# Patient Record
Sex: MALE | Race: BLACK OR AFRICAN AMERICAN | NOT HISPANIC OR LATINO | Employment: OTHER | URBAN - METROPOLITAN AREA
[De-identification: names, ages, dates, MRNs, and addresses within clinical notes are randomized per-mention and may not be internally consistent; named-entity substitution may affect disease eponyms.]

---

## 2019-06-21 ENCOUNTER — HOSPITAL ENCOUNTER (EMERGENCY)
Facility: HOSPITAL | Age: 54
Discharge: HOME | End: 2019-06-21
Attending: EMERGENCY MEDICINE
Payer: MEDICARE

## 2019-06-21 VITALS
TEMPERATURE: 99 F | HEART RATE: 67 BPM | RESPIRATION RATE: 16 BRPM | HEIGHT: 75 IN | OXYGEN SATURATION: 100 % | BODY MASS INDEX: 39.17 KG/M2 | SYSTOLIC BLOOD PRESSURE: 187 MMHG | WEIGHT: 315 LBS | DIASTOLIC BLOOD PRESSURE: 91 MMHG

## 2019-06-21 DIAGNOSIS — T82.524A DISPLACEMENT OF PERIPHERALLY INSERTED CENTRAL VENOUS CATHETER (PICC): Primary | ICD-10-CM

## 2019-06-21 DIAGNOSIS — Z45.2 PICC (PERIPHERALLY INSERTED CENTRAL CATHETER) IN PLACE: ICD-10-CM

## 2019-06-21 DIAGNOSIS — M25.579 ANKLE PAIN: ICD-10-CM

## 2019-06-21 PROCEDURE — 99283 EMERGENCY DEPT VISIT LOW MDM: CPT | Mod: 25

## 2019-06-21 PROCEDURE — 25000003 PHARM REV CODE 250: Performed by: PHYSICIAN ASSISTANT

## 2019-06-21 PROCEDURE — 99285 PR EMERGENCY DEPT VISIT,LEVEL V: ICD-10-PCS | Mod: ,,, | Performed by: EMERGENCY MEDICINE

## 2019-06-21 PROCEDURE — 63600175 PHARM REV CODE 636 W HCPCS: Performed by: PHYSICIAN ASSISTANT

## 2019-06-21 PROCEDURE — 99285 EMERGENCY DEPT VISIT HI MDM: CPT | Mod: ,,, | Performed by: EMERGENCY MEDICINE

## 2019-06-21 RX ORDER — VANCOMYCIN 1.75 GRAM/500 ML IN 0.9 % SODIUM CHLORIDE INTRAVENOUS
1750
Status: COMPLETED | OUTPATIENT
Start: 2019-06-21 | End: 2019-06-21

## 2019-06-21 RX ORDER — ACETAMINOPHEN 325 MG/1
650 TABLET ORAL
Status: COMPLETED | OUTPATIENT
Start: 2019-06-21 | End: 2019-06-21

## 2019-06-21 RX ADMIN — ACETAMINOPHEN 650 MG: 325 TABLET ORAL at 05:06

## 2019-06-21 RX ADMIN — VANCOMYCIN HYDROCHLORIDE 1750 MG: 100 INJECTION, POWDER, LYOPHILIZED, FOR SOLUTION INTRAVENOUS at 07:06

## 2019-06-21 NOTE — ED PROVIDER NOTES
Encounter Date: 6/21/2019       History     Chief Complaint   Patient presents with    Vascular Access Problem     from [facility], sent to ED for PICC line replacement.     55 y/o AAM with history of HTN, hyperlipidemia, DM, currently being treated for R foot osteomyelitis at [facility] presents to the ED via EMS from [facility] due to concern for dislodged PICC line. He has a PICC line in the LUE - he states they were changing the dressing and think they might have pulled out the PICC. He was sent to the ED for imaging to confirm placement of PICC line. PICC was used today without any complication. He reports mild headache and L ankle pain. He denies any trauma to the ankle. He denies f/c, chest pain, SOB, abdominal pain, nausea.     The history is provided by the patient.     Review of patient's allergies indicates:   Allergen Reactions    Adhesive Itching     Past Medical History:   Diagnosis Date    Depression     Diabetes mellitus     Hypertension     Obesity     Osteomyelitis      History reviewed. No pertinent surgical history.  History reviewed. No pertinent family history.  Social History     Tobacco Use    Smoking status: Never Smoker   Substance Use Topics    Alcohol use: Not on file    Drug use: Yes     Frequency: 1.0 times per week     Types: Marijuana     Comment: last use 2 days ago     Review of Systems   Constitutional: Negative for chills and fever.   HENT: Negative for congestion, rhinorrhea and sore throat.    Eyes: Negative for photophobia and visual disturbance.   Respiratory: Negative for shortness of breath.    Cardiovascular: Negative for chest pain.   Gastrointestinal: Negative for abdominal pain, constipation, diarrhea, nausea and vomiting.   Genitourinary: Negative for dysuria and hematuria.   Musculoskeletal: Positive for arthralgias.        PICC line possibly pulled back   Skin: Negative for rash.   Neurological: Negative for light-headedness, numbness and headaches.    Psychiatric/Behavioral: Negative for confusion.       Physical Exam     Initial Vitals [06/21/19 1642]   BP Pulse Resp Temp SpO2   (!) 176/87 60 18 98.5 °F (36.9 °C) 95 %      MAP       --         Physical Exam    Nursing note and vitals reviewed.  Constitutional: He appears well-developed and well-nourished. He is not diaphoretic. No distress.   HENT:   Head: Normocephalic and atraumatic.   Neck: Normal range of motion. Neck supple.   Cardiovascular: Normal rate, regular rhythm and normal heart sounds. Exam reveals no gallop and no friction rub.    No murmur heard.  Pulmonary/Chest: Breath sounds normal. He has no wheezes. He has no rhonchi. He has no rales.   Abdominal: Soft. Bowel sounds are normal. There is no tenderness. There is no rebound and no guarding.   Musculoskeletal: He exhibits no edema or tenderness.        Left ankle: He exhibits normal range of motion. No tenderness.   R foot in boot. PICC line noted to the LUE. No tenderness.    Neurological: He is alert and oriented to person, place, and time.   Skin: Skin is warm and dry. No rash noted. No erythema.   Psychiatric: He has a normal mood and affect.         ED Course   Procedures  Labs Reviewed - No data to display       Imaging Results          X-Ray Ankle Complete Left (Final result)  Result time 06/21/19 18:23:16    Final result by Nina Junior MD (06/21/19 18:23:16)                 Impression:      No fracture or malalignment.      Electronically signed by: Nina Junior  Date:    06/21/2019  Time:    18:23             Narrative:    EXAMINATION:  XR ANKLE COMPLETE 3 VIEW LEFT    CLINICAL HISTORY:  Pain in unspecified ankle and joints of unspecified foot    TECHNIQUE:  AP, lateral and oblique views of the left ankle were performed.    COMPARISON:  None    FINDINGS:  Frontal, oblique and lateral views presented.  The mineralization and joint spaces are normal.  No fracture or erosion or effusion.  There is mild spurring at the  talonavicular joint and the naviculocuneiform joints.  The Achilles tendon appears normal.  No widening of the mortise. No focal soft tissue swelling.                               X-Ray Chest 1 View (Final result)  Result time 06/21/19 18:24:00    Final result by Nina Junior MD (06/21/19 18:24:00)                 Impression:      No failure or pneumonia.      Electronically signed by: Nina Junior  Date:    06/21/2019  Time:    18:24             Narrative:    EXAMINATION:  XR CHEST 1 VIEW    CLINICAL HISTORY:  Encounter for adjustment and management of vascular access device    TECHNIQUE:  Single frontal view of the chest was performed.    COMPARISON:  None    FINDINGS:  Single AP portable view at 17:41.    Left PICC catheter terminates with its tip projecting inferior to the medial left clavicle, precise location unknown on single projection.    Semi lordotic view with modest inspiration.  The heart is probably normal in size allowing for technique.  No pneumothorax or pleural effusion or interstitial edema or nodule or cavitary lesion.  No abdominal free air or fracture.                                 Medical Decision Making:   History:   Old Medical Records: I decided to obtain old medical records.  Independently Interpreted Test(s):   I have ordered and independently interpreted X-rays - see summary below.       <> Summary of X-Ray Reading(s): PICC line tip at the L clavicle  Clinical Tests:   Radiological Study: Ordered and Reviewed       APC / Resident Notes:   55 y/o AAM with history of HTN, hyperlipidemia, DM, currently being treated for R foot osteomyelitis at [facility] presents to the ED via EMS from [facility] due to concern for dislodged PICC line. VSS. PICC line noted to the LUE - no cellulitis, appears dislodged. R foot in boot. L ankle with no tenderness, swelling, erythema. Will obtain CXR to confirm placement of PICC and L ankle xray.     Xray L ankle with no fracture.  CXR shows PICC tip  at the clavicle.     PICC line pulled by Cara Preciado RN.     Discussed with the patient's nurse at [facility]. He needs IV access to return there. Per nurse, he is on vancomycin 1750mg q12 (next dose due at 6pm), rocephin 2g daily (next dose due at 2pm tomorrow). Will place peripheral IV, give dose of IV vanocmycin, send back to [facility]. He will return tomorrow to have PICC line placed.    Spoke with PICC team - they will be here tomorrow starting at 7:30, all orders need to be placed before noon. They were notified that he will be coming tomorrow morning. I do not feel that he needs any further labs or imaging at this time. Stable for discharge.    He was discharged without any new prescriptions.  He will return to the ED tomorrow to have PICC line placed.  All of the patient's questions were answered.  I reviewed the patient's chart and imaging and discussed the case with my supervising physician.                    Clinical Impression:       ICD-10-CM ICD-9-CM   1. Displacement of peripherally inserted central venous catheter (PICC) T82.598A 996.1   2. PICC (peripherally inserted central catheter) in place Z45.2 V58.81   3. Ankle pain M25.579 719.47         Disposition:   Disposition: Discharged  Condition: Stable                        Rossy Guido PA-C  06/21/19 4256

## 2019-06-21 NOTE — DISCHARGE INSTRUCTIONS
Peripheral IV placed in the ED - given 6pm dose of vancomycin. Please send back to the [facility] ED tomorrow morning at 7am to have new PICC line placed.

## 2019-06-21 NOTE — ED TRIAGE NOTES
Vascular Access Problem (from [facility], sent to ED for PICC line replacement.) Pt receives antibiotics daily for infection to right great toe.

## 2019-06-21 NOTE — ED NOTES
Patient identifiers verified and correct for Abel Gibbs  LOC: The patient is awake, alert and aware of environment with an appropriate affect, the patient is oriented x 3 and speaking appropriately.   APPEARANCE: Patient appears comfortable and in no acute distress, patient is clean and well groomed.  SKIN: The skin is warm and dry, color consistent with ethnicity, patient has normal skin turgor and moist mucus membranes, skin intact, no breakdown or bruising noted. Pt has PICC line to left upper arm that needs to be evaluated.  MUSCULOSKELETAL: Patient moving all extremities spontaneously, no swelling noted.  RESPIRATORY: Airway is open and patent, respirations are spontaneous, patient has a normal effort and rate, no accessory muscle use noted, pt placed on continuous pulse ox with O2 sats noted at 97% on room air.  CARDIAC: Pt placed on cardiac monitor. Patient has a normal rate and regular rhythm, no edema noted, capillary refill < 3 seconds.   GASTRO: Soft and non tender to palpation, no distention noted, normoactive bowel sounds present in all four quadrants. Pt states bowel movements have been regular.  : Pt denies any pain or frequency with urination.  NEURO: Pt opens eyes spontaneously, behavior appropriate to situation, follows commands, facial expression symmetrical, bilateral hand grasp equal and even, purposeful motor response noted, normal sensation in all extremities when touched with a finger.

## 2019-06-22 ENCOUNTER — HOSPITAL ENCOUNTER (EMERGENCY)
Facility: HOSPITAL | Age: 54
Discharge: HOME | End: 2019-06-22
Attending: EMERGENCY MEDICINE
Payer: MEDICARE

## 2019-06-22 VITALS
BODY MASS INDEX: 39.17 KG/M2 | OXYGEN SATURATION: 100 % | RESPIRATION RATE: 20 BRPM | HEIGHT: 75 IN | TEMPERATURE: 98 F | HEART RATE: 14 BPM | SYSTOLIC BLOOD PRESSURE: 169 MMHG | DIASTOLIC BLOOD PRESSURE: 86 MMHG | WEIGHT: 315 LBS

## 2019-06-22 DIAGNOSIS — Z95.828 S/P PICC CENTRAL LINE PLACEMENT: Primary | ICD-10-CM

## 2019-06-22 DIAGNOSIS — M25.572 CHRONIC PAIN OF LEFT ANKLE: ICD-10-CM

## 2019-06-22 DIAGNOSIS — G89.29 CHRONIC PAIN OF LEFT ANKLE: ICD-10-CM

## 2019-06-22 LAB
ANION GAP SERPL CALC-SCNC: 8 MMOL/L (ref 8–16)
BASOPHILS # BLD AUTO: 0.07 K/UL (ref 0–0.2)
BASOPHILS NFR BLD: 1.3 % (ref 0–1.9)
BUN SERPL-MCNC: 16 MG/DL (ref 6–20)
CALCIUM SERPL-MCNC: 8.8 MG/DL (ref 8.7–10.5)
CHLORIDE SERPL-SCNC: 104 MMOL/L (ref 95–110)
CO2 SERPL-SCNC: 26 MMOL/L (ref 23–29)
CREAT SERPL-MCNC: 1 MG/DL (ref 0.5–1.4)
DIFFERENTIAL METHOD: ABNORMAL
EOSINOPHIL # BLD AUTO: 0.5 K/UL (ref 0–0.5)
EOSINOPHIL NFR BLD: 8.2 % (ref 0–8)
ERYTHROCYTE [DISTWIDTH] IN BLOOD BY AUTOMATED COUNT: 14.4 % (ref 11.5–14.5)
EST. GFR  (AFRICAN AMERICAN): >60 ML/MIN/1.73 M^2
EST. GFR  (NON AFRICAN AMERICAN): >60 ML/MIN/1.73 M^2
GLUCOSE SERPL-MCNC: 156 MG/DL (ref 70–110)
HCT VFR BLD AUTO: 32.8 % (ref 40–54)
HGB BLD-MCNC: 10.5 G/DL (ref 14–18)
IMM GRANULOCYTES # BLD AUTO: 0.01 K/UL (ref 0–0.04)
IMM GRANULOCYTES NFR BLD AUTO: 0.2 % (ref 0–0.5)
LYMPHOCYTES # BLD AUTO: 2.3 K/UL (ref 1–4.8)
LYMPHOCYTES NFR BLD: 41.4 % (ref 18–48)
MCH RBC QN AUTO: 29 PG (ref 27–31)
MCHC RBC AUTO-ENTMCNC: 32 G/DL (ref 32–36)
MCV RBC AUTO: 91 FL (ref 82–98)
MONOCYTES # BLD AUTO: 0.5 K/UL (ref 0.3–1)
MONOCYTES NFR BLD: 9.5 % (ref 4–15)
NEUTROPHILS # BLD AUTO: 2.2 K/UL (ref 1.8–7.7)
NEUTROPHILS NFR BLD: 39.4 % (ref 38–73)
NRBC BLD-RTO: 0 /100 WBC
PLATELET # BLD AUTO: 223 K/UL (ref 150–350)
PMV BLD AUTO: 9.8 FL (ref 9.2–12.9)
POTASSIUM SERPL-SCNC: 4 MMOL/L (ref 3.5–5.1)
RBC # BLD AUTO: 3.62 M/UL (ref 4.6–6.2)
SODIUM SERPL-SCNC: 138 MMOL/L (ref 136–145)
WBC # BLD AUTO: 5.6 K/UL (ref 3.9–12.7)

## 2019-06-22 PROCEDURE — 99284 EMERGENCY DEPT VISIT MOD MDM: CPT | Mod: ,,, | Performed by: PHYSICIAN ASSISTANT

## 2019-06-22 PROCEDURE — 85025 COMPLETE CBC W/AUTO DIFF WBC: CPT

## 2019-06-22 PROCEDURE — 99284 EMERGENCY DEPT VISIT MOD MDM: CPT | Mod: 25

## 2019-06-22 PROCEDURE — 25000003 PHARM REV CODE 250: Performed by: PHYSICIAN ASSISTANT

## 2019-06-22 PROCEDURE — 80048 BASIC METABOLIC PNL TOTAL CA: CPT

## 2019-06-22 PROCEDURE — 99284 PR EMERGENCY DEPT VISIT,LEVEL IV: ICD-10-PCS | Mod: ,,, | Performed by: PHYSICIAN ASSISTANT

## 2019-06-22 PROCEDURE — C1751 CATH, INF, PER/CENT/MIDLINE: HCPCS

## 2019-06-22 PROCEDURE — 36573 INSJ PICC RS&I 5 YR+: CPT

## 2019-06-22 PROCEDURE — 76937 US GUIDE VASCULAR ACCESS: CPT

## 2019-06-22 RX ORDER — LIDOCAINE 50 MG/G
1 PATCH TOPICAL
Status: DISCONTINUED | OUTPATIENT
Start: 2019-06-22 | End: 2019-06-22 | Stop reason: HOSPADM

## 2019-06-22 RX ORDER — SODIUM CHLORIDE 0.9 % (FLUSH) 0.9 %
10 SYRINGE (ML) INJECTION
Status: DISCONTINUED | OUTPATIENT
Start: 2019-06-22 | End: 2019-06-22 | Stop reason: HOSPADM

## 2019-06-22 RX ORDER — DICLOFENAC SODIUM 10 MG/G
2 GEL TOPICAL 4 TIMES DAILY
Qty: 100 G | Refills: 0 | Status: ON HOLD | OUTPATIENT
Start: 2019-06-22 | End: 2023-02-10 | Stop reason: HOSPADM

## 2019-06-22 RX ORDER — SODIUM CHLORIDE 0.9 % (FLUSH) 0.9 %
10 SYRINGE (ML) INJECTION EVERY 6 HOURS
Status: DISCONTINUED | OUTPATIENT
Start: 2019-06-22 | End: 2019-06-22 | Stop reason: HOSPADM

## 2019-06-22 RX ORDER — LIDOCAINE 50 MG/G
1 PATCH TOPICAL DAILY
Qty: 15 PATCH | Refills: 0 | Status: ON HOLD | OUTPATIENT
Start: 2019-06-22 | End: 2023-02-10 | Stop reason: HOSPADM

## 2019-06-22 RX ORDER — ACETAMINOPHEN 500 MG
1000 TABLET ORAL
Status: COMPLETED | OUTPATIENT
Start: 2019-06-22 | End: 2019-06-22

## 2019-06-22 RX ADMIN — LIDOCAINE 1 PATCH: 50 PATCH TOPICAL at 09:06

## 2019-06-22 RX ADMIN — ACETAMINOPHEN 1000 MG: 500 TABLET ORAL at 09:06

## 2019-06-22 NOTE — CONSULTS
Double lumen PICC placed to (R) BRACHIALvein.  45 cm in length, 0 cm exposed, and 36 cm arm circumference.  Lot # [id number]

## 2019-06-22 NOTE — DISCHARGE INSTRUCTIONS
Take antibiotics through PICC as instructed.  Return to the ED immediately if you develop fever, uncontrollable vomiting or severe pain around PICC site.    Our goal in the emergency department is to always give you outstanding care and exceptional service. You may receive a survey by mail or e-mail in the next week regarding your experience in our ED. We would greatly appreciate your completing and returning the survey. Your feedback provides us with a way to recognize our staff who give very good care and it helps us learn how to improve when your experience was below our aspiration of excellence.

## 2019-06-22 NOTE — ED NOTES
Pt identifiers checked and accurate with Abel Gibbs    Pt reports to ED for PICC placement. Pt seen in ED yesterday, told to return today for new PICC placement, left brachial PICC removed yesterday. Pt denies pain, swelling, fever, chills, N/V/D.    .LOC: The patient is awake, alert and aware of environment with an appropriate affect, the patient is oriented x 3 and speaking appropriately.  APPEARANCE: Patient resting comfortably and in no acute distress, patient is clean and well groomed  SKIN: The skin is warm and dry, color consistent with ethnicity, patient has normal skin turgor and moist mucus membranes, skin intact. Pt presents with dressing to left upper arm, 22 gauge IV to right hand from last night, flushing without difficulty.   MUSCULOSKELETAL: Patient moving all extremities well, no obvious swelling or deformities noted. Pt ambulates unassisted with steady gait.   RESPIRATORY: Airway is open and patent; respirations are spontaneous, patient has a normal effort and rate, no accessory muscle use noted.   NEUROLOGIC: Eyes open spontaneously, behavior appropriate to situation, follows commands, purposeful motor response noted

## 2019-06-22 NOTE — PROCEDURES
"Abel Gibbs is a 54 y.o. male patient.    Temp: 97.5 °F (36.4 °C) (06/22/19 0802)  Pulse: 63 (06/22/19 0802)  Resp: 18 (06/22/19 0802)  BP: (!) 152/75 (06/22/19 0802)  SpO2: 96 % (06/22/19 0802)  Weight: (!) 152 kg (335 lb) (06/22/19 0802)  Height: 6' 3" (190.5 cm) (06/22/19 0802)    PICC  Date/Time: 6/22/2019 12:29 PM  Performed by: Carina Torres RN  Consent Done: Yes  Time out: Immediately prior to procedure a time out was called to verify the correct patient, procedure, equipment, support staff and site/side marked as required  Indications: med administration and vascular access  Anesthesia: local infiltration  Local anesthetic: lidocaine 1% without epinephrine  Anesthetic Total (mL): 2  Preparation: skin prepped with ChloraPrep  Skin prep agent dried: skin prep agent completely dried prior to procedure  Sterile barriers: all five maximum sterile barriers used - cap, mask, sterile gown, sterile gloves, and large sterile sheet  Hand hygiene: hand hygiene performed prior to central venous catheter insertion  Location details: right brachial  Catheter type: double lumen  Catheter size: 5 Fr  Catheter Length: 45cm    Ultrasound guidance: yes  Vessel Caliber: medium and patent, compressibility normal  Vascular Doppler: not done  Needle advanced into vessel with real time Ultrasound guidance.  Guidewire confirmed in vessel.  Image recorded and saved.  Sterile sheath used.  no esophageal manometryNumber of attempts: 1  Post-procedure: blood return through all ports, chlorhexidine patch and sterile dressing applied  Specimens: No  Implants: No  Assessment: placement verified by x-ray  Complications: none          Linda Cordoba  6/22/2019  "

## 2019-06-22 NOTE — ED PROVIDER NOTES
Encounter Date: 6/22/2019       History     Chief Complaint   Patient presents with    Vascular Access Problem     seen here last night told to come to er  this morning and get pic line inserted     Mr Gibbs is 54yoM who presents for PICC line placement; pertinent PMHx PICC line for IV antibiotics 2/2 osteomyelitis of right toe, DM 2, HTN, obesity.  Patient was seen in  see ED yesterday for PICC line dislodgement; PICC line was found to be in left subclavian vein.  PICC was pulled, peripheral IV was placed and he was told to come back today for PICC line placement.  This is confirmed by provider with PICC team.  Patient has no complaints, was able to receive IV antibiotic infusion yesterday.  He has not yet received his infusion today.  Denies fever/chills, nausea/vomiting.  The patients available PMH, PSH, Social History, medications, allergies, and triage vital signs were reviewed just prior to their medical evaluation.          Review of patient's allergies indicates:   Allergen Reactions    Adhesive Itching     Past Medical History:   Diagnosis Date    Depression     Diabetes mellitus     Hypertension     Obesity     Osteomyelitis      History reviewed. No pertinent surgical history.  History reviewed. No pertinent family history.  Social History     Tobacco Use    Smoking status: Never Smoker   Substance Use Topics    Alcohol use: Not on file    Drug use: Yes     Frequency: 1.0 times per week     Types: Marijuana     Comment: last use 2 days ago     Review of Systems   Constitutional: Negative for chills and fever.   Respiratory: Negative for shortness of breath.    Cardiovascular: Negative for chest pain.   Gastrointestinal: Negative for nausea and vomiting.   Skin: Negative for color change and rash.   Neurological: Negative for weakness.       Physical Exam     Initial Vitals [06/22/19 0802]   BP Pulse Resp Temp SpO2   (!) 152/75 63 18 97.5 °F (36.4 °C) 96 %      MAP       --         Physical  Exam    Vitals reviewed.  Constitutional: He appears well-developed and well-nourished. He is not diaphoretic. No distress.   Well-appearing male in NAD, VSS, afebrile, 96% on RA.     HENT:   Head: Normocephalic and atraumatic.   Right Ear: External ear normal.   Left Ear: External ear normal.   Nose: Nose normal.   Eyes: Conjunctivae and EOM are normal. Pupils are equal, round, and reactive to light.   Cardiovascular: Normal rate, regular rhythm and intact distal pulses.   Pulmonary/Chest: Breath sounds normal.   Musculoskeletal:   Right walking boot in place  Left ankle is unremarkable, no pain with AROM, minimal pain with ambulation, no bony tenderness, neurovascular intact (chronic left ankle pain)   Neurological: He is alert and oriented to person, place, and time. He has normal strength. No cranial nerve deficit or sensory deficit.   Skin: Skin is warm and dry. Capillary refill takes less than 2 seconds. No rash noted. No erythema. No pallor.   Peripheral IV in right hand, no surrounding erythema induration or fluctuance   Psychiatric: He has a normal mood and affect. His behavior is normal. Judgment and thought content normal.         ED Course   Procedures  Labs Reviewed   BASIC METABOLIC PANEL - Abnormal; Notable for the following components:       Result Value    Glucose 156 (*)     All other components within normal limits   CBC W/ AUTO DIFFERENTIAL - Abnormal; Notable for the following components:    RBC 3.62 (*)     Hemoglobin 10.5 (*)     Hematocrit 32.8 (*)     Eosinophil% 8.2 (*)     All other components within normal limits          Imaging Results          X-Ray Chest 1 View for PICC_Central line (Final result)  Result time 06/22/19 13:17:46    Final result by Mike Johnston MD (06/22/19 13:17:46)                 Impression:      Interval right-sided PICC line with tip overlying the mid SVC.  Otherwise, no change or radiographic acute process seen.      Electronically signed by: Mike Johnston  MD  Date:    06/22/2019  Time:    13:17             Narrative:    EXAMINATION:  XR CHEST 1 VIEW    CLINICAL HISTORY:  Evaluate PICC line placement;    TECHNIQUE:  Frontal chest radiograph.    COMPARISON:  Chest radiograph 1 day prior    FINDINGS:  Interval placement of right-sided PICC line with tip overlying the mid SVC.  No large pneumothorax or new focal opacity.  Cardiomediastinal silhouette is midline and within normal limits.  Pulmonary vasculature and hilar regions are within normal limits.  No acute osseous process seen.  PA and lateral views can be obtained.                                 Medical Decision Making:   History:   Old Medical Records: I decided to obtain old medical records.  Old Records Summarized: records from clinic visits and records from previous admission(s).  Initial Assessment:   Patient returns for PICC line placement, as confirmed by PICC team yesterday.  No changes to PIP, VSS, afebrile.  Also mentions chronic left ankle pain that improves with Tylenol.  Differential Diagnosis:   DDx arthritic pain, improper sole support. Physical exam and history taking lower clinical suspicion for DVT of RUE, bacteremia.  Clinical Tests:   Lab Tests: Ordered and Reviewed  Radiological Study: Ordered and Reviewed  ED Management:  PICC team consulted and will place on line at the bedside.  Requesting CBC and BMP.  Update:  PICC successfully placed and confirmed by x-ray.  Given Voltaren gel and lidocaine patches as this improved ankle pain. Recommend regular follow-up PCP. Patient agreed to plan of care and voiced understanding. Discharged in stable condition with strict ED return precautions.    Aviva De Santiago PA-C  06/22/2019    I discussed the following case, diagnosis and plan of care with attending physician.                        Clinical Impression:       ICD-10-CM ICD-9-CM   1. S/P PICC central line placement Z95.828 V45.89   2. Chronic pain of left ankle M25.572 719.47    G89.29 338.29          Disposition:   Disposition: Discharged  Condition: Stable                        Aviva De Santiago PA-C  06/22/19 0159

## 2019-06-27 ENCOUNTER — HOSPITAL ENCOUNTER (EMERGENCY)
Facility: HOSPITAL | Age: 54
Discharge: HOME | End: 2019-06-27
Attending: EMERGENCY MEDICINE
Payer: MEDICARE

## 2019-06-27 VITALS
TEMPERATURE: 98 F | DIASTOLIC BLOOD PRESSURE: 88 MMHG | RESPIRATION RATE: 16 BRPM | SYSTOLIC BLOOD PRESSURE: 138 MMHG | HEART RATE: 68 BPM | OXYGEN SATURATION: 98 % | BODY MASS INDEX: 39.17 KG/M2 | WEIGHT: 315 LBS | HEIGHT: 75 IN

## 2019-06-27 DIAGNOSIS — Z95.828 S/P PICC CENTRAL LINE PLACEMENT: Primary | ICD-10-CM

## 2019-06-27 LAB
ANION GAP SERPL CALC-SCNC: 8 MMOL/L (ref 8–16)
BASOPHILS # BLD AUTO: 0.06 K/UL (ref 0–0.2)
BASOPHILS NFR BLD: 1.2 % (ref 0–1.9)
BUN SERPL-MCNC: 21 MG/DL (ref 6–20)
CALCIUM SERPL-MCNC: 9.7 MG/DL (ref 8.7–10.5)
CHLORIDE SERPL-SCNC: 103 MMOL/L (ref 95–110)
CO2 SERPL-SCNC: 29 MMOL/L (ref 23–29)
CREAT SERPL-MCNC: 1 MG/DL (ref 0.5–1.4)
DIFFERENTIAL METHOD: ABNORMAL
EOSINOPHIL # BLD AUTO: 0.4 K/UL (ref 0–0.5)
EOSINOPHIL NFR BLD: 7.4 % (ref 0–8)
ERYTHROCYTE [DISTWIDTH] IN BLOOD BY AUTOMATED COUNT: 14.6 % (ref 11.5–14.5)
EST. GFR  (AFRICAN AMERICAN): >60 ML/MIN/1.73 M^2
EST. GFR  (NON AFRICAN AMERICAN): >60 ML/MIN/1.73 M^2
GLUCOSE SERPL-MCNC: 93 MG/DL (ref 70–110)
HCT VFR BLD AUTO: 33.2 % (ref 40–54)
HGB BLD-MCNC: 10.7 G/DL (ref 14–18)
IMM GRANULOCYTES # BLD AUTO: 0.02 K/UL (ref 0–0.04)
IMM GRANULOCYTES NFR BLD AUTO: 0.4 % (ref 0–0.5)
LYMPHOCYTES # BLD AUTO: 2 K/UL (ref 1–4.8)
LYMPHOCYTES NFR BLD: 40 % (ref 18–48)
MCH RBC QN AUTO: 28.8 PG (ref 27–31)
MCHC RBC AUTO-ENTMCNC: 32.2 G/DL (ref 32–36)
MCV RBC AUTO: 89 FL (ref 82–98)
MONOCYTES # BLD AUTO: 0.5 K/UL (ref 0.3–1)
MONOCYTES NFR BLD: 11.1 % (ref 4–15)
NEUTROPHILS # BLD AUTO: 1.9 K/UL (ref 1.8–7.7)
NEUTROPHILS NFR BLD: 39.9 % (ref 38–73)
NRBC BLD-RTO: 0 /100 WBC
PLATELET # BLD AUTO: 206 K/UL (ref 150–350)
PMV BLD AUTO: 10.6 FL (ref 9.2–12.9)
POTASSIUM SERPL-SCNC: 4.3 MMOL/L (ref 3.5–5.1)
RBC # BLD AUTO: 3.72 M/UL (ref 4.6–6.2)
SODIUM SERPL-SCNC: 140 MMOL/L (ref 136–145)
WBC # BLD AUTO: 4.87 K/UL (ref 3.9–12.7)

## 2019-06-27 PROCEDURE — 99284 EMERGENCY DEPT VISIT MOD MDM: CPT

## 2019-06-27 PROCEDURE — 99284 PR EMERGENCY DEPT VISIT,LEVEL IV: ICD-10-PCS | Mod: ,,, | Performed by: EMERGENCY MEDICINE

## 2019-06-27 PROCEDURE — 85025 COMPLETE CBC W/AUTO DIFF WBC: CPT

## 2019-06-27 PROCEDURE — 99284 EMERGENCY DEPT VISIT MOD MDM: CPT | Mod: ,,, | Performed by: EMERGENCY MEDICINE

## 2019-06-27 PROCEDURE — 80048 BASIC METABOLIC PNL TOTAL CA: CPT

## 2019-06-27 NOTE — ED NOTES
Abel Gibbs, a 54 y.o. male presents to the ED via EMS with CC patient states his picc line is burning, and reports the PICC line has backed out.  Noted the PICC line is noted on the 4 cm nena exposed from skin.        Patient identifiers verified verbally with patient and correct for Abel Gibbs.    LOC/ APPEARANCE: The patient is AAOx4. Pt is speaking appropriately, no slurred speech.  SKIN: Skin is warm dry and intact, and color is consistent with ethnicity. Capillary refill <3 seconds. No breakdown or brusing visible. Mucus membranes moist, acyanotic.Noted PICC line on right upper arm.    RESPIRATORY: Airway is open and patent. Respirations-spontaneous, unlabored, regular rate, equal bilaterally on inspiration and expiration.  CARDIAC: Patient has regular heart rate.  No peripheral edema noted, and patient has no c/o chest pain. Peripheral pulses present equal and strong throughout.  ABDOMEN: Soft and non-tender to palpation with no distention noted. Normoactive bowel sounds x4 quadrants.   NEUROLOGIC: Eyes open spontaneously and facial expression symmetrical. Pt behavior appropriate to situation, and pt follows commands.  MUSCULOSKELETAL: Spontaneous movement noted to all extremities. Hand  equal and leg strength strong +5 bilaterally.   : No complaints of frequency, burning, urgency or blood in the urine. No complaints of incontinence.

## 2019-06-27 NOTE — ED PROVIDER NOTES
Encounter Date: 6/27/2019    SCRIBE #1 NOTE: I, Kenna Stuart, am scribing for, and in the presence of,  Dr. Quick. I have scribed the entire note.       History     Chief Complaint   Patient presents with    Vascular Access Problem     PICC line problem, receives antibiotics for infection in bones     Time patient was seen by the provider: 1:58 PM      The patient is a 54 y.o. male with co-morbidities including: DM, HTN, and Osteomyelitis who presents to the ED with a complaint of vascular access problem with his PICC line at his right arm. States that he is currently living in a nursing home so he can receive the antibiotics for his Osteomyelitis, but he missed his last 2 doses. He explains that at his PICC line has been displaced again during dressing change, nursing home staff states that it is out more, so symptoms to the ED. States that he is having issues with the tape that they used to secure the device, causing irritation to the skin.  He complains of pain at the insertion site of the PICC line.  No redness. No fevers.  No swelling  He also is complaining of severe pain in his left ankle for the last several weeks.  No trauma. He tried taking Percocet with no relief.  He has been recently evaluated for this ankle pain, negative x-rays.  He was recently seen for a left PICC line displacement, at which time PICC was replaced.       The history is provided by the patient and medical records.     Review of patient's allergies indicates:   Allergen Reactions    Adhesive Itching     Past Medical History:   Diagnosis Date    Depression     Diabetes mellitus     Hypertension     Obesity     Osteomyelitis      No past surgical history on file.  No family history on file.  Social History     Tobacco Use    Smoking status: Never Smoker   Substance Use Topics    Alcohol use: Not on file    Drug use: Yes     Frequency: 1.0 times per week     Types: Marijuana     Comment: last use 2 days ago     Review of  Systems     Constitutional:  No Fever, No Chills,   Eyes: No Vision Changes  ENT/Mouth: No sore throat, No rhinorrhea  Cardiovascular:  No Chest Pain, No Palpitations  Respiratory:  No Cough, No SOB  Gastrointestinal:  No Nausea, No Vomiting, No Diarrhea, No abdo pain.  Genitourinary:  No  pain, No dysuria   Musculoskeletal:  + L ankle pain, No Arthralgias, No Back Pain, No Neck Pain, No recent trauma.  Skin: + R arm PICC line pain, No skin Lesions  Neuro:  No Weakness, No Numbness, No Paresthesias, No Dizziness, No Headache        Physical Exam     Initial Vitals [06/27/19 1218]   BP Pulse Resp Temp SpO2   132/82 66 16 98.5 °F (36.9 °C) 98 %      MAP       --         Physical Exam    Nursing note and vitals reviewed.    Physical Exam:  GENERAL APPEARANCE: Well developed, well nourished, in no acute distress.  HENT: Normocephalic, atraumatic    EYES: Sclerae anicteric   NECK: Supple  LUNGS: Breathing comfortably. Speaking in full sentences   NEUROLOGIC: Alert, interacting normally. No facial droop.   MSK: Right arm has a PICC line in place with mild tenderness at the insertion site, no erythema, no significant swelling, no discharge, no foul smell.   Left foot is without focal tenderness and no erythema no significant swelling. Ft is appropriately warm to touch.  Moving all four extremities.  Skin: Warm and dry. No visible rash on exposed areas of skin.    Psych: Mood and affect normal.       ED Course   Procedures  Labs Reviewed   CBC W/ AUTO DIFFERENTIAL - Abnormal; Notable for the following components:       Result Value    RBC 3.72 (*)     Hemoglobin 10.7 (*)     Hematocrit 33.2 (*)     RDW 14.6 (*)     All other components within normal limits   BASIC METABOLIC PANEL - Abnormal; Notable for the following components:    BUN, Bld 21 (*)     All other components within normal limits          Imaging Results          X-Ray Chest AP Portable (Final result)  Result time 06/27/19 15:10:59    Final result by Yuri TOMPKINS  MD Alexa (06/27/19 15:10:59)                 Impression:      1. No acute cardiopulmonary process, grossly stable chronic findings noting hypoventilatory exam.  2. Interval retraction of PICC catheter versus replacement, correlation advised.      Electronically signed by: Yuri Liu MD  Date:    06/27/2019  Time:    15:10             Narrative:    EXAMINATION:  XR CHEST AP PORTABLE    CLINICAL HISTORY:  Picc line check;    TECHNIQUE:  Single frontal view of the chest was performed.    COMPARISON:  06/22/2019    FINDINGS:  Right PICC catheter tip projects over the proximal SVC, retracted somewhat since the previous exam versus replaced.  Correlation recommended.  The cardiomediastinal silhouette is not enlarged.  There is no pleural effusion.  The trachea is midline.  The lungs are symmetrically expanded bilaterally with mildly coarse central hilar interstitial attenuation.  No large focal consolidation seen.  There is no pneumothorax.  The osseous structures are remarkable for degenerative change..                                 Medical Decision Making:   History:   Old Medical Records: I decided to obtain old medical records.  Old Records Summarized: records from clinic visits and records from previous admission(s).  Initial Assessment:   Returning for possible displaced PICC line. PICC line appears to be mostly in at this time. There is some tenderness at the insertion site, but otherwise, no signs of infection. Will check basic labs to look for signs of inflammation. Will xray to see placement of PICC line. Nurse will redress PICC line and see if it flushes and draws back and will discuss with PICC line team. Although, if PICC line is flushing appropriately and is in a satisfactory location, patient may not need replacement.   Clinical Tests:   Lab Tests: Ordered and Reviewed  Radiological Study: Ordered and Reviewed  ED Management:  4:33 PM Discussed with PICC line team given that the patient's  catheter ends in the SVC still and the cath is flushing and drawing back, it is still usable. Dressing changed. Labs are unremarkable and, this combined with physical exam and history, not consistent with infection. Will discharge back to nursing home and recommend continuing infusion as prescribed and return precautions for signs of infection or any new displacement of the line.     MDM Complexity Points:   Problem Points:  1.New problem, with no additional ED work-up planned (maximum of 1) - PICC line displacement/problem    Data Points:  Review or order clinical lab tests, Review or order radiology test, Decision to obtain old records (in the EHR), Review and summarization of old records and Discuss test with performing physician/consulting physician - discussed with PICC line team                    Scribe Attestation:   Scribe #1: I performed the above scribed service and the documentation accurately describes the services I performed. I attest to the accuracy of the note.               Clinical Impression:       ICD-10-CM ICD-9-CM   1. S/P PICC central line placement Z95.828 V45.89         Disposition:   Disposition: Discharged  Condition: Stable                        Edmond Quick MD  06/27/19 2021       Edmond Quick MD  06/27/19 2021

## 2019-06-27 NOTE — DISCHARGE INSTRUCTIONS
Your PICC line is still functional.  It is still in the SVC.  There does not appear to be any signs of infection.  You can continue take your infusions using this PICC line.  There is no need to change at this time.     Please have your nurse to be extremely careful during dressing changes to not further pull out the PICC line.  Your PICC line was redressed in the emergency department.    If you develop any fevers, redness around the area, swelling, the PICC line is not flushing properly, the PICC line is pulled out any further, you need to return to the emergency department for a new PICC line.    Please continue your infusions as prescribed, can be resumed today.

## 2019-07-03 ENCOUNTER — HOSPITAL ENCOUNTER (EMERGENCY)
Facility: HOSPITAL | Age: 54
Discharge: HOME | End: 2019-07-03
Attending: EMERGENCY MEDICINE
Payer: MEDICARE

## 2019-07-03 VITALS
HEART RATE: 64 BPM | DIASTOLIC BLOOD PRESSURE: 75 MMHG | BODY MASS INDEX: 39.17 KG/M2 | SYSTOLIC BLOOD PRESSURE: 152 MMHG | OXYGEN SATURATION: 99 % | WEIGHT: 315 LBS | TEMPERATURE: 98 F | RESPIRATION RATE: 16 BRPM | HEIGHT: 75 IN

## 2019-07-03 DIAGNOSIS — T82.524A DISPLACEMENT OF PERIPHERALLY INSERTED CENTRAL VENOUS CATHETER (PICC): Primary | ICD-10-CM

## 2019-07-03 PROCEDURE — 36578 REPLACE TUNNELED CV CATH: CPT | Mod: 59

## 2019-07-03 PROCEDURE — 99284 EMERGENCY DEPT VISIT MOD MDM: CPT | Mod: 25

## 2019-07-03 PROCEDURE — C1751 CATH, INF, PER/CENT/MIDLINE: HCPCS

## 2019-07-03 PROCEDURE — 36569 INSJ PICC 5 YR+ W/O IMAGING: CPT

## 2019-07-03 PROCEDURE — 99283 PR EMERGENCY DEPT VISIT,LEVEL III: ICD-10-PCS | Mod: ,,, | Performed by: PHYSICIAN ASSISTANT

## 2019-07-03 PROCEDURE — 63600175 PHARM REV CODE 636 W HCPCS: Performed by: PHYSICIAN ASSISTANT

## 2019-07-03 PROCEDURE — 96365 THER/PROPH/DIAG IV INF INIT: CPT | Mod: 59

## 2019-07-03 PROCEDURE — 25000003 PHARM REV CODE 250: Performed by: PHYSICIAN ASSISTANT

## 2019-07-03 PROCEDURE — 96375 TX/PRO/DX INJ NEW DRUG ADDON: CPT | Mod: 59

## 2019-07-03 PROCEDURE — 76937 US GUIDE VASCULAR ACCESS: CPT

## 2019-07-03 PROCEDURE — 99283 EMERGENCY DEPT VISIT LOW MDM: CPT | Mod: ,,, | Performed by: PHYSICIAN ASSISTANT

## 2019-07-03 RX ORDER — VANCOMYCIN 1.75 GRAM/500 ML IN 0.9 % SODIUM CHLORIDE INTRAVENOUS
1750
Status: COMPLETED | OUTPATIENT
Start: 2019-07-03 | End: 2019-07-03

## 2019-07-03 RX ORDER — SODIUM CHLORIDE 0.9 % (FLUSH) 0.9 %
10 SYRINGE (ML) INJECTION
Status: DISCONTINUED | OUTPATIENT
Start: 2019-07-03 | End: 2019-07-03 | Stop reason: HOSPADM

## 2019-07-03 RX ORDER — SODIUM CHLORIDE 0.9 % (FLUSH) 0.9 %
10 SYRINGE (ML) INJECTION EVERY 6 HOURS
Status: DISCONTINUED | OUTPATIENT
Start: 2019-07-03 | End: 2019-07-03 | Stop reason: HOSPADM

## 2019-07-03 RX ADMIN — VANCOMYCIN HYDROCHLORIDE 1750 MG: 100 INJECTION, POWDER, LYOPHILIZED, FOR SOLUTION INTRAVENOUS at 05:07

## 2019-07-03 RX ADMIN — CEFTRIAXONE SODIUM 2 G: 2 INJECTION, SOLUTION INTRAVENOUS at 04:07

## 2019-07-03 NOTE — ED PROVIDER NOTES
Encounter Date: 7/3/2019    SCRIBE #1 NOTE: I, Son Marisa, am scribing for, and in the presence of,  Dr. Banuelos . I have scribed the following portions of the note - the APC attestation.       History     Chief Complaint   Patient presents with    Vascular Access Problem     Pt presents with a dislodged PICC line. Pt has a bone infection and is recieving antibiotic therapy.      54-year-old male with history DM, HTN, and Osteomyelitis who presents to the ED with a complaint of vascular access problem.  Patient is currently on vancomycin and Rocephin for ostial myelitis of the right toe.  Patient states that he believes that his PICC became partially dislodged during a dressing change last night and became further dislodged overnight.  Patient currently at [facility] for therapy.  Confirmed with nursing that patient is currently prescribed vancomycin 1750 mg Q 18h and Rocephin at 2pm daily. Pt last received vanco at 11pm.         Review of patient's allergies indicates:   Allergen Reactions    Adhesive Itching     Past Medical History:   Diagnosis Date    Depression     Diabetes mellitus     Hypertension     Obesity     Osteomyelitis      History reviewed. No pertinent surgical history.  History reviewed. No pertinent family history.  Social History     Tobacco Use    Smoking status: Never Smoker   Substance Use Topics    Alcohol use: Not on file    Drug use: Yes     Frequency: 1.0 times per week     Types: Marijuana     Comment: last use 2 days ago     Review of Systems   Constitutional: Negative for fever.   HENT: Negative for sore throat.    Respiratory: Negative for shortness of breath.    Cardiovascular: Negative for chest pain.        PICC dislodged   Gastrointestinal: Negative for nausea.   Genitourinary: Negative for dysuria.   Musculoskeletal: Negative for back pain.   Skin: Negative for rash.   Neurological: Negative for weakness.   Hematological: Does not bruise/bleed easily.        Physical Exam     Initial Vitals [07/03/19 1317]   BP Pulse Resp Temp SpO2   (!) 147/76 61 18 97.6 °F (36.4 °C) 98 %      MAP       --         Physical Exam    Nursing note and vitals reviewed.  Constitutional: He appears well-developed and well-nourished. He is Obese .  Non-toxic appearance. He does not appear ill. No distress.   HENT:   Head: Normocephalic and atraumatic.   Neck: Normal range of motion. Neck supple.   Cardiovascular: Normal rate and regular rhythm. Exam reveals no gallop, no distant heart sounds and no friction rub.    No murmur heard.  Pulmonary/Chest: Effort normal and breath sounds normal. No accessory muscle usage. No tachypnea. No respiratory distress. He has no decreased breath sounds. He has no wheezes. He has no rhonchi. He has no rales.   Abdominal: He exhibits no distension.   Musculoskeletal:   PICC in R upper arm mostly dislodged. Catheter still inserted into skin.    Neurological: He is alert.   Skin: No rash noted.         ED Course   Procedures  Labs Reviewed - No data to display       Imaging Results          X-Ray Chest 1 View for PICC_Central line (Final result)  Result time 07/03/19 17:06:37    Final result by Jose Lackey Jr., MD (07/03/19 17:06:37)                 Impression:      Satisfactory PICC line placement.      Electronically signed by: Jose Lackey MD  Date:    07/03/2019  Time:    17:06             Narrative:    EXAMINATION:  XR CHEST 1 VIEW    CLINICAL HISTORY:  Evaluate PICC line placement;    TECHNIQUE:  Single frontal view of the chest was performed.    COMPARISON:  June 27, 2019.    FINDINGS:  Right-sided PICC is been removed.  Left-sided PICC catheter overlies the SVC.  Heart size pulmonary vessels are normal.  The lungs are well aerated and clear.  No confluent consolidation or pneumothorax.                                 Medical Decision Making:   History:   Old Medical Records: I decided to obtain old medical records.  Initial Assessment:    Patient presents with dislodged PICC.  Has 11 additional days of IV antibiotic treatment.  ED Management:  PICC team consult it.  Successful replacement of PICC.  Patient given both IV antibiotics in the ED.  Will discharge back to nursing home.            Scribe Attestation:   Scribe #1: I performed the above scribed service and the documentation accurately describes the services I performed. I attest to the accuracy of the note.    Attending Attestation:     Physician Attestation Statement for NP/PA:   I discussed this assessment and plan of this patient with the NP/PA, but I did not personally examine the patient. The face to face encounter was performed by the NP/PA.                     Clinical Impression:       ICD-10-CM ICD-9-CM   1. Displacement of peripherally inserted central venous catheter (PICC) T82.598A 996.1         Disposition:   Disposition: Discharged  Condition: Stable                        Cindy Clay PA-C  07/03/19 2873

## 2019-07-03 NOTE — PROCEDURES
"Abel Gibbs is a 54 y.o. male patient.    Temp: 97.6 °F (36.4 °C) (07/03/19 1317)  Pulse: 61 (07/03/19 1317)  Resp: 18 (07/03/19 1317)  BP: (!) 147/76 (07/03/19 1317)  SpO2: 98 % (07/03/19 1317)  Weight: (!) 143.3 kg (316 lb) (07/03/19 1317)  Height: 6' 3" (190.5 cm) (07/03/19 1317)    PICC  Date/Time: 7/3/2019 4:39 PM  Performed by: Jennifer Govea RN  Assisting provider: Carina Torres RN  Consent Done: Yes  Time out: Immediately prior to procedure a time out was called to verify the correct patient, procedure, equipment, support staff and site/side marked as required  Indications: med administration and vascular access  Anesthesia: local infiltration  Local anesthetic: lidocaine 1% without epinephrine  Anesthetic Total (mL): 2  Preparation: skin prepped with ChloraPrep  Skin prep agent dried: skin prep agent completely dried prior to procedure  Sterile barriers: all five maximum sterile barriers used - cap, mask, sterile gown, sterile gloves, and large sterile sheet  Hand hygiene: hand hygiene performed prior to central venous catheter insertion  Location details: left brachial  Catheter type: double lumen  Catheter size: 5 Fr  Catheter Length: 49 (48 inserted; 1 out)cm    Ultrasound guidance: yes  Vessel Caliber: medium and patent, compressibility normal  Vascular Doppler: not done  Needle advanced into vessel with real time Ultrasound guidance.  Guidewire confirmed in vessel.  Image recorded and saved.  Sterile sheath used.  Number of attempts: 1  Post-procedure: blood return through all ports, chlorhexidine patch and sterile dressing applied  Technical procedures used: 3CG  Specimens: No  Implants: No  Assessment: placement verified by x-ray  Complications: none          Carina Torres  7/3/2019  "

## 2019-07-03 NOTE — PROVIDER PROGRESS NOTES - EMERGENCY DEPT.
Encounter Date: 7/3/2019    ED Physician Progress Notes        Physician Note:   7/3/2019 6:41 PM  I received sign out of this patient from Cindy Clay PA-C.     54-year-old male with PMHx of DM, HTN, and osteomyelitis (receiving vanc + Rocephin via PICC) who presents to the ED with a complaint of vascular access problem. Pt's PICC dislodged during a dressing change last night.    Pt's PICC line replaced in ED. CXR confirmed correct positioning. Final dispo pending patient completing full dose of IV antibiotics in ED and reassessment.     Pt completed antibiotics. He currently has no complaints. Pt is stable for discharge to his nursing facility with instructions to continue IV antibiotics as prescribed. Strict ER return precautions given. Pt expressed understanding and is agreeable with plan.     I have discussed the treatment and management of this patient with my supervising physician, and we agree on the plan of care.      Anitra Maldonado PA-C

## 2019-07-03 NOTE — CONSULTS
Placed double lumen PICC to left brachial vein using u/s guidance.  49 cm in length (48 cm inserted), 40 cm arm circumference and 1 cm exposed.   Lot # [id number].

## 2019-07-03 NOTE — DISCHARGE INSTRUCTIONS
Patient RECEIVED BOTH ANTIBIOTICS (vancomycin and ceftriaxone) in the emergency department.   Successful replacement of PICC line.

## 2019-07-03 NOTE — ED TRIAGE NOTES
"Patient presents w dislodged PICC line. PICC line placed approx 1 month ago. Reports bone infection being treated w IV Vanc per Dr. Vang. Patient states PICC line DRSG has been "raggety"  for approx 1 week. Line was caught on something this this am. Walking boot on RT foot, Rt great toe w wound per patient. Denies fever/ chills. States he is depressed over this situation. PMH significant for controlled diabetes and HTN.  "

## 2019-07-04 NOTE — ED NOTES
The patient is awake, alert and acting age appropriately.    No apparent distress noted. Airway is open and patent.  Respirations with normal effort and rate noted. Explanation of care provided to  patient. No needs at this time. Will continue to monitor.

## 2019-07-08 ENCOUNTER — HOSPITAL ENCOUNTER (EMERGENCY)
Facility: HOSPITAL | Age: 54
Discharge: HOME | End: 2019-07-08
Attending: EMERGENCY MEDICINE
Payer: MEDICARE

## 2019-07-08 VITALS
TEMPERATURE: 98 F | WEIGHT: 315 LBS | DIASTOLIC BLOOD PRESSURE: 68 MMHG | SYSTOLIC BLOOD PRESSURE: 136 MMHG | HEIGHT: 75 IN | BODY MASS INDEX: 39.17 KG/M2 | HEART RATE: 60 BPM | RESPIRATION RATE: 18 BRPM | OXYGEN SATURATION: 97 %

## 2019-07-08 DIAGNOSIS — Z78.9 PROBLEM WITH VASCULAR ACCESS: Primary | ICD-10-CM

## 2019-07-08 PROCEDURE — 36410 VNPNXR 3YR/> PHY/QHP DX/THER: CPT

## 2019-07-08 PROCEDURE — 99282 PR EMERGENCY DEPT VISIT,LEVEL II: ICD-10-PCS | Mod: ,,, | Performed by: NURSE PRACTITIONER

## 2019-07-08 PROCEDURE — C1751 CATH, INF, PER/CENT/MIDLINE: HCPCS

## 2019-07-08 PROCEDURE — 76937 US GUIDE VASCULAR ACCESS: CPT

## 2019-07-08 PROCEDURE — 99283 EMERGENCY DEPT VISIT LOW MDM: CPT

## 2019-07-08 PROCEDURE — 99282 EMERGENCY DEPT VISIT SF MDM: CPT | Mod: ,,, | Performed by: NURSE PRACTITIONER

## 2019-07-08 NOTE — ED PROVIDER NOTES
Encounter Date: 7/8/2019       History     Chief Complaint   Patient presents with    Vascular Access Problem     needing pic line replaced    Ankle Pain     seen for this, tylenol stops pain     HPI     This is a 54-year-old male who is currently living in a [facility] facility for treatment of osteomyelitis of the right foot with IV Rocephin and vancomycin.  Currently has a PICC line and has been seen here in the emergency department several times for dislodgement.  He presents again today after he states he scratched the site of the PICC line last night, accidentally pulling it out.  He denies redness or swelling.  He denies fever or chills. Denies any other problems or concerns at this time.                      Review of patient's allergies indicates:   Allergen Reactions    Adhesive Itching     Past Medical History:   Diagnosis Date    Depression     Diabetes mellitus     Hypertension     Obesity     Osteomyelitis      No past surgical history on file.  No family history on file.  Social History     Tobacco Use    Smoking status: Never Smoker   Substance Use Topics    Alcohol use: Not on file    Drug use: Yes     Frequency: 1.0 times per week     Types: Marijuana     Comment: last use 2 days ago     Review of Systems   Constitutional: Negative for chills and fever.   HENT: Negative for congestion and sinus pressure.    Eyes: Negative for visual disturbance.   Respiratory: Negative for cough, chest tightness and shortness of breath.    Cardiovascular: Negative for chest pain.   Gastrointestinal: Negative for abdominal pain, diarrhea, nausea and vomiting.   Genitourinary: Negative for flank pain. Negative for dysuria.  Musculoskeletal:  Negative for back pain.  Skin:  Positive for osteomyelitis  Neurological: Negative for dizziness. Negative for weakness and numbness.   Psychiatric/Behavioral: Negative for confusion.         Physical Exam     Initial Vitals [07/08/19 1233]   BP Pulse Resp Temp SpO2   (!)  146/80 70 18 98.1 °F (36.7 °C) 97 %      MAP       --         Physical Exam   Nursing note and vitals reviewed.  Constitutional: Patient appears well-developed and well-nourished. Not diaphoretic. No distress.   HENT:   Head: Normocephalic and atraumatic.   Eyes: Conjunctivae are normal. No scleral icterus.   Neck: Normal range of motion. Neck supple.   Cardiovascular: Normal rate, regular rhythm and normal heart sounds.   Pulmonary/Chest: Breath sounds normal. No respiratory distress.  Abdominal: Soft. There is no tenderness.   Musculoskeletal:  Walking boot noted to right foot.    Neurological: Alert and oriented to person, place, and time. Normal strength. No sensory deficit.   Skin:  Dislodged PICC line noted to the left upper extremity.  There is no swelling, erythema, warmth or drainage.  Psychiatric: Normal mood and affect. Thought content normal.       ED Course   Procedures  Labs Reviewed - No data to display       Imaging Results    None          Medical Decision Making:   History:   Old Medical Records: I decided to obtain old medical records.  Differential Diagnosis:   This is a 54-year-old male who is currently being treated with IV antibiotics via PICC line for osteomyelitis of the right foot.  Patient reports that he lives in [place] and his physician is in [facility].  He is currently living in a [facility] facility for treatment.  He is being treated by a non [facility] physician and does not have any of his medical records with him at this time.  After talking to the PICC line team, they have requested that we obtain records indicating when treatment will and as he has been seen multiple times for PICC line replacement without definitive information about his treatment plan.  I did obtain this information via fax, see record included in this chart.  Patient's treatment is scheduled to end on 07/14/2019.  Per Jennifer on the PICC team, because the patient has less than 1 week of IV antibiotic treatment  remaining, he can had a midline placed rather than a PICC line.  This was done per the midline team and patient was discharged back the [facility] facility.  He should continue medications as prescribed and follow up with his physician tomorrow for recheck.                      Clinical Impression:       ICD-10-CM ICD-9-CM   1. Problem with vascular access Z78.9 V49.9         Disposition:   Disposition: Discharged  Condition: Stable                        Kate Moseley NP  07/08/19 2010

## 2019-07-08 NOTE — ED NOTES
Abel Gibbs, a 54 y.o. male presents to the ED via transportation from [facility] with CC PICC line is dislodged.      Patient identifiers verified verbally with patient and correct for Abel Gibbs.

## 2019-07-08 NOTE — CONSULTS
Single lumen 18g x 08cm midline placed to  (R) CEPHALIC vein.  Max dwell date 08.06.19, Lot# [id number]  Needle advances into vein under realtime Ultrasound guidance, image recorded and saved.

## 2019-07-10 ENCOUNTER — HOSPITAL ENCOUNTER (EMERGENCY)
Facility: HOSPITAL | Age: 54
Discharge: HOME | End: 2019-07-10
Attending: EMERGENCY MEDICINE
Payer: MEDICARE

## 2019-07-10 VITALS
WEIGHT: 315 LBS | TEMPERATURE: 98 F | BODY MASS INDEX: 39.17 KG/M2 | HEART RATE: 58 BPM | OXYGEN SATURATION: 95 % | DIASTOLIC BLOOD PRESSURE: 67 MMHG | RESPIRATION RATE: 20 BRPM | HEIGHT: 75 IN | SYSTOLIC BLOOD PRESSURE: 117 MMHG

## 2019-07-10 DIAGNOSIS — Z87.39 HISTORY OF OSTEOMYELITIS: ICD-10-CM

## 2019-07-10 DIAGNOSIS — T82.898A: Primary | ICD-10-CM

## 2019-07-10 LAB — VANCOMYCIN SERPL-MCNC: 7.2 UG/ML

## 2019-07-10 PROCEDURE — 96365 THER/PROPH/DIAG IV INF INIT: CPT

## 2019-07-10 PROCEDURE — 96368 THER/DIAG CONCURRENT INF: CPT

## 2019-07-10 PROCEDURE — 63600175 PHARM REV CODE 636 W HCPCS: Performed by: PHYSICIAN ASSISTANT

## 2019-07-10 PROCEDURE — 96366 THER/PROPH/DIAG IV INF ADDON: CPT

## 2019-07-10 PROCEDURE — 80202 ASSAY OF VANCOMYCIN: CPT

## 2019-07-10 PROCEDURE — 99284 PR EMERGENCY DEPT VISIT,LEVEL IV: ICD-10-PCS | Mod: ,,, | Performed by: EMERGENCY MEDICINE

## 2019-07-10 PROCEDURE — 99284 EMERGENCY DEPT VISIT MOD MDM: CPT | Mod: ,,, | Performed by: EMERGENCY MEDICINE

## 2019-07-10 PROCEDURE — 99284 EMERGENCY DEPT VISIT MOD MDM: CPT | Mod: 25

## 2019-07-10 RX ORDER — VANCOMYCIN 1.75 GRAM/500 ML IN 0.9 % SODIUM CHLORIDE INTRAVENOUS
1750
Status: DISCONTINUED | OUTPATIENT
Start: 2019-07-10 | End: 2019-07-10 | Stop reason: SDUPTHER

## 2019-07-10 RX ADMIN — CEFTRIAXONE 2 G: 2 INJECTION, SOLUTION INTRAVENOUS at 06:07

## 2019-07-10 NOTE — ED NOTES
LOC: The patient is awake, alert and aware of environment with an appropriate affect, the patient is oriented x 3 and speaking appropriately.  APPEARANCE: Patient resting comfortably and in no acute distress, patient is clean and well groomed, patient's clothing is properly fastened.  SKIN: The skin is warm and dry, color consistent with ethnicity, patient has normal skin turgor and moist mucus membranes, skin intact, no breakdown or bruising noted.  Pt with midline to right upper arm dislodged, catheter intact.  MUSCULOSKELETAL: Patient moving all extremities spontaneously, no obvious swelling or deformities noted.  Pt with orthopedic boot to right foot.    RESPIRATORY: Airway is open and patent, respirations are spontaneous, patient has a normal effort and rate, no accessory muscle use noted.

## 2019-07-10 NOTE — ED NOTES
Pt reports that he when the nurse at [facility] attempted to infuse antibiotic to right upper arm midline, line found to be completely out.  Unable to infuse antibiotics.  Pt here for line replacement.

## 2019-07-10 NOTE — ED PROVIDER NOTES
Encounter Date: 7/10/2019       History     Chief Complaint   Patient presents with    Vascular Access Problem     'came out again     54-year-old male with PMHx of DM, HTN, and osteomyelitis (Receiving Vanc + Rocephin via midline IV) who presents to the ED with a complaint of vascular access problem. Per pt, his midline was pulled out at his nursing facility this morning, prior to receiving his morning dose of Vancomycin. He receives vancomycin 1750mg q12 (at 6 AM and 6 PM) and Rocephin 2g daily (at 2 PM). He states that his PICC line has been dislodged multiple times in the past, requiring replacement in the ED. He denies any current complaints and feels as his baseline. Denies fevers, chills, chest pain, shortness of breath, back pain, abdominal pain, nausea, vomiting, diarrhea, blood in stool, melena, dysuria, urinary frequency, numbness, weakness, or any other medical complaints.    The history is provided by the patient.     Review of patient's allergies indicates:   Allergen Reactions    Adhesive Itching     Past Medical History:   Diagnosis Date    Depression     Diabetes mellitus     Hypertension     Obesity     Osteomyelitis      No past surgical history on file.  No family history on file.  Social History     Tobacco Use    Smoking status: Never Smoker   Substance Use Topics    Alcohol use: Not on file    Drug use: Yes     Frequency: 1.0 times per week     Types: Marijuana     Comment: last use 2 days ago     Review of Systems   Constitutional: Negative for chills, fatigue and fever.   HENT: Negative for congestion and sore throat.    Eyes: Negative for visual disturbance.   Respiratory: Negative for shortness of breath.    Cardiovascular: Negative for chest pain.   Gastrointestinal: Negative for abdominal pain, blood in stool, constipation, diarrhea, nausea and vomiting.   Genitourinary: Negative for dysuria and frequency.   Musculoskeletal: Negative for back pain.   Skin: Negative for color  change.   Neurological: Negative for weakness, numbness and headaches.   Hematological: Does not bruise/bleed easily.   Psychiatric/Behavioral: Negative for confusion.       Physical Exam     Initial Vitals [07/10/19 1617]   BP Pulse Resp Temp SpO2   (!) 149/79 69 18 97.7 °F (36.5 °C) 99 %      MAP       --         Physical Exam    Nursing note and vitals reviewed.  Constitutional: He appears well-developed and well-nourished.   HENT:   Head: Normocephalic and atraumatic.   Eyes: Conjunctivae and EOM are normal.   Neck: Normal range of motion. Neck supple.   Cardiovascular: Normal rate, regular rhythm and normal heart sounds.   Pulmonary/Chest: Breath sounds normal. He has no wheezes. He has no rhonchi. He has no rales.   Abdominal: Soft. There is no tenderness. There is no rebound and no guarding.   Musculoskeletal: Normal range of motion. He exhibits no edema or tenderness.   No midline C, T, or L spinal tenderness to palpation. Right boot in place.    Neurological: He is alert and oriented to person, place, and time. He has normal strength.   Skin: Skin is warm.   Psychiatric: He has a normal mood and affect.         ED Course   Procedures  Labs Reviewed   VANCOMYCIN, RANDOM          Imaging Results    None          Medical Decision Making:   History:   Old Medical Records: I decided to obtain old medical records.  Old Records Summarized: records from clinic visits.       <> Summary of Records: Patient with multiple ED visits for similar complaints. Most recently on 07/08/2019, midline IV replaced by PICC team.   Initial Assessment:   54-year-old male with PMHx of DM, HTN, and osteomyelitis (Receiving Vanc + Rocephin via midline IV) who presents to the ED with a complaint of vascular access problem. Pt's midline was pulled out by nursing home staff this AM. He receives vancomycin 1750mg q12 (at 6 AM and 6 PM) and Rocephin 2g daily (at 2 PM). Denies any current complaints. Vital signs are stable. RRR. Lungs CTA  bilaterally. Abdomen soft and nontender. Neurovascularly intact.   Differential Diagnosis:   DDx includes but is not limited vascular access problem,/midline, osteomyelitis, need for IV antibiotics.  ED Management:  As it is after 5 PM, PICC team unavailable to replace midline IV at this time. Will place a peripheral IV, which the patient may be discharged back to his nursing facility with. As patient missed 2 doses of 1750 mg IV vancomycin, will give higher dose in the ED.  Will administer 2500 mg IV vancomycin and 2 mg IV Rocephin in the ED.     Pt received full course of antibiotics. He continues to have no complaints. He is stable for discharge with instructions to return to the ER tomorrow to have PICC line placed. Strict ER return precautions given. All questions answered. Pt expressed understanding and is agreeable with plan.     I have discussed the treatment and management of this patient with my supervising physician, and we agree on the plan of care.      Anitra Maldonado PA-C                        Clinical Impression:       ICD-10-CM ICD-9-CM   1. Accidental loss of central line, initial encounter T82.898A [id number]   2. History of osteomyelitis Z87.39 V13.59         Disposition:   Disposition: Discharged  Condition: Stable                        Anitra Maldonado PA-C  07/10/19 2143

## 2019-07-11 ENCOUNTER — HOSPITAL ENCOUNTER (EMERGENCY)
Facility: HOSPITAL | Age: 54
Discharge: HOME | End: 2019-07-11
Attending: EMERGENCY MEDICINE
Payer: MEDICARE

## 2019-07-11 VITALS
HEART RATE: 69 BPM | TEMPERATURE: 98 F | WEIGHT: 315 LBS | HEIGHT: 75 IN | SYSTOLIC BLOOD PRESSURE: 138 MMHG | DIASTOLIC BLOOD PRESSURE: 71 MMHG | RESPIRATION RATE: 16 BRPM | OXYGEN SATURATION: 99 % | BODY MASS INDEX: 39.17 KG/M2

## 2019-07-11 DIAGNOSIS — Z87.39 HISTORY OF OSTEOMYELITIS: ICD-10-CM

## 2019-07-11 DIAGNOSIS — M25.572 ACUTE LEFT ANKLE PAIN: ICD-10-CM

## 2019-07-11 DIAGNOSIS — Z78.9 PROBLEM WITH VASCULAR ACCESS: Primary | ICD-10-CM

## 2019-07-11 LAB
ANION GAP SERPL CALC-SCNC: 12 MMOL/L (ref 8–16)
BUN SERPL-MCNC: 23 MG/DL (ref 6–20)
CALCIUM SERPL-MCNC: 9.7 MG/DL (ref 8.7–10.5)
CHLORIDE SERPL-SCNC: 102 MMOL/L (ref 95–110)
CO2 SERPL-SCNC: 24 MMOL/L (ref 23–29)
CREAT SERPL-MCNC: 1 MG/DL (ref 0.5–1.4)
CREAT SERPL-MCNC: 1 MG/DL (ref 0.5–1.4)
EST. GFR  (AFRICAN AMERICAN): >60 ML/MIN/1.73 M^2
EST. GFR  (AFRICAN AMERICAN): >60 ML/MIN/1.73 M^2
EST. GFR  (NON AFRICAN AMERICAN): >60 ML/MIN/1.73 M^2
EST. GFR  (NON AFRICAN AMERICAN): >60 ML/MIN/1.73 M^2
GLUCOSE SERPL-MCNC: 114 MG/DL (ref 70–110)
POTASSIUM SERPL-SCNC: 4.6 MMOL/L (ref 3.5–5.1)
SODIUM SERPL-SCNC: 138 MMOL/L (ref 136–145)
VANCOMYCIN TROUGH SERPL-MCNC: 11 UG/ML (ref 10–22)

## 2019-07-11 PROCEDURE — 99284 EMERGENCY DEPT VISIT MOD MDM: CPT | Mod: 25

## 2019-07-11 PROCEDURE — 25000003 PHARM REV CODE 250: Performed by: PHYSICIAN ASSISTANT

## 2019-07-11 PROCEDURE — 76937 US GUIDE VASCULAR ACCESS: CPT | Mod: 59

## 2019-07-11 PROCEDURE — 36410 VNPNXR 3YR/> PHY/QHP DX/THER: CPT

## 2019-07-11 PROCEDURE — 82565 ASSAY OF CREATININE: CPT

## 2019-07-11 PROCEDURE — 80048 BASIC METABOLIC PNL TOTAL CA: CPT

## 2019-07-11 PROCEDURE — 36569 INSJ PICC 5 YR+ W/O IMAGING: CPT

## 2019-07-11 PROCEDURE — 99284 PR EMERGENCY DEPT VISIT,LEVEL IV: ICD-10-PCS | Mod: ,,, | Performed by: PHYSICIAN ASSISTANT

## 2019-07-11 PROCEDURE — C1751 CATH, INF, PER/CENT/MIDLINE: HCPCS

## 2019-07-11 PROCEDURE — 80202 ASSAY OF VANCOMYCIN: CPT

## 2019-07-11 PROCEDURE — 99284 EMERGENCY DEPT VISIT MOD MDM: CPT | Mod: ,,, | Performed by: PHYSICIAN ASSISTANT

## 2019-07-11 RX ORDER — OXYCODONE AND ACETAMINOPHEN 5; 325 MG/1; MG/1
1 TABLET ORAL
Status: COMPLETED | OUTPATIENT
Start: 2019-07-11 | End: 2019-07-11

## 2019-07-11 RX ORDER — VANCOMYCIN 1.75 GRAM/500 ML IN 0.9 % SODIUM CHLORIDE INTRAVENOUS
1750
Status: DISCONTINUED | OUTPATIENT
Start: 2019-07-11 | End: 2019-07-11

## 2019-07-11 RX ADMIN — OXYCODONE HYDROCHLORIDE AND ACETAMINOPHEN 1 TABLET: 5; 325 TABLET ORAL at 12:07

## 2019-07-11 NOTE — DISCHARGE INSTRUCTIONS
You received your full dose of antibiotics in the ER tonight. You should return to the Emergency Department tomorrow morning for your antibiotics and PICC line placement. Please return to the ER if you develop fevers, chills, or any other concerning symptoms.

## 2019-07-11 NOTE — ED TRIAGE NOTES
"Patient seen in ED yesterday after PICC line pulled out, started PIV and gave Vancomycin and Rocephin in ED yesterday. Was told to return today for PICC line placement, IV meds to be completed Monday. Percocet 10 mg and Gabapentin 800mg this am. States "its time for some more"   "

## 2019-07-11 NOTE — ED NOTES
Patient identifiers verified and correct for Mr Gibbs  C/C: Needs midline for IV antibiotics SEE NN  APPEARANCE: awake and alert in NAD.  SKIN: warm, dry and intact. No breakdown or bruising.  MUSCULOSKELETAL: Patient moving all extremities spontaneously, no obvious swelling or deformities noted. Ambulates with assist, arrives with boot  And dressing to RLE  RESPIRATORY: Denies shortness of breath.Respirations unlabored.   CARDIAC: Denies CP, 2+ distal pulses; no peripheral edema  ABDOMEN: S/ND/NT, Denies nausea  : voids spontaneously, denies difficulty  Neurologic: AAO x 4; follows commands equal strength in all extremities; denies numbness/tingling. Denies dizziness Positive gen weakness

## 2019-07-11 NOTE — CONSULTS
Single lumen 18G x 8CM midline placed right cephalic vein. Max dwell date 8/9/19, Lot# [id number].  Needle advanced into the vessel under real time ultrasound guidance.  Image recorded and saved.

## 2019-07-13 NOTE — ED PROVIDER NOTES
Encounter Date: 7/11/2019       History     Chief Complaint   Patient presents with    Vascular Access Problem     Pt needs PICC line placed for antibiotics.     55 y/o male with history of DM, HTN, obesity, osteomyelitis presents to the ER to have his midline catheter replaced.  The patient reports that his midline catheter became dislodged in his sleep yesterday and was pulled out at his nursing facility yesterday morning.  He came to the ER yesterday evening , but PICC team was not available after 5 pm. He was advised to return today for midline placement.  Patient is currently being treated for osteomyelitis of the right foot and is scheduled to complete antibiotic therapy on 7/14.  He receives vancomycin 1750mg q12 (at 6 AM and 6 PM) and Rocephin 2g daily (at 2 PM). He last received 2500 mg Vancomycin and 2 mg Rocephin IV at 6 pm yesterday in the ER due to missed doses. Patient has additional complaint of ongoing left ankle pain.  He denies injury and says that he had an xray last month and was told his pain was due to putting pressure on this ankle while he had a boot on the right ankle.  He is taking percocet q 6 hours for pain and last dose was at 7 am this morning.  He denies redness or swelling of the ankle, fever, or additional complaints.          Review of patient's allergies indicates:   Allergen Reactions    Adhesive Itching     Past Medical History:   Diagnosis Date    Depression     Diabetes mellitus     Hypertension     Obesity     Osteomyelitis      History reviewed. No pertinent surgical history.  History reviewed. No pertinent family history.  Social History     Tobacco Use    Smoking status: Never Smoker    Smokeless tobacco: Never Used   Substance Use Topics    Alcohol use: Yes     Comment: occas    Drug use: Yes     Frequency: 1.0 times per week     Types: Marijuana     Comment: last use 2 days ago     Review of Systems   Constitutional: Negative for chills and fever.    Respiratory: Negative for shortness of breath.    Cardiovascular: Negative for chest pain.   Gastrointestinal: Negative for nausea and vomiting.   Musculoskeletal: Positive for arthralgias. Negative for back pain and joint swelling.   Skin: Negative for rash.   Neurological: Negative for weakness.   Hematological: Does not bruise/bleed easily.       Physical Exam     Initial Vitals [07/11/19 0934]   BP Pulse Resp Temp SpO2   (!) 156/90 81 20 97.8 °F (36.6 °C) 98 %      MAP       --         Physical Exam    Nursing note and vitals reviewed.  Constitutional: He appears well-developed and well-nourished.   HENT:   Head: Atraumatic.   Eyes: Conjunctivae and EOM are normal. Pupils are equal, round, and reactive to light.   Neck: Normal range of motion. Neck supple.   Cardiovascular: Normal rate and regular rhythm.   Pulmonary/Chest: Breath sounds normal. No respiratory distress. He has no wheezes. He has no rhonchi. He has no rales.   Musculoskeletal:        Left ankle: He exhibits normal range of motion, no swelling and normal pulse. No tenderness.   Dressing and boot in place to right ankle/ foot.   Neurological: He is alert and oriented to person, place, and time.   Skin: No rash noted.   Psychiatric: He has a normal mood and affect.         ED Course   Procedures  Labs Reviewed   BASIC METABOLIC PANEL - Abnormal; Notable for the following components:       Result Value    Glucose 114 (*)     BUN, Bld 23 (*)     All other components within normal limits    Narrative:     add on BMP order [id number] per Dr. Alvaro Sloan  07/11/2019  16:27    BASIC METABOLIC PANEL   VANCOMYCIN, TROUGH    Narrative:     Collection Instructions:->before 4th dose   CREATININE, SERUM          Imaging Results    None                APC / Resident Notes:   Patient presents to the ER from [facility] to have midline catheter replaced for antibiotic administration.  Last dose of antibiotics was yesterday at 6 pm while in the ED.    PICC team is consulted at 11:10 am and will place patient on list for midline catheter placement asap.  I have ordered peripheral IV and dose of IV Vancomycin in the ED.  Pharmacy has contacted nurse at 1330 and requested Vanc trough and creatinine prior to patient receiving additional vanc due to large dose of 2500 mg IV given yesterday.  Midline cath is placed by PICC team at 1534 with blood work pending.  Patient is requesting discharge and would like to receive antibiotics at nursing facility.  I have discussed patients presentation with the patients nurse at [facility].  She understands that patient will need dose of Vancomycin and Rocephin when he returns to facility. They will obtain vanc trough tomorrow.   Creatinine and Vanc trough today are WNL.  Patient is stable for discharge back to nursing facility.      I reviewed xray of left ankle from 6/21/19, which showed no acute fracture.  Patient has no joint swelling, redness or significant tenderness. I do not see an indication for additional imaging at this time.  Will refer to orthopedics, he will continue pain medication as prescribed.    He is given ER return precautions.    I discussed the care of this pt with my supervising MD.                     Clinical Impression:       ICD-10-CM ICD-9-CM   1. Problem with vascular access Z78.9 V49.9   2. History of osteomyelitis Z87.39 V13.59   3. Acute left ankle pain M25.572 719.47                                JEFF Hood  07/13/19 0043

## 2020-01-07 NOTE — ED NOTES
PICC team states best case 2 hours until able to place line. Will start PIV for IV antibiotics.    Partial Purse String (Simple) Text: Given the location of the defect and the characteristics of the surrounding skin a simple purse string closure was deemed most appropriate.  Undermining was performed circumfirentially around the surgical defect.  A purse string suture was then placed and tightened. Wound tension only allowed a partial closure of the circular defect.

## 2020-09-03 ENCOUNTER — LAB VISIT (OUTPATIENT)
Dept: LAB | Facility: HOSPITAL | Age: 55
End: 2020-09-03
Attending: INTERNAL MEDICINE
Payer: MEDICARE

## 2020-09-03 DIAGNOSIS — E11.40 DIABETIC NEUROPATHY WITH NEUROLOGIC COMPLICATION: Primary | ICD-10-CM

## 2020-09-03 DIAGNOSIS — E11.49 DIABETIC NEUROPATHY WITH NEUROLOGIC COMPLICATION: Primary | ICD-10-CM

## 2020-09-03 DIAGNOSIS — M86.171 ACUTE OSTEOMYELITIS OF RIGHT ANKLE OR FOOT: ICD-10-CM

## 2020-09-03 LAB
ALBUMIN SERPL BCP-MCNC: 3.5 G/DL (ref 3.5–5.2)
ALP SERPL-CCNC: 41 U/L (ref 55–135)
ALT SERPL W/O P-5'-P-CCNC: 28 U/L (ref 10–44)
ANION GAP SERPL CALC-SCNC: 9 MMOL/L (ref 8–16)
AST SERPL-CCNC: 25 U/L (ref 10–40)
BASOPHILS # BLD AUTO: 0.06 K/UL (ref 0–0.2)
BASOPHILS NFR BLD: 0.9 % (ref 0–1.9)
BILIRUB SERPL-MCNC: 0.6 MG/DL (ref 0.1–1)
BUN SERPL-MCNC: 18 MG/DL (ref 6–20)
CALCIUM SERPL-MCNC: 8.6 MG/DL (ref 8.7–10.5)
CHLORIDE SERPL-SCNC: 105 MMOL/L (ref 95–110)
CO2 SERPL-SCNC: 26 MMOL/L (ref 23–29)
CREAT SERPL-MCNC: 1 MG/DL (ref 0.5–1.4)
CRP SERPL-MCNC: 1.23 MG/DL
DIFFERENTIAL METHOD: ABNORMAL
EOSINOPHIL # BLD AUTO: 0.4 K/UL (ref 0–0.5)
EOSINOPHIL NFR BLD: 6.8 % (ref 0–8)
ERYTHROCYTE [DISTWIDTH] IN BLOOD BY AUTOMATED COUNT: 14.6 % (ref 11.5–14.5)
ERYTHROCYTE [SEDIMENTATION RATE] IN BLOOD BY WESTERGREN METHOD: 46 MM/HR (ref 0–10)
EST. GFR  (AFRICAN AMERICAN): >60 ML/MIN/1.73 M^2
EST. GFR  (NON AFRICAN AMERICAN): >60 ML/MIN/1.73 M^2
GLUCOSE SERPL-MCNC: 104 MG/DL (ref 70–110)
HCT VFR BLD AUTO: 28.4 % (ref 40–54)
HGB BLD-MCNC: 8.8 G/DL (ref 14–18)
IMM GRANULOCYTES # BLD AUTO: 0.02 K/UL (ref 0–0.04)
IMM GRANULOCYTES NFR BLD AUTO: 0.3 % (ref 0–0.5)
LYMPHOCYTES # BLD AUTO: 2 K/UL (ref 1–4.8)
LYMPHOCYTES NFR BLD: 30.6 % (ref 18–48)
MCH RBC QN AUTO: 27.7 PG (ref 27–31)
MCHC RBC AUTO-ENTMCNC: 31 G/DL (ref 32–36)
MCV RBC AUTO: 89 FL (ref 82–98)
MONOCYTES # BLD AUTO: 0.5 K/UL (ref 0.3–1)
MONOCYTES NFR BLD: 8.5 % (ref 4–15)
NEUTROPHILS # BLD AUTO: 3.4 K/UL (ref 1.8–7.7)
NEUTROPHILS NFR BLD: 52.9 % (ref 38–73)
NRBC BLD-RTO: 0 /100 WBC
PLATELET # BLD AUTO: 279 K/UL (ref 150–350)
PMV BLD AUTO: 10.8 FL (ref 9.2–12.9)
POTASSIUM SERPL-SCNC: 3.9 MMOL/L (ref 3.5–5.1)
PROT SERPL-MCNC: 7.2 G/DL (ref 6–8.4)
RBC # BLD AUTO: 3.18 M/UL (ref 4.6–6.2)
SODIUM SERPL-SCNC: 140 MMOL/L (ref 136–145)
VANCOMYCIN TROUGH SERPL-MCNC: 13.5 UG/ML (ref 10–22)
WBC # BLD AUTO: 6.37 K/UL (ref 3.9–12.7)

## 2020-09-03 PROCEDURE — 85651 RBC SED RATE NONAUTOMATED: CPT

## 2020-09-03 PROCEDURE — 80053 COMPREHEN METABOLIC PANEL: CPT

## 2020-09-03 PROCEDURE — 80202 ASSAY OF VANCOMYCIN: CPT

## 2020-09-03 PROCEDURE — 86140 C-REACTIVE PROTEIN: CPT

## 2020-09-03 PROCEDURE — 85025 COMPLETE CBC W/AUTO DIFF WBC: CPT

## 2020-09-14 ENCOUNTER — LAB VISIT (OUTPATIENT)
Dept: LAB | Facility: HOSPITAL | Age: 55
End: 2020-09-14
Attending: INTERNAL MEDICINE
Payer: MEDICARE

## 2020-09-14 DIAGNOSIS — M86.171 ACUTE OSTEOMYELITIS OF RIGHT ANKLE OR FOOT: Primary | ICD-10-CM

## 2020-09-14 LAB
ALBUMIN SERPL BCP-MCNC: 3.7 G/DL (ref 3.5–5.2)
ALP SERPL-CCNC: 40 U/L (ref 55–135)
ALT SERPL W/O P-5'-P-CCNC: 24 U/L (ref 10–44)
ANION GAP SERPL CALC-SCNC: 8 MMOL/L (ref 8–16)
AST SERPL-CCNC: 23 U/L (ref 10–40)
BASOPHILS # BLD AUTO: 0.07 K/UL (ref 0–0.2)
BASOPHILS NFR BLD: 1.4 % (ref 0–1.9)
BILIRUB SERPL-MCNC: 0.8 MG/DL (ref 0.1–1)
BUN SERPL-MCNC: 23 MG/DL (ref 6–20)
CALCIUM SERPL-MCNC: 8.6 MG/DL (ref 8.7–10.5)
CHLORIDE SERPL-SCNC: 104 MMOL/L (ref 95–110)
CO2 SERPL-SCNC: 26 MMOL/L (ref 23–29)
CREAT SERPL-MCNC: 1.4 MG/DL (ref 0.5–1.4)
CRP SERPL-MCNC: 0.83 MG/DL
DIFFERENTIAL METHOD: ABNORMAL
EOSINOPHIL # BLD AUTO: 0.4 K/UL (ref 0–0.5)
EOSINOPHIL NFR BLD: 8.1 % (ref 0–8)
ERYTHROCYTE [DISTWIDTH] IN BLOOD BY AUTOMATED COUNT: 15 % (ref 11.5–14.5)
ERYTHROCYTE [SEDIMENTATION RATE] IN BLOOD BY WESTERGREN METHOD: 10 MM/HR (ref 0–10)
EST. GFR  (AFRICAN AMERICAN): >60 ML/MIN/1.73 M^2
EST. GFR  (NON AFRICAN AMERICAN): 55.4 ML/MIN/1.73 M^2
GLUCOSE SERPL-MCNC: 230 MG/DL (ref 70–110)
HCT VFR BLD AUTO: 29.2 % (ref 40–54)
HGB BLD-MCNC: 9.1 G/DL (ref 14–18)
IMM GRANULOCYTES # BLD AUTO: 0.01 K/UL (ref 0–0.04)
IMM GRANULOCYTES NFR BLD AUTO: 0.2 % (ref 0–0.5)
LYMPHOCYTES # BLD AUTO: 1.9 K/UL (ref 1–4.8)
LYMPHOCYTES NFR BLD: 36 % (ref 18–48)
MCH RBC QN AUTO: 28.1 PG (ref 27–31)
MCHC RBC AUTO-ENTMCNC: 31.2 G/DL (ref 32–36)
MCV RBC AUTO: 90 FL (ref 82–98)
MONOCYTES # BLD AUTO: 0.6 K/UL (ref 0.3–1)
MONOCYTES NFR BLD: 10.8 % (ref 4–15)
NEUTROPHILS # BLD AUTO: 2.3 K/UL (ref 1.8–7.7)
NEUTROPHILS NFR BLD: 43.5 % (ref 38–73)
NRBC BLD-RTO: 0 /100 WBC
PLATELET # BLD AUTO: 256 K/UL (ref 150–350)
PMV BLD AUTO: 11 FL (ref 9.2–12.9)
POTASSIUM SERPL-SCNC: 4.3 MMOL/L (ref 3.5–5.1)
PROT SERPL-MCNC: 7.1 G/DL (ref 6–8.4)
RBC # BLD AUTO: 3.24 M/UL (ref 4.6–6.2)
SODIUM SERPL-SCNC: 138 MMOL/L (ref 136–145)
VANCOMYCIN TROUGH SERPL-MCNC: 17.8 UG/ML (ref 10–22)
WBC # BLD AUTO: 5.17 K/UL (ref 3.9–12.7)

## 2020-09-14 PROCEDURE — 80053 COMPREHEN METABOLIC PANEL: CPT

## 2020-09-14 PROCEDURE — 86140 C-REACTIVE PROTEIN: CPT

## 2020-09-14 PROCEDURE — 80202 ASSAY OF VANCOMYCIN: CPT

## 2020-09-14 PROCEDURE — 85651 RBC SED RATE NONAUTOMATED: CPT

## 2020-09-14 PROCEDURE — 85025 COMPLETE CBC W/AUTO DIFF WBC: CPT

## 2020-09-16 ENCOUNTER — HOSPITAL ENCOUNTER (EMERGENCY)
Facility: HOSPITAL | Age: 55
Discharge: HOME | End: 2020-09-17
Attending: EMERGENCY MEDICINE
Payer: MEDICARE

## 2020-09-16 VITALS
HEART RATE: 68 BPM | HEIGHT: 75 IN | DIASTOLIC BLOOD PRESSURE: 79 MMHG | WEIGHT: 315 LBS | BODY MASS INDEX: 39.17 KG/M2 | OXYGEN SATURATION: 96 % | RESPIRATION RATE: 16 BRPM | TEMPERATURE: 99 F | SYSTOLIC BLOOD PRESSURE: 161 MMHG

## 2020-09-16 DIAGNOSIS — T82.898A OCCLUSION OF PERIPHERALLY INSERTED CENTRAL CATHETER (PICC) LINE, INITIAL ENCOUNTER: Primary | ICD-10-CM

## 2020-09-16 PROCEDURE — 99281 EMR DPT VST MAYX REQ PHY/QHP: CPT

## 2020-09-17 NOTE — ED NOTES
Pt here with PICC line that is leaking and fluid will not go thru it. Pt has PICC line for antibiotic infusions for infection in right foot.

## 2020-09-17 NOTE — ED PROVIDER NOTES
Encounter Date: 9/16/2020       History     Chief Complaint   Patient presents with    PICC line issue     right upper arm PICC leaking and possibly out of place     HPI   55-year-old man with history of osteomyelitis of the right foot receiving vancomycin through a right arm PICC line presents emergency department for or dislodgement of his PICC line that occurred today when scratching his arm.  Review of patient's allergies indicates:   Allergen Reactions    Adhesive Itching     Past Medical History:   Diagnosis Date    Depression     Diabetes mellitus     Hypertension     Obesity     Osteomyelitis      No past surgical history on file.  No family history on file.  Social History     Tobacco Use    Smoking status: Never Smoker    Smokeless tobacco: Never Used   Substance Use Topics    Alcohol use: Yes     Comment: occas    Drug use: Yes     Frequency: 1.0 times per week     Types: Marijuana     Comment: last use 2 days ago     Review of Systems   Constitutional: Negative for fever.   HENT: Negative for sore throat.    Respiratory: Negative for shortness of breath.    Cardiovascular: Negative for chest pain.   Gastrointestinal: Negative for nausea.   Genitourinary: Negative for dysuria.   Musculoskeletal: Negative for back pain.   Skin: Negative for rash.   Neurological: Negative for weakness.   Hematological: Does not bruise/bleed easily.       Physical Exam     Initial Vitals [09/16/20 2231]   BP Pulse Resp Temp SpO2   (!) 161/79 68 16 99.2 °F (37.3 °C) 96 %      MAP       --         Physical Exam  Physical Exam   Nursing note and vitals reviewed.   Constitutional: Oriented to person, place, and time. Appears well-developed and well-nourished.  Obese.  HENT:   Head: Normocephalic and atraumatic.   Eyes: EOM are normal.   Neck: Normal range of motion. Neck supple.   Cardiovascular: Normal rate.  Dislodged PICC line in right upper extremity  Pulmonary/Chest: Effort normal. Clear BS b/l   Abdominal: Soft,  Non tender, no distension.   Musculoskeletal: Normal range of motion.  Right foot with bandage in place.  Neurological:Alert and oriented to person, place, and time.   Psychiatric: Normal mood and affect. Behavior is normal.         ED Course   Procedures   PICC line was completely removed without any bleeding.  Bandage with Coban placed.  Labs Reviewed - No data to display       Imaging Results    None          Medical Decision Making:   History:   Old Medical Records: I decided to obtain old medical records.  Initial Assessment:   55-year-old male with osteomyelitis of the foot presents because he dislodged his PICC line accidentally in the right upper extremity when he was scratching his arm.  He received vancomycin to this PICC line and states he has an appointment for wound care and [place] in the morning.  No PICC team available at this time of night to place PICC line in the ED. Patient does not want to be admitted states he will follow with wound care in the morning and they will call PICC team to place a PICC line.  Patient states they have done this for him in the past.  Return precautions discussed to return if he is unable to have PICC line placed.                             Clinical Impression:     ICD-10-CM ICD-9-CM   1. Occlusion of peripherally inserted central catheter (PICC) line, initial encounter  T82.898A 996.74                          ED Disposition Condition    Discharge Stable        ED Prescriptions     None        Follow-up Information     Follow up With Specialties Details Why Contact Info    Wound care in [place] and have piccc line placed        [facility] Emergency Medicine  If symptoms worsen, As needed [address] [place] [id number] [phone]                                       Rober Quick MD  09/16/20 7557

## 2020-09-18 ENCOUNTER — LAB VISIT (OUTPATIENT)
Dept: LAB | Facility: HOSPITAL | Age: 55
End: 2020-09-18
Attending: INTERNAL MEDICINE
Payer: MEDICARE

## 2020-09-18 DIAGNOSIS — M86.9 DIABETIC FOOT ULCER WITH OSTEOMYELITIS: ICD-10-CM

## 2020-09-18 DIAGNOSIS — L97.509 DIABETIC FOOT ULCER WITH OSTEOMYELITIS: ICD-10-CM

## 2020-09-18 DIAGNOSIS — E11.621 DIABETIC FOOT ULCER WITH OSTEOMYELITIS: ICD-10-CM

## 2020-09-18 DIAGNOSIS — E11.69 DIABETIC FOOT ULCER WITH OSTEOMYELITIS: ICD-10-CM

## 2020-09-18 DIAGNOSIS — M86.10 ACUTE BONE INFECTION: Primary | ICD-10-CM

## 2020-09-18 DIAGNOSIS — E66.09 EXOGENOUS OBESITY: ICD-10-CM

## 2020-09-18 LAB — VANCOMYCIN TROUGH SERPL-MCNC: 10.9 UG/ML (ref 10–22)

## 2020-09-18 PROCEDURE — 80202 ASSAY OF VANCOMYCIN: CPT

## 2020-09-18 PROCEDURE — 36415 COLL VENOUS BLD VENIPUNCTURE: CPT

## 2020-12-30 PROBLEM — N18.9 ACUTE KIDNEY INJURY SUPERIMPOSED ON CHRONIC KIDNEY DISEASE: Status: ACTIVE | Noted: 2020-12-30

## 2020-12-30 PROBLEM — E11.621 DIABETIC ULCER OF TOE OF RIGHT FOOT ASSOCIATED WITH TYPE 2 DIABETES MELLITUS, WITH NECROSIS OF BONE: Status: ACTIVE | Noted: 2020-12-30

## 2020-12-30 PROBLEM — I10 ESSENTIAL HYPERTENSION: Status: ACTIVE | Noted: 2020-12-30

## 2020-12-30 PROBLEM — N17.9 ACUTE KIDNEY INJURY SUPERIMPOSED ON CHRONIC KIDNEY DISEASE: Status: ACTIVE | Noted: 2020-12-30

## 2020-12-30 PROBLEM — L97.514 DIABETIC ULCER OF TOE OF RIGHT FOOT ASSOCIATED WITH TYPE 2 DIABETES MELLITUS, WITH NECROSIS OF BONE: Status: ACTIVE | Noted: 2020-12-30

## 2020-12-30 PROBLEM — M86.071 ACUTE HEMATOGENOUS OSTEOMYELITIS OF RIGHT FOOT: Status: ACTIVE | Noted: 2020-12-30

## 2020-12-30 PROBLEM — E10.51: Status: ACTIVE | Noted: 2020-12-30

## 2021-04-26 ENCOUNTER — HOSPITAL ENCOUNTER (EMERGENCY)
Facility: HOSPITAL | Age: 56
Discharge: HOME | End: 2021-04-26
Attending: EMERGENCY MEDICINE
Payer: MEDICARE

## 2021-04-26 VITALS
TEMPERATURE: 98 F | HEART RATE: 56 BPM | SYSTOLIC BLOOD PRESSURE: 175 MMHG | OXYGEN SATURATION: 100 % | RESPIRATION RATE: 18 BRPM | WEIGHT: 315 LBS | DIASTOLIC BLOOD PRESSURE: 79 MMHG | BODY MASS INDEX: 39.17 KG/M2 | HEIGHT: 75 IN

## 2021-04-26 DIAGNOSIS — R10.9 LEFT FLANK PAIN: Primary | ICD-10-CM

## 2021-04-26 LAB
BACTERIA #/AREA URNS HPF: NORMAL /HPF
BILIRUB UR QL STRIP: NEGATIVE
CLARITY UR: CLEAR
COLOR UR: YELLOW
GLUCOSE UR QL STRIP: ABNORMAL
HGB UR QL STRIP: ABNORMAL
HYALINE CASTS #/AREA URNS LPF: 0 /LPF
KETONES UR QL STRIP: NEGATIVE
LEUKOCYTE ESTERASE UR QL STRIP: NEGATIVE
MICROSCOPIC COMMENT: NORMAL
NITRITE UR QL STRIP: NEGATIVE
PH UR STRIP: 6 [PH] (ref 5–8)
PROT UR QL STRIP: ABNORMAL
RBC #/AREA URNS HPF: 1 /HPF (ref 0–4)
SP GR UR STRIP: >=1.03 (ref 1–1.03)
URN SPEC COLLECT METH UR: ABNORMAL
UROBILINOGEN UR STRIP-ACNC: NEGATIVE EU/DL
WBC #/AREA URNS HPF: 0 /HPF (ref 0–5)

## 2021-04-26 PROCEDURE — 96361 HYDRATE IV INFUSION ADD-ON: CPT

## 2021-04-26 PROCEDURE — 25000003 PHARM REV CODE 250: Performed by: EMERGENCY MEDICINE

## 2021-04-26 PROCEDURE — 99284 EMERGENCY DEPT VISIT MOD MDM: CPT | Mod: 25

## 2021-04-26 PROCEDURE — 96374 THER/PROPH/DIAG INJ IV PUSH: CPT

## 2021-04-26 PROCEDURE — 63600175 PHARM REV CODE 636 W HCPCS: Performed by: EMERGENCY MEDICINE

## 2021-04-26 PROCEDURE — 81000 URINALYSIS NONAUTO W/SCOPE: CPT | Performed by: EMERGENCY MEDICINE

## 2021-04-26 RX ORDER — SODIUM CHLORIDE 9 MG/ML
INJECTION, SOLUTION INTRAVENOUS
Status: COMPLETED | OUTPATIENT
Start: 2021-04-26 | End: 2021-04-26

## 2021-04-26 RX ORDER — KETOROLAC TROMETHAMINE 30 MG/ML
10 INJECTION, SOLUTION INTRAMUSCULAR; INTRAVENOUS
Status: COMPLETED | OUTPATIENT
Start: 2021-04-26 | End: 2021-04-26

## 2021-04-26 RX ADMIN — KETOROLAC TROMETHAMINE 10 MG: 30 INJECTION, SOLUTION INTRAMUSCULAR; INTRAVENOUS at 10:04

## 2021-04-26 RX ADMIN — SODIUM CHLORIDE: 0.9 INJECTION, SOLUTION INTRAVENOUS at 10:04

## 2022-03-09 ENCOUNTER — HOSPITAL ENCOUNTER (OUTPATIENT)
Facility: HOSPITAL | Age: 57
LOS: 2 days | Discharge: HOME | End: 2022-03-11
Attending: EMERGENCY MEDICINE | Admitting: HOSPITALIST
Payer: MEDICARE

## 2022-03-09 DIAGNOSIS — J98.4 PNEUMONITIS: ICD-10-CM

## 2022-03-09 DIAGNOSIS — R07.9 CHEST PAIN: ICD-10-CM

## 2022-03-09 DIAGNOSIS — R06.02 SOB (SHORTNESS OF BREATH): ICD-10-CM

## 2022-03-09 DIAGNOSIS — J96.01 ACUTE HYPOXEMIC RESPIRATORY FAILURE: ICD-10-CM

## 2022-03-09 DIAGNOSIS — J12.9 VIRAL PNEUMONIA: ICD-10-CM

## 2022-03-09 DIAGNOSIS — R09.02 HYPOXIA: ICD-10-CM

## 2022-03-09 DIAGNOSIS — I10 HYPERTENSION, UNSPECIFIED TYPE: ICD-10-CM

## 2022-03-09 DIAGNOSIS — J10.1 INFLUENZA A: Primary | ICD-10-CM

## 2022-03-09 PROBLEM — E11.9 TYPE 2 DIABETES MELLITUS: Status: ACTIVE | Noted: 2022-03-09

## 2022-03-09 LAB
ALBUMIN SERPL BCP-MCNC: 3.3 G/DL (ref 3.5–5.2)
ALP SERPL-CCNC: 47 U/L (ref 55–135)
ALT SERPL W/O P-5'-P-CCNC: 55 U/L (ref 10–44)
ANION GAP SERPL CALC-SCNC: 8 MMOL/L (ref 8–16)
AST SERPL-CCNC: 41 U/L (ref 10–40)
BACTERIA #/AREA URNS HPF: ABNORMAL /HPF
BASOPHILS # BLD AUTO: 0.06 K/UL (ref 0–0.2)
BASOPHILS NFR BLD: 1 % (ref 0–1.9)
BILIRUB SERPL-MCNC: 1.1 MG/DL (ref 0.1–1)
BILIRUB UR QL STRIP: NEGATIVE
BNP SERPL-MCNC: 160 PG/ML (ref 0–99)
BUN SERPL-MCNC: 20 MG/DL (ref 6–20)
CALCIUM SERPL-MCNC: 8.5 MG/DL (ref 8.7–10.5)
CHLORIDE SERPL-SCNC: 103 MMOL/L (ref 95–110)
CLARITY UR: CLEAR
CO2 SERPL-SCNC: 26 MMOL/L (ref 23–29)
COLOR UR: YELLOW
CREAT SERPL-MCNC: 1.2 MG/DL (ref 0.5–1.4)
DIFFERENTIAL METHOD: ABNORMAL
EOSINOPHIL # BLD AUTO: 0.2 K/UL (ref 0–0.5)
EOSINOPHIL NFR BLD: 3.4 % (ref 0–8)
ERYTHROCYTE [DISTWIDTH] IN BLOOD BY AUTOMATED COUNT: 14.9 % (ref 11.5–14.5)
EST. GFR  (AFRICAN AMERICAN): >60 ML/MIN/1.73 M^2
EST. GFR  (NON AFRICAN AMERICAN): >60 ML/MIN/1.73 M^2
GLUCOSE SERPL-MCNC: 198 MG/DL (ref 70–110)
GLUCOSE UR QL STRIP: ABNORMAL
HCT VFR BLD AUTO: 34.8 % (ref 40–54)
HGB BLD-MCNC: 10.9 G/DL (ref 14–18)
HGB UR QL STRIP: ABNORMAL
HYALINE CASTS #/AREA URNS LPF: 0 /LPF
IMM GRANULOCYTES # BLD AUTO: 0.03 K/UL (ref 0–0.04)
IMM GRANULOCYTES NFR BLD AUTO: 0.5 % (ref 0–0.5)
INFLUENZA A, MOLECULAR: POSITIVE
INFLUENZA B, MOLECULAR: NEGATIVE
KETONES UR QL STRIP: NEGATIVE
LACTATE SERPL-SCNC: 1.1 MMOL/L (ref 0.5–2.2)
LEUKOCYTE ESTERASE UR QL STRIP: NEGATIVE
LYMPHOCYTES # BLD AUTO: 1.1 K/UL (ref 1–4.8)
LYMPHOCYTES NFR BLD: 18.8 % (ref 18–48)
MCH RBC QN AUTO: 28.7 PG (ref 27–31)
MCHC RBC AUTO-ENTMCNC: 31.3 G/DL (ref 32–36)
MCV RBC AUTO: 92 FL (ref 82–98)
MICROSCOPIC COMMENT: ABNORMAL
MONOCYTES # BLD AUTO: 0.6 K/UL (ref 0.3–1)
MONOCYTES NFR BLD: 10.3 % (ref 4–15)
NEUTROPHILS # BLD AUTO: 3.9 K/UL (ref 1.8–7.7)
NEUTROPHILS NFR BLD: 66 % (ref 38–73)
NITRITE UR QL STRIP: NEGATIVE
NRBC BLD-RTO: 0 /100 WBC
PH UR STRIP: 6 [PH] (ref 5–8)
PLATELET # BLD AUTO: 195 K/UL (ref 150–450)
PMV BLD AUTO: 11.5 FL (ref 9.2–12.9)
POCT GLUCOSE: 165 MG/DL (ref 70–110)
POTASSIUM SERPL-SCNC: 4 MMOL/L (ref 3.5–5.1)
PROCALCITONIN SERPL IA-MCNC: 0.07 NG/ML
PROT SERPL-MCNC: 6.7 G/DL (ref 6–8.4)
PROT UR QL STRIP: ABNORMAL
RBC # BLD AUTO: 3.8 M/UL (ref 4.6–6.2)
RBC #/AREA URNS HPF: 3 /HPF (ref 0–4)
SARS-COV-2 RDRP RESP QL NAA+PROBE: NEGATIVE
SODIUM SERPL-SCNC: 137 MMOL/L (ref 136–145)
SP GR UR STRIP: 1.02 (ref 1–1.03)
SPECIMEN SOURCE: ABNORMAL
SQUAMOUS #/AREA URNS HPF: 0 /HPF
TROPONIN I SERPL DL<=0.01 NG/ML-MCNC: 0.02 NG/ML (ref 0–0.03)
URN SPEC COLLECT METH UR: ABNORMAL
UROBILINOGEN UR STRIP-ACNC: NEGATIVE EU/DL
WBC # BLD AUTO: 5.91 K/UL (ref 3.9–12.7)
WBC #/AREA URNS HPF: 0 /HPF (ref 0–5)

## 2022-03-09 PROCEDURE — U0002 COVID-19 LAB TEST NON-CDC: HCPCS | Performed by: EMERGENCY MEDICINE

## 2022-03-09 PROCEDURE — 25000003 PHARM REV CODE 250: Performed by: NURSE PRACTITIONER

## 2022-03-09 PROCEDURE — 81000 URINALYSIS NONAUTO W/SCOPE: CPT | Performed by: EMERGENCY MEDICINE

## 2022-03-09 PROCEDURE — 36415 COLL VENOUS BLD VENIPUNCTURE: CPT | Performed by: EMERGENCY MEDICINE

## 2022-03-09 PROCEDURE — 12000002 HC ACUTE/MED SURGE SEMI-PRIVATE ROOM

## 2022-03-09 PROCEDURE — 96375 TX/PRO/DX INJ NEW DRUG ADDON: CPT

## 2022-03-09 PROCEDURE — 63600175 PHARM REV CODE 636 W HCPCS: Performed by: NURSE PRACTITIONER

## 2022-03-09 PROCEDURE — 94761 N-INVAS EAR/PLS OXIMETRY MLT: CPT

## 2022-03-09 PROCEDURE — 25000003 PHARM REV CODE 250: Performed by: EMERGENCY MEDICINE

## 2022-03-09 PROCEDURE — 85025 COMPLETE CBC W/AUTO DIFF WBC: CPT | Performed by: EMERGENCY MEDICINE

## 2022-03-09 PROCEDURE — 96374 THER/PROPH/DIAG INJ IV PUSH: CPT

## 2022-03-09 PROCEDURE — 25000242 PHARM REV CODE 250 ALT 637 W/ HCPCS: Performed by: NURSE PRACTITIONER

## 2022-03-09 PROCEDURE — 63600175 PHARM REV CODE 636 W HCPCS: Performed by: EMERGENCY MEDICINE

## 2022-03-09 PROCEDURE — 80053 COMPREHEN METABOLIC PANEL: CPT | Performed by: EMERGENCY MEDICINE

## 2022-03-09 PROCEDURE — 84145 PROCALCITONIN (PCT): CPT | Performed by: NURSE PRACTITIONER

## 2022-03-09 PROCEDURE — 87502 INFLUENZA DNA AMP PROBE: CPT | Performed by: EMERGENCY MEDICINE

## 2022-03-09 PROCEDURE — 87040 BLOOD CULTURE FOR BACTERIA: CPT | Performed by: EMERGENCY MEDICINE

## 2022-03-09 PROCEDURE — 83605 ASSAY OF LACTIC ACID: CPT | Performed by: EMERGENCY MEDICINE

## 2022-03-09 PROCEDURE — 83880 ASSAY OF NATRIURETIC PEPTIDE: CPT | Performed by: EMERGENCY MEDICINE

## 2022-03-09 PROCEDURE — 36415 COLL VENOUS BLD VENIPUNCTURE: CPT | Performed by: NURSE PRACTITIONER

## 2022-03-09 PROCEDURE — 94640 AIRWAY INHALATION TREATMENT: CPT

## 2022-03-09 PROCEDURE — 84484 ASSAY OF TROPONIN QUANT: CPT | Performed by: EMERGENCY MEDICINE

## 2022-03-09 PROCEDURE — 99285 EMERGENCY DEPT VISIT HI MDM: CPT | Mod: 25

## 2022-03-09 PROCEDURE — 93005 ELECTROCARDIOGRAM TRACING: CPT

## 2022-03-09 RX ORDER — IBUPROFEN 400 MG/1
400 TABLET ORAL EVERY 6 HOURS PRN
Status: DISCONTINUED | OUTPATIENT
Start: 2022-03-09 | End: 2022-03-11 | Stop reason: HOSPADM

## 2022-03-09 RX ORDER — TALC
6 POWDER (GRAM) TOPICAL NIGHTLY PRN
Status: DISCONTINUED | OUTPATIENT
Start: 2022-03-09 | End: 2022-03-11 | Stop reason: HOSPADM

## 2022-03-09 RX ORDER — FUROSEMIDE 20 MG/1
20 TABLET ORAL DAILY
Qty: 30 TABLET | Refills: 0 | Status: ON HOLD | OUTPATIENT
Start: 2022-03-09 | End: 2023-02-10 | Stop reason: HOSPADM

## 2022-03-09 RX ORDER — OSELTAMIVIR PHOSPHATE 75 MG/1
75 CAPSULE ORAL 2 TIMES DAILY
Status: DISCONTINUED | OUTPATIENT
Start: 2022-03-09 | End: 2022-03-11 | Stop reason: HOSPADM

## 2022-03-09 RX ORDER — SODIUM,POTASSIUM PHOSPHATES 280-250MG
2 POWDER IN PACKET (EA) ORAL
Status: DISCONTINUED | OUTPATIENT
Start: 2022-03-09 | End: 2022-03-11 | Stop reason: HOSPADM

## 2022-03-09 RX ORDER — IBUPROFEN 600 MG/1
600 TABLET ORAL
Status: COMPLETED | OUTPATIENT
Start: 2022-03-09 | End: 2022-03-09

## 2022-03-09 RX ORDER — GUAIFENESIN 100 MG/5ML
200 SOLUTION ORAL EVERY 4 HOURS PRN
Status: DISCONTINUED | OUTPATIENT
Start: 2022-03-09 | End: 2022-03-10

## 2022-03-09 RX ORDER — IBUPROFEN 200 MG
16 TABLET ORAL
Status: DISCONTINUED | OUTPATIENT
Start: 2022-03-09 | End: 2022-03-11 | Stop reason: HOSPADM

## 2022-03-09 RX ORDER — ACETAMINOPHEN 325 MG/1
650 TABLET ORAL EVERY 4 HOURS PRN
Status: DISCONTINUED | OUTPATIENT
Start: 2022-03-09 | End: 2022-03-11 | Stop reason: HOSPADM

## 2022-03-09 RX ORDER — LISINOPRIL 10 MG/1
20 TABLET ORAL
Status: COMPLETED | OUTPATIENT
Start: 2022-03-09 | End: 2022-03-09

## 2022-03-09 RX ORDER — MAG HYDROX/ALUMINUM HYD/SIMETH 200-200-20
30 SUSPENSION, ORAL (FINAL DOSE FORM) ORAL 4 TIMES DAILY PRN
Status: DISCONTINUED | OUTPATIENT
Start: 2022-03-09 | End: 2022-03-11 | Stop reason: HOSPADM

## 2022-03-09 RX ORDER — GLUCAGON 1 MG
1 KIT INJECTION
Status: DISCONTINUED | OUTPATIENT
Start: 2022-03-09 | End: 2022-03-11 | Stop reason: HOSPADM

## 2022-03-09 RX ORDER — FUROSEMIDE 10 MG/ML
40 INJECTION INTRAMUSCULAR; INTRAVENOUS
Status: COMPLETED | OUTPATIENT
Start: 2022-03-09 | End: 2022-03-09

## 2022-03-09 RX ORDER — HYDRALAZINE HYDROCHLORIDE 20 MG/ML
10 INJECTION INTRAMUSCULAR; INTRAVENOUS
Status: COMPLETED | OUTPATIENT
Start: 2022-03-09 | End: 2022-03-09

## 2022-03-09 RX ORDER — ENOXAPARIN SODIUM 100 MG/ML
40 INJECTION SUBCUTANEOUS EVERY 24 HOURS
Status: DISCONTINUED | OUTPATIENT
Start: 2022-03-09 | End: 2022-03-11 | Stop reason: HOSPADM

## 2022-03-09 RX ORDER — LANOLIN ALCOHOL/MO/W.PET/CERES
800 CREAM (GRAM) TOPICAL
Status: DISCONTINUED | OUTPATIENT
Start: 2022-03-09 | End: 2022-03-11 | Stop reason: HOSPADM

## 2022-03-09 RX ORDER — FUROSEMIDE 20 MG/1
20 TABLET ORAL DAILY
Status: DISCONTINUED | OUTPATIENT
Start: 2022-03-10 | End: 2022-03-11 | Stop reason: HOSPADM

## 2022-03-09 RX ORDER — IPRATROPIUM BROMIDE AND ALBUTEROL SULFATE 2.5; .5 MG/3ML; MG/3ML
3 SOLUTION RESPIRATORY (INHALATION)
Status: DISCONTINUED | OUTPATIENT
Start: 2022-03-09 | End: 2022-03-11 | Stop reason: HOSPADM

## 2022-03-09 RX ORDER — GABAPENTIN 600 MG/1
600 TABLET ORAL 3 TIMES DAILY
Status: ON HOLD | COMMUNITY
Start: 2022-02-07 | End: 2023-02-10 | Stop reason: HOSPADM

## 2022-03-09 RX ORDER — ALBUTEROL SULFATE 90 UG/1
1-2 AEROSOL, METERED RESPIRATORY (INHALATION) EVERY 6 HOURS PRN
Qty: 18 G | Refills: 0 | Status: SHIPPED | OUTPATIENT
Start: 2022-03-09 | End: 2022-03-11 | Stop reason: SDUPTHER

## 2022-03-09 RX ORDER — SODIUM CHLORIDE 0.9 % (FLUSH) 0.9 %
10 SYRINGE (ML) INJECTION EVERY 12 HOURS PRN
Status: DISCONTINUED | OUTPATIENT
Start: 2022-03-09 | End: 2022-03-11 | Stop reason: HOSPADM

## 2022-03-09 RX ORDER — INSULIN ASPART 100 [IU]/ML
0-5 INJECTION, SOLUTION INTRAVENOUS; SUBCUTANEOUS
Status: DISCONTINUED | OUTPATIENT
Start: 2022-03-09 | End: 2022-03-11 | Stop reason: HOSPADM

## 2022-03-09 RX ORDER — NALOXONE HCL 0.4 MG/ML
0.02 VIAL (ML) INJECTION
Status: DISCONTINUED | OUTPATIENT
Start: 2022-03-09 | End: 2022-03-11 | Stop reason: HOSPADM

## 2022-03-09 RX ORDER — ATORVASTATIN CALCIUM 40 MG/1
80 TABLET, FILM COATED ORAL DAILY
Status: DISCONTINUED | OUTPATIENT
Start: 2022-03-10 | End: 2022-03-11 | Stop reason: HOSPADM

## 2022-03-09 RX ORDER — ONDANSETRON 4 MG/1
4 TABLET, ORALLY DISINTEGRATING ORAL EVERY 8 HOURS PRN
Qty: 12 TABLET | Refills: 0 | Status: SHIPPED | OUTPATIENT
Start: 2022-03-09 | End: 2022-03-11 | Stop reason: SDUPTHER

## 2022-03-09 RX ORDER — GABAPENTIN 300 MG/1
600 CAPSULE ORAL 3 TIMES DAILY
Status: DISCONTINUED | OUTPATIENT
Start: 2022-03-09 | End: 2022-03-11 | Stop reason: HOSPADM

## 2022-03-09 RX ORDER — BENZONATATE 200 MG/1
200 CAPSULE ORAL 3 TIMES DAILY PRN
Qty: 30 CAPSULE | Refills: 0 | Status: SHIPPED | OUTPATIENT
Start: 2022-03-09 | End: 2022-03-11 | Stop reason: SDUPTHER

## 2022-03-09 RX ORDER — HYDROCODONE BITARTRATE AND ACETAMINOPHEN 10; 325 MG/1; MG/1
1 TABLET ORAL EVERY 6 HOURS PRN
Status: DISCONTINUED | OUTPATIENT
Start: 2022-03-09 | End: 2022-03-10

## 2022-03-09 RX ORDER — ACETAMINOPHEN 500 MG
1000 TABLET ORAL
Status: COMPLETED | OUTPATIENT
Start: 2022-03-09 | End: 2022-03-09

## 2022-03-09 RX ORDER — ATORVASTATIN CALCIUM 80 MG/1
80 TABLET, FILM COATED ORAL DAILY
COMMUNITY
Start: 2021-09-12 | End: 2023-02-15 | Stop reason: SDUPTHER

## 2022-03-09 RX ORDER — IBUPROFEN 200 MG
24 TABLET ORAL
Status: DISCONTINUED | OUTPATIENT
Start: 2022-03-09 | End: 2022-03-11 | Stop reason: HOSPADM

## 2022-03-09 RX ORDER — OSELTAMIVIR PHOSPHATE 75 MG/1
75 CAPSULE ORAL 2 TIMES DAILY
Qty: 10 CAPSULE | Refills: 0 | Status: SHIPPED | OUTPATIENT
Start: 2022-03-09 | End: 2022-03-11 | Stop reason: SDUPTHER

## 2022-03-09 RX ORDER — HYDRALAZINE HYDROCHLORIDE 25 MG/1
25 TABLET, FILM COATED ORAL EVERY 8 HOURS PRN
Status: DISCONTINUED | OUTPATIENT
Start: 2022-03-09 | End: 2022-03-11 | Stop reason: HOSPADM

## 2022-03-09 RX ORDER — LISINOPRIL 10 MG/1
20 TABLET ORAL DAILY
Status: DISCONTINUED | OUTPATIENT
Start: 2022-03-09 | End: 2022-03-10

## 2022-03-09 RX ORDER — HYDROCODONE BITARTRATE AND ACETAMINOPHEN 10; 325 MG/1; MG/1
1 TABLET ORAL EVERY 6 HOURS PRN
Status: ON HOLD | COMMUNITY
Start: 2022-02-20 | End: 2023-02-10 | Stop reason: HOSPADM

## 2022-03-09 RX ORDER — ONDANSETRON 2 MG/ML
4 INJECTION INTRAMUSCULAR; INTRAVENOUS EVERY 6 HOURS PRN
Status: DISCONTINUED | OUTPATIENT
Start: 2022-03-09 | End: 2022-03-11 | Stop reason: HOSPADM

## 2022-03-09 RX ORDER — HYDROCODONE POLISTIREX AND CHLORPHENIRAMINE POLISTIREX 10; 8 MG/5ML; MG/5ML
5 SUSPENSION, EXTENDED RELEASE ORAL
Status: COMPLETED | OUTPATIENT
Start: 2022-03-09 | End: 2022-03-09

## 2022-03-09 RX ADMIN — IBUPROFEN 600 MG: 600 TABLET ORAL at 03:03

## 2022-03-09 RX ADMIN — ACETAMINOPHEN 1000 MG: 500 TABLET ORAL at 11:03

## 2022-03-09 RX ADMIN — HYDRALAZINE HYDROCHLORIDE 10 MG: 20 INJECTION INTRAMUSCULAR; INTRAVENOUS at 11:03

## 2022-03-09 RX ADMIN — LISINOPRIL 20 MG: 10 TABLET ORAL at 04:03

## 2022-03-09 RX ADMIN — OSELTAMIVIR PHOSPHATE 75 MG: 75 CAPSULE ORAL at 09:03

## 2022-03-09 RX ADMIN — FUROSEMIDE 40 MG: 10 INJECTION, SOLUTION INTRAMUSCULAR; INTRAVENOUS at 11:03

## 2022-03-09 RX ADMIN — GUAIFENESIN 200 MG: 200 SOLUTION ORAL at 09:03

## 2022-03-09 RX ADMIN — HYDRALAZINE HYDROCHLORIDE 10 MG: 20 INJECTION, SOLUTION INTRAMUSCULAR; INTRAVENOUS at 04:03

## 2022-03-09 RX ADMIN — ENOXAPARIN SODIUM 40 MG: 100 INJECTION SUBCUTANEOUS at 09:03

## 2022-03-09 RX ADMIN — HYDROCODONE POLISTIREX AND CHLORPHENIRAMINE POLISTIREX 5 ML: 10; 8 SUSPENSION, EXTENDED RELEASE ORAL at 03:03

## 2022-03-09 RX ADMIN — GABAPENTIN 600 MG: 300 CAPSULE ORAL at 09:03

## 2022-03-09 RX ADMIN — HYDROCODONE BITARTRATE AND ACETAMINOPHEN 1 TABLET: 10; 325 TABLET ORAL at 09:03

## 2022-03-09 RX ADMIN — IPRATROPIUM BROMIDE AND ALBUTEROL SULFATE 3 ML: 2.5; .5 SOLUTION RESPIRATORY (INHALATION) at 08:03

## 2022-03-09 NOTE — ED NOTES
On phon with Cris DIAZ, NP notified of bp systolic 170's - 200's; she is aware of vs and has PRN meds ordered; she was informed of pt's persistent cough and movements during cycling; no new verbal orders.Alvaro Espinoza

## 2022-03-09 NOTE — HPI
"Abel Gibbs is a 56 yr old male with PMHx significant for hypertension, diabetes, osteomyelitis and nicotine abuse presenting today for cough, shortness of breath, fever or malaise.  Patient reports symptoms started approximately 3 days ago.  He states his shortness of breath and cough worsened and he therefore presented to the ED.  Workup in the ED reveals bilateral pneumonitis and patient tested positive for influenza A. His ambulatory O2 sat dropped into the 80s and therefore admission was requested.  Patient states his symptoms are moderate severity and worsen with exertion.  He denies any chest pain, nausea, vomiting or diarrhea.  Patient was experiencing accelerated hypertension the ED and he was recently treated with IV hydralazine and p.o. lisinopril.  Patient states he did not take his home medications today.  He reports noncompliance with his Lasix at home stating that he takes enough medications and "does not want to take another pill that makes him pee". Patient educated extensively on medication compliance.  Patient will be admitted to hospital medicine services for further workup and management.    "

## 2022-03-09 NOTE — ASSESSMENT & PLAN NOTE
Patient's FSGs are controlled on current medication regimen.  Last A1c reviewed- No results found for: LABA1C, HGBA1C  Most recent fingerstick glucose reviewed- No results for input(s): POCTGLUCOSE in the last 24 hours.  Current correctional scale  Low  Maintain anti-hyperglycemic dose as follows-   Antihyperglycemics (From admission, onward)            Sliding scale insulin AC/HS        Hold Oral hypoglycemics while patient is in the hospital.

## 2022-03-09 NOTE — H&P
"[facility] Medicine  History & Physical    Patient Name: Abel Gibbs  MRN: [id number]  Patient Class: IP- Inpatient  Admission Date: 3/9/2022  Attending Physician: Dr. Vanesa Justice MD  Primary Care Provider: Primary Doctor No         Patient information was obtained from patient, past medical records and ER records.     Subjective:     Principal Problem:Acute hypoxemic respiratory failure    Chief Complaint:   Chief Complaint   Patient presents with    Cough    Shortness of Breath     States cough (mostly dry) since last PM that has interfered with sleeping last PM (Denies COPD Hx/tx'd with albuterol via neb per EMS en route. T=100.6 (pt was unaware of fever PTA)        HPI: Abel Gibbs is a 56 yr old male with PMHx significant for hypertension, diabetes, osteomyelitis and nicotine abuse presenting today for cough, shortness of breath, fever or malaise.  Patient reports symptoms started approximately 3 days ago.  He states his shortness of breath and cough worsened and he therefore presented to the ED.  Workup in the ED reveals bilateral pneumonitis and patient tested positive for influenza A. His ambulatory O2 sat dropped into the 80s and therefore admission was requested.  Patient states his symptoms are moderate severity and worsen with exertion.  He denies any chest pain, nausea, vomiting or diarrhea.  Patient was experiencing accelerated hypertension the ED and he was recently treated with IV hydralazine and p.o. lisinopril.  Patient states he did not take his home medications today.  He reports noncompliance with his Lasix at home stating that he takes enough medications and "does not want to take another pill that makes him pee". Patient educated extensively on medication compliance.  Patient will be admitted to hospital medicine services for further workup and management.        Past Medical History:   Diagnosis Date    Depression     Diabetes mellitus     Hypertension  "    Obesity     Osteomyelitis        No past surgical history on file.    Review of patient's allergies indicates:   Allergen Reactions    Adhesive Itching       No current facility-administered medications on file prior to encounter.     Current Outpatient Medications on File Prior to Encounter   Medication Sig    gabapentin (NEURONTIN) 600 MG tablet Take 600 mg by mouth 3 (three) times daily.    HYDROcodone-acetaminophen (NORCO)  mg per tablet Take 1 tablet by mouth every 6 (six) hours as needed.    arginine-glutamine-calcium HMB 7-7-1.5 gram PwPk Take by mouth 2 (two) times daily.    ascorbic acid, vitamin C, (VITAMIN C) 500 MG tablet Take 500 mg by mouth 2 (two) times daily.    atorvastatin (LIPITOR) 80 MG tablet Take 80 mg by mouth once daily.    dextrose (GLUCOSE GEL) 40 % gel Take 15 g by mouth as needed.    diclofenac sodium (VOLTAREN) 1 % Gel Apply 2 g topically 4 (four) times daily.    glucagon, human recombinant, (GLUCAGEN HYPOKIT) 1 mg SolR Inject 1 mg into the muscle as needed.    insulin glargine (LANTUS) 100 unit/mL injection Inject 50 Units into the skin once daily.    insulin lispro (HUMALOG) 100 unit/mL injection Inject 0-10 Units into the skin 3 (three) times daily before meals.    isoniazid (NYDRAZID) 300 MG Tab Take 300 mg by mouth once daily.    lactobacillus acidophilus & bulgar (LACTINEX) 100 million cell packet Take 1 tablet by mouth 3 (three) times daily with meals.    lidocaine (LIDODERM) 5 % Place 1 patch onto the skin once daily. Remove & Discard patch within 12 hours or as directed by MD    lisinopril (PRINIVIL,ZESTRIL) 20 MG tablet Take 20 mg by mouth once daily.    menthol/zinc oxide (REMEDY CALAZIME PROTECT PASTE TOP) Apply topically 2 (two) times daily.    metFORMIN (GLUCOPHAGE) 1000 MG tablet Take 1,000 mg by mouth 2 (two) times daily with meals.    multivitamin capsule Take 1 capsule by mouth once daily.    polyethylene glycol (MIRALAX) 17 gram PwPk Take  by mouth 2 (two) times daily.    pyridoxine, vitamin B6, (VITAMIN B-6) 50 MG Tab Take 50 mg by mouth once daily.    [DISCONTINUED] atorvastatin (LIPITOR) 40 MG tablet Take 40 mg by mouth every evening.    [DISCONTINUED] furosemide (LASIX) 20 MG tablet Take 20 mg by mouth once daily.    [DISCONTINUED] gabapentin (NEURONTIN) 800 MG tablet Take 800 mg by mouth 3 (three) times daily.    [DISCONTINUED] oxyCODONE-acetaminophen (PERCOCET)  mg per tablet Take 1 tablet by mouth every 4 (four) hours as needed for Pain.     Family History    None       Tobacco Use    Smoking status: Never Smoker    Smokeless tobacco: Never Used   Substance and Sexual Activity    Alcohol use: Yes     Comment: occas    Drug use: Yes     Frequency: 1.0 times per week     Types: Marijuana     Comment: last use 2 days ago    Sexual activity: Not on file     Review of Systems   Constitutional:  Positive for chills, fatigue and fever.   HENT:  Positive for congestion, rhinorrhea, sinus pressure and sneezing. Negative for sore throat and trouble swallowing.    Respiratory:  Positive for cough and shortness of breath. Negative for chest tightness and wheezing.    Cardiovascular:  Negative for chest pain, palpitations and leg swelling.   Gastrointestinal:  Negative for abdominal pain, diarrhea, nausea and vomiting.   Genitourinary:  Negative for difficulty urinating, dysuria and urgency.   Musculoskeletal:  Positive for myalgias. Negative for arthralgias, back pain and gait problem.   Skin:  Negative for rash.   Neurological:  Negative for dizziness, facial asymmetry and headaches.   Hematological:  Negative for adenopathy.   Psychiatric/Behavioral:  Negative for agitation, behavioral problems and confusion.    All other systems reviewed and are negative.  Objective:     Vital Signs (Most Recent):  Temp: 100 °F (37.8 °C) (03/09/22 1257)  Pulse: 78 (03/09/22 1427)  Resp: 19 (03/09/22 1519)  BP: (!) 193/86 (03/09/22 1404)  SpO2: 95 %  (03/09/22 1427)   Vital Signs (24h Range):  Temp:  [100 °F (37.8 °C)-100.6 °F (38.1 °C)] 100 °F (37.8 °C)  Pulse:  [77-98] 78  Resp:  [19-22] 19  SpO2:  [84 %-98 %] 95 %  BP: (160-203)/() 193/86     Weight: (!) 143.8 kg (317 lb)  Body mass index is 39.62 kg/m².    Physical Exam  Vitals and nursing note reviewed.   Constitutional:       General: He is not in acute distress.     Appearance: Normal appearance. He is well-developed. He is not ill-appearing or diaphoretic.   HENT:      Head: Normocephalic and atraumatic.      Right Ear: External ear normal.      Left Ear: External ear normal.      Nose: Congestion and rhinorrhea present.      Mouth/Throat:      Mouth: Mucous membranes are moist.      Pharynx: Oropharynx is clear. No oropharyngeal exudate or posterior oropharyngeal erythema.   Eyes:      General: No scleral icterus.     Conjunctiva/sclera: Conjunctivae normal.      Pupils: Pupils are equal, round, and reactive to light.   Neck:      Vascular: No JVD.   Cardiovascular:      Rate and Rhythm: Normal rate and regular rhythm.      Pulses: Normal pulses.      Heart sounds: Normal heart sounds. No murmur heard.  Pulmonary:      Effort: Pulmonary effort is normal. No respiratory distress.      Breath sounds: Normal breath sounds. No stridor. No wheezing, rhonchi or rales.   Abdominal:      General: Bowel sounds are normal. There is no distension.      Palpations: Abdomen is soft.      Tenderness: There is no abdominal tenderness.   Musculoskeletal:         General: No swelling, tenderness or deformity. Normal range of motion.      Cervical back: Normal range of motion and neck supple.   Skin:     General: Skin is warm and dry.      Capillary Refill: Capillary refill takes 2 to 3 seconds.      Coloration: Skin is not jaundiced or pale.      Findings: No erythema.   Neurological:      General: No focal deficit present.      Mental Status: He is alert and oriented to person, place, and time.      Cranial  Nerves: No cranial nerve deficit.      Sensory: No sensory deficit.      Motor: No weakness.      Coordination: Coordination normal.   Psychiatric:         Mood and Affect: Mood normal.         Behavior: Behavior normal.         Thought Content: Thought content normal.         CRANIAL NERVES     CN III, IV, VI   Pupils are equal, round, and reactive to light.     Significant Labs: All pertinent labs within the past 24 hours have been reviewed.  CBC:   Recent Labs   Lab 03/09/22  1125   WBC 5.91   HGB 10.9*   HCT 34.8*        CMP:   Recent Labs   Lab 03/09/22  1125      K 4.0      CO2 26   *   BUN 20   CREATININE 1.2   CALCIUM 8.5*   PROT 6.7   ALBUMIN 3.3*   BILITOT 1.1*   ALKPHOS 47*   AST 41*   ALT 55*   ANIONGAP 8   EGFRNONAA >60       Significant Imaging: I have reviewed all pertinent imaging results/findings within the past 24 hours.    Assessment/Plan:     * Acute hypoxemic respiratory failure  Patient with Hypoxic Respiratory failure which is Acute.  he is not on home oxygen. Supplemental oxygen was provided and noted-  .   Signs/symptoms of respiratory failure include- tachypnea and increased work of breathing. Contributing diagnoses includes - bilat pneumonitis, viral influenza Labs and images were reviewed. Patient Has not had a recent ABG. Will treat underlying causes and adjust management of respiratory failure as follows-     Cont supplemental O2  Neb tx  tamiflu  Telemetry monitoring      Type 2 diabetes mellitus  Patient's FSGs are controlled on current medication regimen.  Last A1c reviewed- No results found for: LABA1C, HGBA1C  Most recent fingerstick glucose reviewed- No results for input(s): POCTGLUCOSE in the last 24 hours.  Current correctional scale  Low  Maintain anti-hyperglycemic dose as follows-   Antihyperglycemics (From admission, onward)            Sliding scale insulin AC/HS        Hold Oral hypoglycemics while patient is in the  hospital.        Pneumonitis  cxr reviewed  Stable on room air but O2 sats drop with ambulation  Neb tx and supplemental O2      Influenza A  tamiflu bid  Supportive care      Hypertension  Chronic, uncontrolled.  Latest blood pressure and vitals reviewed-   Temp:  [100 °F (37.8 °C)-100.6 °F (38.1 °C)]   Pulse:  [77-98]   Resp:  [19-22]   BP: (160-203)/()   SpO2:  [84 %-98 %] .   Home meds for hypertension were reviewed and noted below.   Hypertension Medications             furosemide (LASIX) 20 MG tablet Take 1 tablet (20 mg total) by mouth once daily.    lisinopril (PRINIVIL,ZESTRIL) 20 MG tablet Take 20 mg by mouth once daily.          While in the hospital, will manage blood pressure as follows; Continue home antihypertensive regimen    Will utilize p.r.n. blood pressure medication only if patient's blood pressure greater than  180/110 and he develops symptoms such as worsening chest pain or shortness of breath.          VTE Risk Mitigation (From admission, onward)    Sunshine Juarez NP  Department of Hospital Medicine   [facility] - Emergency Dept

## 2022-03-09 NOTE — PLAN OF CARE
[facility] Emergency Dept  Initial Discharge Assessment       Primary Care Provider: Primary Doctor No    Admission Diagnosis: Acute hypoxemic respiratory failure [J96.01]    Admission Date: 3/9/2022  Expected Discharge Date:     Cm completed the assessment with pt at bedside. Pt lives alone. Demographic, PCP and Insurance was verified by patient. Pt is a diabetic, denies dialysis and coumadin.  Pt denies hh and other dme.  Pt denies POA/LW. Disposition:  Pt will discharge to home.  Sister will provide transportatio non discharge.  No needs at this time.      Discharge Barriers Identified: None    Payor: [employer] MANAGED MEDICARE / Plan: [employer] MEDICARE HMO / Product Type: Capitation /     Extended Emergency Contact Information  Primary Emergency Contact: Nga Owens   [facility]  Home Phone: [phone]  Mobile Phone: [phone]  Relation: Other  Secondary Emergency Contact: Vick robb  Mobile Phone: [phone]  Relation: Friend   needed? No    Discharge Plan A: Home with family  Discharge Plan B: Home with family    No Pharmacies Listed    Initial Assessment (most recent)     Adult Discharge Assessment - 03/09/22 1525        Discharge Assessment    Assessment Type Discharge Planning Assessment     Confirmed/corrected address, phone number and insurance Yes     Confirmed Demographics Correct on Facesheet     Source of Information patient     Lives With alone     Facility Arrived From: home     Do you expect to return to your current living situation? Yes     Do you have help at home or someone to help you manage your care at home? Yes     Who are your caregiver(s) and their phone number(s)? Nga Owens - [phone]/ friend Vick - [phone]     Prior to hospitilization cognitive status: Alert/Oriented     Current cognitive status: Alert/Oriented     Walking or Climbing Stairs Difficulty none     Dressing/Bathing Difficulty none     Equipment Currently Used at Home glucometer      Readmission within 30 days? No     Patient currently being followed by outpatient case management? Yes     If yes, name of outpatient case management following: insurance company assigned oupatient case management     Do you currently have service(s) that help you manage your care at home? No     Do you take prescription medications? Yes     Do you have prescription coverage? Yes     Do you have any problems affording any of your prescribed medications? No     Is the patient taking medications as prescribed? yes     Who is going to help you get home at discharge? sister     How do you get to doctors appointments? car, drives self     Are you on dialysis? No     Do you take coumadin? No     Discharge Plan A Home with family     Discharge Plan B Home with family     DME Needed Upon Discharge  none     Discharge Plan discussed with: Patient     Discharge Barriers Identified None

## 2022-03-09 NOTE — ASSESSMENT & PLAN NOTE
Chronic, uncontrolled.  Latest blood pressure and vitals reviewed-   Temp:  [100 °F (37.8 °C)-100.6 °F (38.1 °C)]   Pulse:  [77-98]   Resp:  [19-22]   BP: (160-203)/()   SpO2:  [84 %-98 %] .   Home meds for hypertension were reviewed and noted below.   Hypertension Medications             furosemide (LASIX) 20 MG tablet Take 1 tablet (20 mg total) by mouth once daily.    lisinopril (PRINIVIL,ZESTRIL) 20 MG tablet Take 20 mg by mouth once daily.          While in the hospital, will manage blood pressure as follows; Continue home antihypertensive regimen    Will utilize p.r.n. blood pressure medication only if patient's blood pressure greater than  180/110 and he develops symptoms such as worsening chest pain or shortness of breath.

## 2022-03-09 NOTE — PHARMACY MED REC
"Admission Medication History     The home medication history was taken by Ingrid Mckeon CPhT.    Medication history obtained from Patient  And [facility]     You may go to "Admission" then "Reconcile Home Medications" tabs to review and/or act upon these items.      The home medication list has been updated by the Pharmacy department.    Please read ALL comments highlighted in yellow.    Please address this information as you see fit.     Feel free to contact us if you have any questions or require assistance.    The medications listed below were marked currently not taking from the home medication list.  Please reorder if appropriate:  [facility] reports no longer filling for the following medication(s):   Arginine Powder Packet   Vitamin C   Atorvastatin 80mg   Dextrose 40% Gel   Voltaren Gel   Furosemide 20mg   Lantus    Humalog   Isoniazid 300mg   Lactinex   Lidocaine Patch   Lisinopril 20mg   Remedy    Multivitamin    Percocet 10/325mg   Miralax   Vitamin B6    Patient is compliment with filling following medication(s):   Norco 10/325   Gabapentin 600mg    Potential issues to be addressed PRIOR TO DISCHARGE   Patient lacks understanding of therapy      Ingrid Mckeon CPhT.  [phone]      .          "

## 2022-03-09 NOTE — ED PROVIDER NOTES
"Encounter Date: 3/9/2022    SCRIBE #1 NOTE: I, Verenice Cruz, am scribing for, and in the presence of, Doug Fonseca MD.       History     Chief Complaint   Patient presents with    Cough    Shortness of Breath     States cough (mostly dry) since last PM that has interfered with sleeping last PM (Denies COPD Hx/tx'd with albuterol via neb per EMS en route. T=100.6 (pt was unaware of fever PTA)     Time seen by provider: 10:41 AM on 03/09/2022    Abel Gibbs is a 56 y.o. male who presents to the ED via EMS with an onset of SOB and cough. Patient has been sick the last couple days and c/o worsening SOB, cough, generalized weakness, and fatigue. He reports sleeping in a recliner at night secondary to difficulty breathing when lying flat. He also c/o HA, chest tightness, and bilateral leg swelling today. Fever is noted upon ED arrival. Per EMS personnel, patient was placed on nonrebreather with O2 saturation of 98%. EMS personnel deny any wheezing or crackles on auscultation. They mention patient's last COVID-19 test was 3 weeks ago with negative result. CBG is 268. The patient denies CP or any other symptoms at this time. He denies any Hx of similar symptoms or CHF. He has not been compliant with furosemide stating, "I already take a lot of pills and don't want to take any more pills just to make me urinate." He works currently in residential construction. He smokes blunts, marijuana daily, noting use of Black[employer]Mild cigars. He denies inhalation of smoke but just keeps the smoke in his mouth and blows it out. PMHx of DM, HTN, and osteomyelitis. No pertinent PSHx.    The history is provided by the patient and the EMS personnel.     Review of patient's allergies indicates:   Allergen Reactions    Adhesive Itching     Past Medical History:   Diagnosis Date    Depression     Diabetes mellitus     Hypertension     Obesity     Osteomyelitis      No past surgical history on file.  No family history on file.  Social " History     Tobacco Use    Smoking status: Never Smoker    Smokeless tobacco: Never Used   Substance Use Topics    Alcohol use: Yes     Comment: occas    Drug use: Yes     Frequency: 1.0 times per week     Types: Marijuana     Comment: last use 2 days ago     Review of Systems   Constitutional: Positive for fatigue and fever.   HENT: Negative for sore throat.    Respiratory: Positive for cough, chest tightness and shortness of breath.    Cardiovascular: Positive for leg swelling. Negative for chest pain.   Gastrointestinal: Negative for nausea.   Genitourinary: Negative for dysuria.   Musculoskeletal: Negative for back pain.   Skin: Negative for rash.   Neurological: Positive for weakness (generalized) and headaches.   Hematological: Does not bruise/bleed easily.       Physical Exam     Initial Vitals [03/09/22 1031]   BP Pulse Resp Temp SpO2   (!) 170/100 86 20 (!) 100.6 °F (38.1 °C) 98 %      MAP       --         Physical Exam    Nursing note and vitals reviewed.  Constitutional: He appears well-developed and well-nourished. No distress. Face mask in place.   HENT:   Head: Normocephalic and atraumatic.   Eyes: Conjunctivae and EOM are normal. Pupils are equal, round, and reactive to light.   Neck: Neck supple. No JVD present.   Cardiovascular: Normal rate, regular rhythm and normal heart sounds. Exam reveals no gallop, no S3 and no friction rub.    No murmur heard.  Pulmonary/Chest: No respiratory distress. He has no wheezes. He has no rhonchi. He has rales.   Subtle rales to left lung base.   Abdominal: Abdomen is soft. Bowel sounds are normal. He exhibits no distension. There is no abdominal tenderness.   Musculoskeletal:         General: No tenderness or edema. Normal range of motion.      Cervical back: Neck supple.     Neurological: He is alert and oriented to person, place, and time.   Skin: Skin is warm and dry.   Psychiatric: He has a normal mood and affect.         ED Course   Procedures  Labs  Reviewed   INFLUENZA A & B BY MOLECULAR - Abnormal; Notable for the following components:       Result Value    Influenza A, Molecular Positive (*)     All other components within normal limits   CBC W/ AUTO DIFFERENTIAL - Abnormal; Notable for the following components:    RBC 3.80 (*)     Hemoglobin 10.9 (*)     Hematocrit 34.8 (*)     MCHC 31.3 (*)     RDW 14.9 (*)     All other components within normal limits   COMPREHENSIVE METABOLIC PANEL - Abnormal; Notable for the following components:    Glucose 198 (*)     Calcium 8.5 (*)     Albumin 3.3 (*)     Total Bilirubin 1.1 (*)     Alkaline Phosphatase 47 (*)     AST 41 (*)     ALT 55 (*)     All other components within normal limits   URINALYSIS, REFLEX TO URINE CULTURE - Abnormal; Notable for the following components:    Protein, UA 2+ (*)     Glucose, UA 2+ (*)     Occult Blood UA 1+ (*)     All other components within normal limits    Narrative:     Specimen Source->Urine   B-TYPE NATRIURETIC PEPTIDE - Abnormal; Notable for the following components:     (*)     All other components within normal limits   URINALYSIS MICROSCOPIC - Abnormal; Notable for the following components:    Bacteria Few (*)     All other components within normal limits    Narrative:     Specimen Source->Urine   CULTURE, BLOOD   CULTURE, BLOOD   SARS-COV-2 RNA AMPLIFICATION, QUAL   LACTIC ACID, PLASMA   TROPONIN I          Imaging Results          X-Ray Chest AP Portable (Final result)  Result time 03/09/22 12:41:12    Final result by Rich Tyson Jr., MD (03/09/22 12:41:12)                 Impression:      Bilateral lower lobe patchy intrapulmonary infiltrates consistent with pneumonitis.      Electronically signed by: Rich Tyson MD  Date:    03/09/2022  Time:    12:41             Narrative:    EXAMINATION:  XR CHEST AP PORTABLE    CLINICAL HISTORY:  Sepsis;    TECHNIQUE:  Single frontal view of the chest was performed.    COMPARISON:  Chest x-ray of July 3,  2019    FINDINGS:  The mediastinal and cardiac size and contours are normal.  Patchy bilateral lower lobe infiltrates are identified.  These are not present on the prior chest.  The upper lung fields are clear.  There is no pneumothorax or pleural effusion.                                 Medications   acetaminophen tablet 1,000 mg (1,000 mg Oral Given 3/9/22 1135)   furosemide injection 40 mg (40 mg Intravenous Given 3/9/22 1135)   hydrALAZINE injection 10 mg (10 mg Intravenous Given 3/9/22 1135)   hydrocodone-chlorpheniramine 10-8 mg/5 mL suspension 5 mL (5 mLs Oral Given 3/9/22 1519)   ibuprofen tablet 600 mg (600 mg Oral Given 3/9/22 1519)   lisinopriL tablet 20 mg (20 mg Oral Given 3/9/22 1609)   hydrALAZINE injection 10 mg (10 mg Intravenous Given 3/9/22 1609)     Medical Decision Making:   History:   Old Medical Records: I decided to obtain old medical records.  Independently Interpreted Test(s):   I have ordered and independently interpreted EKG Reading(s) - see prior notes  Clinical Tests:   Lab Tests: Reviewed and Ordered  Radiological Study: Ordered and Reviewed  Medical Tests: Reviewed and Ordered          Scribe Attestation:   Scribe #1: I performed the above scribed service and the documentation accurately describes the services I performed. I attest to the accuracy of the note.        ED Course as of 03/09/22 1835   Wed Mar 09, 2022   1257 Patient appears to have pneumonitis likely from influenza.  I will start him on Tamiflu.  He also significantly hypertensive and he needs to take his Lasix and blood pressure medicines. [JS]   1310 Patient has influenza.  This is causing his cough.  BNP is slightly also elevated.  I will start him on Tamiflu and he needs to take his medications for blood pressure and Lasix.  I have given very specific return precautions.  Repeat exam shows oxygen level 95% on room air. [JS]   1437 Patient became hypoxic to 84% with exertion.  Given the pneumonitis on his x-ray  positive flu hypoxia he will be admitted for supplemental oxygen and treatment of his accelerated hypertension [JS]   1517 EKG independently interpreted by me as rate 91 normal sinus rhythm incomplete right bundle-branch block T-wave inversion lead 1 aVL no ST segment elevation or depression. [JS]      ED Course User Index  [JS] Doug Fonseca MD           I, Dr. Doug Fonseca personally performed the services described in this documentation. All medical record entries made by the scribe were at my direction and in my presence.  I have reviewed the chart and agree that the record reflects my personal performance and is accurate and complete. Doug Fonseca MD.  1:15 PM 03/09/2022    DISCLAIMER: This note was prepared with Dragon NaturallySpeaking voice recognition transcription software. Garbled syntax, mangled pronouns, and other bizarre constructions may be attributed to that software system   Clinical Impression:   Final diagnoses:  [R06.02] SOB (shortness of breath)  [J10.1] Influenza A (Primary)  [I10] Hypertension, unspecified type  [J12.9] Viral pneumonia  [R09.02] Hypoxia  [J96.01] Acute hypoxemic respiratory failure          ED Disposition Condition    Admit               Doug Fonseca MD  03/09/22 1316       Doug Fonseca MD  03/09/22 1438       Doug Fonseca MD  03/09/22 2167

## 2022-03-09 NOTE — SUBJECTIVE & OBJECTIVE
Past Medical History:   Diagnosis Date    Depression     Diabetes mellitus     Hypertension     Obesity     Osteomyelitis        No past surgical history on file.    Review of patient's allergies indicates:   Allergen Reactions    Adhesive Itching       No current facility-administered medications on file prior to encounter.     Current Outpatient Medications on File Prior to Encounter   Medication Sig    gabapentin (NEURONTIN) 600 MG tablet Take 600 mg by mouth 3 (three) times daily.    HYDROcodone-acetaminophen (NORCO)  mg per tablet Take 1 tablet by mouth every 6 (six) hours as needed.    arginine-glutamine-calcium HMB 7-7-1.5 gram PwPk Take by mouth 2 (two) times daily.    ascorbic acid, vitamin C, (VITAMIN C) 500 MG tablet Take 500 mg by mouth 2 (two) times daily.    atorvastatin (LIPITOR) 80 MG tablet Take 80 mg by mouth once daily.    dextrose (GLUCOSE GEL) 40 % gel Take 15 g by mouth as needed.    diclofenac sodium (VOLTAREN) 1 % Gel Apply 2 g topically 4 (four) times daily.    glucagon, human recombinant, (GLUCAGEN HYPOKIT) 1 mg SolR Inject 1 mg into the muscle as needed.    insulin glargine (LANTUS) 100 unit/mL injection Inject 50 Units into the skin once daily.    insulin lispro (HUMALOG) 100 unit/mL injection Inject 0-10 Units into the skin 3 (three) times daily before meals.    isoniazid (NYDRAZID) 300 MG Tab Take 300 mg by mouth once daily.    lactobacillus acidophilus & bulgar (LACTINEX) 100 million cell packet Take 1 tablet by mouth 3 (three) times daily with meals.    lidocaine (LIDODERM) 5 % Place 1 patch onto the skin once daily. Remove & Discard patch within 12 hours or as directed by MD    lisinopril (PRINIVIL,ZESTRIL) 20 MG tablet Take 20 mg by mouth once daily.    menthol/zinc oxide (REMEDY CALAZIME PROTECT PASTE TOP) Apply topically 2 (two) times daily.    metFORMIN (GLUCOPHAGE) 1000 MG tablet Take 1,000 mg by mouth 2 (two) times daily with meals.    multivitamin capsule Take 1 capsule  by mouth once daily.    polyethylene glycol (MIRALAX) 17 gram PwPk Take by mouth 2 (two) times daily.    pyridoxine, vitamin B6, (VITAMIN B-6) 50 MG Tab Take 50 mg by mouth once daily.    [DISCONTINUED] atorvastatin (LIPITOR) 40 MG tablet Take 40 mg by mouth every evening.    [DISCONTINUED] furosemide (LASIX) 20 MG tablet Take 20 mg by mouth once daily.    [DISCONTINUED] gabapentin (NEURONTIN) 800 MG tablet Take 800 mg by mouth 3 (three) times daily.    [DISCONTINUED] oxyCODONE-acetaminophen (PERCOCET)  mg per tablet Take 1 tablet by mouth every 4 (four) hours as needed for Pain.     Family History    None       Tobacco Use    Smoking status: Never Smoker    Smokeless tobacco: Never Used   Substance and Sexual Activity    Alcohol use: Yes     Comment: occas    Drug use: Yes     Frequency: 1.0 times per week     Types: Marijuana     Comment: last use 2 days ago    Sexual activity: Not on file     Review of Systems   Constitutional:  Positive for chills, fatigue and fever.   HENT:  Positive for congestion, rhinorrhea, sinus pressure and sneezing. Negative for sore throat and trouble swallowing.    Respiratory:  Positive for cough and shortness of breath. Negative for chest tightness and wheezing.    Cardiovascular:  Negative for chest pain, palpitations and leg swelling.   Gastrointestinal:  Negative for abdominal pain, diarrhea, nausea and vomiting.   Genitourinary:  Negative for difficulty urinating, dysuria and urgency.   Musculoskeletal:  Positive for myalgias. Negative for arthralgias, back pain and gait problem.   Skin:  Negative for rash.   Neurological:  Negative for dizziness, facial asymmetry and headaches.   Hematological:  Negative for adenopathy.   Psychiatric/Behavioral:  Negative for agitation, behavioral problems and confusion.    All other systems reviewed and are negative.  Objective:     Vital Signs (Most Recent):  Temp: 100 °F (37.8 °C) (03/09/22 1257)  Pulse: 78 (03/09/22 1427)  Resp: 19  (03/09/22 1519)  BP: (!) 193/86 (03/09/22 1404)  SpO2: 95 % (03/09/22 1427)   Vital Signs (24h Range):  Temp:  [100 °F (37.8 °C)-100.6 °F (38.1 °C)] 100 °F (37.8 °C)  Pulse:  [77-98] 78  Resp:  [19-22] 19  SpO2:  [84 %-98 %] 95 %  BP: (160-203)/() 193/86     Weight: (!) 143.8 kg (317 lb)  Body mass index is 39.62 kg/m².    Physical Exam  Vitals and nursing note reviewed.   Constitutional:       General: He is not in acute distress.     Appearance: Normal appearance. He is well-developed. He is not ill-appearing or diaphoretic.   HENT:      Head: Normocephalic and atraumatic.      Right Ear: External ear normal.      Left Ear: External ear normal.      Nose: Congestion and rhinorrhea present.      Mouth/Throat:      Mouth: Mucous membranes are moist.      Pharynx: Oropharynx is clear. No oropharyngeal exudate or posterior oropharyngeal erythema.   Eyes:      General: No scleral icterus.     Conjunctiva/sclera: Conjunctivae normal.      Pupils: Pupils are equal, round, and reactive to light.   Neck:      Vascular: No JVD.   Cardiovascular:      Rate and Rhythm: Normal rate and regular rhythm.      Pulses: Normal pulses.      Heart sounds: Normal heart sounds. No murmur heard.  Pulmonary:      Effort: Pulmonary effort is normal. No respiratory distress.      Breath sounds: Normal breath sounds. No stridor. No wheezing, rhonchi or rales.   Abdominal:      General: Bowel sounds are normal. There is no distension.      Palpations: Abdomen is soft.      Tenderness: There is no abdominal tenderness.   Musculoskeletal:         General: No swelling, tenderness or deformity. Normal range of motion.      Cervical back: Normal range of motion and neck supple.   Skin:     General: Skin is warm and dry.      Capillary Refill: Capillary refill takes 2 to 3 seconds.      Coloration: Skin is not jaundiced or pale.      Findings: No erythema.   Neurological:      General: No focal deficit present.      Mental Status: He is alert  and oriented to person, place, and time.      Cranial Nerves: No cranial nerve deficit.      Sensory: No sensory deficit.      Motor: No weakness.      Coordination: Coordination normal.   Psychiatric:         Mood and Affect: Mood normal.         Behavior: Behavior normal.         Thought Content: Thought content normal.         CRANIAL NERVES     CN III, IV, VI   Pupils are equal, round, and reactive to light.     Significant Labs: All pertinent labs within the past 24 hours have been reviewed.  CBC:   Recent Labs   Lab 03/09/22  1125   WBC 5.91   HGB 10.9*   HCT 34.8*        CMP:   Recent Labs   Lab 03/09/22  1125      K 4.0      CO2 26   *   BUN 20   CREATININE 1.2   CALCIUM 8.5*   PROT 6.7   ALBUMIN 3.3*   BILITOT 1.1*   ALKPHOS 47*   AST 41*   ALT 55*   ANIONGAP 8   EGFRNONAA >60       Significant Imaging: I have reviewed all pertinent imaging results/findings within the past 24 hours.

## 2022-03-09 NOTE — ASSESSMENT & PLAN NOTE
Patient with Hypoxic Respiratory failure which is Acute.  he is not on home oxygen. Supplemental oxygen was provided and noted-  .   Signs/symptoms of respiratory failure include- tachypnea and increased work of breathing. Contributing diagnoses includes - bilat pneumonitis, viral influenza Labs and images were reviewed. Patient Has not had a recent ABG. Will treat underlying causes and adjust management of respiratory failure as follows-     Cont supplemental O2  Neb tx  tamiflu  Telemetry monitoring

## 2022-03-09 NOTE — DISCHARGE INSTRUCTIONS
Make sure to take all your medications as prescribed including your diuretic (furosemide) and your blood pressure medicines.  You have the flu and this should last for several days.  Return to the ER for difficulty breathing and or worsening symptoms

## 2022-03-09 NOTE — ED NOTES
pateint's ambulating SPO2 noted as low as  84 % on room air; patient continues to reports mild SOB with persistent cough; patient SPO2 recovers well MD notified.Alvaro Espinoza

## 2022-03-10 LAB
ALBUMIN SERPL BCP-MCNC: 2.9 G/DL (ref 3.5–5.2)
ALP SERPL-CCNC: 40 U/L (ref 55–135)
ALT SERPL W/O P-5'-P-CCNC: 43 U/L (ref 10–44)
ANION GAP SERPL CALC-SCNC: 9 MMOL/L (ref 8–16)
AST SERPL-CCNC: 38 U/L (ref 10–40)
BILIRUB SERPL-MCNC: 1 MG/DL (ref 0.1–1)
BUN SERPL-MCNC: 19 MG/DL (ref 6–20)
CALCIUM SERPL-MCNC: 8.3 MG/DL (ref 8.7–10.5)
CHLORIDE SERPL-SCNC: 103 MMOL/L (ref 95–110)
CO2 SERPL-SCNC: 26 MMOL/L (ref 23–29)
CREAT SERPL-MCNC: 1.1 MG/DL (ref 0.5–1.4)
ERYTHROCYTE [DISTWIDTH] IN BLOOD BY AUTOMATED COUNT: 14.7 % (ref 11.5–14.5)
EST. GFR  (AFRICAN AMERICAN): >60 ML/MIN/1.73 M^2
EST. GFR  (NON AFRICAN AMERICAN): >60 ML/MIN/1.73 M^2
GLUCOSE SERPL-MCNC: 158 MG/DL (ref 70–110)
HCT VFR BLD AUTO: 32.7 % (ref 40–54)
HGB BLD-MCNC: 10.6 G/DL (ref 14–18)
MAGNESIUM SERPL-MCNC: 1.6 MG/DL (ref 1.6–2.6)
MCH RBC QN AUTO: 29.2 PG (ref 27–31)
MCHC RBC AUTO-ENTMCNC: 32.4 G/DL (ref 32–36)
MCV RBC AUTO: 90 FL (ref 82–98)
PLATELET # BLD AUTO: 174 K/UL (ref 150–450)
PMV BLD AUTO: 11.8 FL (ref 9.2–12.9)
POCT GLUCOSE: 156 MG/DL (ref 70–110)
POCT GLUCOSE: 171 MG/DL (ref 70–110)
POCT GLUCOSE: 175 MG/DL (ref 70–110)
POCT GLUCOSE: 238 MG/DL (ref 70–110)
POTASSIUM SERPL-SCNC: 4 MMOL/L (ref 3.5–5.1)
PROT SERPL-MCNC: 6.2 G/DL (ref 6–8.4)
RBC # BLD AUTO: 3.63 M/UL (ref 4.6–6.2)
SODIUM SERPL-SCNC: 138 MMOL/L (ref 136–145)
WBC # BLD AUTO: 4.55 K/UL (ref 3.9–12.7)

## 2022-03-10 PROCEDURE — 94618 PULMONARY STRESS TESTING: CPT

## 2022-03-10 PROCEDURE — 12000002 HC ACUTE/MED SURGE SEMI-PRIVATE ROOM

## 2022-03-10 PROCEDURE — 94640 AIRWAY INHALATION TREATMENT: CPT

## 2022-03-10 PROCEDURE — 63600175 PHARM REV CODE 636 W HCPCS

## 2022-03-10 PROCEDURE — 94761 N-INVAS EAR/PLS OXIMETRY MLT: CPT

## 2022-03-10 PROCEDURE — 25000003 PHARM REV CODE 250: Performed by: NURSE PRACTITIONER

## 2022-03-10 PROCEDURE — 63600175 PHARM REV CODE 636 W HCPCS: Performed by: NURSE PRACTITIONER

## 2022-03-10 PROCEDURE — 99900035 HC TECH TIME PER 15 MIN (STAT)

## 2022-03-10 PROCEDURE — 85027 COMPLETE CBC AUTOMATED: CPT | Performed by: NURSE PRACTITIONER

## 2022-03-10 PROCEDURE — 80053 COMPREHEN METABOLIC PANEL: CPT | Performed by: NURSE PRACTITIONER

## 2022-03-10 PROCEDURE — 36415 COLL VENOUS BLD VENIPUNCTURE: CPT | Performed by: NURSE PRACTITIONER

## 2022-03-10 PROCEDURE — 83735 ASSAY OF MAGNESIUM: CPT | Performed by: NURSE PRACTITIONER

## 2022-03-10 PROCEDURE — 25000242 PHARM REV CODE 250 ALT 637 W/ HCPCS: Performed by: NURSE PRACTITIONER

## 2022-03-10 PROCEDURE — 27000207 HC ISOLATION

## 2022-03-10 RX ORDER — HYDRALAZINE HYDROCHLORIDE 20 MG/ML
10 INJECTION INTRAMUSCULAR; INTRAVENOUS ONCE
Status: COMPLETED | OUTPATIENT
Start: 2022-03-10 | End: 2022-03-10

## 2022-03-10 RX ORDER — FLUTICASONE PROPIONATE 50 MCG
2 SPRAY, SUSPENSION (ML) NASAL DAILY
Status: DISCONTINUED | OUTPATIENT
Start: 2022-03-10 | End: 2022-03-11 | Stop reason: HOSPADM

## 2022-03-10 RX ORDER — LISINOPRIL 10 MG/1
20 TABLET ORAL 2 TIMES DAILY
Status: DISCONTINUED | OUTPATIENT
Start: 2022-03-10 | End: 2022-03-11

## 2022-03-10 RX ORDER — OXYMETAZOLINE HCL 0.05 %
2 SPRAY, NON-AEROSOL (ML) NASAL 2 TIMES DAILY
Status: DISCONTINUED | OUTPATIENT
Start: 2022-03-10 | End: 2022-03-11 | Stop reason: HOSPADM

## 2022-03-10 RX ORDER — DIPHENHYDRAMINE HYDROCHLORIDE 50 MG/ML
25 INJECTION INTRAMUSCULAR; INTRAVENOUS ONCE
Status: COMPLETED | OUTPATIENT
Start: 2022-03-10 | End: 2022-03-10

## 2022-03-10 RX ORDER — AMLODIPINE BESYLATE 5 MG/1
5 TABLET ORAL DAILY
Status: DISCONTINUED | OUTPATIENT
Start: 2022-03-10 | End: 2022-03-11 | Stop reason: HOSPADM

## 2022-03-10 RX ORDER — HYDROCODONE BITARTRATE AND ACETAMINOPHEN 10; 325 MG/1; MG/1
1 TABLET ORAL EVERY 6 HOURS PRN
Status: DISCONTINUED | OUTPATIENT
Start: 2022-03-10 | End: 2022-03-11 | Stop reason: HOSPADM

## 2022-03-10 RX ORDER — LABETALOL HYDROCHLORIDE 5 MG/ML
10 INJECTION, SOLUTION INTRAVENOUS ONCE
Status: DISCONTINUED | OUTPATIENT
Start: 2022-03-10 | End: 2022-03-10

## 2022-03-10 RX ORDER — GUAIFENESIN 600 MG/1
600 TABLET, EXTENDED RELEASE ORAL 2 TIMES DAILY
Status: DISCONTINUED | OUTPATIENT
Start: 2022-03-10 | End: 2022-03-11 | Stop reason: HOSPADM

## 2022-03-10 RX ADMIN — Medication 800 MG: at 09:03

## 2022-03-10 RX ADMIN — AMLODIPINE BESYLATE 5 MG: 5 TABLET ORAL at 05:03

## 2022-03-10 RX ADMIN — ACETAMINOPHEN 650 MG: 325 TABLET ORAL at 05:03

## 2022-03-10 RX ADMIN — OSELTAMIVIR PHOSPHATE 75 MG: 75 CAPSULE ORAL at 08:03

## 2022-03-10 RX ADMIN — HYDRALAZINE HYDROCHLORIDE 10 MG: 20 INJECTION INTRAMUSCULAR; INTRAVENOUS at 05:03

## 2022-03-10 RX ADMIN — GABAPENTIN 600 MG: 300 CAPSULE ORAL at 03:03

## 2022-03-10 RX ADMIN — HYDROCODONE BITARTRATE AND ACETAMINOPHEN 1 TABLET: 10; 325 TABLET ORAL at 05:03

## 2022-03-10 RX ADMIN — GABAPENTIN 600 MG: 300 CAPSULE ORAL at 09:03

## 2022-03-10 RX ADMIN — ATORVASTATIN CALCIUM 80 MG: 40 TABLET, FILM COATED ORAL at 09:03

## 2022-03-10 RX ADMIN — THERA TABS 1 TABLET: TAB at 09:03

## 2022-03-10 RX ADMIN — FLUTICASONE PROPIONATE 100 MCG: 50 SPRAY, METERED NASAL at 12:03

## 2022-03-10 RX ADMIN — LISINOPRIL 20 MG: 10 TABLET ORAL at 09:03

## 2022-03-10 RX ADMIN — HYDROCODONE BITARTRATE AND ACETAMINOPHEN 1 TABLET: 10; 325 TABLET ORAL at 07:03

## 2022-03-10 RX ADMIN — Medication 800 MG: at 12:03

## 2022-03-10 RX ADMIN — FUROSEMIDE 20 MG: 20 TABLET ORAL at 09:03

## 2022-03-10 RX ADMIN — INSULIN ASPART 1 UNITS: 100 INJECTION, SOLUTION INTRAVENOUS; SUBCUTANEOUS at 09:03

## 2022-03-10 RX ADMIN — LISINOPRIL 20 MG: 10 TABLET ORAL at 08:03

## 2022-03-10 RX ADMIN — IPRATROPIUM BROMIDE AND ALBUTEROL SULFATE 3 ML: 2.5; .5 SOLUTION RESPIRATORY (INHALATION) at 07:03

## 2022-03-10 RX ADMIN — HYDRALAZINE HYDROCHLORIDE 25 MG: 25 TABLET, FILM COATED ORAL at 04:03

## 2022-03-10 RX ADMIN — GUAIFENESIN 600 MG: 600 TABLET, EXTENDED RELEASE ORAL at 12:03

## 2022-03-10 RX ADMIN — DIPHENHYDRAMINE HYDROCHLORIDE 25 MG: 50 INJECTION INTRAMUSCULAR; INTRAVENOUS at 11:03

## 2022-03-10 RX ADMIN — GABAPENTIN 600 MG: 300 CAPSULE ORAL at 08:03

## 2022-03-10 RX ADMIN — OXYMETAZOLINE HCL 2 SPRAY: 0.05 SPRAY NASAL at 12:03

## 2022-03-10 RX ADMIN — OSELTAMIVIR PHOSPHATE 75 MG: 75 CAPSULE ORAL at 09:03

## 2022-03-10 RX ADMIN — OXYMETAZOLINE HCL 2 SPRAY: 0.05 SPRAY NASAL at 08:03

## 2022-03-10 RX ADMIN — IPRATROPIUM BROMIDE AND ALBUTEROL SULFATE 3 ML: 2.5; .5 SOLUTION RESPIRATORY (INHALATION) at 03:03

## 2022-03-10 RX ADMIN — GUAIFENESIN 600 MG: 600 TABLET, EXTENDED RELEASE ORAL at 08:03

## 2022-03-10 RX ADMIN — HYDRALAZINE HYDROCHLORIDE 25 MG: 25 TABLET, FILM COATED ORAL at 12:03

## 2022-03-10 RX ADMIN — ENOXAPARIN SODIUM 40 MG: 100 INJECTION SUBCUTANEOUS at 05:03

## 2022-03-10 NOTE — HOSPITAL COURSE
Pt admitted for influenza A, bilat pneumonitis and hypoxemia.  Patient was initiated on tamiflu and supplemental oxygen.  He was noncompliant with oxygen use during hospital stay and needed redirection. His oxygen sat remained stable at rest but worsened on exertion. Home O2 eval performed and patient does not qualify. Pt's bp was uncontrolled on admission and home meds were resumed, pt noncompliant with home meds. His bp improved and lisinopril was adjusted from 20mg daily to bid. Patient is on RA with oxygen sat 96%. He is medically stable for DC.

## 2022-03-10 NOTE — ASSESSMENT & PLAN NOTE
Patient with Hypoxic Respiratory failure which is Acute.  he is not on home oxygen. Supplemental oxygen was provided and noted-  .   Signs/symptoms of respiratory failure include- tachypnea and increased work of breathing. Contributing diagnoses includes - bilat pneumonitis, viral influenza Labs and images were reviewed. Patient Has not had a recent ABG. Will treat underlying causes and adjust management of respiratory failure as follows-     Cont supplemental O2  Neb tx  tamiflu  Telemetry monitoring  Home O2 eval

## 2022-03-10 NOTE — PLAN OF CARE
Problem: Adult Inpatient Plan of Care  Goal: Plan of Care Review  3/9/2022 2330 by Azul Krause RN  Outcome: Ongoing, Progressing  3/9/2022 2328 by Azul Krause RN  Outcome: Ongoing, Progressing  Goal: Patient-Specific Goal (Individualized)  3/9/2022 2330 by Azul Krause RN  Outcome: Ongoing, Progressing  3/9/2022 2328 by Azul Krause RN  Outcome: Ongoing, Progressing  Goal: Absence of Hospital-Acquired Illness or Injury  3/9/2022 2330 by Azul Krause RN  Outcome: Ongoing, Progressing  3/9/2022 2328 by Azul Krause RN  Outcome: Ongoing, Progressing  Goal: Optimal Comfort and Wellbeing  3/9/2022 2330 by Azul Krause RN  Outcome: Ongoing, Progressing  3/9/2022 2328 by Azul Krause RN  Outcome: Ongoing, Progressing  Goal: Readiness for Transition of Care  3/9/2022 2330 by Azul Krause RN  Outcome: Ongoing, Progressing  3/9/2022 2328 by Azul Krause RN  Outcome: Ongoing, Progressing     Problem: Diabetes Comorbidity  Goal: Blood Glucose Level Within Targeted Range  3/9/2022 2330 by Azul Krause RN  Outcome: Ongoing, Progressing  3/9/2022 2328 by Azul Krause RN  Outcome: Ongoing, Progressing     Problem: Fluid and Electrolyte Imbalance (Acute Kidney Injury/Impairment)  Goal: Fluid and Electrolyte Balance  3/9/2022 2330 by Azul Krause RN  Outcome: Ongoing, Progressing  3/9/2022 2328 by Azul Krause RN  Outcome: Ongoing, Progressing     Problem: Oral Intake Inadequate (Acute Kidney Injury/Impairment)  Goal: Optimal Nutrition Intake  3/9/2022 2330 by Azul Krause RN  Outcome: Ongoing, Progressing  3/9/2022 2328 by Azul Krause RN  Outcome: Ongoing, Progressing     Problem: Renal Function Impairment (Acute Kidney Injury/Impairment)  Goal: Effective Renal Function  3/9/2022 2330 by Azul Krause RN  Outcome: Ongoing, Progressing  3/9/2022 2328 by Azul Krause RN  Outcome: Ongoing, Progressing     Problem: Fluid Imbalance  (Pneumonia)  Goal: Fluid Balance  3/9/2022 2330 by Azul Krause RN  Outcome: Ongoing, Progressing  3/9/2022 2328 by Azul Krause RN  Outcome: Ongoing, Progressing     Problem: Infection (Pneumonia)  Goal: Resolution of Infection Signs and Symptoms  3/9/2022 2330 by Azul Krause RN  Outcome: Ongoing, Progressing  3/9/2022 2328 by Azul Krause RN  Outcome: Ongoing, Progressing     Problem: Respiratory Compromise (Pneumonia)  Goal: Effective Oxygenation and Ventilation  3/9/2022 2330 by Azul Krause RN  Outcome: Ongoing, Progressing  3/9/2022 2328 by Azul Krause RN  Outcome: Ongoing, Progressing     Problem: Impaired Wound Healing  Goal: Optimal Wound Healing  3/9/2022 2330 by Azul Krause RN  Outcome: Ongoing, Progressing  3/9/2022 2328 by Azul Krause RN  Outcome: Ongoing, Progressing     Problem: Bariatric Environmental Safety  Goal: Safety Maintained with Care  3/9/2022 2330 by Azul Krause RN  Outcome: Ongoing, Progressing  3/9/2022 2328 by Azul Krause RN  Outcome: Ongoing, Progressing

## 2022-03-10 NOTE — SUBJECTIVE & OBJECTIVE
Interval History:  Notes reviewed, no acute events overnight other than patient noncompliant with oxygen.  Patient resting comfortably in bed.  He states he still experiencing generalized malaise and fatigue with body aches.  He is complaining of severe nasal congestion.  Will order Flonase and Afrin and continue supportive care.  Patient encouraged on oxygen use.  Patient verbalized understanding.  Will continue to monitor closely and plan for possible discharge tomorrow if patient remains stable.    Review of Systems   Constitutional:  Positive for appetite change, chills, fatigue and fever.   HENT:  Positive for congestion, rhinorrhea and sinus pressure. Negative for sore throat and trouble swallowing.    Respiratory:  Positive for cough. Negative for chest tightness and shortness of breath.    Cardiovascular:  Negative for chest pain, palpitations and leg swelling.   Gastrointestinal:  Negative for abdominal pain, diarrhea, nausea and vomiting.   Genitourinary:  Negative for difficulty urinating and dysuria.   Musculoskeletal:  Negative for arthralgias, back pain and gait problem.   Skin:  Negative for color change, pallor, rash and wound.   Psychiatric/Behavioral:  Negative for agitation, behavioral problems and confusion.    All other systems reviewed and are negative.  Objective:     Vital Signs (Most Recent):  Temp: (!) 100.7 °F (38.2 °C) (03/10/22 0806)  Pulse: 80 (03/10/22 0806)  Resp: 19 (03/10/22 0806)  BP: (!) 173/79 (03/10/22 0806)  SpO2: 96 % (03/10/22 0806)   Vital Signs (24h Range):  Temp:  [97.9 °F (36.6 °C)-100.7 °F (38.2 °C)] 100.7 °F (38.2 °C)  Pulse:  [] 80  Resp:  [15-22] 19  SpO2:  [84 %-97 %] 96 %  BP: (160-217)/(77-98) 173/79     Weight: (!) 146.7 kg (323 lb 6.6 oz)  Body mass index is 40.42 kg/m².    Intake/Output Summary (Last 24 hours) at 3/10/2022 1119  Last data filed at 3/10/2022 0638  Gross per 24 hour   Intake 240 ml   Output 3350 ml   Net -3110 ml      Physical Exam  Vitals  and nursing note reviewed.   Constitutional:       General: He is not in acute distress.     Appearance: Normal appearance. He is well-developed. He is not ill-appearing or diaphoretic.   HENT:      Head: Normocephalic and atraumatic.      Right Ear: External ear normal.      Left Ear: External ear normal.      Nose: Congestion and rhinorrhea present.      Mouth/Throat:      Mouth: Mucous membranes are moist.      Pharynx: Oropharynx is clear. No oropharyngeal exudate or posterior oropharyngeal erythema.   Eyes:      General: No scleral icterus.     Conjunctiva/sclera: Conjunctivae normal.      Pupils: Pupils are equal, round, and reactive to light.   Neck:      Vascular: No JVD.   Cardiovascular:      Rate and Rhythm: Normal rate and regular rhythm.      Pulses: Normal pulses.      Heart sounds: Normal heart sounds. No murmur heard.  Pulmonary:      Effort: Pulmonary effort is normal. No respiratory distress.      Breath sounds: Normal breath sounds. No stridor. No wheezing, rhonchi or rales.      Comments: Dry cough  Abdominal:      General: Bowel sounds are normal. There is no distension.      Palpations: Abdomen is soft.      Tenderness: There is no abdominal tenderness.   Musculoskeletal:         General: No swelling or tenderness. Normal range of motion.      Cervical back: Normal range of motion and neck supple.   Skin:     General: Skin is warm and dry.      Capillary Refill: Capillary refill takes 2 to 3 seconds.      Coloration: Skin is not jaundiced or pale.      Findings: No erythema.   Neurological:      General: No focal deficit present.      Mental Status: He is alert and oriented to person, place, and time.      Cranial Nerves: No cranial nerve deficit.      Sensory: No sensory deficit.   Psychiatric:         Mood and Affect: Mood normal.         Behavior: Behavior normal.         Thought Content: Thought content normal.       Significant Labs: All pertinent labs within the past 24 hours have been  reviewed.  CBC:   Recent Labs   Lab 03/09/22  1125 03/10/22  0433   WBC 5.91 4.55   HGB 10.9* 10.6*   HCT 34.8* 32.7*    174     CMP:   Recent Labs   Lab 03/09/22  1125 03/10/22  0434    138   K 4.0 4.0    103   CO2 26 26   * 158*   BUN 20 19   CREATININE 1.2 1.1   CALCIUM 8.5* 8.3*   PROT 6.7 6.2   ALBUMIN 3.3* 2.9*   BILITOT 1.1* 1.0   ALKPHOS 47* 40*   AST 41* 38   ALT 55* 43   ANIONGAP 8 9   EGFRNONAA >60 >60       Significant Imaging: I have reviewed all pertinent imaging results/findings within the past 24 hours.

## 2022-03-10 NOTE — ASSESSMENT & PLAN NOTE
Chronic, uncontrolled.  Latest blood pressure and vitals reviewed-   Temp:  [97.9 °F (36.6 °C)-100.7 °F (38.2 °C)]   Pulse:  []   Resp:  [15-22]   BP: (160-217)/(77-98)   SpO2:  [84 %-97 %] .   Home meds for hypertension were reviewed and noted below.   Hypertension Medications             furosemide (LASIX) 20 MG tablet Take 1 tablet (20 mg total) by mouth once daily.    lisinopril (PRINIVIL,ZESTRIL) 20 MG tablet Take 20 mg by mouth once daily.          While in the hospital, will manage blood pressure as follows; Continue home antihypertensive regimen and increase lisinopril to bid    Will utilize p.r.n. blood pressure medication only if patient's blood pressure greater than  180/110 and he develops symptoms such as worsening chest pain or shortness of breath.

## 2022-03-10 NOTE — PLAN OF CARE
Cm in morning rounding with Cris NASSAR NP. Pt not medically cleared for discharge today. Home o2 eval ordered. Possible d/c tomorrow. Cm will follow-up.

## 2022-03-10 NOTE — CARE UPDATE
03/10/22 0719   Patient Assessment/Suction   Level of Consciousness (AVPU) alert   Respiratory Effort Normal   Expansion/Accessory Muscles/Retractions expansion symmetric   All Lung Fields Breath Sounds Anterior:   LLL Breath Sounds diminished   RLL Breath Sounds diminished   Rhythm/Pattern, Respiratory pattern regular   Cough Frequency no cough   PRE-TX-O2   O2 Device (Oxygen Therapy) room air   SpO2 (!) 92 %   Pulse Oximetry Type Intermittent   $ Pulse Oximetry - Multiple Charge Pulse Oximetry - Multiple   Pulse 78   Resp 15   Positioning HOB elevated 30 degrees   Aerosol Therapy   $ Aerosol Therapy Charges Aerosol Treatment   Respiratory Treatment Status (SVN) given   Treatment Route (SVN) mask   Patient Position (SVN) HOB elevated   Signs of Intolerance (SVN) none   Breath Sounds Post-Respiratory Treatment   Throughout All Fields Post-Treatment All Fields   Throughout All Fields Post-Treatment no change   POx, nebs TID

## 2022-03-10 NOTE — ED NOTES
Report given to assigned RN Sarah; questions and concerns addressed.Alvaro Espinoza  Monitor room notified of tele box placement.Alvaro Espinoza

## 2022-03-10 NOTE — NURSING
Patient arrived to room and assisted into bed. O2 at 2 lpm via NC. Coughing noted but no other distress. Telemetry 8612 in place. Continious pulse ox placed. Ice and water given. Oriented to room and call light. Safety measures in place.

## 2022-03-10 NOTE — PROGRESS NOTES
"[facility] Medicine  Progress Note    Patient Name: Abel Gibbs  MRN: [id number]  Patient Class: IP- Inpatient   Admission Date: 3/9/2022  Length of Stay: 1 days  Attending Physician: Vanesa Justice MD  Primary Care Provider: Primary Doctor No        Subjective:     Principal Problem:Acute hypoxemic respiratory failure        HPI:  Abel Gibbs is a 56 yr old male with PMHx significant for hypertension, diabetes, osteomyelitis and nicotine abuse presenting today for cough, shortness of breath, fever or malaise.  Patient reports symptoms started approximately 3 days ago.  He states his shortness of breath and cough worsened and he therefore presented to the ED.  Workup in the ED reveals bilateral pneumonitis and patient tested positive for influenza A. His ambulatory O2 sat dropped into the 80s and therefore admission was requested.  Patient states his symptoms are moderate severity and worsen with exertion.  He denies any chest pain, nausea, vomiting or diarrhea.  Patient was experiencing accelerated hypertension the ED and he was recently treated with IV hydralazine and p.o. lisinopril.  Patient states he did not take his home medications today.  He reports noncompliance with his Lasix at home stating that he takes enough medications and "does not want to take another pill that makes him pee". Patient educated extensively on medication compliance.  Patient will be admitted to hospital medicine services for further workup and management.        Overview/Hospital Course:  No notes on file    Interval History:  Notes reviewed, no acute events overnight other than patient noncompliant with oxygen.  Patient resting comfortably in bed.  He states he still experiencing generalized malaise and fatigue with body aches.  He is complaining of severe nasal congestion.  Will order Flonase and Afrin and continue supportive care.  Patient encouraged on oxygen use.  Patient verbalized " understanding.  Will continue to monitor closely and plan for possible discharge tomorrow if patient remains stable.    Review of Systems   Constitutional:  Positive for appetite change, chills, fatigue and fever.   HENT:  Positive for congestion, rhinorrhea and sinus pressure. Negative for sore throat and trouble swallowing.    Respiratory:  Positive for cough. Negative for chest tightness and shortness of breath.    Cardiovascular:  Negative for chest pain, palpitations and leg swelling.   Gastrointestinal:  Negative for abdominal pain, diarrhea, nausea and vomiting.   Genitourinary:  Negative for difficulty urinating and dysuria.   Musculoskeletal:  Negative for arthralgias, back pain and gait problem.   Skin:  Negative for color change, pallor, rash and wound.   Psychiatric/Behavioral:  Negative for agitation, behavioral problems and confusion.    All other systems reviewed and are negative.  Objective:     Vital Signs (Most Recent):  Temp: (!) 100.7 °F (38.2 °C) (03/10/22 0806)  Pulse: 80 (03/10/22 0806)  Resp: 19 (03/10/22 0806)  BP: (!) 173/79 (03/10/22 0806)  SpO2: 96 % (03/10/22 0806)   Vital Signs (24h Range):  Temp:  [97.9 °F (36.6 °C)-100.7 °F (38.2 °C)] 100.7 °F (38.2 °C)  Pulse:  [] 80  Resp:  [15-22] 19  SpO2:  [84 %-97 %] 96 %  BP: (160-217)/(77-98) 173/79     Weight: (!) 146.7 kg (323 lb 6.6 oz)  Body mass index is 40.42 kg/m².    Intake/Output Summary (Last 24 hours) at 3/10/2022 1119  Last data filed at 3/10/2022 0638  Gross per 24 hour   Intake 240 ml   Output 3350 ml   Net -3110 ml      Physical Exam  Vitals and nursing note reviewed.   Constitutional:       General: He is not in acute distress.     Appearance: Normal appearance. He is well-developed. He is not ill-appearing or diaphoretic.   HENT:      Head: Normocephalic and atraumatic.      Right Ear: External ear normal.      Left Ear: External ear normal.      Nose: Congestion and rhinorrhea present.      Mouth/Throat:      Mouth:  Mucous membranes are moist.      Pharynx: Oropharynx is clear. No oropharyngeal exudate or posterior oropharyngeal erythema.   Eyes:      General: No scleral icterus.     Conjunctiva/sclera: Conjunctivae normal.      Pupils: Pupils are equal, round, and reactive to light.   Neck:      Vascular: No JVD.   Cardiovascular:      Rate and Rhythm: Normal rate and regular rhythm.      Pulses: Normal pulses.      Heart sounds: Normal heart sounds. No murmur heard.  Pulmonary:      Effort: Pulmonary effort is normal. No respiratory distress.      Breath sounds: Normal breath sounds. No stridor. No wheezing, rhonchi or rales.      Comments: Dry cough  Abdominal:      General: Bowel sounds are normal. There is no distension.      Palpations: Abdomen is soft.      Tenderness: There is no abdominal tenderness.   Musculoskeletal:         General: No swelling or tenderness. Normal range of motion.      Cervical back: Normal range of motion and neck supple.   Skin:     General: Skin is warm and dry.      Capillary Refill: Capillary refill takes 2 to 3 seconds.      Coloration: Skin is not jaundiced or pale.      Findings: No erythema.   Neurological:      General: No focal deficit present.      Mental Status: He is alert and oriented to person, place, and time.      Cranial Nerves: No cranial nerve deficit.      Sensory: No sensory deficit.   Psychiatric:         Mood and Affect: Mood normal.         Behavior: Behavior normal.         Thought Content: Thought content normal.       Significant Labs: All pertinent labs within the past 24 hours have been reviewed.  CBC:   Recent Labs   Lab 03/09/22  1125 03/10/22  0433   WBC 5.91 4.55   HGB 10.9* 10.6*   HCT 34.8* 32.7*    174     CMP:   Recent Labs   Lab 03/09/22  1125 03/10/22  0434    138   K 4.0 4.0    103   CO2 26 26   * 158*   BUN 20 19   CREATININE 1.2 1.1   CALCIUM 8.5* 8.3*   PROT 6.7 6.2   ALBUMIN 3.3* 2.9*   BILITOT 1.1* 1.0   ALKPHOS 47* 40*   AST  41* 38   ALT 55* 43   ANIONGAP 8 9   EGFRNONAA >60 >60       Significant Imaging: I have reviewed all pertinent imaging results/findings within the past 24 hours.      Assessment/Plan:      * Acute hypoxemic respiratory failure  Patient with Hypoxic Respiratory failure which is Acute.  he is not on home oxygen. Supplemental oxygen was provided and noted-  .   Signs/symptoms of respiratory failure include- tachypnea and increased work of breathing. Contributing diagnoses includes - bilat pneumonitis, viral influenza Labs and images were reviewed. Patient Has not had a recent ABG. Will treat underlying causes and adjust management of respiratory failure as follows-     Cont supplemental O2  Neb tx  tamiflu  Telemetry monitoring  Home O2 eval      Type 2 diabetes mellitus  Patient's FSGs are controlled on current medication regimen.  Last A1c reviewed- No results found for: LABA1C, HGBA1C  Most recent fingerstick glucose reviewed- No results for input(s): POCTGLUCOSE in the last 24 hours.  Current correctional scale  Low  Maintain anti-hyperglycemic dose as follows-   Antihyperglycemics (From admission, onward)            Sliding scale insulin AC/HS        Hold Oral hypoglycemics while patient is in the hospital.        Pneumonitis  cxr reviewed  Stable on room air but O2 sats drop with ambulation  Neb tx and supplemental O2      Influenza A  tamiflu bid  Supportive care      Hypertension  Chronic, uncontrolled.  Latest blood pressure and vitals reviewed-   Temp:  [97.9 °F (36.6 °C)-100.7 °F (38.2 °C)]   Pulse:  []   Resp:  [15-22]   BP: (160-217)/(77-98)   SpO2:  [84 %-97 %] .   Home meds for hypertension were reviewed and noted below.   Hypertension Medications             furosemide (LASIX) 20 MG tablet Take 1 tablet (20 mg total) by mouth once daily.    lisinopril (PRINIVIL,ZESTRIL) 20 MG tablet Take 20 mg by mouth once daily.          While in the hospital, will manage blood pressure as follows; Continue  home antihypertensive regimen and increase lisinopril to bid    Will utilize p.r.n. blood pressure medication only if patient's blood pressure greater than  180/110 and he develops symptoms such as worsening chest pain or shortness of breath.          VTE Risk Mitigation (From admission, onward)         Ordered     enoxaparin injection 40 mg  Daily         03/09/22 1847     IP VTE HIGH RISK PATIENT  Once         03/09/22 1847     Place sequential compression device  Until discontinued         03/09/22 1847                Discharge Planning   JONATHAN:      Code Status: Full Code   Is the patient medically ready for discharge?:     Reason for patient still in hospital (select all that apply): Patient trending condition and Treatment  Discharge Plan A: Home with family                  Cris Juarez NP  Department of Hospital Medicine   [facility]

## 2022-03-10 NOTE — CARE UPDATE
03/10/22 1023   Home Oxygen Qualification   $ Home O2 Qualification Pulmonary Stress Test/6 min walk;Tech time 15 minutes   Room Air SpO2 At Rest 94 %   Room Air SpO2 During Ambulation 92 %   Heart Rate on O2 93 bpm   SpO2 Post Ambulation (!) 89 %   Post Ambulation Heart Rate 88 bpm   Home O2 Eval Comments Pt did not have any SOB during exertion.

## 2022-03-11 VITALS
BODY MASS INDEX: 39.17 KG/M2 | RESPIRATION RATE: 18 BRPM | HEART RATE: 66 BPM | OXYGEN SATURATION: 94 % | SYSTOLIC BLOOD PRESSURE: 129 MMHG | WEIGHT: 315 LBS | HEIGHT: 75 IN | DIASTOLIC BLOOD PRESSURE: 82 MMHG | TEMPERATURE: 97 F

## 2022-03-11 LAB
ALBUMIN SERPL BCP-MCNC: 2.7 G/DL (ref 3.5–5.2)
ALP SERPL-CCNC: 39 U/L (ref 55–135)
ALT SERPL W/O P-5'-P-CCNC: 35 U/L (ref 10–44)
ANION GAP SERPL CALC-SCNC: 9 MMOL/L (ref 8–16)
AST SERPL-CCNC: 33 U/L (ref 10–40)
BILIRUB SERPL-MCNC: 0.6 MG/DL (ref 0.1–1)
BUN SERPL-MCNC: 17 MG/DL (ref 6–20)
CALCIUM SERPL-MCNC: 7.9 MG/DL (ref 8.7–10.5)
CHLORIDE SERPL-SCNC: 101 MMOL/L (ref 95–110)
CO2 SERPL-SCNC: 27 MMOL/L (ref 23–29)
CREAT SERPL-MCNC: 1.1 MG/DL (ref 0.5–1.4)
ERYTHROCYTE [DISTWIDTH] IN BLOOD BY AUTOMATED COUNT: 14.6 % (ref 11.5–14.5)
EST. GFR  (AFRICAN AMERICAN): >60 ML/MIN/1.73 M^2
EST. GFR  (NON AFRICAN AMERICAN): >60 ML/MIN/1.73 M^2
GLUCOSE SERPL-MCNC: 190 MG/DL (ref 70–110)
HCT VFR BLD AUTO: 32 % (ref 40–54)
HGB BLD-MCNC: 10.2 G/DL (ref 14–18)
MAGNESIUM SERPL-MCNC: 1.8 MG/DL (ref 1.6–2.6)
MCH RBC QN AUTO: 28.7 PG (ref 27–31)
MCHC RBC AUTO-ENTMCNC: 31.9 G/DL (ref 32–36)
MCV RBC AUTO: 90 FL (ref 82–98)
PLATELET # BLD AUTO: 153 K/UL (ref 150–450)
PMV BLD AUTO: 12.2 FL (ref 9.2–12.9)
POCT GLUCOSE: 186 MG/DL (ref 70–110)
POCT GLUCOSE: 208 MG/DL (ref 70–110)
POTASSIUM SERPL-SCNC: 3.6 MMOL/L (ref 3.5–5.1)
PROT SERPL-MCNC: 5.8 G/DL (ref 6–8.4)
RBC # BLD AUTO: 3.56 M/UL (ref 4.6–6.2)
SODIUM SERPL-SCNC: 137 MMOL/L (ref 136–145)
WBC # BLD AUTO: 2.89 K/UL (ref 3.9–12.7)

## 2022-03-11 PROCEDURE — 85027 COMPLETE CBC AUTOMATED: CPT | Performed by: NURSE PRACTITIONER

## 2022-03-11 PROCEDURE — 80053 COMPREHEN METABOLIC PANEL: CPT | Performed by: NURSE PRACTITIONER

## 2022-03-11 PROCEDURE — 36415 COLL VENOUS BLD VENIPUNCTURE: CPT | Performed by: NURSE PRACTITIONER

## 2022-03-11 PROCEDURE — G0378 HOSPITAL OBSERVATION PER HR: HCPCS

## 2022-03-11 PROCEDURE — 25000242 PHARM REV CODE 250 ALT 637 W/ HCPCS: Performed by: NURSE PRACTITIONER

## 2022-03-11 PROCEDURE — 25000003 PHARM REV CODE 250: Performed by: NURSE PRACTITIONER

## 2022-03-11 PROCEDURE — 94761 N-INVAS EAR/PLS OXIMETRY MLT: CPT

## 2022-03-11 PROCEDURE — 94640 AIRWAY INHALATION TREATMENT: CPT

## 2022-03-11 PROCEDURE — 83735 ASSAY OF MAGNESIUM: CPT | Performed by: NURSE PRACTITIONER

## 2022-03-11 PROCEDURE — 99900035 HC TECH TIME PER 15 MIN (STAT)

## 2022-03-11 RX ORDER — OSELTAMIVIR PHOSPHATE 75 MG/1
75 CAPSULE ORAL 2 TIMES DAILY
Qty: 10 CAPSULE | Refills: 0 | Status: SHIPPED | OUTPATIENT
Start: 2022-03-11 | End: 2022-03-16

## 2022-03-11 RX ORDER — AMLODIPINE BESYLATE 5 MG/1
5 TABLET ORAL DAILY
Qty: 30 TABLET | Refills: 0 | Status: ON HOLD | OUTPATIENT
Start: 2022-03-11 | End: 2023-02-10 | Stop reason: HOSPADM

## 2022-03-11 RX ORDER — ALBUTEROL SULFATE 90 UG/1
1-2 AEROSOL, METERED RESPIRATORY (INHALATION) EVERY 6 HOURS PRN
Qty: 18 G | Refills: 0 | Status: SHIPPED | OUTPATIENT
Start: 2022-03-11 | End: 2023-02-15 | Stop reason: SDUPTHER

## 2022-03-11 RX ORDER — LISINOPRIL 20 MG/1
40 TABLET ORAL DAILY
Qty: 60 TABLET | Refills: 0 | Status: SHIPPED | OUTPATIENT
Start: 2022-03-11 | End: 2022-03-11 | Stop reason: SDUPTHER

## 2022-03-11 RX ORDER — ONDANSETRON 4 MG/1
4 TABLET, ORALLY DISINTEGRATING ORAL EVERY 8 HOURS PRN
Qty: 12 TABLET | Refills: 0 | Status: ON HOLD | OUTPATIENT
Start: 2022-03-11 | End: 2023-02-10 | Stop reason: HOSPADM

## 2022-03-11 RX ORDER — LISINOPRIL 40 MG/1
40 TABLET ORAL 2 TIMES DAILY
Status: DISCONTINUED | OUTPATIENT
Start: 2022-03-11 | End: 2022-03-11

## 2022-03-11 RX ORDER — AMLODIPINE BESYLATE 5 MG/1
5 TABLET ORAL DAILY
Qty: 30 TABLET | Refills: 0 | Status: SHIPPED | OUTPATIENT
Start: 2022-03-11 | End: 2022-03-11 | Stop reason: SDUPTHER

## 2022-03-11 RX ORDER — LISINOPRIL 40 MG/1
40 TABLET ORAL DAILY
Status: DISCONTINUED | OUTPATIENT
Start: 2022-03-11 | End: 2022-03-11 | Stop reason: HOSPADM

## 2022-03-11 RX ORDER — BENZONATATE 200 MG/1
200 CAPSULE ORAL 3 TIMES DAILY PRN
Qty: 30 CAPSULE | Refills: 0 | Status: SHIPPED | OUTPATIENT
Start: 2022-03-11 | End: 2022-03-21

## 2022-03-11 RX ORDER — LISINOPRIL 20 MG/1
40 TABLET ORAL DAILY
Qty: 60 TABLET | Refills: 0 | Status: ON HOLD | OUTPATIENT
Start: 2022-03-11 | End: 2023-02-10 | Stop reason: HOSPADM

## 2022-03-11 RX ADMIN — ATORVASTATIN CALCIUM 80 MG: 40 TABLET, FILM COATED ORAL at 08:03

## 2022-03-11 RX ADMIN — IPRATROPIUM BROMIDE AND ALBUTEROL SULFATE 3 ML: 2.5; .5 SOLUTION RESPIRATORY (INHALATION) at 07:03

## 2022-03-11 RX ADMIN — THERA TABS 1 TABLET: TAB at 08:03

## 2022-03-11 RX ADMIN — LISINOPRIL 40 MG: 40 TABLET ORAL at 08:03

## 2022-03-11 RX ADMIN — INSULIN ASPART 2 UNITS: 100 INJECTION, SOLUTION INTRAVENOUS; SUBCUTANEOUS at 08:03

## 2022-03-11 RX ADMIN — FLUTICASONE PROPIONATE 100 MCG: 50 SPRAY, METERED NASAL at 08:03

## 2022-03-11 RX ADMIN — OXYMETAZOLINE HCL 2 SPRAY: 0.05 SPRAY NASAL at 08:03

## 2022-03-11 RX ADMIN — FUROSEMIDE 20 MG: 20 TABLET ORAL at 08:03

## 2022-03-11 RX ADMIN — AMLODIPINE BESYLATE 5 MG: 5 TABLET ORAL at 08:03

## 2022-03-11 RX ADMIN — GABAPENTIN 600 MG: 300 CAPSULE ORAL at 08:03

## 2022-03-11 RX ADMIN — HYDROCODONE BITARTRATE AND ACETAMINOPHEN 1 TABLET: 10; 325 TABLET ORAL at 06:03

## 2022-03-11 RX ADMIN — OSELTAMIVIR PHOSPHATE 75 MG: 75 CAPSULE ORAL at 08:03

## 2022-03-11 RX ADMIN — GUAIFENESIN 600 MG: 600 TABLET, EXTENDED RELEASE ORAL at 08:03

## 2022-03-11 NOTE — NURSING
Patient  has a very small scratch on his right knee. There is a moderate amount of blood noted.Environment free of clutter and sharp objects. Dressing applied. Patient does not know how it happened.Patient also c/o right ankle pain. Pain medications administred per order. Jennie Perez NP made aware.

## 2022-03-11 NOTE — PLAN OF CARE
Problem: Adult Inpatient Plan of Care  Goal: Plan of Care Review  Outcome: Ongoing, Progressing     Problem: Adult Inpatient Plan of Care  Goal: Optimal Comfort and Wellbeing  Outcome: Ongoing, Progressing     Problem: Diabetes Comorbidity  Goal: Blood Glucose Level Within Targeted Range  Outcome: Ongoing, Progressing     Problem: Fluid and Electrolyte Imbalance (Acute Kidney Injury/Impairment)  Goal: Fluid and Electrolyte Balance  Outcome: Ongoing, Progressing     Problem: Infection (Pneumonia)  Goal: Resolution of Infection Signs and Symptoms  Outcome: Ongoing, Progressing       Plan of care reviewed with patient. Verbalized understanding of plan. PIV intact and saline lock. BP elevated 200s systolic, Cris Juarez, NP made aware. Received new orders for BP medication. Temp 100.5 today. Given prn tylenol. Reports right ankle pain and back pain. Given prn pain medication as directed. Decreased appetite. Encourage to drink fluids. Telemetry monitor 8612 SR. Desaturation when take oxygen off. Sats currently 95%.

## 2022-03-11 NOTE — CARE UPDATE
03/11/22 0702   Patient Assessment/Suction   Level of Consciousness (AVPU) alert   Respiratory Effort Normal   Expansion/Accessory Muscles/Retractions expansion symmetric   All Lung Fields Breath Sounds Anterior:   LLL Breath Sounds diminished   RLL Breath Sounds diminished   Rhythm/Pattern, Respiratory pattern regular   Cough Frequency no cough   PRE-TX-O2   O2 Device (Oxygen Therapy) room air   Oxygen Concentration (%) 21   SpO2 95 %   Pulse Oximetry Type Intermittent   $ Pulse Oximetry - Multiple Charge Pulse Oximetry - Multiple   Pulse 67   Resp 15   Positioning HOB elevated 30 degrees   Aerosol Therapy   $ Aerosol Therapy Charges Aerosol Treatment   Respiratory Treatment Status (SVN) given   Treatment Route (SVN) mask   Patient Position (SVN) HOB elevated   Signs of Intolerance (SVN) none   Breath Sounds Post-Respiratory Treatment   Throughout All Fields Post-Treatment All Fields   Throughout All Fields Post-Treatment no change   POx, nebs TID

## 2022-03-11 NOTE — PLAN OF CARE
Pt is on isolation and SW called pt from nurse station and notified pt of MOSQUEDA.  Pt acknowledged understanding.  Bing RN CN witnessed.     03/11/22 1107   MOSQUEDA Message   Medicare Outpatient and Observation Notification regarding financial responsibility Explained to patient/caregiver   Date MOSQUEDA was signed 03/11/22   Time MOSQUEDA was signed 1107

## 2022-03-11 NOTE — NURSING
Discharge instructions given to patient.  Verbalized understanding of orders. IV/tele discontinued.  Currently waiting on transportation home.

## 2022-03-11 NOTE — PLAN OF CARE
Pt cleared for discharge home from Case Management.    Pt has order for oxygen, but doesn't qualify.  Pt and ALDEN Lynch aware.     03/11/22 1128   Final Note   Assessment Type Final Discharge Note   Anticipated Discharge Disposition Home   What phone number can be called within the next 1-3 days to see how you are doing after discharge?   ([phone])   Hospital Resources/Appts/Education Provided Appointments scheduled and added to AVS   Post-Acute Status   Post-Acute Authorization HME   HME Status   (Patient does qualify)

## 2022-03-11 NOTE — NURSING
Patient has a hive type rash noted on left arm around area of IV site.Patient c/o itching. Mini AGUILAR made aware. New order for IV benadryl  Noted. Will continue to monitor.

## 2022-03-11 NOTE — PLAN OF CARE
Problem: Adult Inpatient Plan of Care  Goal: Plan of Care Review  Outcome: Ongoing, Progressing  Goal: Patient-Specific Goal (Individualized)  Outcome: Ongoing, Progressing  Goal: Absence of Hospital-Acquired Illness or Injury  Outcome: Ongoing, Progressing  Goal: Optimal Comfort and Wellbeing  Outcome: Ongoing, Progressing  Goal: Readiness for Transition of Care  Outcome: Ongoing, Progressing     Problem: Diabetes Comorbidity  Goal: Blood Glucose Level Within Targeted Range  Outcome: Ongoing, Progressing     Problem: Fluid and Electrolyte Imbalance (Acute Kidney Injury/Impairment)  Goal: Fluid and Electrolyte Balance  Outcome: Ongoing, Progressing     Problem: Oral Intake Inadequate (Acute Kidney Injury/Impairment)  Goal: Optimal Nutrition Intake  Outcome: Ongoing, Progressing     Problem: Renal Function Impairment (Acute Kidney Injury/Impairment)  Goal: Effective Renal Function  Outcome: Ongoing, Progressing     Problem: Fluid Imbalance (Pneumonia)  Goal: Fluid Balance  Outcome: Ongoing, Progressing     Problem: Infection (Pneumonia)  Goal: Resolution of Infection Signs and Symptoms  Outcome: Ongoing, Progressing     Problem: Respiratory Compromise (Pneumonia)  Goal: Effective Oxygenation and Ventilation  Outcome: Ongoing, Progressing     Problem: Impaired Wound Healing  Goal: Optimal Wound Healing  Outcome: Ongoing, Progressing     Problem: Bariatric Environmental Safety  Goal: Safety Maintained with Care  Outcome: Ongoing, Progressing     Problem: Infection  Goal: Absence of Infection Signs and Symptoms  Outcome: Ongoing, Progressing

## 2022-03-13 NOTE — DISCHARGE SUMMARY
"[facility] Medicine  Discharge Summary      Patient Name: Able Gibbs  MRN: [id number]  Patient Class: OP- Observation  Admission Date: 3/9/2022  Hospital Length of Stay: 2 days  Discharge Date and Time: 3/11/2022  2:44 PM  Attending Physician: No att. providers found   Discharging Provider: Cris Rausch NP  Primary Care Provider: Primary Doctor Olinda      HPI:   Abel Gibbs is a 56 yr old male with PMHx significant for hypertension, diabetes, osteomyelitis and nicotine abuse presenting today for cough, shortness of breath, fever or malaise.  Patient reports symptoms started approximately 3 days ago.  He states his shortness of breath and cough worsened and he therefore presented to the ED.  Workup in the ED reveals bilateral pneumonitis and patient tested positive for influenza A. His ambulatory O2 sat dropped into the 80s and therefore admission was requested.  Patient states his symptoms are moderate severity and worsen with exertion.  He denies any chest pain, nausea, vomiting or diarrhea.  Patient was experiencing accelerated hypertension the ED and he was recently treated with IV hydralazine and p.o. lisinopril.  Patient states he did not take his home medications today.  He reports noncompliance with his Lasix at home stating that he takes enough medications and "does not want to take another pill that makes him pee". Patient educated extensively on medication compliance.  Patient will be admitted to hospital medicine services for further workup and management.        * No surgery found *      Hospital Course:   Pt admitted for influenza A, bilat pneumonitis and hypoxemia.  Patient was initiated on tamiflu and supplemental oxygen.  He was noncompliant with oxygen use during hospital stay and needed redirection. His oxygen sat remained stable at rest but worsened on exertion. Home O2 eval performed and patient does not qualify. Pt's bp was uncontrolled on admission and " home meds were resumed, pt noncompliant with home meds. His bp improved and lisinopril was adjusted from 20mg daily to bid. Patient is on RA with oxygen sat 96%. He is medically stable for DC.            Goals of Care Treatment Preferences:  Code Status: Full Code      Consults:     No new Assessment & Plan notes have been filed under this hospital service since the last note was generated.  Service: Hospital Medicine    Final Active Diagnoses:    Diagnosis Date Noted POA    PRINCIPAL PROBLEM:  Acute hypoxemic respiratory failure [J96.01] 03/09/2022 Yes    Influenza A [J10.1] 03/09/2022 Yes    Pneumonitis [J18.9] 03/09/2022 Yes    Type 2 diabetes mellitus [E11.9] 03/09/2022 Yes    Hypertension [I10] 12/30/2020 Yes      Problems Resolved During this Admission:       Discharged Condition: stable    Disposition: Home or Self Care    Follow Up:   Follow-up Information     [facility] Emergency Dept .    Specialty: Emergency Medicine  Why: If symptoms worsen  Contact information:  [address] [place] [id number] [phone]           Please follow up.    Contact information:  Follow-up with your regular doctor as needed if your symptoms persist.  Return to the ER for worsening symptoms.           SALVADOR Mace Follow up on 3/23/2022.    Specialty: Family Medicine  Why: hosp f/u at 1pm.  Cm added pt to waiting list for an earlier appointment in 1 week.  Contact information:  [address] [place] [address]  [address] [place] [id number] [phone]             Primary Doctor No Follow up in 1 week(s).                     Patient Instructions:      OXYGEN FOR HOME USE     Order Specific Question Answer Comments   Liter Flow 2    Duration Continuous    Qualifying Test Performed at: Activity    Oxygen saturation at rest 94    Oxygen saturation with activity 89    Oxygen saturation with activity on oxygen 94    Portable mode: continuous    Route nasal cannula    Device: home concentrator with  "portable tanks    Length of need (in months): 12 mos    Patient condition with qualifying saturation Other - List qualifying diagnosis and code    Select a diagnosis & list the code in the comments Acute hypoxemic respiratory failure [[id number]]    Height: 6' 3" (1.905 m)    Weight: 146.7 kg (323 lb 6.6 oz)    Alternative treatment measures have been tried or considered and deemed clinically ineffective. Yes      Diet diabetic     Notify your health care provider if you experience any of the following:  difficulty breathing or increased cough     Notify your health care provider if you experience any of the following:  severe persistent headache     Activity as tolerated       Significant Diagnostic Studies: Labs: BMP: No results for input(s): GLU, NA, K, CL, CO2, BUN, CREATININE, CALCIUM, MG in the last 48 hours. and CMP No results for input(s): NA, K, CL, CO2, GLU, BUN, CREATININE, CALCIUM, PROT, ALBUMIN, BILITOT, ALKPHOS, AST, ALT, ANIONGAP, ESTGFRAFRICA, EGFRNONAA in the last 48 hours.    Pending Diagnostic Studies:     None         Medications:  Reconciled Home Medications:      Medication List      START taking these medications    albuterol 90 mcg/actuation inhaler  Commonly known as: PROVENTIL/VENTOLIN HFA  Inhale 1-2 puffs into the lungs every 6 (six) hours as needed for Wheezing. Rescue     amLODIPine 5 MG tablet  Commonly known as: NORVASC  Take 1 tablet (5 mg total) by mouth once daily.     benzonatate 200 MG capsule  Commonly known as: TESSALON  Take 1 capsule (200 mg total) by mouth 3 (three) times daily as needed.     ondansetron 4 MG Tbdl  Commonly known as: ZOFRAN-ODT  Take 1 tablet (4 mg total) by mouth every 8 (eight) hours as needed.     oseltamivir 75 MG capsule  Commonly known as: TAMIFLU  Take 1 capsule (75 mg total) by mouth 2 (two) times daily. for 5 days        CHANGE how you take these medications    lisinopriL 20 MG tablet  Commonly known as: PRINIVIL,ZESTRIL  Take 2 tablets (40 mg total) " by mouth once daily.  What changed: how much to take        CONTINUE taking these medications    arginine-glutamine-calcium HMB 7-7-1.5 gram Pwpk  Take by mouth 2 (two) times daily.     atorvastatin 80 MG tablet  Commonly known as: LIPITOR  Take 80 mg by mouth once daily.     diclofenac sodium 1 % Gel  Commonly known as: VOLTAREN  Apply 2 g topically 4 (four) times daily.     furosemide 20 MG tablet  Commonly known as: LASIX  Take 1 tablet (20 mg total) by mouth once daily.     gabapentin 600 MG tablet  Commonly known as: NEURONTIN  Take 600 mg by mouth 3 (three) times daily.     GLUCAGON (HUMAN RECOMBINANT) 1 mg Solr  Generic drug: glucagon  Inject 1 mg into the muscle as needed.     Glucose GeL 40 % gel  Generic drug: dextrose  Take 15 g by mouth as needed.     HYDROcodone-acetaminophen  mg per tablet  Commonly known as: NORCO  Take 1 tablet by mouth every 6 (six) hours as needed.     insulin glargine 100 unit/mL injection  Commonly known as: Lantus  Inject 50 Units into the skin once daily.     insulin lispro 100 unit/mL injection  Inject 0-10 Units into the skin 3 (three) times daily before meals.     isoniazid 300 MG Tab  Commonly known as: NYDRAZID  Take 300 mg by mouth once daily.     lactobacillus acidophilus & bulgar 100 million cell packet  Commonly known as: LACTINEX  Take 1 tablet by mouth 3 (three) times daily with meals.     LIDOcaine 5 %  Commonly known as: LIDODERM  Place 1 patch onto the skin once daily. Remove & Discard patch within 12 hours or as directed by MD     metFORMIN 1000 MG tablet  Commonly known as: GLUCOPHAGE  Take 1,000 mg by mouth 2 (two) times daily with meals.     MIRALAX 17 gram Pwpk  Generic drug: polyethylene glycol  Take by mouth 2 (two) times daily.     multivitamin capsule  Take 1 capsule by mouth once daily.     REMEDY CALAZIME PROTECT PASTE TOP  Apply topically 2 (two) times daily.     VITAMIN B-6 50 MG Tab  Generic drug: pyridoxine (vitamin B6)  Take 50 mg by mouth  once daily.     VITAMIN C 500 MG tablet  Generic drug: ascorbic acid (vitamin C)  Take 500 mg by mouth 2 (two) times daily.            Indwelling Lines/Drains at time of discharge:   Lines/Drains/Airways     None                 Time spent on the discharge of patient: 40 minutes         Cris Rausch NP  Department of Hospital Medicine  [facility]

## 2022-03-14 LAB
BACTERIA BLD CULT: NORMAL
BACTERIA BLD CULT: NORMAL

## 2022-08-10 ENCOUNTER — DOCUMENT SCAN (OUTPATIENT)
Dept: HOME HEALTH SERVICES | Facility: HOSPITAL | Age: 57
End: 2022-08-10
Payer: MEDICARE

## 2022-08-23 ENCOUNTER — DOCUMENT SCAN (OUTPATIENT)
Dept: HOME HEALTH SERVICES | Facility: HOSPITAL | Age: 57
End: 2022-08-23
Payer: MEDICARE

## 2023-02-07 ENCOUNTER — HOSPITAL ENCOUNTER (INPATIENT)
Facility: HOSPITAL | Age: 58
LOS: 3 days | Discharge: LEFT BEFORE DISCHARGE | DRG: 981 | End: 2023-02-10
Attending: EMERGENCY MEDICINE | Admitting: STUDENT IN AN ORGANIZED HEALTH CARE EDUCATION/TRAINING PROGRAM
Payer: MEDICARE

## 2023-02-07 DIAGNOSIS — I10 ACCELERATED HYPERTENSION: ICD-10-CM

## 2023-02-07 DIAGNOSIS — R07.9 CHEST PAIN: ICD-10-CM

## 2023-02-07 DIAGNOSIS — I73.9 PAD (PERIPHERAL ARTERY DISEASE): ICD-10-CM

## 2023-02-07 DIAGNOSIS — I10 PRIMARY HYPERTENSION: ICD-10-CM

## 2023-02-07 DIAGNOSIS — L97.523 ULCER OF LEFT FOOT WITH NECROSIS OF MUSCLE: ICD-10-CM

## 2023-02-07 DIAGNOSIS — E11.621 DIABETIC ULCER OF LEFT MIDFOOT ASSOCIATED WITH TYPE 2 DIABETES MELLITUS, UNSPECIFIED ULCER STAGE: ICD-10-CM

## 2023-02-07 DIAGNOSIS — L97.429 DIABETIC ULCER OF LEFT MIDFOOT ASSOCIATED WITH TYPE 2 DIABETES MELLITUS, UNSPECIFIED ULCER STAGE: ICD-10-CM

## 2023-02-07 DIAGNOSIS — J06.9 VIRAL UPPER RESPIRATORY INFECTION: ICD-10-CM

## 2023-02-07 DIAGNOSIS — E11.621 DIABETIC ULCER OF TOE OF RIGHT FOOT ASSOCIATED WITH TYPE 2 DIABETES MELLITUS, WITH NECROSIS OF BONE: ICD-10-CM

## 2023-02-07 DIAGNOSIS — I50.9 ACUTE EXACERBATION OF CHF (CONGESTIVE HEART FAILURE): ICD-10-CM

## 2023-02-07 DIAGNOSIS — I50.9 ACUTE HEART FAILURE, UNSPECIFIED HEART FAILURE TYPE: Primary | ICD-10-CM

## 2023-02-07 DIAGNOSIS — L97.514 DIABETIC ULCER OF TOE OF RIGHT FOOT ASSOCIATED WITH TYPE 2 DIABETES MELLITUS, WITH NECROSIS OF BONE: ICD-10-CM

## 2023-02-07 LAB
ALBUMIN SERPL BCP-MCNC: 2.9 G/DL (ref 3.5–5.2)
ALP SERPL-CCNC: 50 U/L (ref 55–135)
ALT SERPL W/O P-5'-P-CCNC: 22 U/L (ref 10–44)
ANION GAP SERPL CALC-SCNC: 7 MMOL/L (ref 8–16)
AST SERPL-CCNC: 22 U/L (ref 10–40)
BACTERIA #/AREA URNS HPF: NORMAL /HPF
BASOPHILS # BLD AUTO: 0.07 K/UL (ref 0–0.2)
BASOPHILS NFR BLD: 1.3 % (ref 0–1.9)
BILIRUB SERPL-MCNC: 0.6 MG/DL (ref 0.1–1)
BILIRUB UR QL STRIP: NEGATIVE
BNP SERPL-MCNC: 152 PG/ML (ref 0–99)
BUN SERPL-MCNC: 23 MG/DL (ref 6–20)
CALCIUM SERPL-MCNC: 8.4 MG/DL (ref 8.7–10.5)
CHLORIDE SERPL-SCNC: 106 MMOL/L (ref 95–110)
CLARITY UR: CLEAR
CO2 SERPL-SCNC: 26 MMOL/L (ref 23–29)
COLOR UR: ABNORMAL
CREAT SERPL-MCNC: 1.6 MG/DL (ref 0.5–1.4)
DIFFERENTIAL METHOD: ABNORMAL
EOSINOPHIL # BLD AUTO: 0.2 K/UL (ref 0–0.5)
EOSINOPHIL NFR BLD: 4.2 % (ref 0–8)
ERYTHROCYTE [DISTWIDTH] IN BLOOD BY AUTOMATED COUNT: 14.4 % (ref 11.5–14.5)
EST. GFR  (NO RACE VARIABLE): 49 ML/MIN/1.73 M^2
GLUCOSE SERPL-MCNC: 182 MG/DL (ref 70–110)
GLUCOSE UR QL STRIP: NEGATIVE
HCT VFR BLD AUTO: 32.2 % (ref 40–54)
HCV AB SERPL QL IA: NORMAL
HGB BLD-MCNC: 10.3 G/DL (ref 14–18)
HGB UR QL STRIP: ABNORMAL
HIV 1+2 AB+HIV1 P24 AG SERPL QL IA: NORMAL
HYALINE CASTS #/AREA URNS LPF: 0 /LPF
IMM GRANULOCYTES # BLD AUTO: 0.01 K/UL (ref 0–0.04)
IMM GRANULOCYTES NFR BLD AUTO: 0.2 % (ref 0–0.5)
INFLUENZA A, MOLECULAR: NEGATIVE
INFLUENZA B, MOLECULAR: NEGATIVE
KETONES UR QL STRIP: NEGATIVE
LEUKOCYTE ESTERASE UR QL STRIP: NEGATIVE
LYMPHOCYTES # BLD AUTO: 1.5 K/UL (ref 1–4.8)
LYMPHOCYTES NFR BLD: 28 % (ref 18–48)
MAGNESIUM SERPL-MCNC: 1.8 MG/DL (ref 1.6–2.6)
MCH RBC QN AUTO: 29.1 PG (ref 27–31)
MCHC RBC AUTO-ENTMCNC: 32 G/DL (ref 32–36)
MCV RBC AUTO: 91 FL (ref 82–98)
MICROSCOPIC COMMENT: NORMAL
MONOCYTES # BLD AUTO: 0.4 K/UL (ref 0.3–1)
MONOCYTES NFR BLD: 7.7 % (ref 4–15)
NEUTROPHILS # BLD AUTO: 3.1 K/UL (ref 1.8–7.7)
NEUTROPHILS NFR BLD: 58.6 % (ref 38–73)
NITRITE UR QL STRIP: NEGATIVE
NRBC BLD-RTO: 0 /100 WBC
PH UR STRIP: 7 [PH] (ref 5–8)
PLATELET # BLD AUTO: 239 K/UL (ref 150–450)
PMV BLD AUTO: 11.1 FL (ref 9.2–12.9)
POTASSIUM SERPL-SCNC: 4.4 MMOL/L (ref 3.5–5.1)
PROT SERPL-MCNC: 6.3 G/DL (ref 6–8.4)
PROT UR QL STRIP: ABNORMAL
RBC # BLD AUTO: 3.54 M/UL (ref 4.6–6.2)
RBC #/AREA URNS HPF: 2 /HPF (ref 0–4)
SARS-COV-2 RDRP RESP QL NAA+PROBE: NEGATIVE
SODIUM SERPL-SCNC: 139 MMOL/L (ref 136–145)
SP GR UR STRIP: 1.01 (ref 1–1.03)
SPECIMEN SOURCE: NORMAL
T4 FREE SERPL-MCNC: 1.01 NG/DL (ref 0.71–1.51)
TROPONIN I SERPL DL<=0.01 NG/ML-MCNC: 0.03 NG/ML (ref 0–0.03)
TSH SERPL DL<=0.005 MIU/L-ACNC: 1.49 UIU/ML (ref 0.4–4)
URN SPEC COLLECT METH UR: ABNORMAL
UROBILINOGEN UR STRIP-ACNC: NEGATIVE EU/DL
WBC # BLD AUTO: 5.22 K/UL (ref 3.9–12.7)
WBC #/AREA URNS HPF: 0 /HPF (ref 0–5)

## 2023-02-07 PROCEDURE — 63600175 PHARM REV CODE 636 W HCPCS: Mod: HCNC | Performed by: EMERGENCY MEDICINE

## 2023-02-07 PROCEDURE — 99285 EMERGENCY DEPT VISIT HI MDM: CPT | Mod: 25,HCNC

## 2023-02-07 PROCEDURE — 86803 HEPATITIS C AB TEST: CPT | Mod: HCNC | Performed by: EMERGENCY MEDICINE

## 2023-02-07 PROCEDURE — 94761 N-INVAS EAR/PLS OXIMETRY MLT: CPT | Mod: HCNC

## 2023-02-07 PROCEDURE — 80053 COMPREHEN METABOLIC PANEL: CPT | Mod: HCNC | Performed by: EMERGENCY MEDICINE

## 2023-02-07 PROCEDURE — 36415 COLL VENOUS BLD VENIPUNCTURE: CPT | Mod: HCNC | Performed by: EMERGENCY MEDICINE

## 2023-02-07 PROCEDURE — 94640 AIRWAY INHALATION TREATMENT: CPT | Mod: HCNC

## 2023-02-07 PROCEDURE — 83036 HEMOGLOBIN GLYCOSYLATED A1C: CPT | Mod: HCNC | Performed by: NURSE PRACTITIONER

## 2023-02-07 PROCEDURE — 25000003 PHARM REV CODE 250: Mod: HCNC | Performed by: NURSE PRACTITIONER

## 2023-02-07 PROCEDURE — 63600175 PHARM REV CODE 636 W HCPCS: Mod: HCNC | Performed by: NURSE PRACTITIONER

## 2023-02-07 PROCEDURE — 87389 HIV-1 AG W/HIV-1&-2 AB AG IA: CPT | Mod: HCNC | Performed by: EMERGENCY MEDICINE

## 2023-02-07 PROCEDURE — 84439 ASSAY OF FREE THYROXINE: CPT | Mod: HCNC | Performed by: NURSE PRACTITIONER

## 2023-02-07 PROCEDURE — 85025 COMPLETE CBC W/AUTO DIFF WBC: CPT | Mod: HCNC | Performed by: EMERGENCY MEDICINE

## 2023-02-07 PROCEDURE — 87502 INFLUENZA DNA AMP PROBE: CPT | Mod: HCNC | Performed by: EMERGENCY MEDICINE

## 2023-02-07 PROCEDURE — 93005 ELECTROCARDIOGRAM TRACING: CPT | Mod: HCNC

## 2023-02-07 PROCEDURE — 81000 URINALYSIS NONAUTO W/SCOPE: CPT | Mod: HCNC | Performed by: NURSE PRACTITIONER

## 2023-02-07 PROCEDURE — 84443 ASSAY THYROID STIM HORMONE: CPT | Mod: HCNC | Performed by: NURSE PRACTITIONER

## 2023-02-07 PROCEDURE — U0002 COVID-19 LAB TEST NON-CDC: HCPCS | Mod: HCNC | Performed by: EMERGENCY MEDICINE

## 2023-02-07 PROCEDURE — 83880 ASSAY OF NATRIURETIC PEPTIDE: CPT | Mod: HCNC | Performed by: EMERGENCY MEDICINE

## 2023-02-07 PROCEDURE — 36415 COLL VENOUS BLD VENIPUNCTURE: CPT | Mod: HCNC | Performed by: NURSE PRACTITIONER

## 2023-02-07 PROCEDURE — 84484 ASSAY OF TROPONIN QUANT: CPT | Mod: HCNC | Performed by: EMERGENCY MEDICINE

## 2023-02-07 PROCEDURE — 94760 N-INVAS EAR/PLS OXIMETRY 1: CPT | Mod: HCNC

## 2023-02-07 PROCEDURE — 25000242 PHARM REV CODE 250 ALT 637 W/ HCPCS: Mod: HCNC | Performed by: EMERGENCY MEDICINE

## 2023-02-07 PROCEDURE — 93010 ELECTROCARDIOGRAM REPORT: CPT | Mod: HCNC,,, | Performed by: INTERNAL MEDICINE

## 2023-02-07 PROCEDURE — 83735 ASSAY OF MAGNESIUM: CPT | Mod: HCNC | Performed by: NURSE PRACTITIONER

## 2023-02-07 PROCEDURE — 12000002 HC ACUTE/MED SURGE SEMI-PRIVATE ROOM: Mod: HCNC

## 2023-02-07 PROCEDURE — 93010 EKG 12-LEAD: ICD-10-PCS | Mod: HCNC,,, | Performed by: INTERNAL MEDICINE

## 2023-02-07 RX ORDER — FUROSEMIDE 10 MG/ML
40 INJECTION INTRAMUSCULAR; INTRAVENOUS
Status: COMPLETED | OUTPATIENT
Start: 2023-02-07 | End: 2023-02-07

## 2023-02-07 RX ORDER — IPRATROPIUM BROMIDE AND ALBUTEROL SULFATE 2.5; .5 MG/3ML; MG/3ML
3 SOLUTION RESPIRATORY (INHALATION)
Status: COMPLETED | OUTPATIENT
Start: 2023-02-07 | End: 2023-02-07

## 2023-02-07 RX ORDER — HYDROCODONE BITARTRATE AND ACETAMINOPHEN 10; 325 MG/1; MG/1
1 TABLET ORAL ONCE
Status: COMPLETED | OUTPATIENT
Start: 2023-02-08 | End: 2023-02-07

## 2023-02-07 RX ORDER — GABAPENTIN 300 MG/1
600 CAPSULE ORAL ONCE
Status: COMPLETED | OUTPATIENT
Start: 2023-02-07 | End: 2023-02-07

## 2023-02-07 RX ORDER — HYDRALAZINE HYDROCHLORIDE 20 MG/ML
10 INJECTION INTRAMUSCULAR; INTRAVENOUS
Status: COMPLETED | OUTPATIENT
Start: 2023-02-07 | End: 2023-02-07

## 2023-02-07 RX ORDER — SODIUM CHLORIDE 0.9 % (FLUSH) 0.9 %
10 SYRINGE (ML) INJECTION
Status: DISCONTINUED | OUTPATIENT
Start: 2023-02-07 | End: 2023-02-10 | Stop reason: HOSPADM

## 2023-02-07 RX ORDER — HYDRALAZINE HYDROCHLORIDE 20 MG/ML
10 INJECTION INTRAMUSCULAR; INTRAVENOUS ONCE
Status: COMPLETED | OUTPATIENT
Start: 2023-02-07 | End: 2023-02-07

## 2023-02-07 RX ORDER — FUROSEMIDE 10 MG/ML
40 INJECTION INTRAMUSCULAR; INTRAVENOUS DAILY
Status: DISCONTINUED | OUTPATIENT
Start: 2023-02-08 | End: 2023-02-09

## 2023-02-07 RX ORDER — HEPARIN SODIUM 5000 [USP'U]/ML
5000 INJECTION, SOLUTION INTRAVENOUS; SUBCUTANEOUS EVERY 8 HOURS
Status: DISCONTINUED | OUTPATIENT
Start: 2023-02-07 | End: 2023-02-10 | Stop reason: HOSPADM

## 2023-02-07 RX ORDER — ONDANSETRON 2 MG/ML
4 INJECTION INTRAMUSCULAR; INTRAVENOUS EVERY 6 HOURS PRN
Status: DISCONTINUED | OUTPATIENT
Start: 2023-02-07 | End: 2023-02-10 | Stop reason: HOSPADM

## 2023-02-07 RX ORDER — AMLODIPINE BESYLATE 5 MG/1
5 TABLET ORAL DAILY
Status: DISCONTINUED | OUTPATIENT
Start: 2023-02-07 | End: 2023-02-10 | Stop reason: HOSPADM

## 2023-02-07 RX ADMIN — HYDRALAZINE HYDROCHLORIDE 10 MG: 20 INJECTION INTRAMUSCULAR; INTRAVENOUS at 08:02

## 2023-02-07 RX ADMIN — IPRATROPIUM BROMIDE AND ALBUTEROL SULFATE 3 ML: 2.5; .5 SOLUTION RESPIRATORY (INHALATION) at 11:02

## 2023-02-07 RX ADMIN — AMLODIPINE BESYLATE 5 MG: 5 TABLET ORAL at 03:02

## 2023-02-07 RX ADMIN — GABAPENTIN 600 MG: 300 CAPSULE ORAL at 09:02

## 2023-02-07 RX ADMIN — HEPARIN SODIUM 5000 UNITS: 5000 INJECTION INTRAVENOUS; SUBCUTANEOUS at 11:02

## 2023-02-07 RX ADMIN — HYDROCODONE BITARTRATE AND ACETAMINOPHEN 1 TABLET: 10; 325 TABLET ORAL at 11:02

## 2023-02-07 RX ADMIN — FUROSEMIDE 40 MG: 10 INJECTION, SOLUTION INTRAMUSCULAR; INTRAVENOUS at 02:02

## 2023-02-07 RX ADMIN — HYDRALAZINE HYDROCHLORIDE 10 MG: 20 INJECTION INTRAMUSCULAR; INTRAVENOUS at 02:02

## 2023-02-07 NOTE — H&P
"[facility] Medicine  History & Physical    Patient Name: Abel Gibbs  MRN: [id number]  Patient Class: IP- Inpatient  Admission Date: 2/7/2023  Attending Physician: Darius Qiu MD   Primary Care Provider: Primary Doctor No         Patient information was obtained from patient, past medical records and ER records.     Subjective:     Principal Problem:Acute exacerbation of CHF (congestive heart failure)    Chief Complaint:   Chief Complaint   Patient presents with    Chest Pain     Cough, sob x 1 week        HPI: Abel Gibbs is a 57 yr old male with PMHx significant for HTN, DM, right foot osteomyeltis, depression and obesity presenting today for orthopnea, cough, sob and lower ext swelling. Pt reports symptoms started approx 1 week ago and have progressively worsened. He describes his symptoms as mod severity, worsens on exertion or lying flat, improves with rest and elevating head. Pt reports noncompliance with all meds and medical care for the past 6 months.  Patient states he missed his routine appointments and therefore has been out of his medications.  He states "I haven't been taking the medications for the past several months so I didn't think I needed them anymore".  Patient was educated extensively on importance of medication and follow-up compliance.  Patient reports dry nonproductive cough.  He denies fevers, chills, nausea, vomiting or chest pain.  He reports 10 lb weight gain over the past week.  Patient will be admitted to hospital medicine services for further workup and management.        Past Medical History:   Diagnosis Date    Depression     Diabetes mellitus     Hypertension     Obesity     Osteomyelitis        No past surgical history on file.    Review of patient's allergies indicates:   Allergen Reactions    Adhesive Itching       No current facility-administered medications on file prior to encounter.     Current Outpatient Medications on File Prior " to Encounter   Medication Sig    albuterol (PROVENTIL/VENTOLIN HFA) 90 mcg/actuation inhaler Inhale 1-2 puffs into the lungs every 6 (six) hours as needed for Wheezing. Rescue    amLODIPine (NORVASC) 5 MG tablet Take 1 tablet (5 mg total) by mouth once daily.    arginine-glutamine-calcium HMB 7-7-1.5 gram PwPk Take by mouth 2 (two) times daily.    ascorbic acid, vitamin C, (VITAMIN C) 500 MG tablet Take 500 mg by mouth 2 (two) times daily.    atorvastatin (LIPITOR) 80 MG tablet Take 80 mg by mouth once daily.    dextrose (GLUCOSE GEL) 40 % gel Take 15 g by mouth as needed.    diclofenac sodium (VOLTAREN) 1 % Gel Apply 2 g topically 4 (four) times daily.    furosemide (LASIX) 20 MG tablet Take 1 tablet (20 mg total) by mouth once daily.    gabapentin (NEURONTIN) 600 MG tablet Take 600 mg by mouth 3 (three) times daily.    glucagon, human recombinant, (GLUCAGEN HYPOKIT) 1 mg SolR Inject 1 mg into the muscle as needed.    HYDROcodone-acetaminophen (NORCO)  mg per tablet Take 1 tablet by mouth every 6 (six) hours as needed.    insulin glargine (LANTUS) 100 unit/mL injection Inject 50 Units into the skin once daily.    insulin lispro (HUMALOG) 100 unit/mL injection Inject 0-10 Units into the skin 3 (three) times daily before meals.    isoniazid (NYDRAZID) 300 MG Tab Take 300 mg by mouth once daily.    lactobacillus acidophilus & bulgar (LACTINEX) 100 million cell packet Take 1 tablet by mouth 3 (three) times daily with meals.    lidocaine (LIDODERM) 5 % Place 1 patch onto the skin once daily. Remove & Discard patch within 12 hours or as directed by MD    lisinopriL (PRINIVIL,ZESTRIL) 20 MG tablet Take 2 tablets (40 mg total) by mouth once daily.    menthol/zinc oxide (REMEDY CALAZIME PROTECT PASTE TOP) Apply topically 2 (two) times daily.    metFORMIN (GLUCOPHAGE) 1000 MG tablet Take 1,000 mg by mouth 2 (two) times daily with meals.    multivitamin capsule Take 1 capsule by mouth once daily.     ondansetron (ZOFRAN-ODT) 4 MG TbDL Take 1 tablet (4 mg total) by mouth every 8 (eight) hours as needed.    polyethylene glycol (MIRALAX) 17 gram PwPk Take by mouth 2 (two) times daily.    pyridoxine, vitamin B6, (VITAMIN B-6) 50 MG Tab Take 50 mg by mouth once daily.     Family History    None       Tobacco Use    Smoking status: Never    Smokeless tobacco: Never   Substance and Sexual Activity    Alcohol use: Yes     Comment: occas    Drug use: Yes     Frequency: 1.0 times per week     Types: Marijuana     Comment: last use 2 days ago    Sexual activity: Not on file     Review of Systems   Constitutional:  Positive for activity change. Negative for chills, fatigue and fever.   HENT:  Negative for congestion, sore throat and trouble swallowing.    Eyes:  Negative for photophobia and visual disturbance.   Respiratory:  Positive for cough and shortness of breath. Negative for chest tightness.    Cardiovascular:  Positive for leg swelling. Negative for chest pain and palpitations.   Gastrointestinal:  Negative for abdominal pain, diarrhea, nausea and vomiting.   Genitourinary:  Negative for difficulty urinating, dysuria, hematuria and urgency.   Musculoskeletal:  Negative for arthralgias, back pain and gait problem.   Skin:  Negative for color change, pallor, rash and wound.   Hematological:  Negative for adenopathy.   Psychiatric/Behavioral:  Negative for agitation, behavioral problems and confusion.    All other systems reviewed and are negative.  Objective:     Vital Signs (Most Recent):  Temp: 98.8 °F (37.1 °C) (02/07/23 1018)  Pulse: 71 (02/07/23 1333)  Resp: 18 (02/07/23 1140)  BP: (!) 210/98 (02/07/23 1333)  SpO2: (!) 94 % (02/07/23 1333)   Vital Signs (24h Range):  Temp:  [98.8 °F (37.1 °C)] 98.8 °F (37.1 °C)  Pulse:  [62-71] 71  Resp:  [12-18] 18  SpO2:  [94 %-99 %] 94 %  BP: (189-210)/() 210/98     Weight: (!) 152.4 kg (336 lb)  Body mass index is 42 kg/m².    Physical Exam  Vitals and nursing  note reviewed.   Constitutional:       General: He is not in acute distress.     Appearance: Normal appearance. He is well-developed. He is obese. He is not ill-appearing or diaphoretic.   HENT:      Head: Normocephalic and atraumatic.      Right Ear: External ear normal.      Left Ear: External ear normal.      Nose: Nose normal. No congestion or rhinorrhea.      Mouth/Throat:      Mouth: Mucous membranes are moist.      Pharynx: Oropharynx is clear. No oropharyngeal exudate or posterior oropharyngeal erythema.   Eyes:      General: No scleral icterus.     Conjunctiva/sclera: Conjunctivae normal.      Pupils: Pupils are equal, round, and reactive to light.   Neck:      Vascular: No JVD.   Cardiovascular:      Rate and Rhythm: Normal rate and regular rhythm.      Pulses: Normal pulses.      Heart sounds: Normal heart sounds. No murmur heard.  Pulmonary:      Effort: Pulmonary effort is normal. No respiratory distress.      Breath sounds: No stridor. No wheezing, rhonchi or rales.      Comments: Faint crackles to bilat bases  Abdominal:      General: Bowel sounds are normal. There is no distension.      Palpations: Abdomen is soft.      Tenderness: There is no abdominal tenderness.   Musculoskeletal:         General: No swelling or tenderness. Normal range of motion.      Cervical back: Normal range of motion and neck supple.      Right lower leg: Edema present.      Left lower leg: Edema present.      Comments: +3 edema to bilat LE   Skin:     General: Skin is warm and dry.      Capillary Refill: Capillary refill takes 2 to 3 seconds.      Coloration: Skin is not jaundiced or pale.      Findings: No erythema.   Neurological:      General: No focal deficit present.      Mental Status: He is alert and oriented to person, place, and time.      Cranial Nerves: No cranial nerve deficit.      Sensory: No sensory deficit.      Motor: No weakness.      Coordination: Coordination normal.   Psychiatric:         Mood and  Affect: Mood normal.         Behavior: Behavior normal.         Thought Content: Thought content normal.         CRANIAL NERVES     CN III, IV, VI   Pupils are equal, round, and reactive to light.     Significant Labs: All pertinent labs within the past 24 hours have been reviewed.  CBC:   Recent Labs   Lab 02/07/23  1043   WBC 5.22   HGB 10.3*   HCT 32.2*        CMP:   Recent Labs   Lab 02/07/23  1043      K 4.4      CO2 26   *   BUN 23*   CREATININE 1.6*   CALCIUM 8.4*   PROT 6.3   ALBUMIN 2.9*   BILITOT 0.6   ALKPHOS 50*   AST 22   ALT 22   ANIONGAP 7*     Troponin:   Recent Labs   Lab 02/07/23  1043   TROPONINI 0.026       Significant Imaging: I have reviewed all pertinent imaging results/findings within the past 24 hours.    Assessment/Plan:     * Acute exacerbation of CHF (congestive heart failure)  Patient is identified as having new onset CHF heart failure that is Acute. CHF is currently uncontrolled due to Continued edema of extremities and Weight gain of 10 pounds, >3 pillow orthopnea, Rales/crackles on pulmonary exam and Pulmonary edema/pleural effusion on CXR. Latest ECHO performed and demonstrates- No results found for this or any previous visit.  . Continue Furosemide and monitor clinical status closely. Monitor on telemetry. Patient is on CHF pathway.  Monitor strict Is&Os and daily weights.  Place on fluid restriction of 1.5 L. Continue to stress to patient importance of self efficacy and  on diet for CHF. Last BNP reviewed- and noted below   Recent Labs   Lab 02/07/23  1043   *   .  Echo pending  Consult cardiology  Cont IV lasix daily      Type 2 diabetes mellitus  Patient's FSGs are uncontrolled due to hyperglycemia . Pt has been out of meds for 6 months due to noncompliance with follow up appts.  Last A1c reviewed- No results found for: LABA1C, HGBA1C  Most recent fingerstick glucose reviewed- No results for input(s): POCTGLUCOSE in the last 24  hours.  Current correctional scale  Low  Maintain anti-hyperglycemic dose as follows-   Antihyperglycemics (From admission, onward)    None        Hold Oral hypoglycemics while patient is in the hospital.    Hypertension  Chronic, uncontrolled.  Latest blood pressure and vitals reviewed-   Temp:  [98.8 °F (37.1 °C)]   Pulse:  [62-71]   Resp:  [12-18]   BP: (189-210)/()   SpO2:  [94 %-99 %] .   Home meds for hypertension were reviewed and noted below.   Hypertension Medications             amLODIPine (NORVASC) 5 MG tablet Take 1 tablet (5 mg total) by mouth once daily.    furosemide (LASIX) 20 MG tablet Take 1 tablet (20 mg total) by mouth once daily.    lisinopriL (PRINIVIL,ZESTRIL) 20 MG tablet Take 2 tablets (40 mg total) by mouth once daily.          While in the hospital, will manage blood pressure as follows; Adjust home antihypertensive regimen as follows- pt has been noncompliant with home meds and out of meds for 6 months. will start back norvasc. cont IV lasix and add additional bp meds as warranted    Will utilize p.r.n. blood pressure medication only if patient's blood pressure greater than  180/110 and he develops symptoms such as worsening chest pain or shortness of breath.        MINI (acute kidney injury)  Patient with acute kidney injury likely d/t Pre-renal azotemia  caused by acute CHF exac and accelerated HTN. MINI is currently worsening. Labs reviewed- Renal function/electrolytes with Estimated Creatinine Clearance: 78.5 mL/min (A) (based on SCr of 1.6 mg/dL (H)). according to latest data. Monitor urine output and serial BMP and adjust therapy as needed. Avoid nephrotoxins and renally dose meds for GFR listed above.     Consult nephrology  Renal US        VTE Risk Mitigation (From admission, onward)         Ordered     heparin (porcine) injection 5,000 Units  Every 8 hours         02/07/23 1438     IP VTE HIGH RISK PATIENT  Once         02/07/23 1438     Place sequential compression device   Until discontinued         02/07/23 1438                   Cris Juarez NP  Department of Hospital Medicine   [facility] - Emergency Dept

## 2023-02-07 NOTE — ASSESSMENT & PLAN NOTE
Patient is identified as having new onset CHF heart failure that is Acute. CHF is currently uncontrolled due to Continued edema of extremities and Weight gain of 10 pounds, >3 pillow orthopnea, Rales/crackles on pulmonary exam and Pulmonary edema/pleural effusion on CXR. Latest ECHO performed and demonstrates- No results found for this or any previous visit.  . Continue Furosemide and monitor clinical status closely. Monitor on telemetry. Patient is on CHF pathway.  Monitor strict Is&Os and daily weights.  Place on fluid restriction of 1.5 L. Continue to stress to patient importance of self efficacy and  on diet for CHF. Last BNP reviewed- and noted below   Recent Labs   Lab 02/07/23  1043   *   .  Echo pending  Consult cardiology  Cont IV lasix daily

## 2023-02-07 NOTE — ASSESSMENT & PLAN NOTE
Patient's FSGs are uncontrolled due to hyperglycemia . Pt has been out of meds for 6 months due to noncompliance with follow up appts.  Last A1c reviewed- No results found for: LABA1C, HGBA1C  Most recent fingerstick glucose reviewed- No results for input(s): POCTGLUCOSE in the last 24 hours.  Current correctional scale  Low  Maintain anti-hyperglycemic dose as follows-   Antihyperglycemics (From admission, onward)    None        Hold Oral hypoglycemics while patient is in the hospital.

## 2023-02-07 NOTE — ASSESSMENT & PLAN NOTE
Chronic, uncontrolled.  Latest blood pressure and vitals reviewed-   Temp:  [98.8 °F (37.1 °C)]   Pulse:  [62-71]   Resp:  [12-18]   BP: (189-210)/()   SpO2:  [94 %-99 %] .   Home meds for hypertension were reviewed and noted below.   Hypertension Medications             amLODIPine (NORVASC) 5 MG tablet Take 1 tablet (5 mg total) by mouth once daily.    furosemide (LASIX) 20 MG tablet Take 1 tablet (20 mg total) by mouth once daily.    lisinopriL (PRINIVIL,ZESTRIL) 20 MG tablet Take 2 tablets (40 mg total) by mouth once daily.          While in the hospital, will manage blood pressure as follows; Adjust home antihypertensive regimen as follows- pt has been noncompliant with home meds and out of meds for 6 months. will start back norvasc. cont IV lasix and add additional bp meds as warranted    Will utilize p.r.n. blood pressure medication only if patient's blood pressure greater than  180/110 and he develops symptoms such as worsening chest pain or shortness of breath.

## 2023-02-07 NOTE — CONSULTS
Nephrology X-cover.    Request for consult received by our office, chart reviewed, no emergent renal needs, agree with current management.    Dr. Cantor will see patient formally in AM.     If clinically significant change requires urgent decisions, please call me tonight to discuss and treat.    Maico Guerra MD  2/7/2023  5:53 PM

## 2023-02-07 NOTE — ASSESSMENT & PLAN NOTE
Patient with acute kidney injury likely d/t Pre-renal azotemia  caused by acute CHF exac and accelerated HTN. MINI is currently worsening. Labs reviewed- Renal function/electrolytes with Estimated Creatinine Clearance: 78.5 mL/min (A) (based on SCr of 1.6 mg/dL (H)). according to latest data. Monitor urine output and serial BMP and adjust therapy as needed. Avoid nephrotoxins and renally dose meds for GFR listed above.     Consult nephrology  Renal US

## 2023-02-07 NOTE — HPI
"Abel Gibbs is a 57 yr old male with PMHx significant for HTN, DM, right foot osteomyeltis, depression and obesity presenting today for orthopnea, cough, sob and lower ext swelling. Pt reports symptoms started approx 1 week ago and have progressively worsened. He describes his symptoms as mod severity, worsens on exertion or lying flat, improves with rest and elevating head. Pt reports noncompliance with all meds and medical care for the past 6 months.  Patient states he missed his routine appointments and therefore has been out of his medications.  He states "I haven't been taking the medications for the past several months so I didn't think I needed them anymore".  Patient was educated extensively on importance of medication and follow-up compliance.  Patient reports dry nonproductive cough.  He denies fevers, chills, nausea, vomiting or chest pain.  He reports 10 lb weight gain over the past week.  Patient will be admitted to hospital medicine services for further workup and management.    "

## 2023-02-07 NOTE — SUBJECTIVE & OBJECTIVE
Past Medical History:   Diagnosis Date    Depression     Diabetes mellitus     Hypertension     Obesity     Osteomyelitis        No past surgical history on file.    Review of patient's allergies indicates:   Allergen Reactions    Adhesive Itching       No current facility-administered medications on file prior to encounter.     Current Outpatient Medications on File Prior to Encounter   Medication Sig    albuterol (PROVENTIL/VENTOLIN HFA) 90 mcg/actuation inhaler Inhale 1-2 puffs into the lungs every 6 (six) hours as needed for Wheezing. Rescue    amLODIPine (NORVASC) 5 MG tablet Take 1 tablet (5 mg total) by mouth once daily.    arginine-glutamine-calcium HMB 7-7-1.5 gram PwPk Take by mouth 2 (two) times daily.    ascorbic acid, vitamin C, (VITAMIN C) 500 MG tablet Take 500 mg by mouth 2 (two) times daily.    atorvastatin (LIPITOR) 80 MG tablet Take 80 mg by mouth once daily.    dextrose (GLUCOSE GEL) 40 % gel Take 15 g by mouth as needed.    diclofenac sodium (VOLTAREN) 1 % Gel Apply 2 g topically 4 (four) times daily.    furosemide (LASIX) 20 MG tablet Take 1 tablet (20 mg total) by mouth once daily.    gabapentin (NEURONTIN) 600 MG tablet Take 600 mg by mouth 3 (three) times daily.    glucagon, human recombinant, (GLUCAGEN HYPOKIT) 1 mg SolR Inject 1 mg into the muscle as needed.    HYDROcodone-acetaminophen (NORCO)  mg per tablet Take 1 tablet by mouth every 6 (six) hours as needed.    insulin glargine (LANTUS) 100 unit/mL injection Inject 50 Units into the skin once daily.    insulin lispro (HUMALOG) 100 unit/mL injection Inject 0-10 Units into the skin 3 (three) times daily before meals.    isoniazid (NYDRAZID) 300 MG Tab Take 300 mg by mouth once daily.    lactobacillus acidophilus & bulgar (LACTINEX) 100 million cell packet Take 1 tablet by mouth 3 (three) times daily with meals.    lidocaine (LIDODERM) 5 % Place 1 patch onto the skin once daily. Remove & Discard patch within 12 hours or as directed  by MD    lisinopriL (PRINIVIL,ZESTRIL) 20 MG tablet Take 2 tablets (40 mg total) by mouth once daily.    menthol/zinc oxide (REMEDY CALAZIME PROTECT PASTE TOP) Apply topically 2 (two) times daily.    metFORMIN (GLUCOPHAGE) 1000 MG tablet Take 1,000 mg by mouth 2 (two) times daily with meals.    multivitamin capsule Take 1 capsule by mouth once daily.    ondansetron (ZOFRAN-ODT) 4 MG TbDL Take 1 tablet (4 mg total) by mouth every 8 (eight) hours as needed.    polyethylene glycol (MIRALAX) 17 gram PwPk Take by mouth 2 (two) times daily.    pyridoxine, vitamin B6, (VITAMIN B-6) 50 MG Tab Take 50 mg by mouth once daily.     Family History    None       Tobacco Use    Smoking status: Never    Smokeless tobacco: Never   Substance and Sexual Activity    Alcohol use: Yes     Comment: occas    Drug use: Yes     Frequency: 1.0 times per week     Types: Marijuana     Comment: last use 2 days ago    Sexual activity: Not on file     Review of Systems   Constitutional:  Positive for activity change. Negative for chills, fatigue and fever.   HENT:  Negative for congestion, sore throat and trouble swallowing.    Eyes:  Negative for photophobia and visual disturbance.   Respiratory:  Positive for cough and shortness of breath. Negative for chest tightness.    Cardiovascular:  Positive for leg swelling. Negative for chest pain and palpitations.   Gastrointestinal:  Negative for abdominal pain, diarrhea, nausea and vomiting.   Genitourinary:  Negative for difficulty urinating, dysuria, hematuria and urgency.   Musculoskeletal:  Negative for arthralgias, back pain and gait problem.   Skin:  Negative for color change, pallor, rash and wound.   Hematological:  Negative for adenopathy.   Psychiatric/Behavioral:  Negative for agitation, behavioral problems and confusion.    All other systems reviewed and are negative.  Objective:     Vital Signs (Most Recent):  Temp: 98.8 °F (37.1 °C) (02/07/23 1018)  Pulse: 71 (02/07/23 1333)  Resp: 18  (02/07/23 1140)  BP: (!) 210/98 (02/07/23 1333)  SpO2: (!) 94 % (02/07/23 1333)   Vital Signs (24h Range):  Temp:  [98.8 °F (37.1 °C)] 98.8 °F (37.1 °C)  Pulse:  [62-71] 71  Resp:  [12-18] 18  SpO2:  [94 %-99 %] 94 %  BP: (189-210)/() 210/98     Weight: (!) 152.4 kg (336 lb)  Body mass index is 42 kg/m².    Physical Exam  Vitals and nursing note reviewed.   Constitutional:       General: He is not in acute distress.     Appearance: Normal appearance. He is well-developed. He is obese. He is not ill-appearing or diaphoretic.   HENT:      Head: Normocephalic and atraumatic.      Right Ear: External ear normal.      Left Ear: External ear normal.      Nose: Nose normal. No congestion or rhinorrhea.      Mouth/Throat:      Mouth: Mucous membranes are moist.      Pharynx: Oropharynx is clear. No oropharyngeal exudate or posterior oropharyngeal erythema.   Eyes:      General: No scleral icterus.     Conjunctiva/sclera: Conjunctivae normal.      Pupils: Pupils are equal, round, and reactive to light.   Neck:      Vascular: No JVD.   Cardiovascular:      Rate and Rhythm: Normal rate and regular rhythm.      Pulses: Normal pulses.      Heart sounds: Normal heart sounds. No murmur heard.  Pulmonary:      Effort: Pulmonary effort is normal. No respiratory distress.      Breath sounds: No stridor. No wheezing, rhonchi or rales.      Comments: Faint crackles to bilat bases  Abdominal:      General: Bowel sounds are normal. There is no distension.      Palpations: Abdomen is soft.      Tenderness: There is no abdominal tenderness.   Musculoskeletal:         General: No swelling or tenderness. Normal range of motion.      Cervical back: Normal range of motion and neck supple.      Right lower leg: Edema present.      Left lower leg: Edema present.      Comments: +3 edema to bilat LE   Skin:     General: Skin is warm and dry.      Capillary Refill: Capillary refill takes 2 to 3 seconds.      Coloration: Skin is not jaundiced  or pale.      Findings: No erythema.   Neurological:      General: No focal deficit present.      Mental Status: He is alert and oriented to person, place, and time.      Cranial Nerves: No cranial nerve deficit.      Sensory: No sensory deficit.      Motor: No weakness.      Coordination: Coordination normal.   Psychiatric:         Mood and Affect: Mood normal.         Behavior: Behavior normal.         Thought Content: Thought content normal.         CRANIAL NERVES     CN III, IV, VI   Pupils are equal, round, and reactive to light.     Significant Labs: All pertinent labs within the past 24 hours have been reviewed.  CBC:   Recent Labs   Lab 02/07/23  1043   WBC 5.22   HGB 10.3*   HCT 32.2*        CMP:   Recent Labs   Lab 02/07/23  1043      K 4.4      CO2 26   *   BUN 23*   CREATININE 1.6*   CALCIUM 8.4*   PROT 6.3   ALBUMIN 2.9*   BILITOT 0.6   ALKPHOS 50*   AST 22   ALT 22   ANIONGAP 7*     Troponin:   Recent Labs   Lab 02/07/23  1043   TROPONINI 0.026       Significant Imaging: I have reviewed all pertinent imaging results/findings within the past 24 hours.

## 2023-02-08 PROBLEM — Z91.199 NON COMPLIANCE WITH MEDICAL TREATMENT: Status: ACTIVE | Noted: 2023-02-08

## 2023-02-08 PROBLEM — E66.01 SEVERE OBESITY (BMI >= 40): Status: ACTIVE | Noted: 2023-02-08

## 2023-02-08 LAB
ALBUMIN SERPL BCP-MCNC: 2.7 G/DL (ref 3.5–5.2)
ALP SERPL-CCNC: 43 U/L (ref 55–135)
ALT SERPL W/O P-5'-P-CCNC: 18 U/L (ref 10–44)
ANION GAP SERPL CALC-SCNC: 4 MMOL/L (ref 8–16)
AST SERPL-CCNC: 19 U/L (ref 10–40)
BASOPHILS # BLD AUTO: 0.06 K/UL (ref 0–0.2)
BASOPHILS NFR BLD: 0.8 % (ref 0–1.9)
BILIRUB SERPL-MCNC: 0.5 MG/DL (ref 0.1–1)
BSA FOR ECHO PROCEDURE: 2.84 M2
BUN SERPL-MCNC: 25 MG/DL (ref 6–20)
CALCIUM SERPL-MCNC: 8.4 MG/DL (ref 8.7–10.5)
CHLORIDE SERPL-SCNC: 104 MMOL/L (ref 95–110)
CO2 SERPL-SCNC: 30 MMOL/L (ref 23–29)
CREAT SERPL-MCNC: 1.7 MG/DL (ref 0.5–1.4)
CREAT UR-MCNC: 82.3 MG/DL (ref 23–375)
DIFFERENTIAL METHOD: ABNORMAL
EJECTION FRACTION: 65 %
EOSINOPHIL # BLD AUTO: 0.2 K/UL (ref 0–0.5)
EOSINOPHIL NFR BLD: 2.9 % (ref 0–8)
ERYTHROCYTE [DISTWIDTH] IN BLOOD BY AUTOMATED COUNT: 14.3 % (ref 11.5–14.5)
EST. GFR  (NO RACE VARIABLE): 46 ML/MIN/1.73 M^2
ESTIMATED AVG GLUCOSE: 203 MG/DL (ref 68–131)
GLUCOSE SERPL-MCNC: 230 MG/DL (ref 70–110)
HBA1C MFR BLD: 8.7 % (ref 4–5.6)
HCT VFR BLD AUTO: 30.7 % (ref 40–54)
HGB BLD-MCNC: 9.8 G/DL (ref 14–18)
IMM GRANULOCYTES # BLD AUTO: 0.02 K/UL (ref 0–0.04)
IMM GRANULOCYTES NFR BLD AUTO: 0.3 % (ref 0–0.5)
LYMPHOCYTES # BLD AUTO: 2 K/UL (ref 1–4.8)
LYMPHOCYTES NFR BLD: 25 % (ref 18–48)
MCH RBC QN AUTO: 28.4 PG (ref 27–31)
MCHC RBC AUTO-ENTMCNC: 31.9 G/DL (ref 32–36)
MCV RBC AUTO: 89 FL (ref 82–98)
MONOCYTES # BLD AUTO: 0.7 K/UL (ref 0.3–1)
MONOCYTES NFR BLD: 8.5 % (ref 4–15)
NEUTROPHILS # BLD AUTO: 5 K/UL (ref 1.8–7.7)
NEUTROPHILS NFR BLD: 62.5 % (ref 38–73)
NRBC BLD-RTO: 0 /100 WBC
PLATELET # BLD AUTO: 218 K/UL (ref 150–450)
PMV BLD AUTO: 11.2 FL (ref 9.2–12.9)
POCT GLUCOSE: 178 MG/DL (ref 70–110)
POCT GLUCOSE: 225 MG/DL (ref 70–110)
POCT GLUCOSE: 258 MG/DL (ref 70–110)
POTASSIUM SERPL-SCNC: 3.9 MMOL/L (ref 3.5–5.1)
PROT SERPL-MCNC: 6.1 G/DL (ref 6–8.4)
PROT UR-MCNC: 274 MG/DL (ref 0–15)
RA PRESSURE: 3 MMHG
RBC # BLD AUTO: 3.45 M/UL (ref 4.6–6.2)
SODIUM SERPL-SCNC: 138 MMOL/L (ref 136–145)
WBC # BLD AUTO: 8 K/UL (ref 3.9–12.7)

## 2023-02-08 PROCEDURE — 84156 ASSAY OF PROTEIN URINE: CPT | Mod: HCNC | Performed by: INTERNAL MEDICINE

## 2023-02-08 PROCEDURE — 84166 PATHOLOGIST INTERPRETATION UPE: ICD-10-PCS | Mod: 26,HCNC,, | Performed by: PATHOLOGY

## 2023-02-08 PROCEDURE — 63600175 PHARM REV CODE 636 W HCPCS: Mod: HCNC | Performed by: NURSE PRACTITIONER

## 2023-02-08 PROCEDURE — 25000003 PHARM REV CODE 250: Mod: HCNC | Performed by: NURSE PRACTITIONER

## 2023-02-08 PROCEDURE — 36415 COLL VENOUS BLD VENIPUNCTURE: CPT | Mod: HCNC | Performed by: NURSE PRACTITIONER

## 2023-02-08 PROCEDURE — 84166 PROTEIN E-PHORESIS/URINE/CSF: CPT | Mod: HCNC | Performed by: INTERNAL MEDICINE

## 2023-02-08 PROCEDURE — 94761 N-INVAS EAR/PLS OXIMETRY MLT: CPT | Mod: HCNC

## 2023-02-08 PROCEDURE — 99223 1ST HOSP IP/OBS HIGH 75: CPT | Mod: HCNC,,, | Performed by: GENERAL PRACTICE

## 2023-02-08 PROCEDURE — 99223 PR INITIAL HOSPITAL CARE,LEVL III: ICD-10-PCS | Mod: HCNC,,, | Performed by: GENERAL PRACTICE

## 2023-02-08 PROCEDURE — 84166 PROTEIN E-PHORESIS/URINE/CSF: CPT | Mod: 26,HCNC,, | Performed by: PATHOLOGY

## 2023-02-08 PROCEDURE — 80053 COMPREHEN METABOLIC PANEL: CPT | Mod: HCNC | Performed by: NURSE PRACTITIONER

## 2023-02-08 PROCEDURE — 12000002 HC ACUTE/MED SURGE SEMI-PRIVATE ROOM: Mod: HCNC

## 2023-02-08 PROCEDURE — 25000003 PHARM REV CODE 250: Mod: HCNC | Performed by: GENERAL PRACTICE

## 2023-02-08 PROCEDURE — 82570 ASSAY OF URINE CREATININE: CPT | Mod: HCNC | Performed by: INTERNAL MEDICINE

## 2023-02-08 PROCEDURE — 85025 COMPLETE CBC W/AUTO DIFF WBC: CPT | Mod: HCNC | Performed by: NURSE PRACTITIONER

## 2023-02-08 RX ORDER — INSULIN ASPART 100 [IU]/ML
1-10 INJECTION, SOLUTION INTRAVENOUS; SUBCUTANEOUS
Status: DISCONTINUED | OUTPATIENT
Start: 2023-02-08 | End: 2023-02-10 | Stop reason: HOSPADM

## 2023-02-08 RX ORDER — IBUPROFEN 200 MG
16 TABLET ORAL
Status: DISCONTINUED | OUTPATIENT
Start: 2023-02-08 | End: 2023-02-10 | Stop reason: HOSPADM

## 2023-02-08 RX ORDER — HYDROCODONE BITARTRATE AND ACETAMINOPHEN 10; 325 MG/1; MG/1
1 TABLET ORAL EVERY 6 HOURS PRN
Status: DISCONTINUED | OUTPATIENT
Start: 2023-02-08 | End: 2023-02-09

## 2023-02-08 RX ORDER — GLUCAGON 1 MG
1 KIT INJECTION
Status: DISCONTINUED | OUTPATIENT
Start: 2023-02-08 | End: 2023-02-10 | Stop reason: HOSPADM

## 2023-02-08 RX ORDER — IBUPROFEN 200 MG
24 TABLET ORAL
Status: DISCONTINUED | OUTPATIENT
Start: 2023-02-08 | End: 2023-02-10 | Stop reason: HOSPADM

## 2023-02-08 RX ORDER — BENZONATATE 100 MG/1
100 CAPSULE ORAL 3 TIMES DAILY PRN
Status: DISCONTINUED | OUTPATIENT
Start: 2023-02-08 | End: 2023-02-10 | Stop reason: HOSPADM

## 2023-02-08 RX ORDER — HYDRALAZINE HYDROCHLORIDE 25 MG/1
25 TABLET, FILM COATED ORAL EVERY 8 HOURS PRN
Status: DISCONTINUED | OUTPATIENT
Start: 2023-02-08 | End: 2023-02-10 | Stop reason: HOSPADM

## 2023-02-08 RX ORDER — GUAIFENESIN/DEXTROMETHORPHAN 100-10MG/5
5 SYRUP ORAL EVERY 6 HOURS PRN
Status: DISCONTINUED | OUTPATIENT
Start: 2023-02-08 | End: 2023-02-09

## 2023-02-08 RX ORDER — SPIRONOLACTONE 25 MG/1
25 TABLET ORAL DAILY
Status: DISCONTINUED | OUTPATIENT
Start: 2023-02-08 | End: 2023-02-10 | Stop reason: HOSPADM

## 2023-02-08 RX ADMIN — FUROSEMIDE 40 MG: 10 INJECTION, SOLUTION INTRAMUSCULAR; INTRAVENOUS at 08:02

## 2023-02-08 RX ADMIN — HEPARIN SODIUM 5000 UNITS: 5000 INJECTION INTRAVENOUS; SUBCUTANEOUS at 06:02

## 2023-02-08 RX ADMIN — BENZONATATE 100 MG: 100 CAPSULE ORAL at 11:02

## 2023-02-08 RX ADMIN — HYDROCODONE BITARTRATE AND ACETAMINOPHEN 1 TABLET: 10; 325 TABLET ORAL at 11:02

## 2023-02-08 RX ADMIN — SPIRONOLACTONE 25 MG: 25 TABLET, FILM COATED ORAL at 02:02

## 2023-02-08 RX ADMIN — AMLODIPINE BESYLATE 5 MG: 5 TABLET ORAL at 08:02

## 2023-02-08 RX ADMIN — GUAIFENESIN AND DEXTROMETHORPHAN 5 ML: 100; 10 SYRUP ORAL at 08:02

## 2023-02-08 RX ADMIN — SACUBITRIL AND VALSARTAN 1 TABLET: 24; 26 TABLET, FILM COATED ORAL at 08:02

## 2023-02-08 RX ADMIN — HEPARIN SODIUM 5000 UNITS: 5000 INJECTION INTRAVENOUS; SUBCUTANEOUS at 02:02

## 2023-02-08 RX ADMIN — HYDROCODONE BITARTRATE AND ACETAMINOPHEN 1 TABLET: 10; 325 TABLET ORAL at 06:02

## 2023-02-08 RX ADMIN — INSULIN DETEMIR 20 UNITS: 100 INJECTION, SOLUTION SUBCUTANEOUS at 08:02

## 2023-02-08 RX ADMIN — HEPARIN SODIUM 5000 UNITS: 5000 INJECTION INTRAVENOUS; SUBCUTANEOUS at 10:02

## 2023-02-08 RX ADMIN — INSULIN ASPART 2 UNITS: 100 INJECTION, SOLUTION INTRAVENOUS; SUBCUTANEOUS at 08:02

## 2023-02-08 RX ADMIN — INSULIN ASPART 6 UNITS: 100 INJECTION, SOLUTION INTRAVENOUS; SUBCUTANEOUS at 11:02

## 2023-02-08 NOTE — ASSESSMENT & PLAN NOTE
Patient with acute kidney injury likely d/t Pre-renal azotemia  caused by acute CHF exac and accelerated HTN. MINI is currently worsening. Labs reviewed- Renal function/electrolytes with Estimated Creatinine Clearance: 73.9 mL/min (A) (based on SCr of 1.7 mg/dL (H)). according to latest data. Monitor urine output and serial BMP and adjust therapy as needed. Avoid nephrotoxins and renally dose meds for GFR listed above.     Consult nephrology  Renal US reviewed- WNL

## 2023-02-08 NOTE — PLAN OF CARE
[facility]  Initial Discharge Assessment       Primary Care Provider: Primary Doctor No    Admission Diagnosis: Accelerated hypertension [I10]  Viral upper respiratory infection [J06.9]  Acute exacerbation of CHF (congestive heart failure) [I50.9]  Chest pain [R07.9]  Acute heart failure, unspecified heart failure type [I50.9]    Admission Date: 2/7/2023  Expected Discharge Date:     Discharge Barriers Identified: None    Payor: HU[employer] MANAGED MEDICARE / Plan: HU[employer] SNP (SPECIAL NEEDS PLAN) / Product Type: Medicare Advantage /     Extended Emergency Contact Information  Primary Emergency Contact: Nga Owens   [facility]  Home Phone: [phone]  Mobile Phone: [phone]  Relation: Other  Secondary Emergency Contact: Vick robb  Mobile Phone: [phone]  Relation: Friend   needed? No    Discharge Plan A: Home  Discharge Plan B: Home with family      [id number] [address] & [address] [address]  Phone: [phone] Fax: [phone]    SW met with patient at bedside to complete discharge planning assessment.  Patient alert and oriented xs 4.  Patient verified all demographic information on facesheet is correct.  Patient verified he has NO PCP.  Patient verified primary health insurance is [employer].  Patient with NO home health or DME.  Patient with NO POA or Living Will.  Patient not on dialysis or medication coumadin.  Patient with no 30 day admission.  Patient with no financial issues at this time.  Patient family will provide transportation upon discharge from facility.  Patient independent with ADLs, live alone, drives self.      Initial Assessment (most recent)       Adult Discharge Assessment - 02/08/23 1238          Discharge Assessment    Assessment Type Discharge Planning Assessment     Confirmed/corrected address, phone number and insurance Yes     Confirmed  Demographics Correct on Facesheet     Source of Information patient     Communicated JONATHAN with patient/caregiver Date not available/Unable to determine     People in Home alone     Facility Arrived From: home     Do you expect to return to your current living situation? Yes     Do you have help at home or someone to help you manage your care at home? Yes     Who are your caregiver(s) and their phone number(s)? self     Prior to hospitilization cognitive status: Alert/Oriented     Current cognitive status: Alert/Oriented     Equipment Currently Used at Home none     Readmission within 30 days? No     Patient currently being followed by outpatient case management? No     Do you currently have service(s) that help you manage your care at home? No     Do you take prescription medications? Yes     Do you have prescription coverage? Yes     Do you have any problems affording any of your prescribed medications? No     Is the patient taking medications as prescribed? yes     Who is going to help you get home at discharge? se;f     How do you get to doctors appointments? car, drives self     Are you on dialysis? No     Do you take coumadin? No     Discharge Plan A Home     Discharge Plan B Home with family     DME Needed Upon Discharge  none     Discharge Plan discussed with: Patient     Discharge Barriers Identified None        Physical Activity    On average, how many days per week do you engage in moderate to strenuous exercise (like a brisk walk)? Patient refused     On average, how many minutes do you engage in exercise at this level? Patient refused        Financial Resource Strain    How hard is it for you to pay for the very basics like food, housing, medical care, and heating? Patient refused        Housing Stability    In the last 12 months, was there a time when you were not able to pay the mortgage or rent on time? Patient refused     In the last 12 months, was there a time when you did not have a steady place to  sleep or slept in a shelter (including now)? Patient refused        Transportation Needs    In the past 12 months, has lack of transportation kept you from medical appointments or from getting medications? Patient refused     In the past 12 months, has lack of transportation kept you from meetings, work, or from getting things needed for daily living? Patient refused        Food Insecurity    Within the past 12 months, you worried that your food would run out before you got the money to buy more. Patient refused     Within the past 12 months, the food you bought just didn't last and you didn't have money to get more. Patient refused        Stress    Do you feel stress - tense, restless, nervous, or anxious, or unable to sleep at night because your mind is troubled all the time - these days? Patient refused        Social Connections    In a typical week, how many times do you talk on the phone with family, friends, or neighbors? Patient refused     How often do you get together with friends or relatives? Patient refused     How often do you attend Jew or Tenriism services? Patient refused     Do you belong to any clubs or organizations such as Jew groups, unions, fraternal or athletic groups, or school groups? Patient refused     How often do you attend meetings of the clubs or organizations you belong to? Patient refused     Are you , , , , never , or living with a partner? Patient refused        Alcohol Use    Q1: How often do you have a drink containing alcohol? Patient refused     Q2: How many drinks containing alcohol do you have on a typical day when you are drinking? Patient refused     Q3: How often do you have six or more drinks on one occasion? Patient refused

## 2023-02-08 NOTE — SUBJECTIVE & OBJECTIVE
Interval History: Patient seen and examined.  No chest pain.  Orthopnea improved.  Edema is improved per patient, but persistent.  Cr with slight upward trend. BP uncontrolled. Cardiology made med adjustments and plans for NM stress tomorrow.     Review of Systems   Respiratory:  Positive for cough and shortness of breath.    Cardiovascular:  Positive for leg swelling. Negative for chest pain.   Gastrointestinal:  Negative for abdominal pain.   Objective:     Vital Signs (Most Recent):  Temp: 97.1 °F (36.2 °C) (02/08/23 1212)  Pulse: 70 (02/08/23 1212)  Resp: 18 (02/08/23 1212)  BP: (!) 180/84 (02/08/23 1212)  SpO2: 96 % (02/08/23 1212)   Vital Signs (24h Range):  Temp:  [97.1 °F (36.2 °C)-98.3 °F (36.8 °C)] 97.1 °F (36.2 °C)  Pulse:  [66-88] 70  Resp:  [16-19] 18  SpO2:  [93 %-97 %] 96 %  BP: (170-224)/() 180/84     Weight: (!) 152.4 kg (336 lb)  Body mass index is 42 kg/m².    Intake/Output Summary (Last 24 hours) at 2/8/2023 1235  Last data filed at 2/8/2023 1151  Gross per 24 hour   Intake 660 ml   Output 3900 ml   Net -3240 ml      Physical Exam  Vitals and nursing note reviewed.   Constitutional:       Appearance: Normal appearance. He is obese.   HENT:      Head: Normocephalic and atraumatic.   Cardiovascular:      Rate and Rhythm: Normal rate and regular rhythm.   Pulmonary:      Effort: Pulmonary effort is normal.      Breath sounds: Wheezing (expiratory) present.   Abdominal:      General: Abdomen is flat. Bowel sounds are normal.   Musculoskeletal:      Right lower leg: Edema (2+) present.      Left lower leg: Edema (2+) present.   Skin:     General: Skin is warm and dry.      Capillary Refill: Capillary refill takes less than 2 seconds.   Neurological:      General: No focal deficit present.      Mental Status: He is alert and oriented to person, place, and time. Mental status is at baseline.   Psychiatric:         Mood and Affect: Mood normal.         Behavior: Behavior normal.       Significant  Labs: All pertinent labs within the past 24 hours have been reviewed.  CBC:   Recent Labs   Lab 02/07/23  1043 02/08/23  0523   WBC 5.22 8.00   HGB 10.3* 9.8*   HCT 32.2* 30.7*    218     CMP:   Recent Labs   Lab 02/07/23  1043 02/08/23  0523    138   K 4.4 3.9    104   CO2 26 30*   * 230*   BUN 23* 25*   CREATININE 1.6* 1.7*   CALCIUM 8.4* 8.4*   PROT 6.3 6.1   ALBUMIN 2.9* 2.7*   BILITOT 0.6 0.5   ALKPHOS 50* 43*   AST 22 19   ALT 22 18   ANIONGAP 7* 4*     Cardiac Markers:   Recent Labs   Lab 02/07/23  1043   *     Troponin:   Recent Labs   Lab 02/07/23  1043   TROPONINI 0.026     TSH:   Recent Labs   Lab 02/07/23  1505   TSH 1.492       Significant Imaging: I have reviewed all pertinent imaging results/findings within the past 24 hours.

## 2023-02-08 NOTE — ASSESSMENT & PLAN NOTE
Chronic, uncontrolled.  Latest blood pressure and vitals reviewed-   Temp:  [97.1 °F (36.2 °C)-98.3 °F (36.8 °C)]   Pulse:  [66-88]   Resp:  [16-19]   BP: (170-224)/()   SpO2:  [93 %-97 %] .   Home meds for hypertension were reviewed and noted below.   Hypertension Medications             amLODIPine (NORVASC) 5 MG tablet Take 1 tablet (5 mg total) by mouth once daily.    furosemide (LASIX) 20 MG tablet Take 1 tablet (20 mg total) by mouth once daily.    lisinopriL (PRINIVIL,ZESTRIL) 20 MG tablet Take 2 tablets (40 mg total) by mouth once daily.          While in the hospital, will manage blood pressure as follows; Adjust home antihypertensive regimen as follows- pt has been noncompliant with home meds and out of meds for 6 months. will start back norvasc. cont IV lasix and add additional bp meds as warranted    Will utilize p.r.n. blood pressure medication only if patient's blood pressure greater than  180/110 and he develops symptoms such as worsening chest pain or shortness of breath.    Cardiology initiated entresto

## 2023-02-08 NOTE — ASSESSMENT & PLAN NOTE
Patient's FSGs are uncontrolled due to hyperglycemia . Pt has been out of meds for 6 months due to noncompliance with follow up appts.  Last A1c reviewed-   Lab Results   Component Value Date    HGBA1C 8.7 (H) 02/07/2023     Most recent fingerstick glucose reviewed-   Recent Labs   Lab 02/08/23  1137   POCTGLUCOSE 258*     Current correctional scale  Low  Increase anti-hyperglycemic dose as follows-   Antihyperglycemics (From admission, onward)    Start     Stop Route Frequency Ordered    02/08/23 2100  insulin detemir U-100 pen 20 Units         -- SubQ Nightly 02/08/23 0840    02/08/23 0926  insulin aspart U-100 pen 1-10 Units         -- SubQ Before meals & nightly PRN 02/08/23 0826        Hold Oral hypoglycemics while patient is in the hospital.

## 2023-02-08 NOTE — CONSULTS
" INPATIENT NEPHROLOGY CONSULT   [facility] NEPHROLOGY    Abel Gibbs  02/08/2023    Reason for consultation:    Acute kidney injury    Chief Complaint:   Chief Complaint   Patient presents with    Chest Pain     Cough, sob x 1 week          History of Present Illness:    Per H and P  Abel Gibbs is a 57 yr old male with PMHx significant for HTN, DM, right foot osteomyeltis, depression and obesity presenting today for orthopnea, cough, sob and lower ext swelling. Pt reports symptoms started approx 1 week ago and have progressively worsened. He describes his symptoms as mod severity, worsens on exertion or lying flat, improves with rest and elevating head. Pt reports noncompliance with all meds and medical care for the past 6 months.  Patient states he missed his routine appointments and therefore has been out of his medications.  He states "I haven't been taking the medications for the past several months so I didn't think I needed them anymore".  Patient was educated extensively on importance of medication and follow-up compliance.  Patient reports dry nonproductive cough.  He denies fevers, chills, nausea, vomiting or chest pain.  He reports 10 lb weight gain over the past week.  Patient will be admitted to hospital medicine services for further workup and management.    2/8  c/o cough and chauhan.  Has pleurisy.  No nausea,  fever, urinary or bowel complaint, new neurologic symptoms, new joint pain      Plan of Care:       Assessment:    Acute kidney injury likely secondary to hypertensive emergency and decompensated heart failure  --renal us unrevealing  --quantify proteinuria  --paraproteins given the carol and anemia  --rheumatologic serologies  --Avoid NSAIDS, Pike II inhibitors, and other non-essential nephrotoxic agents  --bladder scan  --renal dose medication   --strict I/Os    Hypervolemia  --getting lasix  --keep net daily diuresis to 1 to 1.5 liters  --strict I/Os    Anemia  --iron " panel  --paraproteins  --outpatient colonoscopy if not done    Hypertension  --avoid overcorrection  --started on entresto and amlodipine.    --low salt diet    Proteinuria--underlying diabetic nephropathy a possibility   --quantify  --entresto started  --rheumatologic serologies           Thank you for allowing us to participate in this patient's care. We will continue to follow.    Vital Signs:  Temp Readings from Last 3 Encounters:   02/08/23 97.1 °F (36.2 °C) (Tympanic)   03/11/22 97.1 °F (36.2 °C)   04/26/21 98.4 °F (36.9 °C) (Oral)       Pulse Readings from Last 3 Encounters:   02/08/23 70   03/11/22 66   04/26/21 (!) 56       BP Readings from Last 3 Encounters:   02/08/23 (!) 180/84   03/11/22 129/82   04/26/21 (!) 175/79       Weight:  Wt Readings from Last 3 Encounters:   02/08/23 (!) 152.4 kg (336 lb)   03/09/22 (!) 146.7 kg (323 lb 6.6 oz)   04/26/21 (!) 152.9 kg (337 lb)       Past Medical & Surgical History:  Past Medical History:   Diagnosis Date    Depression     Diabetes mellitus     Hypertension     Obesity     Osteomyelitis        No past surgical history on file.    Past Social History:  Social History     Socioeconomic History    Marital status: Single   Tobacco Use    Smoking status: Never    Smokeless tobacco: Never   Substance and Sexual Activity    Alcohol use: Yes     Comment: occas    Drug use: Yes     Frequency: 1.0 times per week     Types: Marijuana     Comment: last use 2 days ago     Social Determinants of Health     Financial Resource Strain: Unknown    Difficulty of Paying Living Expenses: Patient refused   Food Insecurity: Unknown    Worried About Running Out of Food in the Last Year: Patient refused    Ran Out of Food in the Last Year: Patient refused   Transportation Needs: Unknown    Lack of Transportation (Medical): Patient refused    Lack of Transportation (Non-Medical): Patient refused   Physical Activity: Unknown    Days of Exercise per Week: Patient refused    Minutes of  Exercise per Session: Patient refused   Stress: Unknown    Feeling of Stress : Patient refused   Social Connections: Unknown    Frequency of Communication with Friends and Family: Patient refused    Frequency of Social Gatherings with Friends and Family: Patient refused    Attends Gnosticist Services: Patient refused    Active Member of Clubs or Organizations: Patient refused    Attends Club or Organization Meetings: Patient refused    Marital Status: Patient refused   Housing Stability: Unknown    Unable to Pay for Housing in the Last Year: Patient refused    Unstable Housing in the Last Year: Patient refused       Medications:  No current facility-administered medications on file prior to encounter.     Current Outpatient Medications on File Prior to Encounter   Medication Sig Dispense Refill    albuterol (PROVENTIL/VENTOLIN HFA) 90 mcg/actuation inhaler Inhale 1-2 puffs into the lungs every 6 (six) hours as needed for Wheezing. Rescue 18 g 0    amLODIPine (NORVASC) 5 MG tablet Take 1 tablet (5 mg total) by mouth once daily. 30 tablet 0    gabapentin (NEURONTIN) 600 MG tablet Take 600 mg by mouth 3 (three) times daily.      HYDROcodone-acetaminophen (NORCO)  mg per tablet Take 1 tablet by mouth every 6 (six) hours as needed.      metFORMIN (GLUCOPHAGE) 1000 MG tablet Take 1,000 mg by mouth 2 (two) times daily with meals.      arginine-glutamine-calcium HMB 7-7-1.5 gram PwPk Take by mouth 2 (two) times daily.      ascorbic acid, vitamin C, (VITAMIN C) 500 MG tablet Take 500 mg by mouth 2 (two) times daily.      atorvastatin (LIPITOR) 80 MG tablet Take 80 mg by mouth once daily.      dextrose (GLUCOSE GEL) 40 % gel Take 15 g by mouth as needed.      diclofenac sodium (VOLTAREN) 1 % Gel Apply 2 g topically 4 (four) times daily. 100 g 0    furosemide (LASIX) 20 MG tablet Take 1 tablet (20 mg total) by mouth once daily. 30 tablet 0    glucagon, human recombinant, (GLUCAGEN HYPOKIT) 1 mg SolR Inject 1 mg into the  muscle as needed.      insulin glargine (LANTUS) 100 unit/mL injection Inject 50 Units into the skin once daily.      insulin lispro (HUMALOG) 100 unit/mL injection Inject 0-10 Units into the skin 3 (three) times daily before meals.      isoniazid (NYDRAZID) 300 MG Tab Take 300 mg by mouth once daily.      lactobacillus acidophilus & bulgar (LACTINEX) 100 million cell packet Take 1 tablet by mouth 3 (three) times daily with meals.      lidocaine (LIDODERM) 5 % Place 1 patch onto the skin once daily. Remove & Discard patch within 12 hours or as directed by MD 15 patch 0    lisinopriL (PRINIVIL,ZESTRIL) 20 MG tablet Take 2 tablets (40 mg total) by mouth once daily. 60 tablet 0    menthol/zinc oxide (REMEDY CALAZIME PROTECT PASTE TOP) Apply topically 2 (two) times daily.      multivitamin capsule Take 1 capsule by mouth once daily.      ondansetron (ZOFRAN-ODT) 4 MG TbDL Take 1 tablet (4 mg total) by mouth every 8 (eight) hours as needed. 12 tablet 0    polyethylene glycol (MIRALAX) 17 gram PwPk Take by mouth 2 (two) times daily.      pyridoxine, vitamin B6, (VITAMIN B-6) 50 MG Tab Take 50 mg by mouth once daily.       Scheduled Meds:   amLODIPine  5 mg Oral Daily    furosemide (LASIX) injection  40 mg Intravenous Daily    heparin (porcine)  5,000 Units Subcutaneous Q8H    insulin detemir U-100  20 Units Subcutaneous QHS    sacubitriL-valsartan  1 tablet Oral BID    spironolactone  25 mg Oral Daily     Continuous Infusions:  PRN Meds:.benzonatate, dextrose 10%, dextrose 10%, glucagon (human recombinant), glucose, glucose, HYDROcodone-acetaminophen, insulin aspart U-100, ondansetron, sodium chloride 0.9%    Allergies:  Adhesive    Past Family History:  Reviewed; refer to Hospitalist Admission Note    Review of Systems:  Review of Systems - All 14 systems reviewed and negative, except as noted in HPI    Physical Exam:    BP (!) 180/84 (Patient Position: Sitting)   Pulse 70   Temp 97.1 °F (36.2 °C) (Tympanic)   Resp 18  "  Ht 6' 3" (1.905 m)   Wt (!) 152.4 kg (336 lb)   SpO2 96%   BMI 42.00 kg/m²     General Appearance:    Alert, cooperative, no distress, appears stated age   Head:    Normocephalic, without obvious abnormality, atraumatic   Eyes:    PER, conjunctiva/corneas clear, EOM's intact in both eyes        Throat:   Lips, mucosa, and tongue normal; teeth and gums normal   Back:     Symmetric, no curvature, ROM normal, no CVA tenderness   Lungs:     Expiratory wheezes   Chest wall:    No tenderness or deformity   Heart:    Regular rate and rhythm, S1 and S2 normal, no murmur, rub   or gallop   Abdomen:     Soft, non-tender, bowel sounds active all four quadrants,     no masses, no organomegaly   Extremities:   Extremities normal, atraumatic, no cyanosis . 3plus edema   Pulses:   2+ and symmetric all extremities   MSK:   No joint or muscle swelling, tenderness or deformity   Skin:   Skin color, texture, turgor normal, no rashes or lesions   Neurologic:   CNII-XII intact, normal strength and sensation       Throughout.  No flap     Results:  Lab Results   Component Value Date     02/08/2023    K 3.9 02/08/2023     02/08/2023    CO2 30 (H) 02/08/2023    BUN 25 (H) 02/08/2023    CREATININE 1.7 (H) 02/08/2023    CALCIUM 8.4 (L) 02/08/2023    ANIONGAP 4 (L) 02/08/2023    ESTGFRAFRICA >60 03/11/2022    EGFRNONAA >60 03/11/2022       Lab Results   Component Value Date    CALCIUM 8.4 (L) 02/08/2023       Recent Labs   Lab 02/08/23  0523   WBC 8.00   RBC 3.45*   HGB 9.8*   HCT 30.7*      MCV 89   MCH 28.4   MCHC 31.9*          I have personally reviewed pertinent radiological imaging and reports.    Patient care was time spent personally by me on the following activities:   Obtaining a history  Examination of patient.  Providing medical care at the patients bedside.  Developing a treatment plan with patient or surrogate and bedside caregivers  Ordering and reviewing laboratory studies, radiographic studies, pulse " oximetry.  Ordering and performing treatments and interventions.  Evaluation of patient's response to treatment.  Discussions with consultants while on the unit and immediately available to the patient.  Re-evaluation of the patient's condition.  Documentation in the medical record.     Joes Cantor MD  Nephrology  [facility]  [phone]

## 2023-02-08 NOTE — PLAN OF CARE
Problem: Adult Inpatient Plan of Care  Goal: Plan of Care Review  Outcome: Ongoing, Progressing     Problem: Adult Inpatient Plan of Care  Goal: Optimal Comfort and Wellbeing  Outcome: Ongoing, Progressing     Problem: Fluid and Electrolyte Imbalance (Acute Kidney Injury/Impairment)  Goal: Fluid and Electrolyte Balance  Outcome: Ongoing, Progressing     Problem: Fluid Imbalance (Pneumonia)  Goal: Fluid Balance  Outcome: Ongoing, Progressing     Problem: Respiratory Compromise (Pneumonia)  Goal: Effective Oxygenation and Ventilation  Outcome: Ongoing, Progressing     Problem: Fall Injury Risk  Goal: Absence of Fall and Fall-Related Injury  Outcome: Ongoing, Progressing

## 2023-02-08 NOTE — ASSESSMENT & PLAN NOTE
Patient is identified as having new onset CHF heart failure that is Acute. CHF is currently uncontrolled due to Continued edema of extremities and Weight gain of 10 pounds, >3 pillow orthopnea, Rales/crackles on pulmonary exam and Pulmonary edema/pleural effusion on CXR. Latest ECHO performed and demonstrates- No results found for this or any previous visit.  . Continue Furosemide and monitor clinical status closely. Monitor on telemetry. Patient is on CHF pathway.  Monitor strict Is&Os and daily weights.  Place on fluid restriction of 1.5 L. Continue to stress to patient importance of self efficacy and  on diet for CHF. Last BNP reviewed- and noted below   Recent Labs   Lab 02/07/23  1043   *   .  Echo reviewed: HFpEF noted  Reviewed cardiology notes  Continue diuresis- responding well.  Cardiology initiated Entresto.  NM stress planned to eval for ischemic origin.

## 2023-02-08 NOTE — PROGRESS NOTES
"[facility] Hospital Medicine  Progress Note    Patient Name: Abel Gibbs  MRN: [id number]  Patient Class: IP- Inpatient   Admission Date: 2/7/2023  Length of Stay: 1 days  Attending Physician: Darius Qiu MD  Primary Care Provider: Primary Doctor No        Subjective:     Principal Problem:Acute exacerbation of CHF (congestive heart failure)        HPI:  Abel Gibbs is a 57 yr old male with PMHx significant for HTN, DM, right foot osteomyeltis, depression and obesity presenting today for orthopnea, cough, sob and lower ext swelling. Pt reports symptoms started approx 1 week ago and have progressively worsened. He describes his symptoms as mod severity, worsens on exertion or lying flat, improves with rest and elevating head. Pt reports noncompliance with all meds and medical care for the past 6 months.  Patient states he missed his routine appointments and therefore has been out of his medications.  He states "I haven't been taking the medications for the past several months so I didn't think I needed them anymore".  Patient was educated extensively on importance of medication and follow-up compliance.  Patient reports dry nonproductive cough.  He denies fevers, chills, nausea, vomiting or chest pain.  He reports 10 lb weight gain over the past week.  Patient will be admitted to hospital medicine services for further workup and management.        Overview/Hospital Course:  No notes on file    Interval History: Patient seen and examined.  No chest pain.  Orthopnea improved.  Edema is improved per patient, but persistent.  Cr with slight upward trend. BP uncontrolled. Cardiology made med adjustments and plans for NM stress tomorrow.     Review of Systems   Respiratory:  Positive for cough and shortness of breath.    Cardiovascular:  Positive for leg swelling. Negative for chest pain.   Gastrointestinal:  Negative for abdominal pain.   Objective:     Vital Signs (Most Recent):  Temp: 97.1 " °F (36.2 °C) (02/08/23 1212)  Pulse: 70 (02/08/23 1212)  Resp: 18 (02/08/23 1212)  BP: (!) 180/84 (02/08/23 1212)  SpO2: 96 % (02/08/23 1212)   Vital Signs (24h Range):  Temp:  [97.1 °F (36.2 °C)-98.3 °F (36.8 °C)] 97.1 °F (36.2 °C)  Pulse:  [66-88] 70  Resp:  [16-19] 18  SpO2:  [93 %-97 %] 96 %  BP: (170-224)/() 180/84     Weight: (!) 152.4 kg (336 lb)  Body mass index is 42 kg/m².    Intake/Output Summary (Last 24 hours) at 2/8/2023 1235  Last data filed at 2/8/2023 1151  Gross per 24 hour   Intake 660 ml   Output 3900 ml   Net -3240 ml      Physical Exam  Vitals and nursing note reviewed.   Constitutional:       Appearance: Normal appearance. He is obese.   HENT:      Head: Normocephalic and atraumatic.   Cardiovascular:      Rate and Rhythm: Normal rate and regular rhythm.   Pulmonary:      Effort: Pulmonary effort is normal.      Breath sounds: Wheezing (expiratory) present.   Abdominal:      General: Abdomen is flat. Bowel sounds are normal.   Musculoskeletal:      Right lower leg: Edema (2+) present.      Left lower leg: Edema (2+) present.   Skin:     General: Skin is warm and dry.      Capillary Refill: Capillary refill takes less than 2 seconds.   Neurological:      General: No focal deficit present.      Mental Status: He is alert and oriented to person, place, and time. Mental status is at baseline.   Psychiatric:         Mood and Affect: Mood normal.         Behavior: Behavior normal.       Significant Labs: All pertinent labs within the past 24 hours have been reviewed.  CBC:   Recent Labs   Lab 02/07/23  1043 02/08/23  0523   WBC 5.22 8.00   HGB 10.3* 9.8*   HCT 32.2* 30.7*    218     CMP:   Recent Labs   Lab 02/07/23  1043 02/08/23  0523    138   K 4.4 3.9    104   CO2 26 30*   * 230*   BUN 23* 25*   CREATININE 1.6* 1.7*   CALCIUM 8.4* 8.4*   PROT 6.3 6.1   ALBUMIN 2.9* 2.7*   BILITOT 0.6 0.5   ALKPHOS 50* 43*   AST 22 19   ALT 22 18   ANIONGAP 7* 4*     Cardiac  Markers:   Recent Labs   Lab 02/07/23  1043   *     Troponin:   Recent Labs   Lab 02/07/23  1043   TROPONINI 0.026     TSH:   Recent Labs   Lab 02/07/23  1505   TSH 1.492       Significant Imaging: I have reviewed all pertinent imaging results/findings within the past 24 hours.      Assessment/Plan:      * Acute exacerbation of CHF (congestive heart failure)  Patient is identified as having new onset CHF heart failure that is Acute. CHF is currently uncontrolled due to Continued edema of extremities and Weight gain of 10 pounds, >3 pillow orthopnea, Rales/crackles on pulmonary exam and Pulmonary edema/pleural effusion on CXR. Latest ECHO performed and demonstrates- No results found for this or any previous visit.  . Continue Furosemide and monitor clinical status closely. Monitor on telemetry. Patient is on CHF pathway.  Monitor strict Is&Os and daily weights.  Place on fluid restriction of 1.5 L. Continue to stress to patient importance of self efficacy and  on diet for CHF. Last BNP reviewed- and noted below   Recent Labs   Lab 02/07/23  1043   *   .  Echo reviewed: HFpEF noted  Reviewed cardiology notes  Continue diuresis- responding well.  Cardiology initiated Entresto.  NM stress planned to eval for ischemic origin.    Severe obesity (BMI >= 40)  Body mass index is 42 kg/m². Morbid obesity complicates all aspects of disease management from diagnostic modalities to treatment.       Non compliance with medical treatment  Long discussion with patient regarding the necessity of medication compliance.  -CM to assist with any barriers.    Type 2 diabetes mellitus  Patient's FSGs are uncontrolled due to hyperglycemia . Pt has been out of meds for 6 months due to noncompliance with follow up appts.  Last A1c reviewed-   Lab Results   Component Value Date    HGBA1C 8.7 (H) 02/07/2023     Most recent fingerstick glucose reviewed-   Recent Labs   Lab 02/08/23  1137   POCTGLUCOSE 258*     Current  correctional scale  Low  Increase anti-hyperglycemic dose as follows-   Antihyperglycemics (From admission, onward)    Start     Stop Route Frequency Ordered    02/08/23 2100  insulin detemir U-100 pen 20 Units         -- SubQ Nightly 02/08/23 0840    02/08/23 0926  insulin aspart U-100 pen 1-10 Units         -- SubQ Before meals & nightly PRN 02/08/23 0826        Hold Oral hypoglycemics while patient is in the hospital.    Hypertension  Chronic, uncontrolled.  Latest blood pressure and vitals reviewed-   Temp:  [97.1 °F (36.2 °C)-98.3 °F (36.8 °C)]   Pulse:  [66-88]   Resp:  [16-19]   BP: (170-224)/()   SpO2:  [93 %-97 %] .   Home meds for hypertension were reviewed and noted below.   Hypertension Medications             amLODIPine (NORVASC) 5 MG tablet Take 1 tablet (5 mg total) by mouth once daily.    furosemide (LASIX) 20 MG tablet Take 1 tablet (20 mg total) by mouth once daily.    lisinopriL (PRINIVIL,ZESTRIL) 20 MG tablet Take 2 tablets (40 mg total) by mouth once daily.          While in the hospital, will manage blood pressure as follows; Adjust home antihypertensive regimen as follows- pt has been noncompliant with home meds and out of meds for 6 months. will start back norvasc. cont IV lasix and add additional bp meds as warranted    Will utilize p.r.n. blood pressure medication only if patient's blood pressure greater than  180/110 and he develops symptoms such as worsening chest pain or shortness of breath.    Cardiology initiated entresto      MINI (acute kidney injury)  Patient with acute kidney injury likely d/t Pre-renal azotemia  caused by acute CHF exac and accelerated HTN. MINI is currently worsening. Labs reviewed- Renal function/electrolytes with Estimated Creatinine Clearance: 73.9 mL/min (A) (based on SCr of 1.7 mg/dL (H)). according to latest data. Monitor urine output and serial BMP and adjust therapy as needed. Avoid nephrotoxins and renally dose meds for GFR listed above.     Consult  nephrology  Renal US reviewed- WNL      VTE Risk Mitigation (From admission, onward)         Ordered     heparin (porcine) injection 5,000 Units  Every 8 hours         02/07/23 1438     IP VTE HIGH RISK PATIENT  Once         02/07/23 1438     Place sequential compression device  Until discontinued         02/07/23 1438                Discharge Planning   JONATHAN:      Code Status: Full Code   Is the patient medically ready for discharge?:     Reason for patient still in hospital (select all that apply): Patient trending condition, Treatment and Consult recommendations  Discharge Plan A: Home                  Sarah Varghese NP  Department of Hospital Medicine   [facility]

## 2023-02-08 NOTE — CONSULTS
formerly [facility]  Department of Cardiology  Consult Note      PATIENT NAME: Abel Gibbs    MRN: [id number]  TODAY'S DATE: 02/08/2023  ADMIT DATE: 2/7/2023                          CONSULT REQUESTED BY: Darius Qiu MD    SUBJECTIVE     PRINCIPAL PROBLEM: Acute exacerbation of CHF (congestive heart failure)      REASON FOR CONSULT:  CHF new onset      HPI:  PATIENT IS 57-YEAR-OLD MALE WITH HISTORY OF HYPERTENSION DIABETES MORBID OBESITY, ADMITTED WITH SHORTNESS OF BREATH AND COUGH..  Patient has hypertension which is up and down.  He has had fluid in his legs for 2 3 weeks and has had difficulty sleeping.  Has get up at night sleeping in a chair.  He is had shortness of breath for about a week some yellow phlegm and cough which she attributes to allergies.  He does smoke marijuana and had some wheezing.  He came to the hospital with for some allergy medication and was admitted for heart failure.        Review of patient's allergies indicates:   Allergen Reactions    Adhesive Itching       Past Medical History:   Diagnosis Date    Depression     Diabetes mellitus     Hypertension     Obesity     Osteomyelitis      No past surgical history on file.  Social History     Tobacco Use    Smoking status: Never    Smokeless tobacco: Never   Substance Use Topics    Alcohol use: Yes     Comment: occas    Drug use: Yes     Frequency: 1.0 times per week     Types: Marijuana     Comment: last use 2 days ago        REVIEW OF SYSTEMS  CONSTITUTIONAL: Negative for chills, fatigue and fever.   EYES: No double vision, No blurred vision  NEURO: No headaches, No dizziness  RESPIRATORY:  POSITIVE for cough, shortness of breath and wheezing.  POSITIVE DYSPNEA ON EXERTION  CARDIOVASCULAR:  NEGATIVE for chest pain. Negative for palpitations and POSITIVE leg swelling.  POSITIVE PND BILATERAL LEG SWELLING  GI: Negative for abdominal pain, No melena, diarrhea, nausea and vomiting.   : Negative for dysuria and frequency, Negative  for hematuria  SKIN: Negative for bruising, Negative for edema or discoloration noted.   ENDOCRINE: Negative for polyphagia, Negative for heat intolerance, Negative for cold intolerance POSITIVE DIABETES HISTORY OF OSTEOMYELITIS  PSYCHIATRIC: Negative for depression, Negative for anxiety, Negative for memory loss  MUSCULOSKELETAL: Negative for neck pain, Negative for muscle weakness, Negative for back pain DISABLED SECONDARY TO BACK KNEE AND JOINT DISEASE    OBJECTIVE     VITAL SIGNS (Most Recent)  Temp: 98.3 °F (36.8 °C) (02/08/23 0804)  Pulse: 74 (02/08/23 0804)  Resp: 16 (02/08/23 1148)  BP: (!) 174/86 (02/08/23 0804)  SpO2: (!) 93 % (02/08/23 0804)    VENTILATION STATUS  Resp: 16 (02/08/23 1148)  SpO2: (!) 93 % (02/08/23 0804)       I & O (Last 24H):  Intake/Output Summary (Last 24 hours) at 2/8/2023 1152  Last data filed at 2/8/2023 1151  Gross per 24 hour   Intake 660 ml   Output 3900 ml   Net -3240 ml       WEIGHTS  Wt Readings from Last 1 Encounters:   02/08/23 0517 (!) 152.4 kg (336 lb)   02/07/23 2227 (!) 158.7 kg (349 lb 13.9 oz)   02/07/23 1700 (!) 152.4 kg (336 lb)   02/07/23 1018 (!) 152.4 kg (336 lb)       PHYSICAL EXAM OBESE MALE  GENERAL: well built, well nourished, well-developed in no apparent distress alert and oriented.   HEENT: Normocephalic. ..   NECK: No JVD. No bruit..   LARGE NECK DIFFICULT TO EVALUATE CVP  CARDIAC: Regular rate and rhythm. S1 is normal.S2 is normal.No gallops, clicks or murmurs noted at this time.  CHEST ANATOMY: normal.   LUNGS:  OCCASIONAL MILD WHEEZES AND BASILAR FINE RALES   ABDOMEN: Soft no masses or organomegaly.  No abdomen pulsations or bruits.  Normal bowel sounds. No pulsations and no masses felt, No guarding or rebound.   URINARY: No ahmadi catheter   EXTREMITIES:  1 TO 2+ LEFT LOWER LEG EDEMA   PERIPHERAL VASCULAR SYSTEM: Good palpable distal pulses.   CENTRAL NERVOUS SYSTEM: No focal motor or sensory deficits noted.   SKIN: Skin without lesions, moist, well  perfused.   MUSCLE STRENGTH & TONE: No noteable weakness, atrophy or abnormal movement.     HOME MEDICATIONS:  No current facility-administered medications on file prior to encounter.     Current Outpatient Medications on File Prior to Encounter   Medication Sig Dispense Refill    albuterol (PROVENTIL/VENTOLIN HFA) 90 mcg/actuation inhaler Inhale 1-2 puffs into the lungs every 6 (six) hours as needed for Wheezing. Rescue 18 g 0    amLODIPine (NORVASC) 5 MG tablet Take 1 tablet (5 mg total) by mouth once daily. 30 tablet 0    gabapentin (NEURONTIN) 600 MG tablet Take 600 mg by mouth 3 (three) times daily.      HYDROcodone-acetaminophen (NORCO)  mg per tablet Take 1 tablet by mouth every 6 (six) hours as needed.      metFORMIN (GLUCOPHAGE) 1000 MG tablet Take 1,000 mg by mouth 2 (two) times daily with meals.      arginine-glutamine-calcium HMB 7-7-1.5 gram PwPk Take by mouth 2 (two) times daily.      ascorbic acid, vitamin C, (VITAMIN C) 500 MG tablet Take 500 mg by mouth 2 (two) times daily.      atorvastatin (LIPITOR) 80 MG tablet Take 80 mg by mouth once daily.      dextrose (GLUCOSE GEL) 40 % gel Take 15 g by mouth as needed.      diclofenac sodium (VOLTAREN) 1 % Gel Apply 2 g topically 4 (four) times daily. 100 g 0    furosemide (LASIX) 20 MG tablet Take 1 tablet (20 mg total) by mouth once daily. 30 tablet 0    glucagon, human recombinant, (GLUCAGEN HYPOKIT) 1 mg SolR Inject 1 mg into the muscle as needed.      insulin glargine (LANTUS) 100 unit/mL injection Inject 50 Units into the skin once daily.      insulin lispro (HUMALOG) 100 unit/mL injection Inject 0-10 Units into the skin 3 (three) times daily before meals.      isoniazid (NYDRAZID) 300 MG Tab Take 300 mg by mouth once daily.      lactobacillus acidophilus & bulgar (LACTINEX) 100 million cell packet Take 1 tablet by mouth 3 (three) times daily with meals.      lidocaine (LIDODERM) 5 % Place 1 patch onto the skin once daily. Remove & Discard patch  within 12 hours or as directed by MD 15 patch 0    lisinopriL (PRINIVIL,ZESTRIL) 20 MG tablet Take 2 tablets (40 mg total) by mouth once daily. 60 tablet 0    menthol/zinc oxide (REMEDY CALAZIME PROTECT PASTE TOP) Apply topically 2 (two) times daily.      multivitamin capsule Take 1 capsule by mouth once daily.      ondansetron (ZOFRAN-ODT) 4 MG TbDL Take 1 tablet (4 mg total) by mouth every 8 (eight) hours as needed. 12 tablet 0    polyethylene glycol (MIRALAX) 17 gram PwPk Take by mouth 2 (two) times daily.      pyridoxine, vitamin B6, (VITAMIN B-6) 50 MG Tab Take 50 mg by mouth once daily.         SCHEDULED MEDS:   amLODIPine  5 mg Oral Daily    furosemide (LASIX) injection  40 mg Intravenous Daily    heparin (porcine)  5,000 Units Subcutaneous Q8H    insulin detemir U-100  20 Units Subcutaneous QHS       CONTINUOUS INFUSIONS:    PRN MEDS:benzonatate, dextrose 10%, dextrose 10%, glucagon (human recombinant), glucose, glucose, HYDROcodone-acetaminophen, insulin aspart U-100, ondansetron, sodium chloride 0.9%    LABS AND DIAGNOSTICS     CBC LAST 3 DAYS  Recent Labs   Lab 02/07/23  1043 02/08/23  0523   WBC 5.22 8.00   RBC 3.54* 3.45*   HGB 10.3* 9.8*   HCT 32.2* 30.7*   MCV 91 89   MCH 29.1 28.4   MCHC 32.0 31.9*   RDW 14.4 14.3    218   MPV 11.1 11.2   GRAN 58.6  3.1 62.5  5.0   LYMPH 28.0  1.5 25.0  2.0   MONO 7.7  0.4 8.5  0.7   BASO 0.07 0.06   NRBC 0 0       COAGULATION LAST 3 DAYS  No results for input(s): LABPT, INR, APTT in the last 168 hours.    CHEMISTRY LAST 3 DAYS  Recent Labs   Lab 02/07/23  1043 02/07/23  1505 02/08/23  0523     --  138   K 4.4  --  3.9     --  104   CO2 26  --  30*   ANIONGAP 7*  --  4*   BUN 23*  --  25*   CREATININE 1.6*  --  1.7*   *  --  230*   CALCIUM 8.4*  --  8.4*   MG  --  1.8  --    ALBUMIN 2.9*  --  2.7*   PROT 6.3  --  6.1   ALKPHOS 50*  --  43*   ALT 22  --  18   AST 22  --  19   BILITOT 0.6  --  0.5       CARDIAC PROFILE LAST 3  DAYS  Recent Labs   Lab 02/07/23  1043   *   TROPONINI 0.026       ENDOCRINE LAST 3 DAYS  Recent Labs   Lab 02/07/23  1505   TSH 1.492       LAST ARTERIAL BLOOD GAS  ABG  No results for input(s): PH, PO2, PCO2, HCO3, BE in the last 168 hours.    LAST 7 DAYS MICROBIOLOGY   Microbiology Results (last 7 days)       Procedure Component Value Units Date/Time    Influenza A & B by Molecular [[id number]] Collected: 02/07/23 1127    Order Status: Completed Specimen: Nasopharyngeal Swab Updated: 02/07/23 1215     Influenza A, Molecular Negative     Influenza B, Molecular Negative     Flu A & B Source Nasal swab            MOST RECENT IMAGING  Echo  · The left ventricle is normal in size with moderate concentric   hypertrophy and normal systolic function.  · The estimated ejection fraction is 65%.  · Normal left ventricular diastolic function.  · Moderate mitral regurgitation.  · Mild tricuspid regurgitation.  · Moderate left atrial enlargement.  · Mild right atrial enlargement.  · Normal right ventricular size with normal right ventricular systolic   function.  · Normal central venous pressure (3 mmHg).  · Atrial fibrillation not observed.         ECHOCARDIOGRAM RESULTS (last 5)  Results for orders placed during the hospital encounter of 02/07/23    Echo    Interpretation Summary  · The left ventricle is normal in size with moderate concentric hypertrophy and normal systolic function.  · The estimated ejection fraction is 65%.  · Normal left ventricular diastolic function.  · Moderate mitral regurgitation.  · Mild tricuspid regurgitation.  · Moderate left atrial enlargement.  · Mild right atrial enlargement.  · Normal right ventricular size with normal right ventricular systolic function.  · Normal central venous pressure (3 mmHg).  · Atrial fibrillation not observed.      CURRENT/PREVIOUS VISIT EKG  Results for orders placed or performed during the hospital encounter of 02/07/23   EKG 12-lead    Collection Time:  02/07/23 10:48 AM    Narrative    Test Reason : R07.9,    Vent. Rate : 070 BPM     Atrial Rate : 070 BPM     P-R Int : 186 ms          QRS Dur : 116 ms      QT Int : 428 ms       P-R-T Axes : 069 029 091 degrees     QTc Int : 462 ms    Normal sinus rhythm  Incomplete right bundle branch block  Cannot rule out Anteroseptal infarct ,age undetermined  Abnormal ECG  When compared with ECG of 09-MAR-2022 11:25,  Minimal criteria for Anteroseptal infarct are now Present    Referred By: [email or web address]   SELF           Confirmed By:            ASSESSMENT/PLAN:     Active Hospital Problems    Diagnosis    *Acute exacerbation of CHF (congestive heart failure)    Type 2 diabetes mellitus    Hypertension    MINI (acute kidney injury)       ASSESSMENT & PLAN:    HEART FAILURE PRESERVED EJECTION FRACTION, NEW ONSET  HYPERTENSION POORLY CONTROLLED  ACUTE KIDNEY INJURY  MORBID OBESITY  ANEMIA  DM    RECOMMENDATIONS:  PATIENT IS RESPONDING WELL TO DIURESIS.  HIS BLOOD PRESSURE REMAINS ELEVATED  BLOOD PRESSURE CONTROL WITH HYPERTENSION CONTROL: WITH ENTRESTO ENTRESTO, ALDACTONE, SGLT2 INHIBITOR    NUCLEAR STRESS TEST TOMORROW    Samuel Hughes MD  [facility]  Department of Cardiology  Date of Service: 02/08/2023  11:52 AM

## 2023-02-08 NOTE — ASSESSMENT & PLAN NOTE
Long discussion with patient regarding the necessity of medication compliance.  -CM to assist with any barriers.

## 2023-02-08 NOTE — ASSESSMENT & PLAN NOTE
Body mass index is 42 kg/m². Morbid obesity complicates all aspects of disease management from diagnostic modalities to treatment.

## 2023-02-09 PROBLEM — E11.621 DIABETIC ULCER OF LEFT MIDFOOT ASSOCIATED WITH TYPE 2 DIABETES MELLITUS: Status: ACTIVE | Noted: 2023-02-09

## 2023-02-09 PROBLEM — L97.429 DIABETIC ULCER OF LEFT MIDFOOT ASSOCIATED WITH TYPE 2 DIABETES MELLITUS: Status: ACTIVE | Noted: 2023-02-09

## 2023-02-09 LAB
ALBUMIN SERPL BCP-MCNC: 2.6 G/DL (ref 3.5–5.2)
ALBUMIN SERPL BCP-MCNC: 2.6 G/DL (ref 3.5–5.2)
ALP SERPL-CCNC: 46 U/L (ref 55–135)
ALP SERPL-CCNC: 46 U/L (ref 55–135)
ALT SERPL W/O P-5'-P-CCNC: 15 U/L (ref 10–44)
ALT SERPL W/O P-5'-P-CCNC: 15 U/L (ref 10–44)
ANION GAP SERPL CALC-SCNC: 8 MMOL/L (ref 8–16)
ANION GAP SERPL CALC-SCNC: 8 MMOL/L (ref 8–16)
AST SERPL-CCNC: 14 U/L (ref 10–40)
AST SERPL-CCNC: 14 U/L (ref 10–40)
BASOPHILS # BLD AUTO: 0.07 K/UL (ref 0–0.2)
BASOPHILS # BLD AUTO: 0.07 K/UL (ref 0–0.2)
BASOPHILS NFR BLD: 1 % (ref 0–1.9)
BASOPHILS NFR BLD: 1 % (ref 0–1.9)
BILIRUB SERPL-MCNC: 0.8 MG/DL (ref 0.1–1)
BILIRUB SERPL-MCNC: 0.8 MG/DL (ref 0.1–1)
BUN SERPL-MCNC: 21 MG/DL (ref 6–20)
BUN SERPL-MCNC: 21 MG/DL (ref 6–20)
C3 SERPL-MCNC: 115 MG/DL (ref 50–180)
C4 SERPL-MCNC: 58 MG/DL (ref 11–44)
CALCIUM SERPL-MCNC: 8.5 MG/DL (ref 8.7–10.5)
CALCIUM SERPL-MCNC: 8.5 MG/DL (ref 8.7–10.5)
CHLORIDE SERPL-SCNC: 103 MMOL/L (ref 95–110)
CHLORIDE SERPL-SCNC: 103 MMOL/L (ref 95–110)
CO2 SERPL-SCNC: 28 MMOL/L (ref 23–29)
CO2 SERPL-SCNC: 28 MMOL/L (ref 23–29)
CREAT SERPL-MCNC: 1.7 MG/DL (ref 0.5–1.4)
CREAT SERPL-MCNC: 1.7 MG/DL (ref 0.5–1.4)
CV PHARM DOSE: 0.4 MG
CV STRESS BASE HR: 60 BPM
DIASTOLIC BLOOD PRESSURE: 77 MMHG
DIFFERENTIAL METHOD: ABNORMAL
DIFFERENTIAL METHOD: ABNORMAL
EOSINOPHIL # BLD AUTO: 0.3 K/UL (ref 0–0.5)
EOSINOPHIL # BLD AUTO: 0.3 K/UL (ref 0–0.5)
EOSINOPHIL NFR BLD: 3.8 % (ref 0–8)
EOSINOPHIL NFR BLD: 3.8 % (ref 0–8)
ERYTHROCYTE [DISTWIDTH] IN BLOOD BY AUTOMATED COUNT: 14.2 % (ref 11.5–14.5)
ERYTHROCYTE [DISTWIDTH] IN BLOOD BY AUTOMATED COUNT: 14.2 % (ref 11.5–14.5)
EST. GFR  (NO RACE VARIABLE): 46 ML/MIN/1.73 M^2
EST. GFR  (NO RACE VARIABLE): 46 ML/MIN/1.73 M^2
FERRITIN SERPL-MCNC: 122 NG/ML (ref 20–300)
GLUCOSE SERPL-MCNC: 96 MG/DL (ref 70–110)
GLUCOSE SERPL-MCNC: 96 MG/DL (ref 70–110)
HCT VFR BLD AUTO: 31.5 % (ref 40–54)
HCT VFR BLD AUTO: 31.5 % (ref 40–54)
HGB BLD-MCNC: 10.3 G/DL (ref 14–18)
HGB BLD-MCNC: 10.3 G/DL (ref 14–18)
IMM GRANULOCYTES # BLD AUTO: 0.02 K/UL (ref 0–0.04)
IMM GRANULOCYTES # BLD AUTO: 0.02 K/UL (ref 0–0.04)
IMM GRANULOCYTES NFR BLD AUTO: 0.3 % (ref 0–0.5)
IMM GRANULOCYTES NFR BLD AUTO: 0.3 % (ref 0–0.5)
IRON SERPL-MCNC: 20 UG/DL (ref 45–160)
LYMPHOCYTES # BLD AUTO: 2.6 K/UL (ref 1–4.8)
LYMPHOCYTES # BLD AUTO: 2.6 K/UL (ref 1–4.8)
LYMPHOCYTES NFR BLD: 35.4 % (ref 18–48)
LYMPHOCYTES NFR BLD: 35.4 % (ref 18–48)
MCH RBC QN AUTO: 28.9 PG (ref 27–31)
MCH RBC QN AUTO: 28.9 PG (ref 27–31)
MCHC RBC AUTO-ENTMCNC: 32.7 G/DL (ref 32–36)
MCHC RBC AUTO-ENTMCNC: 32.7 G/DL (ref 32–36)
MCV RBC AUTO: 88 FL (ref 82–98)
MCV RBC AUTO: 88 FL (ref 82–98)
MONOCYTES # BLD AUTO: 0.8 K/UL (ref 0.3–1)
MONOCYTES # BLD AUTO: 0.8 K/UL (ref 0.3–1)
MONOCYTES NFR BLD: 10.6 % (ref 4–15)
MONOCYTES NFR BLD: 10.6 % (ref 4–15)
NEUTROPHILS # BLD AUTO: 3.6 K/UL (ref 1.8–7.7)
NEUTROPHILS # BLD AUTO: 3.6 K/UL (ref 1.8–7.7)
NEUTROPHILS NFR BLD: 48.9 % (ref 38–73)
NEUTROPHILS NFR BLD: 48.9 % (ref 38–73)
NRBC BLD-RTO: 0 /100 WBC
NRBC BLD-RTO: 0 /100 WBC
OHS CV CPX 85 PERCENT MAX PREDICTED HEART RATE MALE: 137
OHS CV CPX MAX PREDICTED HEART RATE: 161
OHS CV CPX PATIENT IS FEMALE: 0
OHS CV CPX PATIENT IS MALE: 1
OHS CV CPX PEAK DIASTOLIC BLOOD PRESSURE: 88 MMHG
OHS CV CPX PEAK HEAR RATE: 83 BPM
OHS CV CPX PEAK RATE PRESSURE PRODUCT: NORMAL
OHS CV CPX PEAK SYSTOLIC BLOOD PRESSURE: 176 MMHG
OHS CV CPX PERCENT MAX PREDICTED HEART RATE ACHIEVED: 52
OHS CV CPX RATE PRESSURE PRODUCT PRESENTING: NORMAL
PLATELET # BLD AUTO: 262 K/UL (ref 150–450)
PLATELET # BLD AUTO: 262 K/UL (ref 150–450)
PMV BLD AUTO: 10.9 FL (ref 9.2–12.9)
PMV BLD AUTO: 10.9 FL (ref 9.2–12.9)
POCT GLUCOSE: 147 MG/DL (ref 70–110)
POCT GLUCOSE: 272 MG/DL (ref 70–110)
POTASSIUM SERPL-SCNC: 3.8 MMOL/L (ref 3.5–5.1)
POTASSIUM SERPL-SCNC: 3.8 MMOL/L (ref 3.5–5.1)
PROT PATTERN UR ELPH-IMP: NORMAL
PROT SERPL-MCNC: 6.2 G/DL (ref 6–8.4)
PROT SERPL-MCNC: 6.2 G/DL (ref 6–8.4)
PTH-INTACT SERPL-MCNC: 81.2 PG/ML (ref 9–77)
RBC # BLD AUTO: 3.57 M/UL (ref 4.6–6.2)
RBC # BLD AUTO: 3.57 M/UL (ref 4.6–6.2)
SATURATED IRON: 7 % (ref 20–50)
SODIUM SERPL-SCNC: 139 MMOL/L (ref 136–145)
SODIUM SERPL-SCNC: 139 MMOL/L (ref 136–145)
SYSTOLIC BLOOD PRESSURE: 178 MMHG
TOTAL IRON BINDING CAPACITY: 278 UG/DL (ref 250–450)
TRANSFERRIN SERPL-MCNC: 188 MG/DL (ref 200–375)
URATE SERPL-MCNC: 6 MG/DL (ref 3.4–7)
WBC # BLD AUTO: 7.28 K/UL (ref 3.9–12.7)
WBC # BLD AUTO: 7.28 K/UL (ref 3.9–12.7)

## 2023-02-09 PROCEDURE — 99499 UNLISTED E&M SERVICE: CPT | Mod: HCNC,,, | Performed by: GENERAL PRACTICE

## 2023-02-09 PROCEDURE — 36415 COLL VENOUS BLD VENIPUNCTURE: CPT | Mod: HCNC | Performed by: INTERNAL MEDICINE

## 2023-02-09 PROCEDURE — 84165 PROTEIN E-PHORESIS SERUM: CPT | Mod: 26,HCNC,, | Performed by: PATHOLOGY

## 2023-02-09 PROCEDURE — 63600175 PHARM REV CODE 636 W HCPCS: Mod: HCNC | Performed by: STUDENT IN AN ORGANIZED HEALTH CARE EDUCATION/TRAINING PROGRAM

## 2023-02-09 PROCEDURE — 85025 COMPLETE CBC W/AUTO DIFF WBC: CPT | Mod: HCNC | Performed by: NURSE PRACTITIONER

## 2023-02-09 PROCEDURE — 63600175 PHARM REV CODE 636 W HCPCS: Mod: HCNC | Performed by: NURSE PRACTITIONER

## 2023-02-09 PROCEDURE — 94761 N-INVAS EAR/PLS OXIMETRY MLT: CPT | Mod: HCNC

## 2023-02-09 PROCEDURE — 84466 ASSAY OF TRANSFERRIN: CPT | Mod: HCNC | Performed by: INTERNAL MEDICINE

## 2023-02-09 PROCEDURE — 25000003 PHARM REV CODE 250: Mod: HCNC | Performed by: NURSE PRACTITIONER

## 2023-02-09 PROCEDURE — 80053 COMPREHEN METABOLIC PANEL: CPT | Mod: HCNC | Performed by: NURSE PRACTITIONER

## 2023-02-09 PROCEDURE — 86235 NUCLEAR ANTIGEN ANTIBODY: CPT | Mod: 59,HCNC | Performed by: INTERNAL MEDICINE

## 2023-02-09 PROCEDURE — 84550 ASSAY OF BLOOD/URIC ACID: CPT | Mod: HCNC | Performed by: INTERNAL MEDICINE

## 2023-02-09 PROCEDURE — 86160 COMPLEMENT ANTIGEN: CPT | Mod: HCNC | Performed by: INTERNAL MEDICINE

## 2023-02-09 PROCEDURE — 86160 COMPLEMENT ANTIGEN: CPT | Mod: 59,HCNC | Performed by: INTERNAL MEDICINE

## 2023-02-09 PROCEDURE — 82728 ASSAY OF FERRITIN: CPT | Mod: HCNC | Performed by: INTERNAL MEDICINE

## 2023-02-09 PROCEDURE — 12000002 HC ACUTE/MED SURGE SEMI-PRIVATE ROOM: Mod: HCNC

## 2023-02-09 PROCEDURE — 86038 ANTINUCLEAR ANTIBODIES: CPT | Mod: HCNC | Performed by: INTERNAL MEDICINE

## 2023-02-09 PROCEDURE — 84165 PROTEIN E-PHORESIS SERUM: CPT | Mod: HCNC | Performed by: INTERNAL MEDICINE

## 2023-02-09 PROCEDURE — 83970 ASSAY OF PARATHORMONE: CPT | Mod: HCNC | Performed by: INTERNAL MEDICINE

## 2023-02-09 PROCEDURE — 86334 IMMUNOFIX E-PHORESIS SERUM: CPT | Mod: 26,HCNC,, | Performed by: PATHOLOGY

## 2023-02-09 PROCEDURE — 86039 ANTINUCLEAR ANTIBODIES (ANA): CPT | Mod: HCNC | Performed by: NURSE PRACTITIONER

## 2023-02-09 PROCEDURE — 99499 NO LOS: ICD-10-PCS | Mod: HCNC,,, | Performed by: GENERAL PRACTICE

## 2023-02-09 PROCEDURE — 86334 IMMUNOFIX E-PHORESIS SERUM: CPT | Mod: HCNC | Performed by: NURSE PRACTITIONER

## 2023-02-09 PROCEDURE — 83520 IMMUNOASSAY QUANT NOS NONAB: CPT | Mod: HCNC | Performed by: INTERNAL MEDICINE

## 2023-02-09 PROCEDURE — 25000003 PHARM REV CODE 250: Mod: HCNC | Performed by: GENERAL PRACTICE

## 2023-02-09 PROCEDURE — 86225 DNA ANTIBODY NATIVE: CPT | Mod: HCNC | Performed by: INTERNAL MEDICINE

## 2023-02-09 PROCEDURE — 84165 PATHOLOGIST INTERPRETATION SPE: ICD-10-PCS | Mod: 26,HCNC,, | Performed by: PATHOLOGY

## 2023-02-09 PROCEDURE — 86334 PATHOLOGIST INTERPRETATION IFE: ICD-10-PCS | Mod: 26,HCNC,, | Performed by: PATHOLOGY

## 2023-02-09 PROCEDURE — 86036 ANCA SCREEN EACH ANTIBODY: CPT | Mod: 59,HCNC | Performed by: INTERNAL MEDICINE

## 2023-02-09 RX ORDER — GUAIFENESIN/DEXTROMETHORPHAN 100-10MG/5
10 SYRUP ORAL EVERY 6 HOURS PRN
Status: DISCONTINUED | OUTPATIENT
Start: 2023-02-09 | End: 2023-02-10 | Stop reason: HOSPADM

## 2023-02-09 RX ORDER — GABAPENTIN 300 MG/1
600 CAPSULE ORAL 3 TIMES DAILY
Status: DISCONTINUED | OUTPATIENT
Start: 2023-02-09 | End: 2023-02-10 | Stop reason: HOSPADM

## 2023-02-09 RX ORDER — REGADENOSON 0.08 MG/ML
0.4 INJECTION, SOLUTION INTRAVENOUS ONCE
Status: COMPLETED | OUTPATIENT
Start: 2023-02-09 | End: 2023-02-09

## 2023-02-09 RX ORDER — HYDROCODONE BITARTRATE AND ACETAMINOPHEN 10; 325 MG/1; MG/1
1 TABLET ORAL EVERY 4 HOURS PRN
Status: DISCONTINUED | OUTPATIENT
Start: 2023-02-09 | End: 2023-02-10 | Stop reason: HOSPADM

## 2023-02-09 RX ADMIN — HYDROCODONE BITARTRATE AND ACETAMINOPHEN 1 TABLET: 10; 325 TABLET ORAL at 09:02

## 2023-02-09 RX ADMIN — HEPARIN SODIUM 5000 UNITS: 5000 INJECTION INTRAVENOUS; SUBCUTANEOUS at 10:02

## 2023-02-09 RX ADMIN — GABAPENTIN 600 MG: 300 CAPSULE ORAL at 02:02

## 2023-02-09 RX ADMIN — SACUBITRIL AND VALSARTAN 1 TABLET: 24; 26 TABLET, FILM COATED ORAL at 08:02

## 2023-02-09 RX ADMIN — HYDROCODONE BITARTRATE AND ACETAMINOPHEN 1 TABLET: 10; 325 TABLET ORAL at 02:02

## 2023-02-09 RX ADMIN — HEPARIN SODIUM 5000 UNITS: 5000 INJECTION INTRAVENOUS; SUBCUTANEOUS at 06:02

## 2023-02-09 RX ADMIN — INSULIN DETEMIR 20 UNITS: 100 INJECTION, SOLUTION SUBCUTANEOUS at 08:02

## 2023-02-09 RX ADMIN — INSULIN ASPART 6 UNITS: 100 INJECTION, SOLUTION INTRAVENOUS; SUBCUTANEOUS at 04:02

## 2023-02-09 RX ADMIN — SACUBITRIL AND VALSARTAN 1 TABLET: 24; 26 TABLET, FILM COATED ORAL at 09:02

## 2023-02-09 RX ADMIN — AMLODIPINE BESYLATE 5 MG: 5 TABLET ORAL at 09:02

## 2023-02-09 RX ADMIN — HEPARIN SODIUM 5000 UNITS: 5000 INJECTION INTRAVENOUS; SUBCUTANEOUS at 02:02

## 2023-02-09 RX ADMIN — GABAPENTIN 600 MG: 300 CAPSULE ORAL at 08:02

## 2023-02-09 RX ADMIN — REGADENOSON 0.4 MG: 0.08 INJECTION, SOLUTION INTRAVENOUS at 08:02

## 2023-02-09 RX ADMIN — HYDROCODONE BITARTRATE AND ACETAMINOPHEN 1 TABLET: 10; 325 TABLET ORAL at 08:02

## 2023-02-09 RX ADMIN — SPIRONOLACTONE 25 MG: 25 TABLET, FILM COATED ORAL at 09:02

## 2023-02-09 NOTE — ASSESSMENT & PLAN NOTE
Patient with acute kidney injury likely d/t Pre-renal azotemia  caused by acute CHF exac and accelerated HTN. MINI is currently stable. Labs reviewed- Renal function/electrolytes with Estimated Creatinine Clearance: 73.9 mL/min (A) (based on SCr of 1.7 mg/dL (H)). according to latest data. Monitor urine output and serial BMP and adjust therapy as needed. Avoid nephrotoxins and renally dose meds for GFR listed above.     Consult nephrology  Renal US reviewed- WNL

## 2023-02-09 NOTE — PLAN OF CARE
Problem: Adult Inpatient Plan of Care  Goal: Plan of Care Review  Outcome: Ongoing, Progressing     Problem: Adult Inpatient Plan of Care  Goal: Optimal Comfort and Wellbeing  Outcome: Ongoing, Progressing     Problem: Diabetes Comorbidity  Goal: Blood Glucose Level Within Targeted Range  Outcome: Ongoing, Progressing     Problem: Fluid and Electrolyte Imbalance (Acute Kidney Injury/Impairment)  Goal: Fluid and Electrolyte Balance  Outcome: Ongoing, Progressing     Problem: Respiratory Compromise (Pneumonia)  Goal: Effective Oxygenation and Ventilation  Outcome: Ongoing, Progressing     Problem: Fall Injury Risk  Goal: Absence of Fall and Fall-Related Injury  Outcome: Ongoing, Progressing

## 2023-02-09 NOTE — NURSING
Spoke with Sourav regarding Entresto prescription, they state it is covered under pt's insurance with $0 copay and it is in stock and ready.

## 2023-02-09 NOTE — PROGRESS NOTES
Formerly [facility]  Department of Cardiology  Progress Note    PATIENT NAME: Abel Gibbs  MRN: [id number]  TODAY'S DATE: 02/09/2023  ADMIT DATE: 2/7/2023    SUBJECTIVE     PRINCIPLE PROBLEM: Acute exacerbation of CHF (congestive heart failure)    INTERVAL HISTORY:    2/9/2023  No chest pain SOB  Stress test today    Review of patient's allergies indicates:   Allergen Reactions    Adhesive Itching       REVIEW OF SYSTEMS  CARDIOVASCULAR: No recent chest pain, palpitations, arm, neck, or jaw pain  RESPIRATORY: No recent fever, cough chills, SOB or congestion  : No blood in the urine  GI: No Nausea, vomiting, constipation, diarrhea, blood, or reflux.  MUSCULOSKELETAL: No myalgias  NEURO: No lightheadedness or dizziness  EYES: No Double vision, blurry, vision or headache     OBJECTIVE     VITAL SIGNS (Most Recent)  Temp: 98.6 °F (37 °C) (02/09/23 0308)  Pulse: 64 (02/09/23 0810)  Resp: 16 (02/09/23 0308)  BP: (!) 168/77 (02/09/23 0810)  SpO2: 96 % (02/09/23 0725)    VENTILATION STATUS  Resp: 16 (02/09/23 0308)  SpO2: 96 % (02/09/23 0725)       I & O (Last 24H):  Intake/Output Summary (Last 24 hours) at 2/9/2023 0917  Last data filed at 2/9/2023 0002  Gross per 24 hour   Intake 880 ml   Output 3050 ml   Net -2170 ml       WEIGHTS  Wt Readings from Last 3 Encounters:   02/08/23 0517 (!) 152.4 kg (336 lb)   02/07/23 2227 (!) 158.7 kg (349 lb 13.9 oz)   02/07/23 1700 (!) 152.4 kg (336 lb)   02/07/23 1018 (!) 152.4 kg (336 lb)   03/09/22 2339 (!) 146.7 kg (323 lb 6.6 oz)   03/09/22 2028 (!) 146.7 kg (323 lb 6.6 oz)   03/09/22 1913 (!) 146.7 kg (323 lb 6.6 oz)   03/09/22 1031 (!) 143.8 kg (317 lb)   04/26/21 1002 (!) 152.9 kg (337 lb)       PHYSICAL EXAM  CONSTITUTIONAL: Well built, well nourished in no apparent distress  NECK: no carotid bruit, no JVD  LUNGS: CTA  CHEST WALL: no tenderness  HEART: regular rate and rhythm, S1, S2 normal, no murmur, click, rub or gallop   ABDOMEN: soft, non-tender; bowel sounds  normal; no masses,  no organomegaly  EXTREMITIES: Extremities normal, no edema  NEURO: AAO X 3    SCHEDULED MEDS:   amLODIPine  5 mg Oral Daily    furosemide (LASIX) injection  40 mg Intravenous Daily    heparin (porcine)  5,000 Units Subcutaneous Q8H    insulin detemir U-100  20 Units Subcutaneous QHS    sacubitriL-valsartan  1 tablet Oral BID    spironolactone  25 mg Oral Daily       CONTINUOUS INFUSIONS:    PRN MEDS:benzonatate, dextromethorphan-guaiFENesin  mg/5 ml, dextrose 10%, dextrose 10%, glucagon (human recombinant), glucose, glucose, hydrALAZINE, HYDROcodone-acetaminophen, insulin aspart U-100, ondansetron, sodium chloride 0.9%    LABS AND DIAGNOSTICS     CBC LAST 3 DAYS  Recent Labs   Lab 02/07/23  1043 02/08/23  0523 02/09/23  0430   WBC 5.22 8.00 7.28  7.28   RBC 3.54* 3.45* 3.57*  3.57*   HGB 10.3* 9.8* 10.3*  10.3*   HCT 32.2* 30.7* 31.5*  31.5*   MCV 91 89 88  88   MCH 29.1 28.4 28.9  28.9   MCHC 32.0 31.9* 32.7  32.7   RDW 14.4 14.3 14.2  14.2    218 262  262   MPV 11.1 11.2 10.9  10.9   GRAN 58.6  3.1 62.5  5.0 48.9  48.9  3.6  3.6   LYMPH 28.0  1.5 25.0  2.0 35.4  35.4  2.6  2.6   MONO 7.7  0.4 8.5  0.7 10.6  10.6  0.8  0.8   BASO 0.07 0.06 0.07  0.07   NRBC 0 0 0  0       COAGULATION LAST 3 DAYS  No results for input(s): LABPT, INR, APTT in the last 168 hours.    CHEMISTRY LAST 3 DAYS  Recent Labs   Lab 02/07/23  1043 02/07/23  1505 02/08/23  0523 02/09/23  0430     --  138 139  139   K 4.4  --  3.9 3.8  3.8     --  104 103  103   CO2 26  --  30* 28  28   ANIONGAP 7*  --  4* 8  8   BUN 23*  --  25* 21*  21*   CREATININE 1.6*  --  1.7* 1.7*  1.7*   *  --  230* 96  96   CALCIUM 8.4*  --  8.4* 8.5*  8.5*   MG  --  1.8  --   --    ALBUMIN 2.9*  --  2.7* 2.6*  2.6*   PROT 6.3  --  6.1 6.2  6.2   ALKPHOS 50*  --  43* 46*  46*   ALT 22  --  18 15  15   AST 22  --  19 14  14   BILITOT 0.6  --  0.5 0.8  0.8       CARDIAC PROFILE  LAST 3 DAYS  Recent Labs   Lab 02/07/23  1043   *   TROPONINI 0.026       ENDOCRINE LAST 3 DAYS  Recent Labs   Lab 02/07/23  1505   TSH 1.492       LAST ARTERIAL BLOOD GAS  ABG  No results for input(s): PH, PO2, PCO2, HCO3, BE in the last 168 hours.    LAST 7 DAYS MICROBIOLOGY   Microbiology Results (last 7 days)       Procedure Component Value Units Date/Time    Influenza A & B by Molecular [[id number]] Collected: 02/07/23 1127    Order Status: Completed Specimen: Nasopharyngeal Swab Updated: 02/07/23 1215     Influenza A, Molecular Negative     Influenza B, Molecular Negative     Flu A & B Source Nasal swab            MOST RECENT IMAGING  Nuclear Stress Test    The ECG portion of the study is negative for ischemia.    The patient reported no chest pain during the stress test.    There were no arrhythmias during stress.    The nuclear portion of this study will be reported separately.      LASTECHO  Results for orders placed during the hospital encounter of 02/07/23    Echo    Interpretation Summary  · The left ventricle is normal in size with moderate concentric hypertrophy and normal systolic function.  · The estimated ejection fraction is 65%.  · Normal left ventricular diastolic function.  · Moderate mitral regurgitation.  · Mild tricuspid regurgitation.  · Moderate left atrial enlargement.  · Mild right atrial enlargement.  · Normal right ventricular size with normal right ventricular systolic function.  · Normal central venous pressure (3 mmHg).  · Atrial fibrillation not observed.      CURRENT/PREVIOUS VISIT EKG  Results for orders placed or performed during the hospital encounter of 02/07/23   EKG 12-lead    Collection Time: 02/07/23 10:48 AM    Narrative    Test Reason : R07.9,    Vent. Rate : 070 BPM     Atrial Rate : 070 BPM     P-R Int : 186 ms          QRS Dur : 116 ms      QT Int : 428 ms       P-R-T Axes : 069 029 091 degrees     QTc Int : 462 ms    Normal sinus rhythm  Incomplete right bundle  branch block  Cannot rule out Anteroseptal infarct ,age undetermined  Abnormal ECG  When compared with ECG of 09-MAR-2022 11:25,  Minimal criteria for Anteroseptal infarct are now Present    Referred By: TASHIA   SELF           Confirmed By:        ASSESSMENT/PLAN:     Active Hospital Problems    Diagnosis    *Acute exacerbation of CHF (congestive heart failure)    Non compliance with medical treatment    Severe obesity (BMI >= 40)    Type 2 diabetes mellitus    Hypertension    MINI (acute kidney injury)       ASSESSMENT & PLAN:   CHF preserved EFimproved  HTN LVH    RECOMMENDATIONS:  HOLD LASIX TODAY LUNGS CLEAR   STRESS TEST IN  PROGRESS        Samuel Hughes MD  [facility]  Department of Cardiology  Date of Service: 02/09/2023  9:17 AM      Addendum stress test results reveal inferior wall fixed defect in a lot of artifact from GI tract her liver :minimal concern for little ischemia.  Medical management with hypertension control is acceptable.  He can follow-up in the office.  Would add SGLT 2 at discharge her in the office for follow-up

## 2023-02-09 NOTE — PROGRESS NOTES
Pharmacist Renal Dose Adjustment Note    Abel Gibbs is a 57 y.o. male being treated with the medication Gabapentin    Patient Data:    Vital Signs (Most Recent):  Temp: 98.4 °F (36.9 °C) (02/09/23 0810)  Pulse: 64 (02/09/23 0810)  Resp: 16 (02/09/23 0943)  BP: (!) 168/77 (02/09/23 0810)  SpO2: 96 % (02/09/23 0725)   Vital Signs (72h Range):  Temp:  [97.1 °F (36.2 °C)-99.3 °F (37.4 °C)]   Pulse:  [62-88]   Resp:  [12-19]   BP: (166-224)/()   SpO2:  [92 %-99 %]      Recent Labs   Lab 02/07/23  1043 02/08/23  0523 02/09/23  0430   CREATININE 1.6* 1.7* 1.7*  1.7*     Serum creatinine: 1.7 mg/dL (H) 02/09/23 0430  Estimated creatinine clearance: 73.9 mL/min (A)    Medication:Gabapentin dose: 800 mg frequency three times daily will be changed to medication:Gabapentin dose: 600 mg frequency: three times daily    Pharmacist's Name: Cait Cunningham  Pharmacist's Extension: [phone]

## 2023-02-09 NOTE — SUBJECTIVE & OBJECTIVE
Interval History: Patient seen and examined.  Remains free of chest pain.  His orthopnea and edema is improved.  He states he is feeling well.  Wound Care evaluated him yesterday and noticed a wound to his left plantar region and podiatry consult was placed.  Pt has established outpatient podiatry care for wound on the right foot which is well healed.  He underwent stress test this morning and we are awaiting results.  Plan is for discharge tomorrow if labs remained stable and BP remains improved.    Review of Systems   Respiratory:  Positive for cough. Negative for shortness of breath.    Cardiovascular:  Positive for leg swelling. Negative for chest pain.   Gastrointestinal:  Negative for abdominal pain.   Skin:  Positive for wound.   Objective:     Vital Signs (Most Recent):  Temp: 97.6 °F (36.4 °C) (02/09/23 1110)  Pulse: 65 (02/09/23 1110)  Resp: 16 (02/09/23 1110)  BP: (!) 185/94 (02/09/23 1110)  SpO2: 96 % (02/09/23 1110)   Vital Signs (24h Range):  Temp:  [97.6 °F (36.4 °C)-99.3 °F (37.4 °C)] 97.6 °F (36.4 °C)  Pulse:  [64-73] 65  Resp:  [16-18] 16  SpO2:  [92 %-96 %] 96 %  BP: (166-196)/(76-94) 185/94     Weight: (!) 152.4 kg (336 lb)  Body mass index is 42 kg/m².    Intake/Output Summary (Last 24 hours) at 2/9/2023 1407  Last data filed at 2/9/2023 1223  Gross per 24 hour   Intake 640 ml   Output 1750 ml   Net -1110 ml        Physical Exam  Vitals and nursing note reviewed.   Constitutional:       Appearance: Normal appearance. He is obese.   HENT:      Head: Normocephalic and atraumatic.   Cardiovascular:      Rate and Rhythm: Normal rate and regular rhythm.   Pulmonary:      Effort: Pulmonary effort is normal.      Breath sounds: No wheezing.   Abdominal:      General: Abdomen is flat. Bowel sounds are normal.   Musculoskeletal:      Right lower leg: Edema (1+) present.      Left lower leg: Edema (1+) present.   Skin:     General: Skin is warm and dry.      Capillary Refill: Capillary refill takes less  than 2 seconds.   Neurological:      General: No focal deficit present.      Mental Status: He is alert and oriented to person, place, and time. Mental status is at baseline.   Psychiatric:         Mood and Affect: Mood normal.         Behavior: Behavior normal.       Significant Labs: All pertinent labs within the past 24 hours have been reviewed.  CBC:   Recent Labs   Lab 02/08/23 0523 02/09/23  0430   WBC 8.00 7.28  7.28   HGB 9.8* 10.3*  10.3*   HCT 30.7* 31.5*  31.5*    262  262       CMP:   Recent Labs   Lab 02/08/23 0523 02/09/23  0430    139  139   K 3.9 3.8  3.8    103  103   CO2 30* 28  28   * 96  96   BUN 25* 21*  21*   CREATININE 1.7* 1.7*  1.7*   CALCIUM 8.4* 8.5*  8.5*   PROT 6.1 6.2  6.2   ALBUMIN 2.7* 2.6*  2.6*   BILITOT 0.5 0.8  0.8   ALKPHOS 43* 46*  46*   AST 19 14  14   ALT 18 15  15   ANIONGAP 4* 8  8       Cardiac Markers:   No results for input(s): CKMB, MYOGLOBIN, BNP, TROPISTAT in the last 48 hours.    Troponin:   No results for input(s): TROPONINI, TROPONINIHS in the last 48 hours.    TSH:   Recent Labs   Lab 02/07/23  1505   TSH 1.492         Significant Imaging: I have reviewed all pertinent imaging results/findings within the past 24 hours.

## 2023-02-09 NOTE — ASSESSMENT & PLAN NOTE
Chronic, uncontrolled- improving since admit.  Latest blood pressure and vitals reviewed-   Temp:  [97.6 °F (36.4 °C)-99.3 °F (37.4 °C)]   Pulse:  [64-73]   Resp:  [16-18]   BP: (166-196)/(76-94)   SpO2:  [92 %-96 %] .   Home meds for hypertension were reviewed and noted below.   Hypertension Medications             amLODIPine (NORVASC) 5 MG tablet Take 1 tablet (5 mg total) by mouth once daily.    furosemide (LASIX) 20 MG tablet Take 1 tablet (20 mg total) by mouth once daily.    lisinopriL (PRINIVIL,ZESTRIL) 20 MG tablet Take 2 tablets (40 mg total) by mouth once daily.          While in the hospital, will manage blood pressure as follows; Adjust home antihypertensive regimen as follows- pt has been noncompliant with home meds and out of meds for 6 months. will start back norvasc. cont IV lasix and add additional bp meds as warranted    Will utilize p.r.n. blood pressure medication only if patient's blood pressure greater than  180/110 and he develops symptoms such as worsening chest pain or shortness of breath.    Cardiology initiated entresto

## 2023-02-09 NOTE — ASSESSMENT & PLAN NOTE
Patient's FSGs are uncontrolled due to hyperglycemia . Pt has been out of meds for 6 months due to noncompliance with follow up appts.  Last A1c reviewed-   Lab Results   Component Value Date    HGBA1C 8.7 (H) 02/07/2023     Most recent fingerstick glucose reviewed-   Recent Labs   Lab 02/08/23  1614 02/08/23 2025   POCTGLUCOSE 178* 225*     Current correctional scale  Low  Increase anti-hyperglycemic dose as follows-   Antihyperglycemics (From admission, onward)    Start     Stop Route Frequency Ordered    02/08/23 2100  insulin detemir U-100 pen 20 Units         -- SubQ Nightly 02/08/23 0840    02/08/23 0926  insulin aspart U-100 pen 1-10 Units         -- SubQ Before meals & nightly PRN 02/08/23 0826        Hold Oral hypoglycemics while patient is in the hospital.  AM glucose 96: add some prandial insulin

## 2023-02-09 NOTE — CONSULTS
"Consulted for wound to left foot. Previous injury to right foot diabetic wound is healed at this assessment. Left foot is noted with serosanguinous drainage. Open area of wound probes to the entire underside of the plantar callous. Patient only feels pain to the immediate open wound area but not to the plantar foot or toe areas due to neuropathy. Notified MD regarding wound status and new orders noted to consult podiatry. Painted left foot with betadine and then applied a betadine soaked gauze over the open wound and wrapped with kerlix. Added a bed extender to patients bed due to his height being 6'3". Wound care will follow up post podiatry consult with recommendations.       Left anterior foot    Left anterior aspect of plantar wound    Left plantar foot and left great toe  "

## 2023-02-09 NOTE — CONSULTS
" INPATIENT NEPHROLOGY CONSULT   [facility] NEPHROLOGY    Abel Gibbs  02/09/2023    Reason for consultation:    Acute kidney injury    Chief Complaint:   Chief Complaint   Patient presents with    Chest Pain     Cough, sob x 1 week          History of Present Illness:    Per H and P  Abel Gibbs is a 57 yr old male with PMHx significant for HTN, DM, right foot osteomyeltis, depression and obesity presenting today for orthopnea, cough, sob and lower ext swelling. Pt reports symptoms started approx 1 week ago and have progressively worsened. He describes his symptoms as mod severity, worsens on exertion or lying flat, improves with rest and elevating head. Pt reports noncompliance with all meds and medical care for the past 6 months.  Patient states he missed his routine appointments and therefore has been out of his medications.  He states "I haven't been taking the medications for the past several months so I didn't think I needed them anymore".  Patient was educated extensively on importance of medication and follow-up compliance.  Patient reports dry nonproductive cough.  He denies fevers, chills, nausea, vomiting or chest pain.  He reports 10 lb weight gain over the past week.  Patient will be admitted to hospital medicine services for further workup and management.    2/8  c/o cough and chauhan.  Has pleurisy.  No nausea,  fever, urinary or bowel complaint, new neurologic symptoms, new joint pain  2/9  AFVSS.  3050 cc uop.      Plan of Care:       Assessment:    Acute kidney injury likely secondary to hypertensive emergency and decompensated heart failure  --renal us unrevealing  --quantify proteinuria--nephrotic range   --paraproteins given the carol and anemia  --rheumatologic serologies  --Avoid NSAIDS, Pike II inhibitors, and other non-essential nephrotoxic agents  --bladder scan ordered but not done  --renal dose medication   --strict I/Os    Hypervolemia  --getting lasix  --keep net daily diuresis to 1 to " 1.5 liters  --strict I/Os  --monitor potassium with addition of Entresto and aldactone    Anemia  --iron panel--pending  --paraproteins--pending  --outpatient colonoscopy if not done    Hypertension  --avoid overcorrection  --started on entresto and amlodipine.    --low salt diet    Proteinuria--underlying diabetic nephropathy a possibility   --quantify  --entresto started  --rheumatologic serologies           Thank you for allowing us to participate in this patient's care. We will continue to follow.    Vital Signs:  Temp Readings from Last 3 Encounters:   02/09/23 98.6 °F (37 °C) (Tympanic)   03/11/22 97.1 °F (36.2 °C)   04/26/21 98.4 °F (36.9 °C) (Oral)       Pulse Readings from Last 3 Encounters:   02/09/23 64   03/11/22 66   04/26/21 (!) 56       BP Readings from Last 3 Encounters:   02/09/23 (!) 168/77   03/11/22 129/82   04/26/21 (!) 175/79       Weight:  Wt Readings from Last 3 Encounters:   02/08/23 (!) 152.4 kg (336 lb)   03/09/22 (!) 146.7 kg (323 lb 6.6 oz)   04/26/21 (!) 152.9 kg (337 lb)       Past Medical & Surgical History:  Past Medical History:   Diagnosis Date    Depression     Diabetes mellitus     Hypertension     Obesity     Osteomyelitis        No past surgical history on file.    Past Social History:  Social History     Socioeconomic History    Marital status: Single   Tobacco Use    Smoking status: Never    Smokeless tobacco: Never   Substance and Sexual Activity    Alcohol use: Yes     Comment: occas    Drug use: Yes     Frequency: 1.0 times per week     Types: Marijuana     Comment: last use 2 days ago     Social Determinants of Health     Financial Resource Strain: Unknown    Difficulty of Paying Living Expenses: Patient refused   Food Insecurity: Unknown    Worried About Running Out of Food in the Last Year: Patient refused    Ran Out of Food in the Last Year: Patient refused   Transportation Needs: Unknown    Lack of Transportation (Medical): Patient refused    Lack of Transportation  (Non-Medical): Patient refused   Physical Activity: Unknown    Days of Exercise per Week: Patient refused    Minutes of Exercise per Session: Patient refused   Stress: Unknown    Feeling of Stress : Patient refused   Social Connections: Unknown    Frequency of Communication with Friends and Family: Patient refused    Frequency of Social Gatherings with Friends and Family: Patient refused    Attends Islam Services: Patient refused    Active Member of Clubs or Organizations: Patient refused    Attends Club or Organization Meetings: Patient refused    Marital Status: Patient refused   Housing Stability: Unknown    Unable to Pay for Housing in the Last Year: Patient refused    Unstable Housing in the Last Year: Patient refused       Medications:  No current facility-administered medications on file prior to encounter.     Current Outpatient Medications on File Prior to Encounter   Medication Sig Dispense Refill    albuterol (PROVENTIL/VENTOLIN HFA) 90 mcg/actuation inhaler Inhale 1-2 puffs into the lungs every 6 (six) hours as needed for Wheezing. Rescue 18 g 0    amLODIPine (NORVASC) 5 MG tablet Take 1 tablet (5 mg total) by mouth once daily. 30 tablet 0    gabapentin (NEURONTIN) 600 MG tablet Take 600 mg by mouth 3 (three) times daily.      HYDROcodone-acetaminophen (NORCO)  mg per tablet Take 1 tablet by mouth every 6 (six) hours as needed.      metFORMIN (GLUCOPHAGE) 1000 MG tablet Take 1,000 mg by mouth 2 (two) times daily with meals.      arginine-glutamine-calcium HMB 7-7-1.5 gram PwPk Take by mouth 2 (two) times daily.      ascorbic acid, vitamin C, (VITAMIN C) 500 MG tablet Take 500 mg by mouth 2 (two) times daily.      atorvastatin (LIPITOR) 80 MG tablet Take 80 mg by mouth once daily.      dextrose (GLUCOSE GEL) 40 % gel Take 15 g by mouth as needed.      diclofenac sodium (VOLTAREN) 1 % Gel Apply 2 g topically 4 (four) times daily. 100 g 0    furosemide (LASIX) 20 MG tablet Take 1 tablet (20 mg  total) by mouth once daily. 30 tablet 0    glucagon, human recombinant, (GLUCAGEN HYPOKIT) 1 mg SolR Inject 1 mg into the muscle as needed.      insulin glargine (LANTUS) 100 unit/mL injection Inject 50 Units into the skin once daily.      insulin lispro (HUMALOG) 100 unit/mL injection Inject 0-10 Units into the skin 3 (three) times daily before meals.      isoniazid (NYDRAZID) 300 MG Tab Take 300 mg by mouth once daily.      lactobacillus acidophilus & bulgar (LACTINEX) 100 million cell packet Take 1 tablet by mouth 3 (three) times daily with meals.      lidocaine (LIDODERM) 5 % Place 1 patch onto the skin once daily. Remove & Discard patch within 12 hours or as directed by MD 15 patch 0    lisinopriL (PRINIVIL,ZESTRIL) 20 MG tablet Take 2 tablets (40 mg total) by mouth once daily. 60 tablet 0    menthol/zinc oxide (REMEDY CALAZIME PROTECT PASTE TOP) Apply topically 2 (two) times daily.      multivitamin capsule Take 1 capsule by mouth once daily.      ondansetron (ZOFRAN-ODT) 4 MG TbDL Take 1 tablet (4 mg total) by mouth every 8 (eight) hours as needed. 12 tablet 0    polyethylene glycol (MIRALAX) 17 gram PwPk Take by mouth 2 (two) times daily.      pyridoxine, vitamin B6, (VITAMIN B-6) 50 MG Tab Take 50 mg by mouth once daily.       Scheduled Meds:   amLODIPine  5 mg Oral Daily    furosemide (LASIX) injection  40 mg Intravenous Daily    heparin (porcine)  5,000 Units Subcutaneous Q8H    insulin detemir U-100  20 Units Subcutaneous QHS    sacubitriL-valsartan  1 tablet Oral BID    spironolactone  25 mg Oral Daily     Continuous Infusions:  PRN Meds:.benzonatate, dextromethorphan-guaiFENesin  mg/5 ml, dextrose 10%, dextrose 10%, glucagon (human recombinant), glucose, glucose, hydrALAZINE, HYDROcodone-acetaminophen, insulin aspart U-100, ondansetron, sodium chloride 0.9%    Allergies:  Adhesive    Past Family History:  Reviewed; refer to Hospitalist Admission Note    Review of Systems:  Review of Systems - All  "14 systems reviewed and negative, except as noted in HPI    Physical Exam:    BP (!) 168/77 (BP Location: Left arm, Patient Position: Lying)   Pulse 64   Temp 98.6 °F (37 °C) (Tympanic)   Resp 16   Ht 6' 3" (1.905 m)   Wt (!) 152.4 kg (336 lb)   SpO2 96%   BMI 42.00 kg/m²     General Appearance:    Alert, cooperative, no distress, appears stated age   Head:    Normocephalic, without obvious abnormality, atraumatic   Eyes:    PER, conjunctiva/corneas clear, EOM's intact in both eyes        Throat:   Lips, mucosa, and tongue normal; teeth and gums normal   Back:     Symmetric, no curvature, ROM normal, no CVA tenderness   Lungs:     Expiratory wheezes   Chest wall:    No tenderness or deformity   Heart:    Regular rate and rhythm, S1 and S2 normal, no murmur, rub   or gallop   Abdomen:     Soft, non-tender, bowel sounds active all four quadrants,     no masses, no organomegaly   Extremities:   Extremities normal, atraumatic, no cyanosis . 3plus edema   Pulses:   2+ and symmetric all extremities   MSK:   No joint or muscle swelling, tenderness or deformity   Skin:   Skin color, texture, turgor normal, no rashes or lesions   Neurologic:   CNII-XII intact, normal strength and sensation       Throughout.  No flap     Results:  Lab Results   Component Value Date     02/09/2023     02/09/2023    K 3.8 02/09/2023    K 3.8 02/09/2023     02/09/2023     02/09/2023    CO2 28 02/09/2023    CO2 28 02/09/2023    BUN 21 (H) 02/09/2023    BUN 21 (H) 02/09/2023    CREATININE 1.7 (H) 02/09/2023    CREATININE 1.7 (H) 02/09/2023    CALCIUM 8.5 (L) 02/09/2023    CALCIUM 8.5 (L) 02/09/2023    ANIONGAP 8 02/09/2023    ANIONGAP 8 02/09/2023    ESTGFRAFRICA >60 03/11/2022    EGFRNONAA >60 03/11/2022       Lab Results   Component Value Date    CALCIUM 8.5 (L) 02/09/2023    CALCIUM 8.5 (L) 02/09/2023       Recent Labs   Lab 02/09/23  0430   WBC 7.28  7.28   RBC 3.57*  3.57*   HGB 10.3*  10.3*   HCT 31.5*  " 31.5*     262   MCV 88  88   MCH 28.9  28.9   [facility] 32.7  32.7            I have personally reviewed pertinent radiological imaging and reports.    Patient care was time spent personally by me on the following activities:   Obtaining a history  Examination of patient.  Providing medical care at the patients bedside.  Developing a treatment plan with patient or surrogate and bedside caregivers  Ordering and reviewing laboratory studies, radiographic studies, pulse oximetry.  Ordering and performing treatments and interventions.  Evaluation of patient's response to treatment.  Discussions with consultants while on the unit and immediately available to the patient.  Re-evaluation of the patient's condition.  Documentation in the medical record.     Jose Cantor MD  Nephrology  [facility]  [phone]

## 2023-02-09 NOTE — PROGRESS NOTES
"[facility] Hospital Medicine  Progress Note    Patient Name: Abel Gibbs  MRN: [id number]  Patient Class: IP- Inpatient   Admission Date: 2/7/2023  Length of Stay: 2 days  Attending Physician: Darius Qiu MD  Primary Care Provider: Primary Doctor No        Subjective:     Principal Problem:Acute exacerbation of CHF (congestive heart failure)        HPI:  Abel Gibbs is a 57 yr old male with PMHx significant for HTN, DM, right foot osteomyeltis, depression and obesity presenting today for orthopnea, cough, sob and lower ext swelling. Pt reports symptoms started approx 1 week ago and have progressively worsened. He describes his symptoms as mod severity, worsens on exertion or lying flat, improves with rest and elevating head. Pt reports noncompliance with all meds and medical care for the past 6 months.  Patient states he missed his routine appointments and therefore has been out of his medications.  He states "I haven't been taking the medications for the past several months so I didn't think I needed them anymore".  Patient was educated extensively on importance of medication and follow-up compliance.  Patient reports dry nonproductive cough.  He denies fevers, chills, nausea, vomiting or chest pain.  He reports 10 lb weight gain over the past week.  Patient will be admitted to hospital medicine services for further workup and management.        Overview/Hospital Course:  No notes on file    Interval History: Patient seen and examined.  Remains free of chest pain.  His orthopnea and edema is improved.  He states he is feeling well.  Wound Care evaluated him yesterday and noticed a wound to his left plantar region and podiatry consult was placed.  Pt has established outpatient podiatry care for wound on the right foot which is well healed.  He underwent stress test this morning and we are awaiting results.  Plan is for discharge tomorrow if labs remained stable and BP remains " improved.    Review of Systems   Respiratory:  Positive for cough. Negative for shortness of breath.    Cardiovascular:  Positive for leg swelling. Negative for chest pain.   Gastrointestinal:  Negative for abdominal pain.   Skin:  Positive for wound.   Objective:     Vital Signs (Most Recent):  Temp: 97.6 °F (36.4 °C) (02/09/23 1110)  Pulse: 65 (02/09/23 1110)  Resp: 16 (02/09/23 1110)  BP: (!) 185/94 (02/09/23 1110)  SpO2: 96 % (02/09/23 1110)   Vital Signs (24h Range):  Temp:  [97.6 °F (36.4 °C)-99.3 °F (37.4 °C)] 97.6 °F (36.4 °C)  Pulse:  [64-73] 65  Resp:  [16-18] 16  SpO2:  [92 %-96 %] 96 %  BP: (166-196)/(76-94) 185/94     Weight: (!) 152.4 kg (336 lb)  Body mass index is 42 kg/m².    Intake/Output Summary (Last 24 hours) at 2/9/2023 1407  Last data filed at 2/9/2023 1223  Gross per 24 hour   Intake 640 ml   Output 1750 ml   Net -1110 ml        Physical Exam  Vitals and nursing note reviewed.   Constitutional:       Appearance: Normal appearance. He is obese.   HENT:      Head: Normocephalic and atraumatic.   Cardiovascular:      Rate and Rhythm: Normal rate and regular rhythm.   Pulmonary:      Effort: Pulmonary effort is normal.      Breath sounds: No wheezing.   Abdominal:      General: Abdomen is flat. Bowel sounds are normal.   Musculoskeletal:      Right lower leg: Edema (1+) present.      Left lower leg: Edema (1+) present.   Skin:     General: Skin is warm and dry.      Capillary Refill: Capillary refill takes less than 2 seconds.   Neurological:      General: No focal deficit present.      Mental Status: He is alert and oriented to person, place, and time. Mental status is at baseline.   Psychiatric:         Mood and Affect: Mood normal.         Behavior: Behavior normal.       Significant Labs: All pertinent labs within the past 24 hours have been reviewed.  CBC:   Recent Labs   Lab 02/08/23  0523 02/09/23  0430   WBC 8.00 7.28  7.28   HGB 9.8* 10.3*  10.3*   HCT 30.7* 31.5*  31.5*    262   262       CMP:   Recent Labs   Lab 02/08/23  0523 02/09/23  0430    139  139   K 3.9 3.8  3.8    103  103   CO2 30* 28  28   * 96  96   BUN 25* 21*  21*   CREATININE 1.7* 1.7*  1.7*   CALCIUM 8.4* 8.5*  8.5*   PROT 6.1 6.2  6.2   ALBUMIN 2.7* 2.6*  2.6*   BILITOT 0.5 0.8  0.8   ALKPHOS 43* 46*  46*   AST 19 14  14   ALT 18 15  15   ANIONGAP 4* 8  8       Cardiac Markers:   No results for input(s): CKMB, MYOGLOBIN, BNP, TROPISTAT in the last 48 hours.    Troponin:   No results for input(s): TROPONINI, TROPONINIHS in the last 48 hours.    TSH:   Recent Labs   Lab 02/07/23  1505   TSH 1.492         Significant Imaging: I have reviewed all pertinent imaging results/findings within the past 24 hours.      Assessment/Plan:      * Acute exacerbation of CHF (congestive heart failure)  Patient is identified as having new onset CHF heart failure that is Acute. CHF is currently uncontrolled due to Continued edema of extremities and Weight gain of 10 pounds, >3 pillow orthopnea, Rales/crackles on pulmonary exam and Pulmonary edema/pleural effusion on CXR. Latest ECHO performed and demonstrates- No results found for this or any previous visit.  . Continue Furosemide and monitor clinical status closely. Monitor on telemetry. Patient is on CHF pathway.  Monitor strict Is&Os and daily weights.  Place on fluid restriction of 1.5 L. Continue to stress to patient importance of self efficacy and  on diet for CHF. Last BNP reviewed- and noted below   Recent Labs   Lab 02/07/23  1043   *   .  Echo reviewed: HFpEF noted  Reviewed cardiology notes  Discussed with cardiology: Lasix d/c today.  Cardiology initiated Entresto.  NM stress results pending.    Diabetic ulcer of left midfoot associated with type 2 diabetes mellitus  Wound care consulted  -Podiatry consult pending.    Severe obesity (BMI >= 40)  Body mass index is 42 kg/m². Morbid obesity complicates all aspects of disease  management from diagnostic modalities to treatment.       Non compliance with medical treatment  Long discussion with patient regarding the necessity of medication compliance.  -CM to assist with any barriers.    Type 2 diabetes mellitus  Patient's FSGs are uncontrolled due to hyperglycemia . Pt has been out of meds for 6 months due to noncompliance with follow up appts.  Last A1c reviewed-   Lab Results   Component Value Date    HGBA1C 8.7 (H) 02/07/2023     Most recent fingerstick glucose reviewed-   Recent Labs   Lab 02/08/23  1614 02/08/23 2025   POCTGLUCOSE 178* 225*     Current correctional scale  Low  Increase anti-hyperglycemic dose as follows-   Antihyperglycemics (From admission, onward)    Start     Stop Route Frequency Ordered    02/08/23 2100  insulin detemir U-100 pen 20 Units         -- SubQ Nightly 02/08/23 0840    02/08/23 0926  insulin aspart U-100 pen 1-10 Units         -- SubQ Before meals & nightly PRN 02/08/23 0826        Hold Oral hypoglycemics while patient is in the hospital.  AM glucose 96: add some prandial insulin    Hypertension  Chronic, uncontrolled- improving since admit.  Latest blood pressure and vitals reviewed-   Temp:  [97.6 °F (36.4 °C)-99.3 °F (37.4 °C)]   Pulse:  [64-73]   Resp:  [16-18]   BP: (166-196)/(76-94)   SpO2:  [92 %-96 %] .   Home meds for hypertension were reviewed and noted below.   Hypertension Medications             amLODIPine (NORVASC) 5 MG tablet Take 1 tablet (5 mg total) by mouth once daily.    furosemide (LASIX) 20 MG tablet Take 1 tablet (20 mg total) by mouth once daily.    lisinopriL (PRINIVIL,ZESTRIL) 20 MG tablet Take 2 tablets (40 mg total) by mouth once daily.          While in the hospital, will manage blood pressure as follows; Adjust home antihypertensive regimen as follows- pt has been noncompliant with home meds and out of meds for 6 months. will start back norvasc. cont IV lasix and add additional bp meds as warranted    Will utilize p.r.n.  blood pressure medication only if patient's blood pressure greater than  180/110 and he develops symptoms such as worsening chest pain or shortness of breath.    Cardiology initiated entresto      MINI (acute kidney injury)  Patient with acute kidney injury likely d/t Pre-renal azotemia  caused by acute CHF exac and accelerated HTN. MINI is currently stable. Labs reviewed- Renal function/electrolytes with Estimated Creatinine Clearance: 73.9 mL/min (A) (based on SCr of 1.7 mg/dL (H)). according to latest data. Monitor urine output and serial BMP and adjust therapy as needed. Avoid nephrotoxins and renally dose meds for GFR listed above.     Consult nephrology  Renal US reviewed- WNL    VTE Risk Mitigation (From admission, onward)         Ordered     heparin (porcine) injection 5,000 Units  Every 8 hours         02/07/23 1438     IP VTE HIGH RISK PATIENT  Once         02/07/23 1438     Place sequential compression device  Until discontinued         02/07/23 1438                Discharge Planning   JONATHAN: 2/10/2023     Code Status: Full Code   Is the patient medically ready for discharge?:     Reason for patient still in hospital (select all that apply): Patient trending condition, Treatment and Consult recommendations  Discharge Plan A: Home                  Sarah Varghese NP  Department of Hospital Medicine   [facility]

## 2023-02-10 VITALS
HEART RATE: 62 BPM | RESPIRATION RATE: 19 BRPM | BODY MASS INDEX: 39.17 KG/M2 | DIASTOLIC BLOOD PRESSURE: 79 MMHG | SYSTOLIC BLOOD PRESSURE: 151 MMHG | TEMPERATURE: 98 F | WEIGHT: 315 LBS | HEIGHT: 75 IN | OXYGEN SATURATION: 98 %

## 2023-02-10 PROBLEM — L97.523 ULCER OF LEFT FOOT WITH NECROSIS OF MUSCLE: Status: ACTIVE | Noted: 2023-02-10

## 2023-02-10 LAB
ALBUMIN SERPL BCP-MCNC: 2.5 G/DL (ref 3.5–5.2)
ALBUMIN SERPL ELPH-MCNC: 2.72 G/DL (ref 3.35–5.55)
ALP SERPL-CCNC: 43 U/L (ref 55–135)
ALPHA1 GLOB SERPL ELPH-MCNC: 0.29 G/DL (ref 0.17–0.41)
ALPHA2 GLOB SERPL ELPH-MCNC: 0.77 G/DL (ref 0.43–0.99)
ALT SERPL W/O P-5'-P-CCNC: 13 U/L (ref 10–44)
ANA PATTERN 1: NORMAL
ANA SER QL IF: POSITIVE
ANA TITR SER IF: NORMAL {TITER}
ANION GAP SERPL CALC-SCNC: 8 MMOL/L (ref 8–16)
AST SERPL-CCNC: 15 U/L (ref 10–40)
B-GLOBULIN SERPL ELPH-MCNC: 0.94 G/DL (ref 0.5–1.1)
BASOPHILS # BLD AUTO: 0.07 K/UL (ref 0–0.2)
BASOPHILS NFR BLD: 1.2 % (ref 0–1.9)
BILIRUB SERPL-MCNC: 0.3 MG/DL (ref 0.1–1)
BM IGG SER-ACNC: <0.2 U
BUN SERPL-MCNC: 19 MG/DL (ref 6–20)
CALCIUM SERPL-MCNC: 8.4 MG/DL (ref 8.7–10.5)
CHLORIDE SERPL-SCNC: 105 MMOL/L (ref 95–110)
CO2 SERPL-SCNC: 26 MMOL/L (ref 23–29)
CREAT SERPL-MCNC: 1.5 MG/DL (ref 0.5–1.4)
DIFFERENTIAL METHOD: ABNORMAL
EOSINOPHIL # BLD AUTO: 0.3 K/UL (ref 0–0.5)
EOSINOPHIL NFR BLD: 5.3 % (ref 0–8)
ERYTHROCYTE [DISTWIDTH] IN BLOOD BY AUTOMATED COUNT: 14.1 % (ref 11.5–14.5)
EST. GFR  (NO RACE VARIABLE): 53 ML/MIN/1.73 M^2
GAMMA GLOB SERPL ELPH-MCNC: 0.89 G/DL (ref 0.67–1.58)
GLUCOSE SERPL-MCNC: 127 MG/DL (ref 70–110)
HCT VFR BLD AUTO: 32.7 % (ref 40–54)
HGB BLD-MCNC: 10.5 G/DL (ref 14–18)
IMM GRANULOCYTES # BLD AUTO: 0.02 K/UL (ref 0–0.04)
IMM GRANULOCYTES NFR BLD AUTO: 0.3 % (ref 0–0.5)
LYMPHOCYTES # BLD AUTO: 2.3 K/UL (ref 1–4.8)
LYMPHOCYTES NFR BLD: 38.8 % (ref 18–48)
MCH RBC QN AUTO: 28.5 PG (ref 27–31)
MCHC RBC AUTO-ENTMCNC: 32.1 G/DL (ref 32–36)
MCV RBC AUTO: 89 FL (ref 82–98)
MONOCYTES # BLD AUTO: 0.6 K/UL (ref 0.3–1)
MONOCYTES NFR BLD: 10.2 % (ref 4–15)
NEUTROPHILS # BLD AUTO: 2.6 K/UL (ref 1.8–7.7)
NEUTROPHILS NFR BLD: 44.2 % (ref 38–73)
NRBC BLD-RTO: 0 /100 WBC
PATHOLOGIST INTERPRETATION UPE: NORMAL
PLATELET # BLD AUTO: 265 K/UL (ref 150–450)
PMV BLD AUTO: 10.7 FL (ref 9.2–12.9)
POCT GLUCOSE: 109 MG/DL (ref 70–110)
POCT GLUCOSE: 154 MG/DL (ref 70–110)
POTASSIUM SERPL-SCNC: 4 MMOL/L (ref 3.5–5.1)
PROT SERPL-MCNC: 5.6 G/DL (ref 6–8.4)
PROT SERPL-MCNC: 6.2 G/DL (ref 6–8.4)
RBC # BLD AUTO: 3.68 M/UL (ref 4.6–6.2)
SODIUM SERPL-SCNC: 139 MMOL/L (ref 136–145)
WBC # BLD AUTO: 5.88 K/UL (ref 3.9–12.7)

## 2023-02-10 PROCEDURE — 25000003 PHARM REV CODE 250: Mod: HCNC | Performed by: NURSE PRACTITIONER

## 2023-02-10 PROCEDURE — 80053 COMPREHEN METABOLIC PANEL: CPT | Mod: HCNC | Performed by: NURSE PRACTITIONER

## 2023-02-10 PROCEDURE — 99222 PR INITIAL HOSPITAL CARE,LEVL II: ICD-10-PCS | Mod: 25,HCNC,, | Performed by: PODIATRIST

## 2023-02-10 PROCEDURE — 11043 DBRDMT MUSC&/FSCA 1ST 20/<: CPT | Mod: HCNC,,, | Performed by: PODIATRIST

## 2023-02-10 PROCEDURE — 36415 COLL VENOUS BLD VENIPUNCTURE: CPT | Mod: HCNC | Performed by: NURSE PRACTITIONER

## 2023-02-10 PROCEDURE — 85025 COMPLETE CBC W/AUTO DIFF WBC: CPT | Mod: HCNC | Performed by: NURSE PRACTITIONER

## 2023-02-10 PROCEDURE — 63600175 PHARM REV CODE 636 W HCPCS: Mod: HCNC | Performed by: NURSE PRACTITIONER

## 2023-02-10 PROCEDURE — 25000003 PHARM REV CODE 250: Mod: HCNC | Performed by: GENERAL PRACTICE

## 2023-02-10 PROCEDURE — 94761 N-INVAS EAR/PLS OXIMETRY MLT: CPT | Mod: HCNC

## 2023-02-10 PROCEDURE — 99222 1ST HOSP IP/OBS MODERATE 55: CPT | Mod: 25,HCNC,, | Performed by: PODIATRIST

## 2023-02-10 PROCEDURE — 11043 DEBRIDEMENT: ICD-10-PCS | Mod: HCNC,,, | Performed by: PODIATRIST

## 2023-02-10 PROCEDURE — 87186 SC STD MICRODIL/AGAR DIL: CPT | Mod: HCNC | Performed by: PODIATRIST

## 2023-02-10 PROCEDURE — 87077 CULTURE AEROBIC IDENTIFY: CPT | Mod: HCNC | Performed by: PODIATRIST

## 2023-02-10 PROCEDURE — 25000003 PHARM REV CODE 250: Mod: HCNC | Performed by: PODIATRIST

## 2023-02-10 PROCEDURE — 87075 CULTR BACTERIA EXCEPT BLOOD: CPT | Mod: HCNC | Performed by: PODIATRIST

## 2023-02-10 PROCEDURE — 87070 CULTURE OTHR SPECIMN AEROBIC: CPT | Mod: HCNC | Performed by: PODIATRIST

## 2023-02-10 RX ORDER — LEVOFLOXACIN 750 MG/1
750 TABLET ORAL DAILY
Qty: 7 TABLET | Refills: 0 | Status: SHIPPED | OUTPATIENT
Start: 2023-02-10 | End: 2023-02-17

## 2023-02-10 RX ORDER — LANCETS
EACH MISCELLANEOUS
Qty: 200 EACH | Refills: 0 | Status: SHIPPED | OUTPATIENT
Start: 2023-02-10

## 2023-02-10 RX ORDER — DIPHENHYDRAMINE HCL 25 MG
25 CAPSULE ORAL EVERY 6 HOURS PRN
Status: DISCONTINUED | OUTPATIENT
Start: 2023-02-10 | End: 2023-02-10 | Stop reason: HOSPADM

## 2023-02-10 RX ORDER — DOXYCYCLINE HYCLATE 100 MG
100 TABLET ORAL 2 TIMES DAILY
Qty: 14 TABLET | Refills: 0 | Status: SHIPPED | OUTPATIENT
Start: 2023-02-10 | End: 2023-02-17

## 2023-02-10 RX ORDER — AMLODIPINE BESYLATE 5 MG/1
5 TABLET ORAL DAILY
Qty: 30 TABLET | Refills: 0 | Status: SHIPPED | OUTPATIENT
Start: 2023-02-10 | End: 2023-02-15 | Stop reason: SDUPTHER

## 2023-02-10 RX ORDER — INSULIN PUMP SYRINGE, 3 ML
EACH MISCELLANEOUS
Qty: 1 EACH | Refills: 0 | Status: SHIPPED | OUTPATIENT
Start: 2023-02-10 | End: 2024-02-10

## 2023-02-10 RX ORDER — CARVEDILOL 3.12 MG/1
3.12 TABLET ORAL 2 TIMES DAILY WITH MEALS
Qty: 60 TABLET | Refills: 0 | Status: SHIPPED | OUTPATIENT
Start: 2023-02-10 | End: 2023-02-15 | Stop reason: SDUPTHER

## 2023-02-10 RX ORDER — CARVEDILOL 3.12 MG/1
3.12 TABLET ORAL 2 TIMES DAILY WITH MEALS
Status: DISCONTINUED | OUTPATIENT
Start: 2023-02-10 | End: 2023-02-10 | Stop reason: HOSPADM

## 2023-02-10 RX ORDER — DAPAGLIFLOZIN 5 MG/1
5 TABLET, FILM COATED ORAL DAILY
Qty: 30 TABLET | Refills: 0 | Status: SHIPPED | OUTPATIENT
Start: 2023-02-10 | End: 2023-02-15 | Stop reason: SDUPTHER

## 2023-02-10 RX ORDER — SILVER NITRATE 38.21; 12.74 MG/1; MG/1
3 STICK TOPICAL ONCE
Status: COMPLETED | OUTPATIENT
Start: 2023-02-10 | End: 2023-02-10

## 2023-02-10 RX ORDER — SPIRONOLACTONE 25 MG/1
25 TABLET ORAL DAILY
Qty: 30 TABLET | Refills: 0 | Status: SHIPPED | OUTPATIENT
Start: 2023-02-11 | End: 2023-02-15 | Stop reason: SDUPTHER

## 2023-02-10 RX ADMIN — HEPARIN SODIUM 5000 UNITS: 5000 INJECTION INTRAVENOUS; SUBCUTANEOUS at 05:02

## 2023-02-10 RX ADMIN — HYDROCODONE BITARTRATE AND ACETAMINOPHEN 1 TABLET: 10; 325 TABLET ORAL at 05:02

## 2023-02-10 RX ADMIN — SACUBITRIL AND VALSARTAN 1 TABLET: 24; 26 TABLET, FILM COATED ORAL at 09:02

## 2023-02-10 RX ADMIN — HYDROCODONE BITARTRATE AND ACETAMINOPHEN 1 TABLET: 10; 325 TABLET ORAL at 12:02

## 2023-02-10 RX ADMIN — GABAPENTIN 600 MG: 300 CAPSULE ORAL at 09:02

## 2023-02-10 RX ADMIN — SILVER NITRATE APPLICATORS 3 APPLICATOR: 25; 75 STICK TOPICAL at 09:02

## 2023-02-10 RX ADMIN — AMLODIPINE BESYLATE 5 MG: 5 TABLET ORAL at 09:02

## 2023-02-10 RX ADMIN — SPIRONOLACTONE 25 MG: 25 TABLET, FILM COATED ORAL at 09:02

## 2023-02-10 RX ADMIN — HYDROCODONE BITARTRATE AND ACETAMINOPHEN 1 TABLET: 10; 325 TABLET ORAL at 09:02

## 2023-02-10 NOTE — HPI
Abel Gibbs is a 57 yr old male with PMHx significant for HTN, DM, right foot osteomyeltis, depression and obesity presented to the ER  for orthopnea, cough, sob and lower ext swelling.     Upon further workup it was noted for patient to have a diabetic foot ulcer to the left plantar foot.     Patient states that he just recently noticed this wound on his left foot and he is not sure exactly how long it has been present.

## 2023-02-10 NOTE — DISCHARGE INSTRUCTIONS
Please read the attached information on all of the new medications prescribed.  Please check your sugars and record the values and bring with you to your appointment.  I have referred you to vascular surgery for your arterial disease on ultrasound.    You are leaving against our medical advice as you did not complete the MRI to determine if you have a bone infection.  I have written you a week of antibiotics as the podiatrist here feels your foot ulcer is infected.    Discharge Instructions, [facility] Medicine    Thank you for choosing [facility] for your medical care. The primary doctor who is taking care of you at the time of your discharge is Darius Qiu MD.     You were admitted to the hospital with Acute exacerbation of CHF (congestive heart failure).     Please note your discharge instructions, including diet/activity restrictions, follow-up appointments, and medication changes.  If you have any questions about your medical issues, prescriptions, or any other questions, please feel free to contact the [facility] Medicine Dept at [phone] and we will help.    If you are previously with Home health, outpatient PT/OT or under a therapy program, you are cleared to return to those programs.    Please direct all long term medication refills and follow up to your primary care provider, Primary Doctor No. Thank you again for letting us take care of your health care needs.    Please note the following discharge instructions per your discharging physician-  Sarah Varghese NP

## 2023-02-10 NOTE — SUBJECTIVE & OBJECTIVE
Scheduled Meds:   amLODIPine  5 mg Oral Daily    gabapentin  600 mg Oral TID    heparin (porcine)  5,000 Units Subcutaneous Q8H    insulin detemir U-100  20 Units Subcutaneous QHS    sacubitriL-valsartan  1 tablet Oral BID    spironolactone  25 mg Oral Daily     Continuous Infusions:  PRN Meds:benzonatate, dextromethorphan-guaiFENesin  mg/5 ml, dextrose 10%, dextrose 10%, diphenhydrAMINE, glucagon (human recombinant), glucose, glucose, hydrALAZINE, HYDROcodone-acetaminophen, insulin aspart U-100, ondansetron, sodium chloride 0.9%    Review of patient's allergies indicates:   Allergen Reactions    Adhesive Itching        Past Medical History:   Diagnosis Date    Depression     Diabetes mellitus     Hypertension     Obesity     Osteomyelitis      No past surgical history on file.    Family History    None       Tobacco Use    Smoking status: Never    Smokeless tobacco: Never   Substance and Sexual Activity    Alcohol use: Yes     Comment: occas    Drug use: Yes     Frequency: 1.0 times per week     Types: Marijuana     Comment: last use 2 days ago    Sexual activity: Not on file     Review of Systems  Objective:     Vital Signs (Most Recent):  Temp: 98 °F (36.7 °C) (02/10/23 0802)  Pulse: 64 (02/10/23 0837)  Resp: 16 (02/10/23 0903)  BP: (!) 145/87 (02/10/23 0802)  SpO2: 95 % (02/10/23 0837)   Vital Signs (24h Range):  Temp:  [97.6 °F (36.4 °C)-98.4 °F (36.9 °C)] 98 °F (36.7 °C)  Pulse:  [60-71] 64  Resp:  [14-20] 16  SpO2:  [92 %-96 %] 95 %  BP: (137-185)/(68-94) 145/87     Weight: (!) 151.1 kg (333 lb 1.8 oz)  Body mass index is 41.64 kg/m².    Foot Exam    Right Foot/Ankle     Neurovascular  Dorsalis pedis: 1+  Posterior tibial: 1+  Saphenous nerve sensation: absent  Tibial nerve sensation: absent  Superficial peroneal nerve sensation: absent  Deep peroneal nerve sensation: absent  Sural nerve sensation: absent      Left Foot/Ankle      Neurovascular  Dorsalis pedis: 1+  Posterior tibial: 1+  Saphenous nerve  sensation: absent  Tibial nerve sensation: absent  Superficial peroneal nerve sensation: absent  Deep peroneal nerve sensation: absent  Sural nerve sensation: absent                  Laboratory:  All pertinent labs reviewed within the last 24 hours.    Diagnostic Results:  I have reviewed all pertinent imaging results/findings within the past 24 hours.

## 2023-02-10 NOTE — DISCHARGE SUMMARY
"[facility] Medicine  Discharge Summary      Patient Name: Abel Gibbs  MRN: [id number]  RANDOLPH: [id number]  Patient Class: IP- Inpatient  Admission Date: 2/7/2023  Hospital Length of Stay: 3 days  Discharge Date and Time:  02/10/2023 2:44 PM  Attending Physician: Darius Qiu MD   Discharging Provider: Sarah Varghese NP  Primary Care Provider: Primary Doctor No    Primary Care Team: Networked reference to record PCT     HPI:   Abel Gibbs is a 57 yr old male with PMHx significant for HTN, DM, right foot osteomyeltis, depression and obesity presenting today for orthopnea, cough, sob and lower ext swelling. Pt reports symptoms started approx 1 week ago and have progressively worsened. He describes his symptoms as mod severity, worsens on exertion or lying flat, improves with rest and elevating head. Pt reports noncompliance with all meds and medical care for the past 6 months.  Patient states he missed his routine appointments and therefore has been out of his medications.  He states "I haven't been taking the medications for the past several months so I didn't think I needed them anymore".  Patient was educated extensively on importance of medication and follow-up compliance.  Patient reports dry nonproductive cough.  He denies fevers, chills, nausea, vomiting or chest pain.  He reports 10 lb weight gain over the past week.  Patient will be admitted to hospital medicine services for further workup and management.        * No surgery found *      Hospital Course:   Pt was monitored closely during his stay.  He was diuresed with good result.  His renal function was trended and nephrology followed along.  Cardiology was consulted.  His echo showed preserved ejection fraction.  He was initiated on Entresto and Aldactone per Cardiology.  Lasix was stopped due to brisk diuresis.  His kidney function continued to improve.  His blood pressures improved with addition of medication.  Pt was " found to have left plantar foot ulcer and podiatry and Wound Care were consulted.  Podiatrist did bedside debridement was concern for infection.  She ordered MRI and arterial ultrasound.  Arterial ultrasound showed mild disease without any high-grade stenosis.  Outpatient referral to vascular surgery was placed.  Pt refused MRI and elected to leave the hospital AMA and follow-up outpatient with his own podiatrist and Infectious Disease doctor.  Pt was agreeable to take new medications as prescribed by specialists during his stay, and wanted to follow-up with specialist upon discharge.  Follow-up appointments were obtained.  New medications were prescribed per specialty recommendations.  A new glucometer was prescribed at patient's request.  Pt was provided with education on new medications upon discharge.  He was instructed on the dangers of leaving against medical advice including but not limited to limb loss, sepsis, and death.  He verbalized understanding and valve to follow-up with his own team outpatient.  Return precautions were provided.    Pt was seen on day of discharge.  He was not cleared for discharge given pending MRI, however elected to leave against medical advice.       Goals of Care Treatment Preferences:  Code Status: Full Code      Consults:   Consults (From admission, onward)        Status Ordering Provider     Inpatient consult to Podiatry  Once        Provider:  JESICA Kenyon JESSICA M.     Inpatient consult to Nephrology  Once        Provider:  Jose Cantor MD    Completed NASREEN YANG     Inpatient consult to Cardiology  Once        Provider:  Samuel Hughes MD    Completed NASREEN YANG     Inpatient consult to Registered Dietitian/Nutritionist  Once        Provider:  (Not yet assigned)    Completed NASREEN YANG          No new Assessment & Plan notes have been filed under this hospital service since the last note was generated.  Service:  Hospital Medicine    Final Active Diagnoses:    Diagnosis Date Noted POA    PRINCIPAL PROBLEM:  Acute exacerbation of CHF (congestive heart failure) [I50.9] 02/07/2023 Yes    Ulcer of left foot with necrosis of muscle [L97.523] 02/10/2023 Yes    Diabetic ulcer of left midfoot associated with type 2 diabetes mellitus [E11.621, L97.429] 02/09/2023 Yes    Non compliance with medical treatment [Z91.199] 02/08/2023 Not Applicable    Severe obesity (BMI >= 40) [E66.01] 02/08/2023 Yes    Type 2 diabetes mellitus [E11.9] 03/09/2022 Yes    Hypertension [I10] 12/30/2020 Yes    MINI (acute kidney injury) [N17.9] 12/30/2020 Yes      Problems Resolved During this Admission:       Discharged Condition: against medical advice    Disposition: Left Against Medical Adv*    Follow Up:   Follow-up Information     JEFF Elmore. Go on 2/15/2023.    Specialty: Internal Medicine  Why: Hospital follow up appt on 2/15/2023 at 11:20 AM.  Contact information:  [address]  [facility] [address]  [phone]             Cely P. Delfin, NP. Go on 3/1/2023.    Specialty: Cardiovascular Disease  Why: Hospital follow up with cardiology on 3/1/2023 at 11:00 AM     This appt is with the NP.  Contact information:  [address]  [address] [address]  [phone]             [facility] Follow up on 2/13/2023.    Why: apt with Silverio Valadez NP    11 AM for wound care follow up  Contact information:  [address] [place] [address]  [phone]           Jose Cantor MD Follow up.    Specialty: Nephrology  Contact information:  [address] [facility] NEPHROLOGY [facility] [address]  [phone]                       Patient Instructions:      Ambulatory referral/consult to Outpatient Case Management   Referral Priority: Routine Referral Type: Consultation   Referral Reason: Specialty Services Required   Number of Visits Requested: 1     Ambulatory referral/consult to  Vascular Surgery   Standing Status: Future   Referral Priority: Routine Referral Type: Consultation   Referral Reason: Specialty Services Required   Requested Specialty: Vascular Surgery   Number of Visits Requested: 1     Diet Cardiac     Diet diabetic     Call MD for:  temperature >100.4     Call MD for:  persistent nausea and vomiting or diarrhea     Call MD for:  severe uncontrolled pain     Call MD for:  redness, tenderness, or signs of infection (pain, swelling, redness, odor or green/yellow discharge around incision site)     Call MD for:  difficulty breathing or increased cough     Call MD for:  severe persistent headache     Call MD for:  worsening rash     Call MD for:  persistent dizziness, light-headedness, or visual disturbances     Call MD for:  increased confusion or weakness     Other restrictions (specify):   Order Comments: Non weight bearing LEFT foot     Weight bearing restrictions (specify):   Order Comments: Non-weight bearing on the left foot       Significant Diagnostic Studies: Labs:   CMP   Recent Labs   Lab 02/09/23  0430 02/10/23  0421     139 139   K 3.8  3.8 4.0     103 105   CO2 28  28 26   GLU 96  96 127*   BUN 21*  21* 19   CREATININE 1.7*  1.7* 1.5*   CALCIUM 8.5*  8.5* 8.4*   PROT 6.2  6.2 6.2   ALBUMIN 2.6*  2.6* 2.5*   BILITOT 0.8  0.8 0.3   ALKPHOS 46*  46* 43*   AST 14  14 15   ALT 15  15 13   ANIONGAP 8  8 8    and CBC   Recent Labs   Lab 02/09/23  0430 02/10/23  0421   WBC 7.28  7.28 5.88   HGB 10.3*  10.3* 10.5*   HCT 31.5*  31.5* 32.7*     262 265       Pending Diagnostic Studies:     Procedure Component Value Units Date/Time    EMILY Screen w/Reflex [[id number]] Collected: 02/09/23 0430    Order Status: Sent Lab Status: In process Updated: 02/09/23 1017    Specimen: Blood     Anti-Neutrophilic Cytoplasmic Antibody [[id number]] Collected: 02/09/23 0429    Order Status: Sent Lab Status: In process Updated: 02/09/23 0518    Specimen: Blood      MRI Foot (Forefoot) Left W W/O Contrast [[id number]] Resulted: 02/10/23 1336    Order Status: Sent Lab Status: No result Updated: 02/10/23 1408         Medications:  Reconciled Home Medications:      Medication List      START taking these medications    blood sugar diagnostic Strp  To check BG 4 times daily, to use with insurance preferred meter     blood-glucose meter kit  To check BG 4 times daily, to use with insurance preferred meter     carvediloL 3.125 MG tablet  Commonly known as: COREG  Take 1 tablet (3.125 mg total) by mouth 2 (two) times daily with meals.     dapagliflozin 5 mg Tab tablet  Commonly known as: FARXIGA  Take 1 tablet (5 mg total) by mouth once daily.     doxycycline 100 MG tablet  Commonly known as: VIBRA-TABS  Take 1 tablet (100 mg total) by mouth 2 (two) times daily. for 7 days     lancets Misc  To check BG 4 times daily, to use with insurance preferred meter     levoFLOXacin 750 MG tablet  Commonly known as: LEVAQUIN  Take 1 tablet (750 mg total) by mouth once daily. for 7 days     sacubitriL-valsartan 24-26 mg per tablet  Commonly known as: ENTRESTO  Take 1 tablet by mouth 2 (two) times daily.     spironolactone 25 MG tablet  Commonly known as: ALDACTONE  Take 1 tablet (25 mg total) by mouth once daily.  Start taking on: February 11, 2023        CONTINUE taking these medications    albuterol 90 mcg/actuation inhaler  Commonly known as: PROVENTIL/VENTOLIN HFA  Inhale 1-2 puffs into the lungs every 6 (six) hours as needed for Wheezing. Rescue     amLODIPine 5 MG tablet  Commonly known as: NORVASC  Take 1 tablet (5 mg total) by mouth once daily.     atorvastatin 80 MG tablet  Commonly known as: LIPITOR  Take 80 mg by mouth once daily.     lactobacillus acidophilus & bulgar 100 million cell packet  Commonly known as: LACTINEX  Take 1 tablet by mouth 3 (three) times daily with meals.        STOP taking these medications    arginine-glutamine-calcium HMB 7-7-1.5 gram Pwpk     diclofenac  sodium 1 % Gel  Commonly known as: VOLTAREN     furosemide 20 MG tablet  Commonly known as: LASIX     gabapentin 600 MG tablet  Commonly known as: NEURONTIN     GLUCAGON (HUMAN RECOMBINANT) 1 mg Solr  Generic drug: glucagon     Glucose GeL 40 % gel  Generic drug: dextrose     HYDROcodone-acetaminophen  mg per tablet  Commonly known as: NORCO     insulin glargine 100 unit/mL injection  Commonly known as: Lantus     insulin lispro 100 unit/mL injection     isoniazid 300 MG Tab  Commonly known as: NYDRAZID     LIDOcaine 5 %  Commonly known as: LIDODERM     lisinopriL 20 MG tablet  Commonly known as: PRINIVIL,ZESTRIL     metFORMIN 1000 MG tablet  Commonly known as: GLUCOPHAGE     MIRALAX 17 gram Pwpk  Generic drug: polyethylene glycol     multivitamin capsule     ondansetron 4 MG Tbdl  Commonly known as: ZOFRAN-ODT     REMEDY CALAZIME PROTECT PASTE TOP     VITAMIN B-6 50 MG Tab  Generic drug: pyridoxine (vitamin B6)     VITAMIN C 500 MG tablet  Generic drug: ascorbic acid (vitamin C)            Indwelling Lines/Drains at time of discharge:   Lines/Drains/Airways     None                 Time spent on the discharge of patient: 45   minutes         Sarah Varghese NP  Department of Hospital Medicine  [facility]

## 2023-02-10 NOTE — ASSESSMENT & PLAN NOTE
Bedside debridement with culture taken.  See procedure note    Jamie and protein drinks daily    Wound care for dressing changes to consist of Santyl followed by a football wrap without compression    Patient will need a surgical shoe    MRI ordered to rule out deeper infection.  If there is no bone infection, patient can be discharged with oral antibiotics.    Arterial ultrasounds ordered      Patient can follow-up outpatient.

## 2023-02-10 NOTE — CARE UPDATE
02/10/23 0837   Patient Assessment/Suction   Level of Consciousness (AVPU) alert   PRE-TX-O2   Device (Oxygen Therapy) room air   SpO2 95 %   Pulse Oximetry Type Intermittent   $ Pulse Oximetry - Multiple Charge Pulse Oximetry - Multiple   Pulse 64   Resp 18

## 2023-02-10 NOTE — HOSPITAL COURSE
Pt was monitored closely during his stay.  He was diuresed with good result.  His renal function was trended and nephrology followed along.  Cardiology was consulted.  His echo showed preserved ejection fraction.  He was initiated on Entresto and Aldactone per Cardiology.  Lasix was stopped due to brisk diuresis.  His kidney function continued to improve.  His blood pressures improved with addition of medication.  Pt was found to have left plantar foot ulcer and podiatry and Wound Care were consulted.  Podiatrist did bedside debridement was concern for infection.  She ordered MRI and arterial ultrasound.  Arterial ultrasound showed mild disease without any high-grade stenosis.  Outpatient referral to vascular surgery was placed.  Pt refused MRI and elected to leave the hospital AMA and follow-up outpatient with his own podiatrist and Infectious Disease doctor.  Pt was agreeable to take new medications as prescribed by specialists during his stay, and wanted to follow-up with specialist upon discharge.  Follow-up appointments were obtained.  New medications were prescribed per specialty recommendations.  A new glucometer was prescribed at patient's request.  Pt was provided with education on new medications upon discharge.  He was instructed on the dangers of leaving against medical advice including but not limited to limb loss, sepsis, and death.  He verbalized understanding and valve to follow-up with his own team outpatient.  Return precautions were provided.    Pt was seen on day of discharge.  He was not cleared for discharge given pending MRI, however elected to leave against medical advice.

## 2023-02-10 NOTE — PLAN OF CARE
POC discussed with pt, pt verbalized understanding. Oriented x4. PIV cdi. NSR on tele. Pt ambulated in room with stand by assist. Dressing to left foot wound cdi. 600 ml of 1.5 L fluid restriction consumed. Complaints of back pain controlled with prn's. Blood glucose monitored. Call light in reach, bed alarm set, safety maintained. No complaints or requests at this time, will continue to monitor.

## 2023-02-10 NOTE — CONSULTS
" INPATIENT NEPHROLOGY CONSULT   [facility]    Abel Gibbs  02/10/2023    Reason for consultation:    Acute kidney injury    Chief Complaint:   Chief Complaint   Patient presents with    Chest Pain     Cough, sob x 1 week          History of Present Illness:    Per H and P  Abel Gibbs is a 57 yr old male with PMHx significant for HTN, DM, right foot osteomyeltis, depression and obesity presenting today for orthopnea, cough, sob and lower ext swelling. Pt reports symptoms started approx 1 week ago and have progressively worsened. He describes his symptoms as mod severity, worsens on exertion or lying flat, improves with rest and elevating head. Pt reports noncompliance with all meds and medical care for the past 6 months.  Patient states he missed his routine appointments and therefore has been out of his medications.  He states "I haven't been taking the medications for the past several months so I didn't think I needed them anymore".  Patient was educated extensively on importance of medication and follow-up compliance.  Patient reports dry nonproductive cough.  He denies fevers, chills, nausea, vomiting or chest pain.  He reports 10 lb weight gain over the past week.  Patient will be admitted to hospital medicine services for further workup and management.    2/8  c/o cough and chauhan.  Has pleurisy.  No nausea,  fever, urinary or bowel complaint, new neurologic symptoms, new joint pain  2/9  AFVSS.  3050 cc uop.    2/10  having foot pain after debridement.  No chest pain or sob.  2.8 liter UOP    Plan of Care:       Assessment:    Acute kidney injury likely secondary to hypertensive emergency and decompensated heart failure  --renal us unrevealing  --quantify proteinuria--nephrotic range   --paraproteins given the carol and anemia  --rheumatologic serologies  --Avoid NSAIDS, Pike II inhibitors, and other non-essential nephrotoxic agents  --bladder scan --no retention   --renal dose medication "   --strict I/Os    Hypervolemia  --much improved.    --keep net daily diuresis to 1 to 1.5 liters  --strict I/Os  --monitor potassium with addition of Entresto and aldactone    Anemia  --iron panel--pending  --paraproteins-negative  --outpatient colonoscopy if not done    Hypertension  --avoid overcorrection  --started on entresto and amlodipine.    --low salt diet    Proteinuria--underlying diabetic nephropathy a possibility   --quantify  --entresto started  --rheumatologic serologies  --consider addition of sodium-glucose co-transporter 2 inhibitor     Secondary hyperparathyroidism secondary to CKDIII  --low phosphorus diet  --add activated vitamin D when pth 2-3 times normal value  --will check vit level when I see him in f/u         Thank you for allowing us to participate in this patient's care. We will continue to follow.    Vital Signs:  Temp Readings from Last 3 Encounters:   02/10/23 98 °F (36.7 °C) (Oral)   03/11/22 97.1 °F (36.2 °C)   04/26/21 98.4 °F (36.9 °C) (Oral)       Pulse Readings from Last 3 Encounters:   02/10/23 62   03/11/22 66   04/26/21 (!) 56       BP Readings from Last 3 Encounters:   02/10/23 (!) 151/79   03/11/22 129/82   04/26/21 (!) 175/79       Weight:  Wt Readings from Last 3 Encounters:   02/10/23 (!) 151.1 kg (333 lb 1.8 oz)   03/09/22 (!) 146.7 kg (323 lb 6.6 oz)   04/26/21 (!) 152.9 kg (337 lb)       Past Medical & Surgical History:  Past Medical History:   Diagnosis Date    Depression     Diabetes mellitus     Hypertension     Obesity     Osteomyelitis        No past surgical history on file.    Past Social History:  Social History     Socioeconomic History    Marital status: Single   Tobacco Use    Smoking status: Never    Smokeless tobacco: Never   Substance and Sexual Activity    Alcohol use: Yes     Comment: occas    Drug use: Yes     Frequency: 1.0 times per week     Types: Marijuana     Comment: last use 2 days ago     Social Determinants of Health     Financial Resource  Strain: Unknown    Difficulty of Paying Living Expenses: Patient refused   Food Insecurity: Unknown    Worried About Running Out of Food in the Last Year: Patient refused    Ran Out of Food in the Last Year: Patient refused   Transportation Needs: Unknown    Lack of Transportation (Medical): Patient refused    Lack of Transportation (Non-Medical): Patient refused   Physical Activity: Unknown    Days of Exercise per Week: Patient refused    Minutes of Exercise per Session: Patient refused   Stress: Unknown    Feeling of Stress : Patient refused   Social Connections: Unknown    Frequency of Communication with Friends and Family: Patient refused    Frequency of Social Gatherings with Friends and Family: Patient refused    Attends Buddhism Services: Patient refused    Active Member of Clubs or Organizations: Patient refused    Attends Club or Organization Meetings: Patient refused    Marital Status: Patient refused   Housing Stability: Unknown    Unable to Pay for Housing in the Last Year: Patient refused    Unstable Housing in the Last Year: Patient refused       Medications:  No current facility-administered medications on file prior to encounter.     Current Outpatient Medications on File Prior to Encounter   Medication Sig Dispense Refill    albuterol (PROVENTIL/VENTOLIN HFA) 90 mcg/actuation inhaler Inhale 1-2 puffs into the lungs every 6 (six) hours as needed for Wheezing. Rescue 18 g 0    amLODIPine (NORVASC) 5 MG tablet Take 1 tablet (5 mg total) by mouth once daily. 30 tablet 0    gabapentin (NEURONTIN) 600 MG tablet Take 600 mg by mouth 3 (three) times daily.      HYDROcodone-acetaminophen (NORCO)  mg per tablet Take 1 tablet by mouth every 6 (six) hours as needed.      metFORMIN (GLUCOPHAGE) 1000 MG tablet Take 1,000 mg by mouth 2 (two) times daily with meals.      arginine-glutamine-calcium HMB 7-7-1.5 gram PwPk Take by mouth 2 (two) times daily.      ascorbic acid, vitamin C, (VITAMIN C) 500 MG  tablet Take 500 mg by mouth 2 (two) times daily.      atorvastatin (LIPITOR) 80 MG tablet Take 80 mg by mouth once daily.      dextrose (GLUCOSE GEL) 40 % gel Take 15 g by mouth as needed.      diclofenac sodium (VOLTAREN) 1 % Gel Apply 2 g topically 4 (four) times daily. 100 g 0    furosemide (LASIX) 20 MG tablet Take 1 tablet (20 mg total) by mouth once daily. 30 tablet 0    glucagon, human recombinant, (GLUCAGEN HYPOKIT) 1 mg SolR Inject 1 mg into the muscle as needed.      insulin glargine (LANTUS) 100 unit/mL injection Inject 50 Units into the skin once daily.      insulin lispro (HUMALOG) 100 unit/mL injection Inject 0-10 Units into the skin 3 (three) times daily before meals.      isoniazid (NYDRAZID) 300 MG Tab Take 300 mg by mouth once daily.      lactobacillus acidophilus & bulgar (LACTINEX) 100 million cell packet Take 1 tablet by mouth 3 (three) times daily with meals.      lidocaine (LIDODERM) 5 % Place 1 patch onto the skin once daily. Remove & Discard patch within 12 hours or as directed by MD 15 patch 0    lisinopriL (PRINIVIL,ZESTRIL) 20 MG tablet Take 2 tablets (40 mg total) by mouth once daily. 60 tablet 0    menthol/zinc oxide (REMEDY CALAZIME PROTECT PASTE TOP) Apply topically 2 (two) times daily.      multivitamin capsule Take 1 capsule by mouth once daily.      ondansetron (ZOFRAN-ODT) 4 MG TbDL Take 1 tablet (4 mg total) by mouth every 8 (eight) hours as needed. 12 tablet 0    polyethylene glycol (MIRALAX) 17 gram PwPk Take by mouth 2 (two) times daily.      pyridoxine, vitamin B6, (VITAMIN B-6) 50 MG Tab Take 50 mg by mouth once daily.       Scheduled Meds:   amLODIPine  5 mg Oral Daily    gabapentin  600 mg Oral TID    heparin (porcine)  5,000 Units Subcutaneous Q8H    insulin detemir U-100  20 Units Subcutaneous QHS    sacubitriL-valsartan  2 tablet Oral BID    spironolactone  25 mg Oral Daily     Continuous Infusions:  PRN Meds:.benzonatate, dextromethorphan-guaiFENesin  mg/5 ml,  "dextrose 10%, dextrose 10%, diphenhydrAMINE, glucagon (human recombinant), glucose, glucose, hydrALAZINE, HYDROcodone-acetaminophen, insulin aspart U-100, ondansetron, sodium chloride 0.9%    Allergies:  Adhesive    Past Family History:  Reviewed; refer to Hospitalist Admission Note    Review of Systems:  Review of Systems - All 14 systems reviewed and negative, except as noted in HPI    Physical Exam:    BP (!) 151/79 (BP Location: Right arm)   Pulse 62   Temp 98 °F (36.7 °C) (Oral)   Resp 19   Ht 6' 3" (1.905 m)   Wt (!) 151.1 kg (333 lb 1.8 oz)   SpO2 98%   BMI 41.64 kg/m²     General Appearance:    Alert, cooperative, no distress, appears stated age   Head:    Normocephalic, without obvious abnormality, atraumatic   Eyes:    PER, conjunctiva/corneas clear, EOM's intact in both eyes        Throat:   Lips, mucosa, and tongue normal; teeth and gums normal   Back:     Symmetric, no curvature, ROM normal, no CVA tenderness   Lungs:     Expiratory wheezes   Chest wall:    No tenderness or deformity   Heart:    Regular rate and rhythm, S1 and S2 normal, no murmur, rub   or gallop   Abdomen:     Soft, non-tender, bowel sounds active all four quadrants,     no masses, no organomegaly   Extremities:   Extremities normal, atraumatic, no cyanosis . 3plus edema   Pulses:   2+ and symmetric all extremities   MSK:   No joint or muscle swelling, tenderness or deformity   Skin:   Skin color, texture, turgor normal, no rashes or lesions   Neurologic:   CNII-XII intact, normal strength and sensation       Throughout.  No flap     Results:  Lab Results   Component Value Date     02/10/2023    K 4.0 02/10/2023     02/10/2023    CO2 26 02/10/2023    BUN 19 02/10/2023    CREATININE 1.5 (H) 02/10/2023    CALCIUM 8.4 (L) 02/10/2023    ANIONGAP 8 02/10/2023    ESTGFRAFRICA >60 03/11/2022    EGFRNONAA >60 03/11/2022       Lab Results   Component Value Date    CALCIUM 8.4 (L) 02/10/2023       Recent Labs   Lab " 02/10/23  0421   WBC 5.88   RBC 3.68*   HGB 10.5*   HCT 32.7*      MCV 89   MCH 28.5   MCHC 32.1            I have personally reviewed pertinent radiological imaging and reports.    Patient care was time spent personally by me on the following activities:   Obtaining a history  Examination of patient.  Providing medical care at the patients bedside.  Developing a treatment plan with patient or surrogate and bedside caregivers  Ordering and reviewing laboratory studies, radiographic studies, pulse oximetry.  Ordering and performing treatments and interventions.  Evaluation of patient's response to treatment.  Discussions with consultants while on the unit and immediately available to the patient.  Re-evaluation of the patient's condition.  Documentation in the medical record.     Jose Cantor MD  Nephrology  [facility]  [phone]

## 2023-02-10 NOTE — PROCEDURES
"Abel Gibbs is a 57 y.o. male patient.    Temp: 98 °F (36.7 °C) (02/10/23 0802)  Pulse: 64 (02/10/23 0837)  Resp: 16 (02/10/23 0903)  BP: (!) 145/87 (02/10/23 0802)  SpO2: 95 % (02/10/23 0837)  Weight: (!) 151.1 kg (333 lb 1.8 oz) (02/10/23 0541)  Height: 6' 3" (190.5 cm) (02/08/23 0517)       Debridement    Date/Time: 2/10/2023 10:02 AM  Performed by: Jackelyn Rivera DPM  Authorized by: Jackelyn Rivera DPM     Time out: Immediately prior to procedure a "time out" was called to verify the correct patient, procedure, equipment, support staff and site/side marked as required.    Consent Done?:  Yes (Verbal)    Preparation: Patient was prepped and draped in usual sterile fashion    Local anesthesia used?: No      Wound Details:    Location:  Left foot    Location:  Left 1st Metatarsal Head    Type of Debridement:  Excisional       Length (cm):  2.8       Area (sq cm):  6.16       Width (cm):  2.2       Percent Debrided (%):  100       Depth (cm):  0.5       Total Area Debrided (sq cm):  6.16    Depth of debridement:  Muscle/fascia/tendon    Tissue debrided:  Fascia and Subcutaneous    Devitalized tissue debrided:  Biofilm, Callus, Slough, Necrotic/Eschar, Exudate and Other    Instruments:  Forceps, Blade, Curette and Nippers    Bleeding:  Moderate  Hemostasis Achieved: Yes    Method Used:  Pressure and Silver Nitrate  Patient tolerance:  Patient tolerated the procedure well with no immediate complications    2/10/2023    "

## 2023-02-10 NOTE — CONSULTS
[facility]  Podiatry  Consult Note    Patient Name: Abel Gibbs  MRN: [id number]  Admission Date: 2/7/2023  Hospital Length of Stay: 3 days  Attending Physician: Darius Qiu MD  Primary Care Provider: Primary Doctor No     Consults  Subjective:     History of Present Illness:  Abel Gibbs is a 57 yr old male with PMHx significant for HTN, DM, right foot osteomyeltis, depression and obesity presented to the ER  for orthopnea, cough, sob and lower ext swelling.     Upon further workup it was noted for patient to have a diabetic foot ulcer to the left plantar foot.     Patient states that he just recently noticed this wound on his left foot and he is not sure exactly how long it has been present.                    Scheduled Meds:   amLODIPine  5 mg Oral Daily    gabapentin  600 mg Oral TID    heparin (porcine)  5,000 Units Subcutaneous Q8H    insulin detemir U-100  20 Units Subcutaneous QHS    sacubitriL-valsartan  1 tablet Oral BID    spironolactone  25 mg Oral Daily     Continuous Infusions:  PRN Meds:benzonatate, dextromethorphan-guaiFENesin  mg/5 ml, dextrose 10%, dextrose 10%, diphenhydrAMINE, glucagon (human recombinant), glucose, glucose, hydrALAZINE, HYDROcodone-acetaminophen, insulin aspart U-100, ondansetron, sodium chloride 0.9%    Review of patient's allergies indicates:   Allergen Reactions    Adhesive Itching        Past Medical History:   Diagnosis Date    Depression     Diabetes mellitus     Hypertension     Obesity     Osteomyelitis      No past surgical history on file.    Family History    None       Tobacco Use    Smoking status: Never    Smokeless tobacco: Never   Substance and Sexual Activity    Alcohol use: Yes     Comment: occas    Drug use: Yes     Frequency: 1.0 times per week     Types: Marijuana     Comment: last use 2 days ago    Sexual activity: Not on file     Review of Systems  Objective:     Vital Signs (Most Recent):  Temp: 98 °F  (36.7 °C) (02/10/23 0802)  Pulse: 64 (02/10/23 0837)  Resp: 16 (02/10/23 0903)  BP: (!) 145/87 (02/10/23 0802)  SpO2: 95 % (02/10/23 0837)   Vital Signs (24h Range):  Temp:  [97.6 °F (36.4 °C)-98.4 °F (36.9 °C)] 98 °F (36.7 °C)  Pulse:  [60-71] 64  Resp:  [14-20] 16  SpO2:  [92 %-96 %] 95 %  BP: (137-185)/(68-94) 145/87     Weight: (!) 151.1 kg (333 lb 1.8 oz)  Body mass index is 41.64 kg/m².    Foot Exam    Right Foot/Ankle     Neurovascular  Dorsalis pedis: 1+  Posterior tibial: 1+  Saphenous nerve sensation: absent  Tibial nerve sensation: absent  Superficial peroneal nerve sensation: absent  Deep peroneal nerve sensation: absent  Sural nerve sensation: absent      Left Foot/Ankle      Neurovascular  Dorsalis pedis: 1+  Posterior tibial: 1+  Saphenous nerve sensation: absent  Tibial nerve sensation: absent  Superficial peroneal nerve sensation: absent  Deep peroneal nerve sensation: absent  Sural nerve sensation: absent                  Laboratory:  All pertinent labs reviewed within the last 24 hours.    Diagnostic Results:  I have reviewed all pertinent imaging results/findings within the past 24 hours.    Assessment/Plan:     Ulcer of left foot with necrosis of muscle  Bedside debridement with culture taken.  See procedure note    Jamie and protein drinks daily    Wound care for dressing changes to consist of Santyl followed by a football wrap without compression    Patient will need a surgical shoe    MRI ordered to rule out deeper infection.  If there is no bone infection, patient can be discharged with oral antibiotics.    Arterial ultrasounds ordered      Patient can follow-up outpatient.        Thank you for your consult. I will follow-up with patient. Please contact us if you have any additional questions.    Jackelyn Rivera DPM  Podiatry  [facility]

## 2023-02-13 LAB
ANCA AB TITR SER IF: NORMAL TITER
P-ANCA TITR SER IF: NORMAL TITER
PATHOLOGIST INTERPRETATION SPE: NORMAL

## 2023-02-14 LAB
ANTI SM ANTIBODY: 0.09 RATIO (ref 0–0.99)
ANTI SM/RNP ANTIBODY: 0.08 RATIO (ref 0–0.99)
ANTI-SM INTERPRETATION: NEGATIVE
ANTI-SM/RNP INTERPRETATION: NEGATIVE
ANTI-SSA ANTIBODY: 0.07 RATIO (ref 0–0.99)
ANTI-SSA INTERPRETATION: NEGATIVE
ANTI-SSB ANTIBODY: 0.07 RATIO (ref 0–0.99)
ANTI-SSB INTERPRETATION: NEGATIVE
BACTERIA SPEC AEROBE CULT: ABNORMAL
BACTERIA SPEC AEROBE CULT: ABNORMAL
BACTERIA SPEC ANAEROBE CULT: ABNORMAL
DSDNA AB SER-ACNC: NORMAL [IU]/ML
INTERPRETATION SERPL IFE-IMP: NORMAL
PATHOLOGIST INTERPRETATION IFE: NORMAL

## 2023-02-15 ENCOUNTER — OFFICE VISIT (OUTPATIENT)
Dept: FAMILY MEDICINE | Facility: CLINIC | Age: 58
End: 2023-02-15
Payer: MEDICARE

## 2023-02-15 VITALS
WEIGHT: 315 LBS | TEMPERATURE: 98 F | HEIGHT: 75 IN | DIASTOLIC BLOOD PRESSURE: 86 MMHG | OXYGEN SATURATION: 98 % | SYSTOLIC BLOOD PRESSURE: 154 MMHG | BODY MASS INDEX: 39.17 KG/M2 | HEART RATE: 71 BPM

## 2023-02-15 DIAGNOSIS — K59.00 CONSTIPATION, UNSPECIFIED CONSTIPATION TYPE: ICD-10-CM

## 2023-02-15 DIAGNOSIS — E11.22 CKD STAGE 3 SECONDARY TO DIABETES: ICD-10-CM

## 2023-02-15 DIAGNOSIS — E11.621 DIABETIC ULCER OF TOE OF RIGHT FOOT ASSOCIATED WITH TYPE 2 DIABETES MELLITUS, WITH NECROSIS OF BONE: ICD-10-CM

## 2023-02-15 DIAGNOSIS — N18.30 CKD STAGE 3 SECONDARY TO DIABETES: ICD-10-CM

## 2023-02-15 DIAGNOSIS — E11.69 TYPE 2 DIABETES MELLITUS WITH OTHER SPECIFIED COMPLICATION, WITHOUT LONG-TERM CURRENT USE OF INSULIN: ICD-10-CM

## 2023-02-15 DIAGNOSIS — I50.9 CHRONIC CONGESTIVE HEART FAILURE, UNSPECIFIED HEART FAILURE TYPE: Primary | ICD-10-CM

## 2023-02-15 DIAGNOSIS — I10 PRIMARY HYPERTENSION: ICD-10-CM

## 2023-02-15 DIAGNOSIS — L97.514 DIABETIC ULCER OF TOE OF RIGHT FOOT ASSOCIATED WITH TYPE 2 DIABETES MELLITUS, WITH NECROSIS OF BONE: ICD-10-CM

## 2023-02-15 PROCEDURE — 3052F PR MOST RECENT HEMOGLOBIN A1C LEVEL 8.0 - < 9.0%: ICD-10-PCS | Mod: HCNC,CPTII,S$GLB, | Performed by: PHYSICIAN ASSISTANT

## 2023-02-15 PROCEDURE — 3077F PR MOST RECENT SYSTOLIC BLOOD PRESSURE >= 140 MM HG: ICD-10-PCS | Mod: HCNC,CPTII,S$GLB, | Performed by: PHYSICIAN ASSISTANT

## 2023-02-15 PROCEDURE — 99999 PR PBB SHADOW E&M-EST. PATIENT-LVL IV: CPT | Mod: PBBFAC,HCNC,, | Performed by: PHYSICIAN ASSISTANT

## 2023-02-15 PROCEDURE — 1159F PR MEDICATION LIST DOCUMENTED IN MEDICAL RECORD: ICD-10-PCS | Mod: HCNC,CPTII,S$GLB, | Performed by: PHYSICIAN ASSISTANT

## 2023-02-15 PROCEDURE — 3079F PR MOST RECENT DIASTOLIC BLOOD PRESSURE 80-89 MM HG: ICD-10-PCS | Mod: HCNC,CPTII,S$GLB, | Performed by: PHYSICIAN ASSISTANT

## 2023-02-15 PROCEDURE — 99204 PR OFFICE/OUTPT VISIT, NEW, LEVL IV, 45-59 MIN: ICD-10-PCS | Mod: HCNC,S$GLB,, | Performed by: PHYSICIAN ASSISTANT

## 2023-02-15 PROCEDURE — 3077F SYST BP >= 140 MM HG: CPT | Mod: HCNC,CPTII,S$GLB, | Performed by: PHYSICIAN ASSISTANT

## 2023-02-15 PROCEDURE — 4010F PR ACE/ARB THEARPY RXD/TAKEN: ICD-10-PCS | Mod: HCNC,CPTII,S$GLB, | Performed by: PHYSICIAN ASSISTANT

## 2023-02-15 PROCEDURE — 1111F DSCHRG MED/CURRENT MED MERGE: CPT | Mod: HCNC,CPTII,S$GLB, | Performed by: PHYSICIAN ASSISTANT

## 2023-02-15 PROCEDURE — 99999 PR PBB SHADOW E&M-EST. PATIENT-LVL IV: ICD-10-PCS | Mod: PBBFAC,HCNC,, | Performed by: PHYSICIAN ASSISTANT

## 2023-02-15 PROCEDURE — 1111F PR DISCHARGE MEDS RECONCILED W/ CURRENT OUTPATIENT MED LIST: ICD-10-PCS | Mod: HCNC,CPTII,S$GLB, | Performed by: PHYSICIAN ASSISTANT

## 2023-02-15 PROCEDURE — 99204 OFFICE O/P NEW MOD 45 MIN: CPT | Mod: HCNC,S$GLB,, | Performed by: PHYSICIAN ASSISTANT

## 2023-02-15 PROCEDURE — 3079F DIAST BP 80-89 MM HG: CPT | Mod: HCNC,CPTII,S$GLB, | Performed by: PHYSICIAN ASSISTANT

## 2023-02-15 PROCEDURE — 3008F PR BODY MASS INDEX (BMI) DOCUMENTED: ICD-10-PCS | Mod: HCNC,CPTII,S$GLB, | Performed by: PHYSICIAN ASSISTANT

## 2023-02-15 PROCEDURE — 3052F HG A1C>EQUAL 8.0%<EQUAL 9.0%: CPT | Mod: HCNC,CPTII,S$GLB, | Performed by: PHYSICIAN ASSISTANT

## 2023-02-15 PROCEDURE — 4010F ACE/ARB THERAPY RXD/TAKEN: CPT | Mod: HCNC,CPTII,S$GLB, | Performed by: PHYSICIAN ASSISTANT

## 2023-02-15 PROCEDURE — 1159F MED LIST DOCD IN RCRD: CPT | Mod: HCNC,CPTII,S$GLB, | Performed by: PHYSICIAN ASSISTANT

## 2023-02-15 PROCEDURE — 3008F BODY MASS INDEX DOCD: CPT | Mod: HCNC,CPTII,S$GLB, | Performed by: PHYSICIAN ASSISTANT

## 2023-02-15 RX ORDER — POLYETHYLENE GLYCOL 3350 17 G/17G
17 POWDER, FOR SOLUTION ORAL DAILY
Qty: 850 G | Refills: 2 | Status: SHIPPED | OUTPATIENT
Start: 2023-02-15

## 2023-02-15 RX ORDER — ALBUTEROL SULFATE 90 UG/1
1-2 AEROSOL, METERED RESPIRATORY (INHALATION) EVERY 6 HOURS PRN
Qty: 18 G | Refills: 5 | Status: SHIPPED | OUTPATIENT
Start: 2023-02-15 | End: 2024-02-15

## 2023-02-15 RX ORDER — CARVEDILOL 3.12 MG/1
3.12 TABLET ORAL 2 TIMES DAILY WITH MEALS
Qty: 60 TABLET | Refills: 5 | Status: ON HOLD | OUTPATIENT
Start: 2023-02-15 | End: 2023-07-28 | Stop reason: SDUPTHER

## 2023-02-15 RX ORDER — DAPAGLIFLOZIN 5 MG/1
5 TABLET, FILM COATED ORAL DAILY
Qty: 30 TABLET | Refills: 5 | Status: ON HOLD | OUTPATIENT
Start: 2023-02-15 | End: 2023-07-26

## 2023-02-15 RX ORDER — SPIRONOLACTONE 25 MG/1
25 TABLET ORAL DAILY
Qty: 30 TABLET | Refills: 5 | Status: ON HOLD | OUTPATIENT
Start: 2023-02-15 | End: 2023-07-28 | Stop reason: HOSPADM

## 2023-02-15 RX ORDER — GABAPENTIN 800 MG/1
800 TABLET ORAL 4 TIMES DAILY
COMMUNITY
Start: 2023-01-26

## 2023-02-15 RX ORDER — ATORVASTATIN CALCIUM 80 MG/1
80 TABLET, FILM COATED ORAL DAILY
Qty: 30 TABLET | Refills: 5 | Status: SHIPPED | OUTPATIENT
Start: 2023-02-15

## 2023-02-15 RX ORDER — AMLODIPINE BESYLATE 5 MG/1
5 TABLET ORAL DAILY
Qty: 30 TABLET | Refills: 5 | Status: ON HOLD | OUTPATIENT
Start: 2023-02-15 | End: 2023-07-28 | Stop reason: HOSPADM

## 2023-02-15 NOTE — PROGRESS NOTES
Subjective:       Patient ID: Abel Gibbs is a 57 y.o. male.    Chief Complaint: Hospital Follow Up    Patient with HTN, DM type 2, and right foot osteomyelitis presents for hospital follow up. He was admitted with acute heart failure exacerbation. Cardiology and nephrology were consulted. Echo was preserved EF. He was started with lasix and transitioned to spironolactone and entresto.  Podiatry and wound care were consulted for his right foot ulcer and bedside debridement was done.  Arterial US showed mild PAD without high grade stenosis.  He declined MRI that was recommended.  He left AMA  Since discharge he states that he is feeling well expect for chest congestion.  He has already followed up with his wound care specialist Mindi in [place].  He reports constipation that is new and he attributes this to new meds.  He is not checking glucose at home.    Patients patient medical/surgical, social and family histories have been reviewed       Review of Systems   Respiratory:  Positive for cough. Negative for chest tightness, shortness of breath and wheezing.    Gastrointestinal:  Positive for constipation.   Musculoskeletal:  Negative for gait problem.   Skin:  Positive for wound.   All other systems reviewed and are negative.    Objective:      Physical Exam  Constitutional:       General: He is not in acute distress.     Appearance: He is well-developed.   HENT:      Head: Normocephalic and atraumatic.   Cardiovascular:      Rate and Rhythm: Normal rate and regular rhythm.      Heart sounds: Normal heart sounds.   Pulmonary:      Effort: Pulmonary effort is normal.      Breath sounds: Normal breath sounds.   Musculoskeletal:      Cervical back: Neck supple. No tenderness.      Right lower leg: No edema.      Left lower leg: No edema.   Skin:     General: Skin is warm and dry.      Capillary Refill: Capillary refill takes less than 2 seconds.   Neurological:      General: No focal deficit present.       Mental Status: He is alert.   Psychiatric:         Mood and Affect: Mood normal.         Behavior: Behavior normal. Behavior is cooperative.         Judgment: Judgment normal.       Assessment:       1. Chronic congestive heart failure, unspecified heart failure type    2. Type 2 diabetes mellitus with other specified complication, without long-term current use of insulin    3. Primary hypertension    4. Diabetic ulcer of toe of right foot associated with type 2 diabetes mellitus, with necrosis of bone    5. CKD stage 3 secondary to diabetes    6. Constipation, unspecified constipation type          Plan:       Abel was seen today for hospital follow up.    Diagnoses and all orders for this visit:    Chronic congestive heart failure, unspecified heart failure type  -     Ambulatory referral/consult to Cardiology; Future  -     sacubitriL-valsartan (ENTRESTO) 24-26 mg per tablet; Take 1 tablet by mouth 2 (two) times daily.  -     spironolactone (ALDACTONE) 25 MG tablet; Take 1 tablet (25 mg total) by mouth once daily.    Type 2 diabetes mellitus with other specified complication, without long-term current use of insulin  -     Ambulatory referral/consult to Optometry; Future  -     atorvastatin (LIPITOR) 80 MG tablet; Take 1 tablet (80 mg total) by mouth once daily.  -     blood sugar diagnostic Strp; To check BG 4 times daily, to use with insurance preferred meter  -     dapagliflozin (FARXIGA) 5 mg Tab tablet; Take 1 tablet (5 mg total) by mouth once daily.    Primary hypertension  -     amLODIPine (NORVASC) 5 MG tablet; Take 1 tablet (5 mg total) by mouth once daily.  -     carvediloL (COREG) 3.125 MG tablet; Take 1 tablet (3.125 mg total) by mouth 2 (two) times daily with meals.    Diabetic ulcer of toe of right foot associated with type 2 diabetes mellitus, with necrosis of bone  Continue per Dr Delgado  CKD stage 3 secondary to diabetes  -     Ambulatory referral/consult to Nephrology;  "Future    Constipation, unspecified constipation type  -     polyethylene glycol (MIRALAX) 17 gram/dose powder; Take 17 g by mouth once daily. For constipation    Other orders  - refilled     albuterol (PROVENTIL/VENTOLIN HFA) 90 mcg/actuation inhaler; Inhale 1-2 puffs into the lungs every 6 (six) hours as needed for Wheezing. Rescue           Follow up for cardiology , optometry, pcp to estab in 4-8 weeks, nephrology is external .           Documentation entered by me for this encounter may have been done in part using speech-recognition technology. Although I have made an effort to ensure accuracy, "sound like" errors may exist and should be interpreted in context.  "

## 2023-02-16 NOTE — PHYSICIAN QUERY
PT Name: Abel Gibbs  MR #: [id number]     DOCUMENTATION CLARIFICATION     CDS/: REI Santoyo, RN, CCDS               Contact information: [email or web address]  This form is a permanent document in the medical record.     Query Date: February 16, 2023    By submitting this query, we are merely seeking further clarification of documentation.  Please utilize your independent clinical judgment when addressing the question(s) below.    The Medical Record contains the following   Indicators Supporting Clinical Findings Location in Medical Record   X Heart Failure documented Acute exacerbation of CHF   new onset CHF heart failure that is Acute    PRINCIPAL PROBLEM:  Acute exacerbation of CHF (congestive heart failure)   H & P: NESS Juarez NP 2/7      DCS: Dr. Qiu 2/10   X    Lab: 2/7   X EF/Echo normal systolic function.  The estimated ejection fraction is 65%.  Normal left ventricular diastolic function.   Echo: 2/7   X Radiology findings Pulmonary edema/pleural effusion on CXR   H & P: NESS Juarez NP 2/7   X Subjective/Objective Respiratory Conditions presenting today for orthopnea, cough, sob and lower ext swelling    symptoms as mod severity, worsens on exertion or lying flat, improves with rest and elevating head    Faint crackles to bilat bases  >3 pillow orthopnea, Rales/crackles on pulmonary exam    H & P: NESS Juarez NP 2/7    Recent/Current MI      Heart Transplant, LVAD     X Edema, JVD +3 edema to bilat LE   uncontrolled due to Continued edema of extremities and Weight gain of 10 pounds,   H & P: NESS Juarez NP 2/7    Ascites     X Diuretics/Meds furosemide injection 40 mg  Dose: 40 mg  Freq: ED 1 Time Route: IV  Start: 02/07/23 1400 End: 02/07/23 1436   MAR    Other Treatment      Other       Heart failure is a clinical diagnosis which includes symptomatic fluid retention, elevated intracardiac pressures, and/or the inability of the heart to deliver adequate blood flow.    Heart Failure  with reduced Ejection Fraction (HFrEF) or Systolic Heart Failure (loses ability to contract normally, EF is <40%)    Heart Failure with preserved Ejection Fraction (HFpEF) or Diastolic Heart Failure (stiff ventricles, does not relax properly, EF is >50%)     Heart Failure with Combined Systolic and Diastolic Failure (stiff ventricles, does not relax properly and EF is <50%)    Mid-range or mildly reduced ejection fraction (HFmrEF) is classified as systolic heart failure.  Congestive heart failure with a recovered EF is classified as Diastolic Heart Failure.  Common clues to acute exacerbation:  Rapidly progressive symptoms (w/in 2 weeks of presentation), using IV diuretics, using supplemental O2, pulmonary edema on Xray, new or worsening pleural effusion, +JVD or other signs of volume overload, MI w/in 4 weeks, and/or BNP >500  The clinical guidelines noted are only system guidelines, and do not replace the providers clinical judgment.    Provider, please clarify CHF exacerbation diagnosis associated with the above clinical findings.    [ X ]  Acute Diastolic Heart Failure (HFpEF) - new diagnosis   [   ]  Acute on Chronic Diastolic Heart Failure (HFpEF) - worsening of CHF signs/symptoms in preexisting CHF   [   ]  Other (please specify): ___________________________________     Please document in your progress notes daily for the duration of treatment until resolved and include in your discharge summary.    References:  American Heart Association editorial staff. (2017, May). Ejection Fraction Heart Failure Measurement. American Heart Association. https://www.heart.org/en/health-topics/heart-failure/diagnosing-heart-failure/ejection-fraction-heart-failure-measurement#:~:text=Ejection%20fraction%20(EF)%20is%20a,pushed%20out%20with%20each%20heartbeat  ARLYN Morgan (2020, December 15). Heart failure with preserved ejection fraction: Clinical manifestations and diagnosis. UpToDate.  https://www.[employer]/contents/heart-failure-with-preserved-ejection-fraction-clinical-manifestations-and-diagnosis.  ICD-10-CM/PCS Coding Clinic Third Quarter ICD-10, Effective with discharges: September 8, 2020 [facility] § Heart failure with mid-range or mildly reduced ejection fraction (2020).  ICD-10-CM/PCS Coding Clinic Third Quarter ICD-10, Effective with discharges: September 8, 2020 [facility] § Heart failure with recovered ejection fraction (2020).  Form No. 02932

## 2023-02-20 RX ORDER — LEVOFLOXACIN 500 MG/1
500 TABLET, FILM COATED ORAL DAILY
Qty: 10 TABLET | Refills: 0 | Status: SHIPPED | OUTPATIENT
Start: 2023-02-20 | End: 2023-03-02

## 2023-02-23 ENCOUNTER — OUTPATIENT CASE MANAGEMENT (OUTPATIENT)
Dept: ADMINISTRATIVE | Facility: OTHER | Age: 58
End: 2023-02-23
Payer: MEDICARE

## 2023-02-23 ENCOUNTER — TELEPHONE (OUTPATIENT)
Dept: FAMILY MEDICINE | Facility: CLINIC | Age: 58
End: 2023-02-23
Payer: MEDICARE

## 2023-02-23 NOTE — TELEPHONE ENCOUNTER
----- Message from Lauren Isaac LMSW sent at 2/23/2023 12:33 PM CST -----  Greetings, JEFF Kenny and or Staff      SW contacted patient regarding any social needs. While speaking with patient. Patient reports having questions and or concerns with medication that was ordered for him. Patient was seen in the clinic on 02/15/2023 for a hospital follow-up       Please contact patient and offer assistance.    Thank you,    Lauren Isaac LMSW

## 2023-02-23 NOTE — PROGRESS NOTES
Outpatient Care Management   - Low Risk Patient Assessment    Patient: Abel Gibbs  MRN:  [id number]  Date of Service:  2/23/2023  Completed by:  Lauren Isaac LMSW  Referral Date: 02/10/2023    Reason for Visit   Patient presents with    OPCM NILESH First Assessment Attempt     02/23/2023    Social Work Assessment - Low/Mod Risk     02/23/2023    Plan Of Care     02/23/2023    Case Closure     02/23/2023       Brief Summary:  received a referral from  I/P  .SW completed assessment with patient. Patient resides alone. Patient denied any family and or friends check in on him. Patient can complete IADL's and ADL's without assistance. Patient uses a cane to ambulate. Patient reports needing a new cane. Patient will order cane through [facility] [employer] benefit. SW discussed establish care with a PCP. Patient denied. Patient advised SW he will reach out to [facility] for recommendations for a PCP. Patient also will contact his [facility] nurse for assistance. Patient has questions and or concerns with medication that was provided to him on hospital follow-up visit. NILESH sent an [employer] message to [facility] staff to follow-up with patient regarding question and or concern.  Patient reports he has a nurse that visits him from [facility]. Patient denied needing assistance with medication, medical and food but needs assistance with shelter. Patient reports he only receives disability. Patient requesting resources for affordable housing. SW provided patient with resources for [employer] housing. Patient denied any current symptoms. Patient denied information on ACP. NILESH provided all available resources via telephone and US Mail . Care plan was created in collaboration with patient/caregiver input.           Attempt #:  1  This [facility] [place] patient/caregiver to provide resource , however not avilable. Patient reports he's currently in the doctors office. Patient ask that this NILESH  contact him later.

## 2023-02-23 NOTE — TELEPHONE ENCOUNTER
Spoke to pt confirmed upcoming appt. Advised meds were sent to pharmacy with refills. Pt states he is not home to know which meds he needs filled. Advised pt to contact pharmacy.

## 2023-05-15 PROBLEM — N17.9 AKI (ACUTE KIDNEY INJURY): Status: RESOLVED | Noted: 2020-12-30 | Resolved: 2023-05-15

## 2023-06-02 DIAGNOSIS — R05.3 CHRONIC COUGH: Primary | ICD-10-CM

## 2023-06-04 ENCOUNTER — HOSPITAL ENCOUNTER (EMERGENCY)
Facility: HOSPITAL | Age: 58
Discharge: HOME | End: 2023-06-04
Attending: EMERGENCY MEDICINE
Payer: MEDICARE

## 2023-06-04 VITALS
BODY MASS INDEX: 39.17 KG/M2 | TEMPERATURE: 99 F | RESPIRATION RATE: 13 BRPM | HEIGHT: 75 IN | OXYGEN SATURATION: 95 % | DIASTOLIC BLOOD PRESSURE: 62 MMHG | HEART RATE: 53 BPM | WEIGHT: 315 LBS | SYSTOLIC BLOOD PRESSURE: 133 MMHG

## 2023-06-04 DIAGNOSIS — N28.9 RENAL INSUFFICIENCY: Primary | ICD-10-CM

## 2023-06-04 DIAGNOSIS — R89.9 ABNORMAL LABORATORY TEST: ICD-10-CM

## 2023-06-04 LAB
ALBUMIN SERPL BCP-MCNC: 3.5 G/DL (ref 3.5–5.2)
ALP SERPL-CCNC: 35 U/L (ref 55–135)
ALT SERPL W/O P-5'-P-CCNC: 13 U/L (ref 10–44)
ANION GAP SERPL CALC-SCNC: 7 MMOL/L (ref 8–16)
AST SERPL-CCNC: 19 U/L (ref 10–40)
BASOPHILS # BLD AUTO: 0.07 K/UL (ref 0–0.2)
BASOPHILS NFR BLD: 1.4 % (ref 0–1.9)
BILIRUB SERPL-MCNC: 0.9 MG/DL (ref 0.1–1)
BUN SERPL-MCNC: 32 MG/DL (ref 6–20)
CALCIUM SERPL-MCNC: 8.6 MG/DL (ref 8.7–10.5)
CHLORIDE SERPL-SCNC: 104 MMOL/L (ref 95–110)
CO2 SERPL-SCNC: 27 MMOL/L (ref 23–29)
CREAT SERPL-MCNC: 1.8 MG/DL (ref 0.5–1.4)
DIFFERENTIAL METHOD: ABNORMAL
EOSINOPHIL # BLD AUTO: 0.2 K/UL (ref 0–0.5)
EOSINOPHIL NFR BLD: 3.9 % (ref 0–8)
ERYTHROCYTE [DISTWIDTH] IN BLOOD BY AUTOMATED COUNT: 15.3 % (ref 11.5–14.5)
EST. GFR  (NO RACE VARIABLE): 42.6 ML/MIN/1.73 M^2
GLUCOSE SERPL-MCNC: 198 MG/DL (ref 70–110)
HCT VFR BLD AUTO: 32.3 % (ref 40–54)
HGB BLD-MCNC: 10.4 G/DL (ref 14–18)
IMM GRANULOCYTES # BLD AUTO: 0.03 K/UL (ref 0–0.04)
IMM GRANULOCYTES NFR BLD AUTO: 0.6 % (ref 0–0.5)
LYMPHOCYTES # BLD AUTO: 1.7 K/UL (ref 1–4.8)
LYMPHOCYTES NFR BLD: 35 % (ref 18–48)
MAGNESIUM SERPL-MCNC: 1.8 MG/DL (ref 1.6–2.6)
MCH RBC QN AUTO: 29.1 PG (ref 27–31)
MCHC RBC AUTO-ENTMCNC: 32.2 G/DL (ref 32–36)
MCV RBC AUTO: 90 FL (ref 82–98)
MONOCYTES # BLD AUTO: 0.4 K/UL (ref 0.3–1)
MONOCYTES NFR BLD: 7.5 % (ref 4–15)
NEUTROPHILS # BLD AUTO: 2.5 K/UL (ref 1.8–7.7)
NEUTROPHILS NFR BLD: 51.6 % (ref 38–73)
NRBC BLD-RTO: 0 /100 WBC
PHOSPHATE SERPL-MCNC: 5.3 MG/DL (ref 2.7–4.5)
PLATELET # BLD AUTO: 218 K/UL (ref 150–450)
PMV BLD AUTO: 10 FL (ref 9.2–12.9)
POTASSIUM SERPL-SCNC: 4.7 MMOL/L (ref 3.5–5.1)
PROT SERPL-MCNC: 6.8 G/DL (ref 6–8.4)
RBC # BLD AUTO: 3.58 M/UL (ref 4.6–6.2)
SODIUM SERPL-SCNC: 138 MMOL/L (ref 136–145)
TROPONIN I SERPL HS-MCNC: 6.2 PG/ML (ref 0–14.9)
WBC # BLD AUTO: 4.92 K/UL (ref 3.9–12.7)

## 2023-06-04 PROCEDURE — 84484 ASSAY OF TROPONIN QUANT: CPT | Performed by: EMERGENCY MEDICINE

## 2023-06-04 PROCEDURE — 80053 COMPREHEN METABOLIC PANEL: CPT | Performed by: EMERGENCY MEDICINE

## 2023-06-04 PROCEDURE — 93010 EKG 12-LEAD: ICD-10-PCS | Mod: ,,, | Performed by: INTERNAL MEDICINE

## 2023-06-04 PROCEDURE — 36415 COLL VENOUS BLD VENIPUNCTURE: CPT | Performed by: EMERGENCY MEDICINE

## 2023-06-04 PROCEDURE — 93010 ELECTROCARDIOGRAM REPORT: CPT | Mod: ,,, | Performed by: INTERNAL MEDICINE

## 2023-06-04 PROCEDURE — 83735 ASSAY OF MAGNESIUM: CPT | Performed by: EMERGENCY MEDICINE

## 2023-06-04 PROCEDURE — 84100 ASSAY OF PHOSPHORUS: CPT | Performed by: EMERGENCY MEDICINE

## 2023-06-04 PROCEDURE — 85025 COMPLETE CBC W/AUTO DIFF WBC: CPT | Performed by: EMERGENCY MEDICINE

## 2023-06-04 PROCEDURE — 93005 ELECTROCARDIOGRAM TRACING: CPT | Performed by: INTERNAL MEDICINE

## 2023-06-04 PROCEDURE — 99284 EMERGENCY DEPT VISIT MOD MDM: CPT

## 2023-06-04 NOTE — ED PROVIDER NOTES
Encounter Date: 6/4/2023       History     Chief Complaint   Patient presents with    Abnormal Lab     Pt states sent by md for abnormal lab. Done on Friday.     Patient had routine well visit with his primary care provider 3 days ago.  He had outpatient labs done that day.  He was called and told to return to emergency department because his potassium was 6.  He does have history of some renal insufficiency.  He does have diabetes.  Had previous diabetic foot ulcer with infection.  He reports ulcers are healing.  He has no chest pain or shortness of breath.  No fever chills.  Patient states he has been feeling well.    Review of patient's allergies indicates:   Allergen Reactions    Adhesive Itching     Past Medical History:   Diagnosis Date    Depression     Diabetes mellitus     Hypertension     Obesity     Osteomyelitis      No past surgical history on file.  No family history on file.  Social History     Tobacco Use    Smoking status: Never    Smokeless tobacco: Never   Substance Use Topics    Alcohol use: Yes     Comment: occas    Drug use: Yes     Frequency: 1.0 times per week     Types: Marijuana     Comment: last use 2 days ago     Review of Systems   Constitutional:  Negative for chills and fever.   HENT:  Negative for sore throat.    Eyes:  Negative for photophobia and visual disturbance.   Respiratory:  Negative for shortness of breath.    Cardiovascular:  Negative for chest pain.   Gastrointestinal:  Negative for abdominal pain and vomiting.   Genitourinary:  Negative for dysuria.   Musculoskeletal:  Negative for joint swelling.   Skin:  Negative for rash.        Foot ulcers healing   Neurological:  Negative for weakness and headaches.   Psychiatric/Behavioral:  Negative for confusion.      Physical Exam     Initial Vitals [06/04/23 1257]   BP Pulse Resp Temp SpO2   (!) 178/83 64 18 98.5 °F (36.9 °C) 97 %      MAP       --         Physical Exam    Nursing note and vitals reviewed.  Constitutional: He is  not diaphoretic. No distress.   HENT:   Head: Normocephalic and atraumatic.   Eyes: Conjunctivae are normal.   Neck:   Normal range of motion.  Cardiovascular:  Regular rhythm.           Pulmonary/Chest: Breath sounds normal.   Abdominal: Abdomen is soft. There is no abdominal tenderness.   Musculoskeletal:         General: Normal range of motion.      Cervical back: Normal range of motion.      Comments: Left 1st metatarsal foot ulcer with no surrounding erythema or drainage.     Skin: No rash noted.   Psychiatric: He has a normal mood and affect.       ED Course   Procedures  Labs Reviewed   CBC W/ AUTO DIFFERENTIAL - Abnormal; Notable for the following components:       Result Value    RBC 3.58 (*)     Hemoglobin 10.4 (*)     Hematocrit 32.3 (*)     RDW 15.3 (*)     Immature Granulocytes 0.6 (*)     All other components within normal limits   COMPREHENSIVE METABOLIC PANEL - Abnormal; Notable for the following components:    Glucose 198 (*)     BUN 32 (*)     Creatinine 1.8 (*)     Calcium 8.6 (*)     Alkaline Phosphatase 35 (*)     Anion Gap 7 (*)     eGFR 42.6 (*)     All other components within normal limits   PHOSPHORUS - Abnormal; Notable for the following components:    Phosphorus 5.3 (*)     All other components within normal limits   TROPONIN I HIGH SENSITIVITY   MAGNESIUM   PHOSPHORUS        ECG Results              EKG 12-lead (In process)  Result time 06/04/23 13:19:38      In process by Interface, Lab In [facility] (06/04/23 13:19:38)                   Narrative:    Test Reason : R89.9,    Vent. Rate : 060 BPM     Atrial Rate : 060 BPM     P-R Int : 180 ms          QRS Dur : 120 ms      QT Int : 430 ms       P-R-T Axes : 040 011 071 degrees     QTc Int : 430 ms    Normal sinus rhythm  Nonspecific intraventricular conduction delay  Borderline Abnormal ECG  When compared with ECG of 07-FEB-2023 10:48,  Minimal criteria for Anteroseptal infarct are no longer Present    Referred By: [email or web address]   SELF            Confirmed By:                                   Imaging Results    None          Medications - No data to display  Medical Decision Making:   History:   Old Medical Records: I decided to obtain old medical records.  Old Records Summarized: records from clinic visits and records from previous admission(s).       <> Summary of Records: History of foot ulcers  Differential Diagnosis:   Acute kidney injury, hyperkalemia, fictitious hyperkalemia  Independently Interpreted Test(s):   I have ordered and independently interpreted EKG Reading(s) - see summary below       <> Summary of EKG Reading(s): Normal sinus rhythm with no evidence of hyperkalemia  Clinical Tests:   Lab Tests: Reviewed  The following lab test(s) were unremarkable: CBC and CMP       <> Summary of Lab: Creatinine 1.8  Medical Tests: Reviewed  ED Management:  Patient presents with outpatient labs with potassium 6.0.  Today potassium is normal.  Patient likely with fictitious hyperkalemia with outpatient labs.  Will have patient call primary provider tomorrow.  Creatinine was slightly worsened baseline at 1.8.                        Clinical Impression:   Final diagnoses:  [R89.9] Abnormal laboratory test  [N28.9] Renal insufficiency (Primary)        ED Disposition Condition    Discharge Stable          ED Prescriptions    None       Follow-up Information       Follow up With Specialties Details Why Contact Info Additional Information    [facility] Emergency Dept Emergency Medicine  If symptoms worsen [address]  [facility] [id number] [phone] 1st floor    [facility] Emergency Dept Emergency Medicine  If symptoms worsen [address]  [facility] [address]  [phone] 1st floor             Matt Fry MD  06/04/23 2100

## 2023-06-04 NOTE — DISCHARGE INSTRUCTIONS
Blood pressure check in 48 hours  Today your potassium was 4.7 and creatinine was 1.8.  Call your doctor tomorrow with these results.

## 2023-07-18 NOTE — PLAN OF CARE
Patient has a BP of 185/90   hydralazine given. Recheck after an hour patient /86 and patient now c/o headache 8 on pain scale. Will administer pain medication at this time. Mini AGUILAR made aware.   1 person assist

## 2023-07-26 ENCOUNTER — HOSPITAL ENCOUNTER (INPATIENT)
Facility: HOSPITAL | Age: 58
LOS: 1 days | Discharge: HOME | DRG: 287 | End: 2023-07-28
Attending: EMERGENCY MEDICINE | Admitting: STUDENT IN AN ORGANIZED HEALTH CARE EDUCATION/TRAINING PROGRAM
Payer: MEDICARE

## 2023-07-26 ENCOUNTER — NURSE TRIAGE (OUTPATIENT)
Dept: ADMINISTRATIVE | Facility: CLINIC | Age: 58
End: 2023-07-26
Payer: MEDICARE

## 2023-07-26 ENCOUNTER — CLINICAL SUPPORT (OUTPATIENT)
Dept: CARDIOLOGY | Facility: HOSPITAL | Age: 58
DRG: 287 | End: 2023-07-26
Attending: EMERGENCY MEDICINE
Payer: MEDICARE

## 2023-07-26 VITALS — WEIGHT: 315 LBS | HEIGHT: 75 IN | BODY MASS INDEX: 39.17 KG/M2

## 2023-07-26 DIAGNOSIS — I10 HYPERTENSION, UNSPECIFIED TYPE: ICD-10-CM

## 2023-07-26 DIAGNOSIS — R07.9 CHEST PAIN: ICD-10-CM

## 2023-07-26 DIAGNOSIS — L97.523 ULCER OF LEFT FOOT WITH NECROSIS OF MUSCLE: Chronic | ICD-10-CM

## 2023-07-26 DIAGNOSIS — I10 PRIMARY HYPERTENSION: ICD-10-CM

## 2023-07-26 DIAGNOSIS — R07.9 CHEST PAIN: Primary | ICD-10-CM

## 2023-07-26 PROBLEM — N40.0 BPH (BENIGN PROSTATIC HYPERPLASIA): Status: ACTIVE | Noted: 2023-07-26

## 2023-07-26 PROBLEM — E78.5 HLD (HYPERLIPIDEMIA): Status: ACTIVE | Noted: 2023-07-26

## 2023-07-26 PROBLEM — J10.1 INFLUENZA A: Status: RESOLVED | Noted: 2022-03-09 | Resolved: 2023-07-26

## 2023-07-26 PROBLEM — E11.9 TYPE 2 DIABETES MELLITUS: Chronic | Status: ACTIVE | Noted: 2022-03-09

## 2023-07-26 PROBLEM — I50.9 CONGESTIVE HEART DISEASE: Chronic | Status: ACTIVE | Noted: 2023-07-26

## 2023-07-26 PROBLEM — I50.9 ACUTE EXACERBATION OF CHF (CONGESTIVE HEART FAILURE): Status: RESOLVED | Noted: 2023-02-07 | Resolved: 2023-07-26

## 2023-07-26 PROBLEM — D64.9 ANEMIA: Status: ACTIVE | Noted: 2023-07-26

## 2023-07-26 PROBLEM — J98.4 PNEUMONITIS: Status: RESOLVED | Noted: 2022-03-09 | Resolved: 2023-07-26

## 2023-07-26 PROBLEM — N18.9 CKD (CHRONIC KIDNEY DISEASE): Status: ACTIVE | Noted: 2023-07-26

## 2023-07-26 PROBLEM — J96.01 ACUTE HYPOXEMIC RESPIRATORY FAILURE: Status: RESOLVED | Noted: 2022-03-09 | Resolved: 2023-07-26

## 2023-07-26 LAB
ALBUMIN SERPL BCP-MCNC: 3.6 G/DL (ref 3.5–5.2)
ALP SERPL-CCNC: 28 U/L (ref 55–135)
ALT SERPL W/O P-5'-P-CCNC: 21 U/L (ref 10–44)
ANION GAP SERPL CALC-SCNC: 8 MMOL/L (ref 8–16)
AST SERPL-CCNC: 21 U/L (ref 10–40)
BASOPHILS # BLD AUTO: 0.08 K/UL (ref 0–0.2)
BASOPHILS NFR BLD: 1.1 % (ref 0–1.9)
BILIRUB SERPL-MCNC: 0.5 MG/DL (ref 0.1–1)
BNP SERPL-MCNC: 93 PG/ML (ref 0–99)
BSA FOR ECHO PROCEDURE: 2.9 M2
BUN SERPL-MCNC: 28 MG/DL (ref 6–20)
CALCIUM SERPL-MCNC: 8.8 MG/DL (ref 8.7–10.5)
CHLORIDE SERPL-SCNC: 104 MMOL/L (ref 95–110)
CO2 SERPL-SCNC: 25 MMOL/L (ref 23–29)
CREAT SERPL-MCNC: 1.8 MG/DL (ref 0.5–1.4)
CV ECHO LV RWT: 0.47 CM
DIFFERENTIAL METHOD: ABNORMAL
E WAVE DECELERATION TIME: 190 MSEC
E/A RATIO: 2.57
E/E' RATIO: 12.1 M/S
ECHO LV POSTERIOR WALL: 1.24 CM (ref 0.6–1.1)
EJECTION FRACTION: 65 %
EOSINOPHIL # BLD AUTO: 0.3 K/UL (ref 0–0.5)
EOSINOPHIL NFR BLD: 3.6 % (ref 0–8)
ERYTHROCYTE [DISTWIDTH] IN BLOOD BY AUTOMATED COUNT: 14.4 % (ref 11.5–14.5)
EST. GFR  (NO RACE VARIABLE): 42.6 ML/MIN/1.73 M^2
FRACTIONAL SHORTENING: 44 % (ref 28–44)
GLUCOSE SERPL-MCNC: 117 MG/DL (ref 70–110)
GLUCOSE SERPL-MCNC: 133 MG/DL (ref 70–110)
GLUCOSE SERPL-MCNC: 143 MG/DL (ref 70–110)
HCT VFR BLD AUTO: 30.5 % (ref 40–54)
HGB BLD-MCNC: 9.8 G/DL (ref 14–18)
IMM GRANULOCYTES # BLD AUTO: 0.03 K/UL (ref 0–0.04)
IMM GRANULOCYTES NFR BLD AUTO: 0.4 % (ref 0–0.5)
INTERVENTRICULAR SEPTUM: 1.12 CM (ref 0.6–1.1)
LEFT INTERNAL DIMENSION IN SYSTOLE: 2.98 CM (ref 2.1–4)
LEFT VENTRICLE DIASTOLIC VOLUME INDEX: 49.28 ML/M2
LEFT VENTRICLE DIASTOLIC VOLUME: 137 ML
LEFT VENTRICLE MASS INDEX: 91 G/M2
LEFT VENTRICLE SYSTOLIC VOLUME INDEX: 12.4 ML/M2
LEFT VENTRICLE SYSTOLIC VOLUME: 34.4 ML
LEFT VENTRICULAR INTERNAL DIMENSION IN DIASTOLE: 5.32 CM (ref 3.5–6)
LEFT VENTRICULAR MASS: 252.21 G
LV LATERAL E/E' RATIO: 11 M/S
LV SEPTAL E/E' RATIO: 13.44 M/S
LYMPHOCYTES # BLD AUTO: 3 K/UL (ref 1–4.8)
LYMPHOCYTES NFR BLD: 39.7 % (ref 18–48)
MAGNESIUM SERPL-MCNC: 1.8 MG/DL (ref 1.6–2.6)
MCH RBC QN AUTO: 29.2 PG (ref 27–31)
MCHC RBC AUTO-ENTMCNC: 32.1 G/DL (ref 32–36)
MCV RBC AUTO: 91 FL (ref 82–98)
MONOCYTES # BLD AUTO: 0.5 K/UL (ref 0.3–1)
MONOCYTES NFR BLD: 6.7 % (ref 4–15)
MV PEAK A VEL: 0.47 M/S
MV PEAK E VEL: 1.21 M/S
NEUTROPHILS # BLD AUTO: 3.7 K/UL (ref 1.8–7.7)
NEUTROPHILS NFR BLD: 48.5 % (ref 38–73)
NRBC BLD-RTO: 0 /100 WBC
PISA TR MAX VEL: 2.44 M/S
PLATELET # BLD AUTO: 265 K/UL (ref 150–450)
PMV BLD AUTO: 10 FL (ref 9.2–12.9)
POTASSIUM SERPL-SCNC: 4.7 MMOL/L (ref 3.5–5.1)
PROT SERPL-MCNC: 6.9 G/DL (ref 6–8.4)
RA PRESSURE: 3 MMHG
RBC # BLD AUTO: 3.36 M/UL (ref 4.6–6.2)
SODIUM SERPL-SCNC: 137 MMOL/L (ref 136–145)
T4 FREE SERPL-MCNC: 0.95 NG/DL (ref 0.71–1.51)
TDI LATERAL: 0.11 M/S
TDI SEPTAL: 0.09 M/S
TDI: 0.1 M/S
TR MAX PG: 24 MMHG
TROPONIN I SERPL HS-MCNC: 7.7 PG/ML (ref 0–14.9)
TROPONIN I SERPL HS-MCNC: 8.5 PG/ML (ref 0–14.9)
TROPONIN I SERPL HS-MCNC: 8.6 PG/ML (ref 0–14.9)
TSH SERPL DL<=0.005 MIU/L-ACNC: 2.55 UIU/ML (ref 0.34–5.6)
TV REST PULMONARY ARTERY PRESSURE: 27 MMHG
WBC # BLD AUTO: 7.59 K/UL (ref 3.9–12.7)

## 2023-07-26 PROCEDURE — G0378 HOSPITAL OBSERVATION PER HR: HCPCS

## 2023-07-26 PROCEDURE — 99223 PR INITIAL HOSPITAL CARE,LEVL III: ICD-10-PCS | Mod: ,,, | Performed by: INTERNAL MEDICINE

## 2023-07-26 PROCEDURE — 84484 ASSAY OF TROPONIN QUANT: CPT | Mod: 91 | Performed by: EMERGENCY MEDICINE

## 2023-07-26 PROCEDURE — 63600175 PHARM REV CODE 636 W HCPCS: Performed by: STUDENT IN AN ORGANIZED HEALTH CARE EDUCATION/TRAINING PROGRAM

## 2023-07-26 PROCEDURE — 93308 TTE F-UP OR LMTD: CPT

## 2023-07-26 PROCEDURE — 94761 N-INVAS EAR/PLS OXIMETRY MLT: CPT

## 2023-07-26 PROCEDURE — 80053 COMPREHEN METABOLIC PANEL: CPT | Performed by: EMERGENCY MEDICINE

## 2023-07-26 PROCEDURE — 96374 THER/PROPH/DIAG INJ IV PUSH: CPT

## 2023-07-26 PROCEDURE — 96372 THER/PROPH/DIAG INJ SC/IM: CPT | Performed by: STUDENT IN AN ORGANIZED HEALTH CARE EDUCATION/TRAINING PROGRAM

## 2023-07-26 PROCEDURE — 93308 ECHO (CUPID ONLY): ICD-10-PCS | Mod: 26,,, | Performed by: INTERNAL MEDICINE

## 2023-07-26 PROCEDURE — 63600175 PHARM REV CODE 636 W HCPCS: Performed by: NURSE PRACTITIONER

## 2023-07-26 PROCEDURE — 83036 HEMOGLOBIN GLYCOSYLATED A1C: CPT | Performed by: NURSE PRACTITIONER

## 2023-07-26 PROCEDURE — 93010 ELECTROCARDIOGRAM REPORT: CPT | Mod: ,,, | Performed by: SPECIALIST

## 2023-07-26 PROCEDURE — 93010 EKG 12-LEAD: ICD-10-PCS | Mod: ,,, | Performed by: SPECIALIST

## 2023-07-26 PROCEDURE — 99285 EMERGENCY DEPT VISIT HI MDM: CPT | Mod: 25

## 2023-07-26 PROCEDURE — 99223 1ST HOSP IP/OBS HIGH 75: CPT | Mod: ,,, | Performed by: INTERNAL MEDICINE

## 2023-07-26 PROCEDURE — 25000003 PHARM REV CODE 250: Performed by: NURSE PRACTITIONER

## 2023-07-26 PROCEDURE — 84439 ASSAY OF FREE THYROXINE: CPT | Performed by: NURSE PRACTITIONER

## 2023-07-26 PROCEDURE — 96376 TX/PRO/DX INJ SAME DRUG ADON: CPT

## 2023-07-26 PROCEDURE — 25000003 PHARM REV CODE 250: Performed by: INTERNAL MEDICINE

## 2023-07-26 PROCEDURE — 83036 HEMOGLOBIN GLYCOSYLATED A1C: CPT | Mod: 91 | Performed by: NURSE PRACTITIONER

## 2023-07-26 PROCEDURE — 84484 ASSAY OF TROPONIN QUANT: CPT | Mod: 91 | Performed by: NURSE PRACTITIONER

## 2023-07-26 PROCEDURE — 93308 TTE F-UP OR LMTD: CPT | Mod: 26,,, | Performed by: INTERNAL MEDICINE

## 2023-07-26 PROCEDURE — 85025 COMPLETE CBC W/AUTO DIFF WBC: CPT | Performed by: EMERGENCY MEDICINE

## 2023-07-26 PROCEDURE — 93005 ELECTROCARDIOGRAM TRACING: CPT | Performed by: SPECIALIST

## 2023-07-26 PROCEDURE — 25000003 PHARM REV CODE 250: Performed by: EMERGENCY MEDICINE

## 2023-07-26 PROCEDURE — 83880 ASSAY OF NATRIURETIC PEPTIDE: CPT | Performed by: EMERGENCY MEDICINE

## 2023-07-26 PROCEDURE — 84443 ASSAY THYROID STIM HORMONE: CPT | Performed by: NURSE PRACTITIONER

## 2023-07-26 PROCEDURE — 36415 COLL VENOUS BLD VENIPUNCTURE: CPT | Performed by: NURSE PRACTITIONER

## 2023-07-26 PROCEDURE — 25000003 PHARM REV CODE 250: Performed by: STUDENT IN AN ORGANIZED HEALTH CARE EDUCATION/TRAINING PROGRAM

## 2023-07-26 PROCEDURE — 83735 ASSAY OF MAGNESIUM: CPT | Performed by: EMERGENCY MEDICINE

## 2023-07-26 PROCEDURE — 99900031 HC PATIENT EDUCATION (STAT)

## 2023-07-26 PROCEDURE — 96375 TX/PRO/DX INJ NEW DRUG ADDON: CPT

## 2023-07-26 RX ORDER — VALSARTAN 80 MG/1
80 TABLET ORAL 2 TIMES DAILY
Status: DISCONTINUED | OUTPATIENT
Start: 2023-07-26 | End: 2023-07-28 | Stop reason: HOSPADM

## 2023-07-26 RX ORDER — ENOXAPARIN SODIUM 100 MG/ML
40 INJECTION SUBCUTANEOUS EVERY 24 HOURS
Status: DISCONTINUED | OUTPATIENT
Start: 2023-07-26 | End: 2023-07-26

## 2023-07-26 RX ORDER — ASPIRIN 81 MG/1
81 TABLET ORAL DAILY
Status: DISCONTINUED | OUTPATIENT
Start: 2023-07-27 | End: 2023-07-28 | Stop reason: HOSPADM

## 2023-07-26 RX ORDER — HYDRALAZINE HYDROCHLORIDE 20 MG/ML
10 INJECTION INTRAMUSCULAR; INTRAVENOUS EVERY 6 HOURS PRN
Status: DISCONTINUED | OUTPATIENT
Start: 2023-07-26 | End: 2023-07-28 | Stop reason: HOSPADM

## 2023-07-26 RX ORDER — MORPHINE SULFATE 2 MG/ML
2 INJECTION, SOLUTION INTRAMUSCULAR; INTRAVENOUS EVERY 4 HOURS PRN
Status: DISCONTINUED | OUTPATIENT
Start: 2023-07-26 | End: 2023-07-28 | Stop reason: HOSPADM

## 2023-07-26 RX ORDER — HYDROCODONE BITARTRATE AND ACETAMINOPHEN 10; 325 MG/1; MG/1
1 TABLET ORAL EVERY 6 HOURS
Status: ON HOLD | COMMUNITY
Start: 2023-07-05 | End: 2023-07-28 | Stop reason: HOSPADM

## 2023-07-26 RX ORDER — ASPIRIN 325 MG
325 TABLET ORAL
Status: COMPLETED | OUTPATIENT
Start: 2023-07-26 | End: 2023-07-26

## 2023-07-26 RX ORDER — SPIRONOLACTONE 50 MG/1
50 TABLET, FILM COATED ORAL DAILY
Status: DISCONTINUED | OUTPATIENT
Start: 2023-07-26 | End: 2023-07-28 | Stop reason: HOSPADM

## 2023-07-26 RX ORDER — OXYCODONE AND ACETAMINOPHEN 10; 325 MG/1; MG/1
1 TABLET ORAL EVERY 6 HOURS PRN
COMMUNITY
Start: 2023-07-24 | End: 2024-01-09 | Stop reason: DRUGHIGH

## 2023-07-26 RX ORDER — AMLODIPINE BESYLATE 5 MG/1
5 TABLET ORAL DAILY
Status: DISCONTINUED | OUTPATIENT
Start: 2023-07-26 | End: 2023-07-26

## 2023-07-26 RX ORDER — NITROGLYCERIN 0.4 MG/1
0.4 TABLET SUBLINGUAL EVERY 5 MIN PRN
Status: DISCONTINUED | OUTPATIENT
Start: 2023-07-26 | End: 2023-07-28 | Stop reason: HOSPADM

## 2023-07-26 RX ORDER — PROCHLORPERAZINE EDISYLATE 5 MG/ML
5 INJECTION INTRAMUSCULAR; INTRAVENOUS EVERY 6 HOURS PRN
Status: DISCONTINUED | OUTPATIENT
Start: 2023-07-26 | End: 2023-07-28 | Stop reason: HOSPADM

## 2023-07-26 RX ORDER — INSULIN ASPART 100 [IU]/ML
1-10 INJECTION, SOLUTION INTRAVENOUS; SUBCUTANEOUS EVERY 6 HOURS PRN
Status: DISCONTINUED | OUTPATIENT
Start: 2023-07-26 | End: 2023-07-28 | Stop reason: HOSPADM

## 2023-07-26 RX ORDER — ENOXAPARIN SODIUM 100 MG/ML
40 INJECTION SUBCUTANEOUS EVERY 12 HOURS
Status: DISCONTINUED | OUTPATIENT
Start: 2023-07-26 | End: 2023-07-28 | Stop reason: HOSPADM

## 2023-07-26 RX ORDER — CEFUROXIME AXETIL 500 MG/1
500 TABLET ORAL 2 TIMES DAILY
Status: ON HOLD | COMMUNITY
Start: 2023-07-18 | End: 2023-07-28 | Stop reason: HOSPADM

## 2023-07-26 RX ORDER — METOPROLOL SUCCINATE 25 MG/1
25 TABLET, EXTENDED RELEASE ORAL DAILY
Status: ON HOLD | COMMUNITY
Start: 2023-06-02 | End: 2023-07-28 | Stop reason: HOSPADM

## 2023-07-26 RX ORDER — FUROSEMIDE 20 MG/1
1 TABLET ORAL DAILY
Status: ON HOLD | COMMUNITY
Start: 2023-05-16 | End: 2023-07-28 | Stop reason: HOSPADM

## 2023-07-26 RX ORDER — HYDRALAZINE HYDROCHLORIDE 25 MG/1
37.5 TABLET, FILM COATED ORAL 3 TIMES DAILY
Status: ON HOLD | COMMUNITY
Start: 2023-07-18 | End: 2023-07-28 | Stop reason: HOSPADM

## 2023-07-26 RX ORDER — CARVEDILOL 3.12 MG/1
3.12 TABLET ORAL 2 TIMES DAILY WITH MEALS
Status: DISCONTINUED | OUTPATIENT
Start: 2023-07-26 | End: 2023-07-28 | Stop reason: HOSPADM

## 2023-07-26 RX ORDER — SPIRONOLACTONE 25 MG/1
25 TABLET ORAL DAILY
Status: DISCONTINUED | OUTPATIENT
Start: 2023-07-26 | End: 2023-07-26

## 2023-07-26 RX ORDER — ACETAMINOPHEN 325 MG/1
650 TABLET ORAL EVERY 8 HOURS PRN
Status: DISCONTINUED | OUTPATIENT
Start: 2023-07-26 | End: 2023-07-28 | Stop reason: HOSPADM

## 2023-07-26 RX ORDER — ACETAMINOPHEN 325 MG/1
650 TABLET ORAL EVERY 8 HOURS PRN
Status: DISCONTINUED | OUTPATIENT
Start: 2023-07-26 | End: 2023-07-26

## 2023-07-26 RX ORDER — INSULIN DETEMIR 100 [IU]/ML
30 INJECTION, SOLUTION SUBCUTANEOUS DAILY
COMMUNITY
Start: 2023-05-02

## 2023-07-26 RX ORDER — TRAZODONE HYDROCHLORIDE 50 MG/1
50 TABLET ORAL NIGHTLY PRN
Status: ON HOLD | COMMUNITY
Start: 2023-07-19 | End: 2023-07-28 | Stop reason: HOSPADM

## 2023-07-26 RX ORDER — FLUTICASONE PROPIONATE 50 MCG
1 SPRAY, SUSPENSION (ML) NASAL DAILY
Status: ON HOLD | COMMUNITY
Start: 2023-07-23 | End: 2023-07-28 | Stop reason: SDUPTHER

## 2023-07-26 RX ORDER — METFORMIN HYDROCHLORIDE 1000 MG/1
1000 TABLET ORAL 2 TIMES DAILY WITH MEALS
COMMUNITY
Start: 2023-05-17 | End: 2024-03-12

## 2023-07-26 RX ORDER — DAPAGLIFLOZIN 5 MG/1
5 TABLET, FILM COATED ORAL DAILY
Status: CANCELLED | OUTPATIENT
Start: 2023-07-26

## 2023-07-26 RX ORDER — ATORVASTATIN CALCIUM 40 MG/1
80 TABLET, FILM COATED ORAL NIGHTLY
Status: DISCONTINUED | OUTPATIENT
Start: 2023-07-26 | End: 2023-07-28 | Stop reason: HOSPADM

## 2023-07-26 RX ORDER — OXYCODONE AND ACETAMINOPHEN 10; 325 MG/1; MG/1
1 TABLET ORAL EVERY 6 HOURS PRN
Status: DISCONTINUED | OUTPATIENT
Start: 2023-07-26 | End: 2023-07-28 | Stop reason: HOSPADM

## 2023-07-26 RX ORDER — FINASTERIDE 5 MG/1
5 TABLET, FILM COATED ORAL DAILY
COMMUNITY
Start: 2023-06-02

## 2023-07-26 RX ORDER — LABETALOL HYDROCHLORIDE 5 MG/ML
10 INJECTION, SOLUTION INTRAVENOUS
Status: DISCONTINUED | OUTPATIENT
Start: 2023-07-26 | End: 2023-07-26

## 2023-07-26 RX ORDER — ONDANSETRON 2 MG/ML
4 INJECTION INTRAMUSCULAR; INTRAVENOUS EVERY 8 HOURS PRN
Status: DISCONTINUED | OUTPATIENT
Start: 2023-07-26 | End: 2023-07-28 | Stop reason: HOSPADM

## 2023-07-26 RX ORDER — SACUBITRIL AND VALSARTAN 24; 26 MG/1; MG/1
1 TABLET, FILM COATED ORAL 2 TIMES DAILY
Status: ON HOLD | COMMUNITY
Start: 2023-06-02 | End: 2023-07-28 | Stop reason: HOSPADM

## 2023-07-26 RX ORDER — SODIUM CHLORIDE 0.9 % (FLUSH) 0.9 %
10 SYRINGE (ML) INJECTION
Status: DISCONTINUED | OUTPATIENT
Start: 2023-07-26 | End: 2023-07-28

## 2023-07-26 RX ORDER — GABAPENTIN 800 MG/1
800 TABLET ORAL 4 TIMES DAILY
Status: DISCONTINUED | OUTPATIENT
Start: 2023-07-26 | End: 2023-07-26

## 2023-07-26 RX ORDER — AMLODIPINE BESYLATE 5 MG/1
10 TABLET ORAL DAILY
Status: DISCONTINUED | OUTPATIENT
Start: 2023-07-26 | End: 2023-07-28 | Stop reason: HOSPADM

## 2023-07-26 RX ORDER — DOXYCYCLINE 100 MG/1
100 CAPSULE ORAL 2 TIMES DAILY
Status: ON HOLD | COMMUNITY
Start: 2023-07-18 | End: 2023-07-28 | Stop reason: HOSPADM

## 2023-07-26 RX ORDER — TRAZODONE HYDROCHLORIDE 50 MG/1
50 TABLET ORAL NIGHTLY PRN
Status: DISCONTINUED | OUTPATIENT
Start: 2023-07-26 | End: 2023-07-28 | Stop reason: HOSPADM

## 2023-07-26 RX ORDER — DOXYCYCLINE HYCLATE 100 MG
100 TABLET ORAL EVERY 12 HOURS
Status: ON HOLD | COMMUNITY
Start: 2023-07-18 | End: 2023-07-28 | Stop reason: HOSPADM

## 2023-07-26 RX ORDER — TALC
6 POWDER (GRAM) TOPICAL NIGHTLY PRN
Status: DISCONTINUED | OUTPATIENT
Start: 2023-07-26 | End: 2023-07-26

## 2023-07-26 RX ORDER — FINASTERIDE 5 MG/1
5 TABLET, FILM COATED ORAL DAILY
Status: DISCONTINUED | OUTPATIENT
Start: 2023-07-26 | End: 2023-07-28 | Stop reason: HOSPADM

## 2023-07-26 RX ORDER — GLUCAGON 1 MG
1 KIT INJECTION
Status: DISCONTINUED | OUTPATIENT
Start: 2023-07-26 | End: 2023-07-28 | Stop reason: HOSPADM

## 2023-07-26 RX ORDER — NALOXONE HCL 0.4 MG/ML
0.4 VIAL (ML) INJECTION
Status: DISCONTINUED | OUTPATIENT
Start: 2023-07-26 | End: 2023-07-28 | Stop reason: HOSPADM

## 2023-07-26 RX ORDER — ACETAMINOPHEN 500 MG
1000 TABLET ORAL
Status: COMPLETED | OUTPATIENT
Start: 2023-07-26 | End: 2023-07-26

## 2023-07-26 RX ADMIN — NITROGLYCERIN 1 INCH: 20 OINTMENT TOPICAL at 02:07

## 2023-07-26 RX ADMIN — CARVEDILOL 3.12 MG: 3.12 TABLET, FILM COATED ORAL at 04:07

## 2023-07-26 RX ADMIN — HYDRALAZINE HYDROCHLORIDE 10 MG: 20 INJECTION INTRAMUSCULAR; INTRAVENOUS at 03:07

## 2023-07-26 RX ADMIN — GABAPENTIN 800 MG: 300 CAPSULE ORAL at 01:07

## 2023-07-26 RX ADMIN — ASPIRIN 325 MG: 325 TABLET ORAL at 02:07

## 2023-07-26 RX ADMIN — HYDRALAZINE HYDROCHLORIDE 10 MG: 20 INJECTION INTRAMUSCULAR; INTRAVENOUS at 11:07

## 2023-07-26 RX ADMIN — TRAZODONE HYDROCHLORIDE 50 MG: 50 TABLET ORAL at 09:07

## 2023-07-26 RX ADMIN — AMLODIPINE BESYLATE 10 MG: 5 TABLET ORAL at 04:07

## 2023-07-26 RX ADMIN — ACETAMINOPHEN 1000 MG: 500 TABLET ORAL at 02:07

## 2023-07-26 RX ADMIN — VALSARTAN 80 MG: 80 TABLET, FILM COATED ORAL at 09:07

## 2023-07-26 RX ADMIN — ENOXAPARIN SODIUM 40 MG: 100 INJECTION SUBCUTANEOUS at 10:07

## 2023-07-26 RX ADMIN — GABAPENTIN 800 MG: 300 CAPSULE ORAL at 04:07

## 2023-07-26 RX ADMIN — HYDRALAZINE HYDROCHLORIDE 10 MG: 20 INJECTION INTRAMUSCULAR; INTRAVENOUS at 05:07

## 2023-07-26 RX ADMIN — CARVEDILOL 3.12 MG: 3.12 TABLET, FILM COATED ORAL at 07:07

## 2023-07-26 RX ADMIN — MORPHINE SULFATE 2 MG: 2 INJECTION, SOLUTION INTRAMUSCULAR; INTRAVENOUS at 05:07

## 2023-07-26 RX ADMIN — MORPHINE SULFATE 2 MG: 2 INJECTION, SOLUTION INTRAMUSCULAR; INTRAVENOUS at 11:07

## 2023-07-26 RX ADMIN — GABAPENTIN 800 MG: 300 CAPSULE ORAL at 09:07

## 2023-07-26 RX ADMIN — FINASTERIDE 5 MG: 5 TABLET, FILM COATED ORAL at 03:07

## 2023-07-26 RX ADMIN — MORPHINE SULFATE 2 MG: 2 INJECTION, SOLUTION INTRAMUSCULAR; INTRAVENOUS at 07:07

## 2023-07-26 RX ADMIN — ENOXAPARIN SODIUM 40 MG: 100 INJECTION SUBCUTANEOUS at 09:07

## 2023-07-26 RX ADMIN — OXYCODONE AND ACETAMINOPHEN 1 TABLET: 10; 325 TABLET ORAL at 03:07

## 2023-07-26 RX ADMIN — SPIRONOLACTONE 50 MG: 50 TABLET ORAL at 09:07

## 2023-07-26 RX ADMIN — OXYCODONE AND ACETAMINOPHEN 1 TABLET: 10; 325 TABLET ORAL at 09:07

## 2023-07-26 RX ADMIN — ATORVASTATIN CALCIUM 80 MG: 40 TABLET, FILM COATED ORAL at 09:07

## 2023-07-26 NOTE — PHARMACY MED REC
"        Admission Medication History     The home medication history was taken by Cassia Nash.    You may go to "Admission" then "Reconcile Home Medications" tabs to review and/or act upon these items.     The home medication list has been updated by the Pharmacy department.   Please read ALL comments highlighted in yellow.   Please address this information as you see fit.    Feel free to contact us if you have any questions or require assistance.      The medications listed below were removed from the home medication list. Please reorder if appropriate:  Patient reports no longer taking the following medication(s):  Trazodone 50mg         Medications listed below were obtained from: Patient/family and [employer]- [employer]  No current facility-administered medications on file prior to encounter.     Current Outpatient Medications on File Prior to Encounter   Medication Sig Dispense Refill    amLODIPine (NORVASC) 5 MG tablet Take 1 tablet (5 mg total) by mouth once daily. 30 tablet 5    atorvastatin (LIPITOR) 80 MG tablet Take 1 tablet (80 mg total) by mouth once daily. 30 tablet 5    blood sugar diagnostic Strp To check BG 4 times daily, to use with insurance preferred meter 200 each 0    blood-glucose meter kit To check BG 4 times daily, to use with insurance preferred meter 1 each 0    carvediloL (COREG) 3.125 MG tablet Take 1 tablet (3.125 mg total) by mouth 2 (two) times daily with meals. 60 tablet 5    doxycycline (VIBRA-TABS) 100 MG tablet Take 100 mg by mouth every 12 (twelve) hours.      ENTRESTO 24-26 mg per tablet Take 1 tablet by mouth 2 (two) times daily.      furosemide (LASIX) 20 MG tablet Take 1 tablet by mouth once daily.      gabapentin (NEURONTIN) 800 MG tablet Take 800 mg by mouth 4 (four) times daily.      hydrALAZINE (APRESOLINE) 25 MG tablet Take 37.5 mg by mouth 3 (three) times daily.      lactobacillus acidophilus & bulgar (LACTINEX) 100 million cell packet Take 1 tablet by mouth 3 " (three) times daily with meals.      lancets Misc To check BG 4 times daily, to use with insurance preferred meter 200 each 0    LEVEMIR FLEXPEN 100 unit/mL (3 mL) InPn pen Inject 30 Units into the skin once daily.      metFORMIN (GLUCOPHAGE) 1000 MG tablet Take 1,000 mg by mouth 2 (two) times daily with meals.      metoprolol succinate (TOPROL-XL) 25 MG 24 hr tablet Take 25 mg by mouth once daily.      oxyCODONE-acetaminophen (PERCOCET)  mg per tablet Take 1 tablet by mouth every 6 (six) hours as needed.      [DISCONTINUED] dapagliflozin (FARXIGA) 5 mg Tab tablet Take 1 tablet (5 mg total) by mouth once daily. 30 tablet 5    albuterol (PROVENTIL/VENTOLIN HFA) 90 mcg/actuation inhaler Inhale 1-2 puffs into the lungs every 6 (six) hours as needed for Wheezing. Rescue 18 g 5    cefUROXime (CEFTIN) 500 MG tablet Take 500 mg by mouth 2 (two) times daily.      doxycycline (VIBRAMYCIN) 100 MG Cap Take 100 mg by mouth 2 (two) times daily.      finasteride (PROSCAR) 5 mg tablet Take 5 mg by mouth once daily.      fluticasone propionate (FLONASE) 50 mcg/actuation nasal spray 1 spray by Each Nostril route once daily.      HYDROcodone-acetaminophen (NORCO)  mg per tablet Take 1 tablet by mouth every 6 (six) hours.      polyethylene glycol (MIRALAX) 17 gram/dose powder Take 17 g by mouth once daily. For constipation 850 g 2    spironolactone (ALDACTONE) 25 MG tablet Take 1 tablet (25 mg total) by mouth once daily. 30 tablet 5    traZODone (DESYREL) 50 MG tablet Take 50 mg by mouth nightly as needed.         Potential issues to be addressed PRIOR TO DISCHARGE  Patient reported not taking the following medications: (Farixga 5mg , Spironlactone 25mg ). These medications remain on the home medication list. Please address accordingly.     Cassia Nash  [id number]          .

## 2023-07-26 NOTE — ASSESSMENT & PLAN NOTE
Patient's FSGs are uncontrolled due to hyperglycemia on current medication regimen.  Last A1c reviewed-   Lab Results   Component Value Date    HGBA1C 8.7 (H) 02/07/2023     Most recent fingerstick glucose reviewed- No results for input(s): POCTGLUCOSE in the last 24 hours.  Current correctional scale  moderate  Decrease anti-hyperglycemic dose as follows-   Antihyperglycemics (From admission, onward)    Start     Stop Route Frequency Ordered    07/26/23 0857  insulin aspart U-100 pen 1-10 Units         -- SubQ Every 6 hours PRN 07/26/23 0757      POCT/SSI  Hold Oral hypoglycemics while patient is in the hospital.

## 2023-07-26 NOTE — TELEPHONE ENCOUNTER
B/P is 207/105  and repeat of 199/100 HR 67  Patient is also c/o an headache and a cough.  Patient is advised as per protocol.

## 2023-07-26 NOTE — ASSESSMENT & PLAN NOTE
In setting of elevated BP. EKG and troponin unremarkable.  -Telemetry  -TTE  -Cardiology consulted  -PRN morphine and nitroglycerin  -Statin  -Coreg  -Entresto

## 2023-07-26 NOTE — ASSESSMENT & PLAN NOTE
Karrie  Limited echo  Last stress test 02/2023  Consult Cardiology  Initial troponin 8.5 we will trend x2  EKG no ST elevation

## 2023-07-26 NOTE — CARE UPDATE
07/26/23 1400   Patient Assessment/Suction   Level of Consciousness (AVPU) alert   Respiratory Effort Normal;Unlabored   PRE-TX-O2   Device (Oxygen Therapy) room air   SpO2 97 %   Pulse Oximetry Type Intermittent   $ Pulse Oximetry - Multiple Charge Pulse Oximetry - Multiple   Education   $ Education 15 min

## 2023-07-26 NOTE — SUBJECTIVE & OBJECTIVE
Past Medical History:   Diagnosis Date    Depression     Diabetes mellitus     Hypertension     Obesity     Osteomyelitis        No past surgical history on file.    Review of patient's allergies indicates:   Allergen Reactions    Adhesive Itching       No current facility-administered medications on file prior to encounter.     Current Outpatient Medications on File Prior to Encounter   Medication Sig    albuterol (PROVENTIL/VENTOLIN HFA) 90 mcg/actuation inhaler Inhale 1-2 puffs into the lungs every 6 (six) hours as needed for Wheezing. Rescue    amLODIPine (NORVASC) 5 MG tablet Take 1 tablet (5 mg total) by mouth once daily.    atorvastatin (LIPITOR) 80 MG tablet Take 1 tablet (80 mg total) by mouth once daily.    blood sugar diagnostic Strp To check BG 4 times daily, to use with insurance preferred meter    blood-glucose meter kit To check BG 4 times daily, to use with insurance preferred meter    carvediloL (COREG) 3.125 MG tablet Take 1 tablet (3.125 mg total) by mouth 2 (two) times daily with meals.    dapagliflozin (FARXIGA) 5 mg Tab tablet Take 1 tablet (5 mg total) by mouth once daily.    gabapentin (NEURONTIN) 800 MG tablet Take 800 mg by mouth 4 (four) times daily.    lactobacillus acidophilus & bulgar (LACTINEX) 100 million cell packet Take 1 tablet by mouth 3 (three) times daily with meals.    lancets Misc To check BG 4 times daily, to use with insurance preferred meter    polyethylene glycol (MIRALAX) 17 gram/dose powder Take 17 g by mouth once daily. For constipation    spironolactone (ALDACTONE) 25 MG tablet Take 1 tablet (25 mg total) by mouth once daily.     Family History    None       Tobacco Use    Smoking status: Never    Smokeless tobacco: Never   Substance and Sexual Activity    Alcohol use: Yes     Comment: occas    Drug use: Yes     Frequency: 1.0 times per week     Types: Marijuana     Comment: last use 2 days ago    Sexual activity: Not on file     Review of Systems   Constitutional:   Negative for activity change, appetite change, chills, diaphoresis, fatigue, fever and unexpected weight change.   HENT:  Negative for congestion, dental problem, facial swelling, nosebleeds, sinus pressure, sinus pain, sore throat and trouble swallowing.    Eyes:  Negative for pain and redness.   Respiratory:  Negative for apnea, cough, chest tightness, shortness of breath and wheezing.    Cardiovascular:  Positive for chest pain and leg swelling. Negative for palpitations.   Gastrointestinal:  Negative for abdominal distention, abdominal pain, anal bleeding, blood in stool, constipation, diarrhea, nausea and vomiting.   Endocrine: Negative for polyphagia and polyuria.   Genitourinary:  Negative for decreased urine volume, difficulty urinating, dysuria, frequency, hematuria and urgency.   Musculoskeletal:  Negative for back pain, gait problem, joint swelling, myalgias, neck pain and neck stiffness.   Skin:  Negative for color change, pallor, rash and wound (left diabetic foot infection).   Neurological:  Positive for headaches. Negative for dizziness, seizures, syncope, facial asymmetry, speech difficulty, weakness, light-headedness and numbness.   Psychiatric/Behavioral:  Negative for agitation, behavioral problems, confusion, hallucinations, sleep disturbance and suicidal ideas.    Objective:     Vital Signs (Most Recent):  Temp: 99.2 °F (37.3 °C) (07/26/23 0139)  Pulse: 64 (07/26/23 0301)  Resp: 16 (07/26/23 0229)  BP: (!) 197/95 (07/26/23 0301)  SpO2: 98 % (07/26/23 0301) Vital Signs (24h Range):  Temp:  [99.2 °F (37.3 °C)] 99.2 °F (37.3 °C)  Pulse:  [58-74] 64  Resp:  [16-18] 16  SpO2:  [98 %-99 %] 98 %  BP: (163-204)/(79-95) 197/95     Weight: (!) 157.4 kg (347 lb)  Body mass index is 43.37 kg/m².     Physical Exam  Vitals reviewed.   Constitutional:       General: He is not in acute distress.     Appearance: Normal appearance. He is not ill-appearing, toxic-appearing or diaphoretic.   HENT:      Head:  Normocephalic.      Right Ear: External ear normal.      Left Ear: External ear normal.      Nose: No congestion or rhinorrhea.      Mouth/Throat:      Mouth: Mucous membranes are moist.      Pharynx: Oropharynx is clear. No oropharyngeal exudate or posterior oropharyngeal erythema.   Eyes:      Extraocular Movements: Extraocular movements intact.      Conjunctiva/sclera: Conjunctivae normal.      Pupils: Pupils are equal, round, and reactive to light.   Cardiovascular:      Rate and Rhythm: Regular rhythm. Bradycardia present.      Pulses: Normal pulses.      Heart sounds: Normal heart sounds.   Pulmonary:      Effort: Pulmonary effort is normal.      Breath sounds: Normal breath sounds.   Abdominal:      General: Bowel sounds are normal.      Palpations: Abdomen is soft.   Genitourinary:     Rectum: Normal.   Musculoskeletal:         General: Normal range of motion.      Cervical back: Normal range of motion and neck supple.   Skin:     General: Skin is warm and dry.      Capillary Refill: Capillary refill takes less than 2 seconds.   Neurological:      Mental Status: He is alert and oriented to person, place, and time.   Psychiatric:         Mood and Affect: Mood normal.            CRANIAL NERVES     CN III, IV, VI   Pupils are equal, round, and reactive to light.     Significant Labs: All pertinent labs within the past 24 hours have been reviewed.  Recent Lab Results         07/26/23  0158        Albumin 3.6       Alkaline Phosphatase 28       ALT 21       Anion Gap 8       AST 21       Baso # 0.08       Basophil % 1.1       BILIRUBIN TOTAL 0.5  Comment: For infants and newborns, interpretation of results should be based  on gestational age, weight and in agreement with clinical  observations.    Premature Infant recommended reference ranges:  Up to 24 hours.............<8.0 mg/dL  Up to 48 hours............<12.0 mg/dL  3-5 days..................<15.0 mg/dL  6-29 days.................<15.0 mg/dL         BNP  93  Comment: Values of less than 100 pg/ml are consistent with non-CHF populations.       BUN 28       Calcium 8.8       Chloride 104       CO2 25       Creatinine 1.8       Differential Method Automated       eGFR 42.6       Eos # 0.3       Eosinophil % 3.6       Glucose 143       Gran # (ANC) 3.7       Gran % 48.5       Hematocrit 30.5       Hemoglobin 9.8       Immature Grans (Abs) 0.03  Comment: Mild elevation in immature granulocytes is non specific and   can be seen in a variety of conditions including stress response,   acute inflammation, trauma and pregnancy. Correlation with other   laboratory and clinical findings is essential.         Immature Granulocytes 0.4       Lymph # 3.0       Lymph % 39.7       Magnesium 1.8       MCH 29.2       MCHC 32.1       MCV 91       Mono # 0.5       Mono % 6.7       MPV 10.0       nRBC 0       Platelets 265       Potassium 4.7       PROTEIN TOTAL 6.9       RBC 3.36       RDW 14.4       Sodium 137       Troponin I High Sensitivity 8.5  Comment: Troponin results differ between methods. Do not use   results between Troponin methods interchangeably.    Alkaline Phospatase levels above 400 U/L may   cause false positive results.    Access hsTnI should not be used for patients taking   Asfotase kaden (Strensiq).         WBC 7.59               Significant Imaging: I have reviewed all pertinent imaging results/findings within the past 24 hours.

## 2023-07-26 NOTE — SUBJECTIVE & OBJECTIVE
"Interval History: see "Hospital Course"    Review of Systems   Respiratory:  Positive for shortness of breath.    Cardiovascular:  Positive for chest pain.   Skin:  Positive for wound.   Allergic/Immunologic: Positive for immunocompromised state.   Objective:     Vital Signs (Most Recent):  Temp: 98 °F (36.7 °C) (07/26/23 1124)  Pulse: (!) 56 (07/26/23 1242)  Resp: 17 (07/26/23 1124)  BP: (!) 178/87 (07/26/23 1242)  SpO2: 97 % (07/26/23 1400) Vital Signs (24h Range):  Temp:  [98 °F (36.7 °C)-99.2 °F (37.3 °C)] 98 °F (36.7 °C)  Pulse:  [56-74] 56  Resp:  [16-18] 17  SpO2:  [95 %-100 %] 97 %  BP: (153-204)/(72-95) 178/87     Weight: (!) 158.8 kg (350 lb 3.2 oz)  Body mass index is 43.77 kg/m².    Intake/Output Summary (Last 24 hours) at 7/26/2023 1447  Last data filed at 7/26/2023 1407  Gross per 24 hour   Intake 600 ml   Output 1620 ml   Net -1020 ml         Physical Exam  Vitals and nursing note reviewed.   Constitutional:       General: He is not in acute distress.     Appearance: He is obese.   HENT:      Head: Normocephalic and atraumatic.      Right Ear: External ear normal.      Left Ear: External ear normal.      Nose: Nose normal.      Mouth/Throat:      Mouth: Mucous membranes are moist.      Pharynx: Oropharynx is clear.   Eyes:      Extraocular Movements: Extraocular movements intact.   Cardiovascular:      Rate and Rhythm: Normal rate and regular rhythm.      Pulses: Normal pulses.      Heart sounds: Normal heart sounds.   Pulmonary:      Effort: Pulmonary effort is normal.      Breath sounds: Normal breath sounds.   Abdominal:      General: Bowel sounds are normal.      Palpations: Abdomen is soft.   Musculoskeletal:         General: Normal range of motion.      Cervical back: Normal range of motion and neck supple.   Skin:     General: Skin is warm and dry.      Comments: LLE dressed.   Neurological:      Mental Status: He is alert. Mental status is at baseline.   Psychiatric:         Mood and Affect: " Mood normal.         Behavior: Behavior normal.           Significant Labs: All pertinent labs within the past 24 hours have been reviewed.    Significant Imaging: I have reviewed all pertinent imaging results/findings within the past 24 hours.

## 2023-07-26 NOTE — ASSESSMENT & PLAN NOTE
Left foot diabetic ulcer followed by outpatient Podiatry (Dr. Dawson).  -Podiatry consulted  -Wound Care consulted

## 2023-07-26 NOTE — PLAN OF CARE
Problem: Adult Inpatient Plan of Care  Goal: Plan of Care Review  Outcome: Ongoing, Progressing  Goal: Patient-Specific Goal (Individualized)  Outcome: Ongoing, Progressing  Goal: Absence of Hospital-Acquired Illness or Injury  Outcome: Ongoing, Progressing  Goal: Optimal Comfort and Wellbeing  Outcome: Ongoing, Progressing  Goal: Readiness for Transition of Care  Outcome: Ongoing, Progressing     Problem: Bariatric Environmental Safety  Goal: Safety Maintained with Care  Outcome: Ongoing, Progressing     Problem: Diabetes Comorbidity  Goal: Blood Glucose Level Within Targeted Range  Outcome: Ongoing, Progressing     Problem: Fluid Imbalance (Pneumonia)  Goal: Fluid Balance  Outcome: Ongoing, Progressing     Problem: Infection (Pneumonia)  Goal: Resolution of Infection Signs and Symptoms  Outcome: Ongoing, Progressing     Problem: Respiratory Compromise (Pneumonia)  Goal: Effective Oxygenation and Ventilation  Outcome: Ongoing, Progressing

## 2023-07-26 NOTE — NURSING
[facility]  Wound Care    Patient Name:  Abel Gibbs   MRN:  [id number]  Date: 7/26/2023  Diagnosis: Chest pain    History:     Past Medical History:   Diagnosis Date    Depression     Diabetes mellitus     Hypertension     Obesity     Osteomyelitis        Social History     Socioeconomic History    Marital status: Single   Tobacco Use    Smoking status: Never    Smokeless tobacco: Never   Substance and Sexual Activity    Alcohol use: Yes     Comment: occas    Drug use: Yes     Frequency: 1.0 times per week     Types: Marijuana     Comment: last use 2 days ago     Social Determinants of Health     Financial Resource Strain: Unknown    Difficulty of Paying Living Expenses: Patient refused   Food Insecurity: Unknown    Worried About Running Out of Food in the Last Year: Patient refused    Ran Out of Food in the Last Year: Patient refused   Transportation Needs: Unknown    Lack of Transportation (Medical): Patient refused    Lack of Transportation (Non-Medical): Patient refused   Physical Activity: Unknown    Days of Exercise per Week: Patient refused    Minutes of Exercise per Session: Patient refused   Stress: Unknown    Feeling of Stress : Patient refused   Social Connections: Unknown    Frequency of Communication with Friends and Family: Patient refused    Frequency of Social Gatherings with Friends and Family: Patient refused    Attends Restoration Services: Patient refused    Active Member of Clubs or Organizations: Patient refused    Attends Club or Organization Meetings: Patient refused    Marital Status: Patient refused   Housing Stability: Unknown    Unable to Pay for Housing in the Last Year: Patient refused    Unstable Housing in the Last Year: Patient refused       Precautions:     Allergies as of 07/26/2023 - Reviewed 07/26/2023   Allergen Reaction Noted    Adhesive Itching 10/22/2018       [facility] Assessment Details/Treatment   07/26/2023  58 yr old male  awake and alert  in bed   Left foot  with football wrap on  it is to be changed tomorrow  by outpt wound care   Right foot with scars of surgery   Will notify Dr Caba in the am of pt being here and get orders for the dressing change    No other needs at this time

## 2023-07-26 NOTE — Clinical Note
45 ml of contrast were injected throughout the case. 40 mL of contrast was the total wasted during the case. 85 mL was the total amount used during the case.

## 2023-07-26 NOTE — ASSESSMENT & PLAN NOTE
Body mass index is 43.77 kg/m². Morbid obesity complicates all aspects of disease management from diagnostic modalities to treatment.

## 2023-07-26 NOTE — ASSESSMENT & PLAN NOTE
Patient's FSGs are uncontrolled due to hyperglycemia on current medication regimen.  Last A1c reviewed-   Lab Results   Component Value Date    HGBA1C 8.7 (H) 02/07/2023     Most recent fingerstick glucose reviewed- No results for input(s): POCTGLUCOSE in the last 24 hours.  Current correctional scale  Low  Decrease anti-hyperglycemic dose as follows-   Antihyperglycemics (From admission, onward)    None      POCT/SSI  Hold Oral hypoglycemics while patient is in the hospital.  Check A1c

## 2023-07-26 NOTE — PLAN OF CARE
Patient transfer from ED via stretcher to room 3007 with transporter. Tele in place. Initial assessment completed and documented. B/P elevated; prn hydralazine administered per order. Condition stable. S/P x 8 days; LLE Sx. Patient reports that drsg is to stay in place.

## 2023-07-26 NOTE — HPI
Patient is a 58-year-old male with history of HTN, DM, CHF, diabetic foot ulcer, right foot osteomyeltis, depression and obesity.  He presents to the ED by EMS with complaints of chest pressure 7/10, subjective blood pressure 200/100 at home and frontal headache.  EMS he was nitroglycerin tab to help with chest pressure.  On arrival patient's blood pressure systolic 200, chest pain 6/10 and reports increased shortness of breath.  On assessment in ED initial troponin 8.5, Mag 1.8, creatinine 1.8.  P.o. aspirin, labs, EKG ordered.    On assessment patient is alert, oriented, reports chest pressure 6/10 and has improved with the nitro paste.  He reports his shortness of breath has improved since his blood pressure have reduced.  He reports at home subjective blood pressure greater than 200 S started around 3 or 4:00 p.m. he reports chest pressure radiating to left arm, frontal headache and eye pain.  He reports a PCP appointment on Thursday at 9:00 a.m. he reports being treated for diabetic foot infection/osteomyelitis with antibiotics and is followed podiatrist by Dr. Dawson outpatient.  He denies lightheadedness, dizziness, diaphoresis, nausea, vomiting.  He denies smoking, illicit drugs, alcohol.    Hospitalist asked to admit for , chest pain, hypertension.    Summary echo 02/2023  The left ventricle is normal in size with moderate concentric hypertrophy and normal systolic function.  The estimated ejection fraction is 65%.  Normal left ventricular diastolic function.  Moderate mitral regurgitation.  Mild tricuspid regurgitation.  Moderate left atrial enlargement.  Mild right atrial enlargement.  Normal right ventricular size with normal right ventricular systolic function.  Normal central venous pressure (3 mmHg).  Atrial fibrillation not observed.EF 65%      Conclusion initial stress test 02/09/2023    The ECG portion of the study is negative for ischemia.    The patient reported no chest pain during the stress  test.    There were no arrhythmias during stress.    The nuclear portion of this study will be reported separately.

## 2023-07-26 NOTE — PLAN OF CARE
[facility]  Initial Discharge Assessment       Primary Care Provider: Rupinder Pagan MD    Admission Diagnosis: Hypertension, unspecified type [I10]    Admission Date: 7/26/2023  Expected Discharge Date: 7/27/2023     met with Pt at bedside to complete discharge assessment. Pt AAOx4s. Demographics, PCP, and insurance verified. No home health. No dialysis. Pt reports ability to complete ADLs without assistance. Pt verbalized plan to discharge home via family transport. Pt requested a bariatric rollator upon discharge. Pt has no other needs to be addressed at this time.    Transition of Care Barriers: None    Payor: HU[employer] MANAGED MEDICARE / Plan: [employer] HMO PPO SPECIAL NEEDS / Product Type: Medicare Advantage /     Extended Emergency Contact Information  Primary Emergency Contact: Abel Gibbs Jr   [place]  Mobile Phone: [phone]  Relation: Son  Secondary Emergency Contact: Nga Owens   [facility]  Home Phone: [phone]  Mobile Phone: [phone]  Relation: Other    Discharge Plan A: Home  Discharge Plan B: Home      [id number] - [address] AT [facility] & [address] [address]  Phone: [phone] Fax: [phone]      Initial Assessment (most recent)       Adult Discharge Assessment - 07/26/23 1146          Discharge Assessment    Assessment Type Discharge Planning Assessment     Confirmed/corrected address, phone number and insurance Yes     Confirmed Demographics Correct on Facesheet     Source of Information patient     Does patient/caregiver understand observation status Yes     Communicated JONATHAN with patient/caregiver Yes     Reason For Admission Chest pain     People in Home alone     Facility Arrived From: Home     Do you expect to return to your current living situation? Yes     Do you have help at home or someone to help you manage your care at home? Yes     Who  are your caregiver(s) and their phone number(s)? SaiAbel Jr (Son)   [phone] (Mobile)     Prior to hospitilization cognitive status: Alert/Oriented     Current cognitive status: Alert/Oriented     Walking or Climbing Stairs ambulation difficulty, requires equipment;stair climbing difficulty, requires equipment;transferring difficulty, requires equipment     Mobility Management Pt verbalized     Home Accessibility wheelchair accessible     Home Layout Able to live on 1st floor     Equipment Currently Used at Home none     Readmission within 30 days? No     Patient currently being followed by outpatient case management? No     Do you currently have service(s) that help you manage your care at home? No     Do you take prescription medications? Yes     Do you have prescription coverage? Yes     Coverage Payor:  HU[employer] MANAGED MEDICARE - [facility] [facility] PPO SPECIAL NEEDS     Do you have any problems affording any of your prescribed medications? No     Is the patient taking medications as prescribed? yes     Who is going to help you get home at discharge? SaiAbel Jr (Son)   [phone] (Mobile)     How do you get to doctors appointments? car, drives self     Are you on dialysis? No     Do you take coumadin? No     Discharge Plan A Home     Discharge Plan B Home     DME Needed Upon Discharge  rollator     Discharge Plan discussed with: Patient     Transition of Care Barriers None

## 2023-07-26 NOTE — ASSESSMENT & PLAN NOTE
Patient is identified as having diastolic heart failure that is Chronic. CHF is currently controlled. Latest ECHO performed and demonstrates- Results for orders placed during the hospital encounter of 02/07/23    Echo    Interpretation Summary  · The left ventricle is normal in size with moderate concentric hypertrophy and normal systolic function.  · The estimated ejection fraction is 65%.  · Normal left ventricular diastolic function.  · Moderate mitral regurgitation.  · Mild tricuspid regurgitation.  · Moderate left atrial enlargement.  · Mild right atrial enlargement.  · Normal right ventricular size with normal right ventricular systolic function.  · Normal central venous pressure (3 mmHg).  · Atrial fibrillation not observed.  . Continue Beta Blocker and ACE/ARB and monitor clinical status closely. Monitor on telemetry. Patient is on CHF pathway.  Monitor strict Is&Os and daily weights.  Place on fluid restriction of 1.5 L. Cardiology has been consulted. Continue to stress to patient importance of self efficacy and  on diet for CHF. Last BNP reviewed- and noted below   Recent Labs   Lab 07/26/23  0158   BNP 93   .  No CHF exacerbation/chronic  Echo pending

## 2023-07-26 NOTE — CONSULTS
[facility]  Cardiology  Consult Note    Patient Name: Abel Gibbs  MRN: [id number]  Admission Date: 7/26/2023  Hospital Length of Stay: 0 days  Code Status: Full Code   Attending Provider: Darius Qiu MD   Consulting Provider: Alek Greenwood MD  Primary Care Physician: Rupinder Pagan MD  Principal Problem:Chest pain    Patient information was obtained from patient and ER records.     Consults  Subjective:     58-year-old male with history of mildly abnormal stress test in February admitted with chest discomfort which was ongoing for past few days.  EKG does not show any new ischemic changes.  Reports intermittent compliance with antihypertensives.  Blood pressure severely elevated greater than 200 on admission.  Chest pain improved with nitro but showed blood pressure.  Echo this morning shows normal EF.  Also has chronic kidney disease.  Troponin negative.  Symptoms resolved this morning and has not had any more chest pain this morning.    Past Medical History:   Diagnosis Date    Depression     Diabetes mellitus     Hypertension     Obesity     Osteomyelitis        No past surgical history on file.    Review of patient's allergies indicates:   Allergen Reactions    Adhesive Itching       No current facility-administered medications on file prior to encounter.     Current Outpatient Medications on File Prior to Encounter   Medication Sig    amLODIPine (NORVASC) 5 MG tablet Take 1 tablet (5 mg total) by mouth once daily.    atorvastatin (LIPITOR) 80 MG tablet Take 1 tablet (80 mg total) by mouth once daily.    blood sugar diagnostic Strp To check BG 4 times daily, to use with insurance preferred meter    blood-glucose meter kit To check BG 4 times daily, to use with insurance preferred meter    carvediloL (COREG) 3.125 MG tablet Take 1 tablet (3.125 mg total) by mouth 2 (two) times daily with meals.    doxycycline (VIBRA-TABS) 100 MG tablet Take 100 mg by mouth every 12 (twelve) hours.     ENTRESTO 24-26 mg per tablet Take 1 tablet by mouth 2 (two) times daily.    furosemide (LASIX) 20 MG tablet Take 1 tablet by mouth once daily.    gabapentin (NEURONTIN) 800 MG tablet Take 800 mg by mouth 4 (four) times daily.    hydrALAZINE (APRESOLINE) 25 MG tablet Take 37.5 mg by mouth 3 (three) times daily.    lactobacillus acidophilus & bulgar (LACTINEX) 100 million cell packet Take 1 tablet by mouth 3 (three) times daily with meals.    lancets Misc To check BG 4 times daily, to use with insurance preferred meter    LEVEMIR FLEXPEN 100 unit/mL (3 mL) InPn pen Inject 30 Units into the skin once daily.    metFORMIN (GLUCOPHAGE) 1000 MG tablet Take 1,000 mg by mouth 2 (two) times daily with meals.    metoprolol succinate (TOPROL-XL) 25 MG 24 hr tablet Take 25 mg by mouth once daily.    oxyCODONE-acetaminophen (PERCOCET)  mg per tablet Take 1 tablet by mouth every 6 (six) hours as needed.    [DISCONTINUED] dapagliflozin (FARXIGA) 5 mg Tab tablet Take 1 tablet (5 mg total) by mouth once daily.    albuterol (PROVENTIL/VENTOLIN HFA) 90 mcg/actuation inhaler Inhale 1-2 puffs into the lungs every 6 (six) hours as needed for Wheezing. Rescue    cefUROXime (CEFTIN) 500 MG tablet Take 500 mg by mouth 2 (two) times daily.    doxycycline (VIBRAMYCIN) 100 MG Cap Take 100 mg by mouth 2 (two) times daily.    finasteride (PROSCAR) 5 mg tablet Take 5 mg by mouth once daily.    fluticasone propionate (FLONASE) 50 mcg/actuation nasal spray 1 spray by Each Nostril route once daily.    HYDROcodone-acetaminophen (NORCO)  mg per tablet Take 1 tablet by mouth every 6 (six) hours.    polyethylene glycol (MIRALAX) 17 gram/dose powder Take 17 g by mouth once daily. For constipation    spironolactone (ALDACTONE) 25 MG tablet Take 1 tablet (25 mg total) by mouth once daily.    traZODone (DESYREL) 50 MG tablet Take 50 mg by mouth nightly as needed.     Family History    None       Tobacco Use    Smoking status: Never    Smokeless  tobacco: Never   Substance and Sexual Activity    Alcohol use: Yes     Comment: occas    Drug use: Yes     Frequency: 1.0 times per week     Types: Marijuana     Comment: last use 2 days ago    Sexual activity: Not on file     Review of Systems   All other systems reviewed and are negative.  Objective:     Vital Signs (Most Recent):  Temp: 98 °F (36.7 °C) (07/26/23 1522)  Pulse: (!) 56 (07/26/23 1522)  Resp: 18 (07/26/23 1522)  BP: (!) 212/101 (07/26/23 1522)  SpO2: 97 % (07/26/23 1522) Vital Signs (24h Range):  Temp:  [98 °F (36.7 °C)-99.2 °F (37.3 °C)] 98 °F (36.7 °C)  Pulse:  [56-74] 56  Resp:  [16-18] 18  SpO2:  [95 %-100 %] 97 %  BP: (153-212)/() 212/101     Weight: (!) 158.8 kg (350 lb 3.2 oz)  Body mass index is 43.77 kg/m².    SpO2: 97 %         Intake/Output Summary (Last 24 hours) at 7/26/2023 1546  Last data filed at 7/26/2023 1407  Gross per 24 hour   Intake 600 ml   Output 1620 ml   Net -1020 ml       Lines/Drains/Airways       Peripheral Intravenous Line  Duration                  Peripheral IV - Single Lumen 07/26/23 0405 20 G Left;Posterior Forearm <1 day                    Physical Exam  Vitals reviewed.   Constitutional:       Appearance: Normal appearance.   HENT:      Mouth/Throat:      Mouth: Mucous membranes are moist.   Eyes:      Extraocular Movements: Extraocular movements intact.      Pupils: Pupils are equal, round, and reactive to light.   Cardiovascular:      Rate and Rhythm: Normal rate and regular rhythm.      Heart sounds: No murmur heard.    No gallop.   Pulmonary:      Effort: Pulmonary effort is normal.      Breath sounds: Normal breath sounds.   Abdominal:      General: Abdomen is flat.      Palpations: Abdomen is soft.   Musculoskeletal:      Cervical back: Normal range of motion.   Skin:     General: Skin is warm and dry.   Neurological:      General: No focal deficit present.      Mental Status: He is alert and oriented to person, place, and time.   Psychiatric:          Mood and Affect: Mood normal.       Significant Labs: BMP:   Recent Labs   Lab 07/26/23  0158   *      K 4.7      CO2 25   BUN 28*   CREATININE 1.8*   CALCIUM 8.8   MG 1.8    and CBC   Recent Labs   Lab 07/26/23  0158   WBC 7.59   HGB 9.8*   HCT 30.5*          Significant Imaging: Echocardiogram: 2D echo with color flow doppler: No results found for this or any previous visit. and Transthoracic echo (TTE) complete (Cupid Only):   Results for orders placed or performed during the hospital encounter of 07/26/23   Echo   Result Value Ref Range    BSA 2.9 m2    TDI SEPTAL 0.09 m/s    LV LATERAL E/E' RATIO 11.00 m/s    LV SEPTAL E/E' RATIO 13.44 m/s    TDI LATERAL 0.11 m/s    LVIDd 5.32 3.5 - 6.0 cm    IVS 1.12 (A) 0.6 - 1.1 cm    Posterior Wall 1.24 (A) 0.6 - 1.1 cm    LVIDs 2.98 2.1 - 4.0 cm    FS 44 28 - 44 %    LV mass 252.21 g    Left Ventricle Relative Wall Thickness 0.47 cm    E/A ratio 2.57     Mean e' 0.10 m/s    E wave deceleration time 190.00 msec    E/E' ratio 12.10 m/s    MV Peak E Fabiano 1.21 m/s    TR Max Fabiano 2.44 m/s    MV Peak A Fabiano 0.47 m/s    LV Systolic Volume 34.40 mL    LV Systolic Volume Index 12.4 mL/m2    LV Diastolic Volume 137.00 mL    LV Diastolic Volume Index 49.28 mL/m2    LV Mass Index 91 g/m2    Triscuspid Valve Regurgitation Peak Gradient 24 mmHg    Right Atrial Pressure (from IVC) 3 mmHg    EF 65 %    TV rest pulmonary artery pressure 27 mmHg    Narrative    · The left ventricle is normal in size with mild concentric hypertrophy   and normal systolic function.  · The estimated ejection fraction is 65%.  · Normal left ventricular diastolic function.  · Normal right ventricular size with normal right ventricular systolic   function.  · Mild mitral regurgitation.  · Mild tricuspid regurgitation.  · Normal central venous pressure (3 mmHg).  · The estimated PA systolic pressure is 27 mmHg.        Assessment and Plan:     Chest pain, could be due to CAD versus  hypertension  Uncontrolled hypertension/hypertensive urgency  Abnormal stress test in February, no significant reversibility  Preserved EF  CKD    Recommendations:  - will need to re-evaluate patient's angina once blood pressure is better controlled.  Patient reports intermittent compliance with antihypertensives.  Patient educated on complete compliance with medications.  Will stop Entresto given normal EF and switch the patient to valsartan twice a day.  Continue with Coreg, spironolactone.  Will add amlodipine at 10 mg.  Monitor blood pressures overnight.  Ambulate patient this evening and tomorrow.  If blood pressure well-controlled tomorrow and no angina on ambulation this evening and tomorrow, he can be discharged with further follow-up in the clinic.  He will need sublingual nitroglycerin prior to discharge.  Will start the patient on baby aspirin.  Angiography can be entertained on the patient if he has recurrent symptoms but there are concerns of noncompliance as well as chronic kidney disease.    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 [L97.523] 02/10/2023 Yes     Chronic    Severe obesity (BMI >= 40) [E66.01] 02/08/2023 Yes    Type 2 diabetes mellitus [E11.9] 03/09/2022 Yes     Chronic    Hypertension [I10] 12/30/2020 Yes     Chronic    Peripheral vascular disease in type 2 diabetes mellitus [E10.51] 12/30/2020 Yes      Problems Resolved During this Admission:       VTE Risk Mitigation (From admission, onward)           Ordered     enoxaparin injection 40 mg  Every 12 hours         07/26/23 0804     IP VTE HIGH RISK PATIENT  Once         07/26/23 0323     Place sequential compression device  Until  discontinued         07/26/23 0323                    Thank you for your consult. I will follow-up with patient. Please contact us if you have any additional questions.    Alek Greenwood MD  Cardiology   [facility]

## 2023-07-26 NOTE — PROGRESS NOTES
[facility] Medicine  Progress Note    Patient Name: Abel Gibbs  MRN: [id number]  Patient Class: OP- Observation   Admission Date: 7/26/2023  Length of Stay: 0 days  Attending Physician: Darius Qiu MD  Primary Care Provider: Rupinder Pagan MD        Subjective:     Principal Problem:Chest pain        HPI:  Patient is a 58-year-old male with history of HTN, DM, CHF, diabetic foot ulcer, right foot osteomyeltis, depression and obesity.  He presents to the ED by EMS with complaints of chest pressure 7/10, subjective blood pressure 200/100 at home and frontal headache.  EMS he was nitroglycerin tab to help with chest pressure.  On arrival patient's blood pressure systolic 200, chest pain 6/10 and reports increased shortness of breath.  On assessment in ED initial troponin 8.5, Mag 1.8, creatinine 1.8.  P.o. aspirin, labs, EKG ordered.    On assessment patient is alert, oriented, reports chest pressure 6/10 and has improved with the nitro paste.  He reports his shortness of breath has improved since his blood pressure have reduced.  He reports at home subjective blood pressure greater than 200 S started around 3 or 4:00 p.m. he reports chest pressure radiating to left arm, frontal headache and eye pain.  He reports a PCP appointment on Thursday at 9:00 a.m. he reports being treated for diabetic foot infection/osteomyelitis with antibiotics and is followed podiatrist by Dr. Dawson outpatient.  He denies lightheadedness, dizziness, diaphoresis, nausea, vomiting.  He denies smoking, illicit drugs, alcohol.    Hospitalist asked to admit for , chest pain, hypertension.    Summary echo 02/2023   The left ventricle is normal in size with moderate concentric hypertrophy and normal systolic function.   The estimated ejection fraction is 65%.   Normal left ventricular diastolic function.   Moderate mitral regurgitation.   Mild tricuspid regurgitation.   Moderate left atrial  "enlargement.   Mild right atrial enlargement.   Normal right ventricular size with normal right ventricular systolic function.   Normal central venous pressure (3 mmHg).   Atrial fibrillation not observed.EF 65%      Conclusion initial stress test 02/09/2023    The ECG portion of the study is negative for ischemia.    The patient reported no chest pain during the stress test.    There were no arrhythmias during stress.    The nuclear portion of this study will be reported separately.      Overview/Hospital Course:  Abel Gibbs is a 58 year old male with a past medical history of DM, HTN, HLD, obesity, anemia and CKD who presented with chest pain in the setting of hypertensive urgency. Troponin is unremarkable thus far and being trended. TTE is pending. Home Aldactone has been increased. Cardiology has been consulted. Podiatry has been consulted as well for a chronic left foot ulceration.      Interval History: see "Hospital Course"    Review of Systems   Respiratory:  Positive for shortness of breath.    Cardiovascular:  Positive for chest pain.   Skin:  Positive for wound.   Allergic/Immunologic: Positive for immunocompromised state.   Objective:     Vital Signs (Most Recent):  Temp: 98 °F (36.7 °C) (07/26/23 1124)  Pulse: (!) 56 (07/26/23 1242)  Resp: 17 (07/26/23 1124)  BP: (!) 178/87 (07/26/23 1242)  SpO2: 97 % (07/26/23 1400) Vital Signs (24h Range):  Temp:  [98 °F (36.7 °C)-99.2 °F (37.3 °C)] 98 °F (36.7 °C)  Pulse:  [56-74] 56  Resp:  [16-18] 17  SpO2:  [95 %-100 %] 97 %  BP: (153-204)/(72-95) 178/87     Weight: (!) 158.8 kg (350 lb 3.2 oz)  Body mass index is 43.77 kg/m².    Intake/Output Summary (Last 24 hours) at 7/26/2023 1447  Last data filed at 7/26/2023 1407  Gross per 24 hour   Intake 600 ml   Output 1620 ml   Net -1020 ml         Physical Exam  Vitals and nursing note reviewed.   Constitutional:       General: He is not in acute distress.     Appearance: He is obese.   HENT:      Head: " Normocephalic and atraumatic.      Right Ear: External ear normal.      Left Ear: External ear normal.      Nose: Nose normal.      Mouth/Throat:      Mouth: Mucous membranes are moist.      Pharynx: Oropharynx is clear.   Eyes:      Extraocular Movements: Extraocular movements intact.   Cardiovascular:      Rate and Rhythm: Normal rate and regular rhythm.      Pulses: Normal pulses.      Heart sounds: Normal heart sounds.   Pulmonary:      Effort: Pulmonary effort is normal.      Breath sounds: Normal breath sounds.   Abdominal:      General: Bowel sounds are normal.      Palpations: Abdomen is soft.   Musculoskeletal:         General: Normal range of motion.      Cervical back: Normal range of motion and neck supple.   Skin:     General: Skin is warm and dry.      Comments: LLE dressed.   Neurological:      Mental Status: He is alert. Mental status is at baseline.   Psychiatric:         Mood and Affect: Mood normal.         Behavior: Behavior normal.           Significant Labs: All pertinent labs within the past 24 hours have been reviewed.    Significant Imaging: I have reviewed all pertinent imaging results/findings within the past 24 hours.      Assessment/Plan:      * Chest pain  In setting of elevated BP. EKG and troponin unremarkable.  -Telemetry  -TTE  -Cardiology consulted  -PRN morphine and nitroglycerin  -Statin  -Coreg  -Entresto    Anemia  Stable.  -Trend Hgb with CBC      CKD (chronic kidney disease)  Stable.  -Renally dose medications  -Avoid nephrotoxic agents      BPH (benign prostatic hyperplasia)  -[place]ide      HLD (hyperlipidemia)  -Continue statin      Ulcer of left foot with necrosis of muscle  Left foot diabetic ulcer followed by outpatient Podiatry (Dr. Dawson).  -Podiatry consulted  -Wound Care consulted    Severe obesity (BMI >= 40)  Body mass index is 43.77 kg/m². Morbid obesity complicates all aspects of disease management from diagnostic modalities to treatment.          Type 2  diabetes mellitus  Patient's FSGs are uncontrolled due to hyperglycemia on current medication regimen.  Last A1c reviewed-   Lab Results   Component Value Date    HGBA1C 8.7 (H) 02/07/2023     Most recent fingerstick glucose reviewed- No results for input(s): POCTGLUCOSE in the last 24 hours.  Current correctional scale  moderate  Decrease anti-hyperglycemic dose as follows-   Antihyperglycemics (From admission, onward)    Start     Stop Route Frequency Ordered    07/26/23 0857  insulin aspart U-100 pen 1-10 Units         -- SubQ Every 6 hours PRN 07/26/23 0757      POCT/SSI  Hold Oral hypoglycemics while patient is in the hospital.    Hypertension  -Coreg 3.125 BID  -Aldactone 50  -Entresto 24-26 BID  -PRN IV hydralazine      Peripheral vascular disease in type 2 diabetes mellitus  Chronic.  -Podiatry consulted      VTE Risk Mitigation (From admission, onward)         Ordered     enoxaparin injection 40 mg  Every 12 hours         07/26/23 0804     IP VTE HIGH RISK PATIENT  Once         07/26/23 0323     Place sequential compression device  Until discontinued         07/26/23 0323                Discharge Planning   JONATHAN: 7/27/2023     Code Status: Full Code   Is the patient medically ready for discharge?:     Reason for patient still in hospital (select all that apply): Patient trending condition, Laboratory test, Treatment, Imaging, Consult recommendations and Pending disposition  Discharge Plan A: Home                  Darius Qiu MD  Department of Hospital Medicine   [facility]

## 2023-07-26 NOTE — HOSPITAL COURSE
Abel Gibbs is a 58 year old male with a past medical history of DM, HTN, HLD, obesity, anemia and CKD who presented with chest pain in the setting of hypertensive urgency. Troponin is unremarkable thus far and being trended. TTE is unremarkable. Home Aldactone has been increased. Cardiology has been consulted and has started the patient on Coreg, hydralazine, amlodipine and valsartan with control of the BP as of 7/28. Coronary angiogram was performed 7/28 and unremarkable. Podiatry has been consulted as well for a chronic left foot ulceration. He was discharged 7/28/2023 and will follow up with his PCP in the outpatient setting.

## 2023-07-26 NOTE — ASSESSMENT & PLAN NOTE
Left foot diabetic ulcer followed by outpatient podiatry Dr. Dawson  Consider consulting podiatry and wound care if patient is in hospital longer than 24 hours

## 2023-07-26 NOTE — H&P (VIEW-ONLY)
[facility]  Cardiology  Consult Note    Patient Name: Abel Gibbs  MRN: [id number]  Admission Date: 7/26/2023  Hospital Length of Stay: 0 days  Code Status: Full Code   Attending Provider: Darius Qiu MD   Consulting Provider: Alek Greenwood MD  Primary Care Physician: Rupinder Pagan MD  Principal Problem:Chest pain    Patient information was obtained from patient and ER records.     Consults  Subjective:     58-year-old male with history of mildly abnormal stress test in February admitted with chest discomfort which was ongoing for past few days.  EKG does not show any new ischemic changes.  Reports intermittent compliance with antihypertensives.  Blood pressure severely elevated greater than 200 on admission.  Chest pain improved with nitro but showed blood pressure.  Echo this morning shows normal EF.  Also has chronic kidney disease.  Troponin negative.  Symptoms resolved this morning and has not had any more chest pain this morning.    Past Medical History:   Diagnosis Date    Depression     Diabetes mellitus     Hypertension     Obesity     Osteomyelitis        No past surgical history on file.    Review of patient's allergies indicates:   Allergen Reactions    Adhesive Itching       No current facility-administered medications on file prior to encounter.     Current Outpatient Medications on File Prior to Encounter   Medication Sig    amLODIPine (NORVASC) 5 MG tablet Take 1 tablet (5 mg total) by mouth once daily.    atorvastatin (LIPITOR) 80 MG tablet Take 1 tablet (80 mg total) by mouth once daily.    blood sugar diagnostic Strp To check BG 4 times daily, to use with insurance preferred meter    blood-glucose meter kit To check BG 4 times daily, to use with insurance preferred meter    carvediloL (COREG) 3.125 MG tablet Take 1 tablet (3.125 mg total) by mouth 2 (two) times daily with meals.    doxycycline (VIBRA-TABS) 100 MG tablet Take 100 mg by mouth every 12 (twelve) hours.     ENTRESTO 24-26 mg per tablet Take 1 tablet by mouth 2 (two) times daily.    furosemide (LASIX) 20 MG tablet Take 1 tablet by mouth once daily.    gabapentin (NEURONTIN) 800 MG tablet Take 800 mg by mouth 4 (four) times daily.    hydrALAZINE (APRESOLINE) 25 MG tablet Take 37.5 mg by mouth 3 (three) times daily.    lactobacillus acidophilus & bulgar (LACTINEX) 100 million cell packet Take 1 tablet by mouth 3 (three) times daily with meals.    lancets Misc To check BG 4 times daily, to use with insurance preferred meter    LEVEMIR FLEXPEN 100 unit/mL (3 mL) InPn pen Inject 30 Units into the skin once daily.    metFORMIN (GLUCOPHAGE) 1000 MG tablet Take 1,000 mg by mouth 2 (two) times daily with meals.    metoprolol succinate (TOPROL-XL) 25 MG 24 hr tablet Take 25 mg by mouth once daily.    oxyCODONE-acetaminophen (PERCOCET)  mg per tablet Take 1 tablet by mouth every 6 (six) hours as needed.    [DISCONTINUED] dapagliflozin (FARXIGA) 5 mg Tab tablet Take 1 tablet (5 mg total) by mouth once daily.    albuterol (PROVENTIL/VENTOLIN HFA) 90 mcg/actuation inhaler Inhale 1-2 puffs into the lungs every 6 (six) hours as needed for Wheezing. Rescue    cefUROXime (CEFTIN) 500 MG tablet Take 500 mg by mouth 2 (two) times daily.    doxycycline (VIBRAMYCIN) 100 MG Cap Take 100 mg by mouth 2 (two) times daily.    finasteride (PROSCAR) 5 mg tablet Take 5 mg by mouth once daily.    fluticasone propionate (FLONASE) 50 mcg/actuation nasal spray 1 spray by Each Nostril route once daily.    HYDROcodone-acetaminophen (NORCO)  mg per tablet Take 1 tablet by mouth every 6 (six) hours.    polyethylene glycol (MIRALAX) 17 gram/dose powder Take 17 g by mouth once daily. For constipation    spironolactone (ALDACTONE) 25 MG tablet Take 1 tablet (25 mg total) by mouth once daily.    traZODone (DESYREL) 50 MG tablet Take 50 mg by mouth nightly as needed.     Family History    None       Tobacco Use    Smoking status: Never    Smokeless  tobacco: Never   Substance and Sexual Activity    Alcohol use: Yes     Comment: occas    Drug use: Yes     Frequency: 1.0 times per week     Types: Marijuana     Comment: last use 2 days ago    Sexual activity: Not on file     Review of Systems   All other systems reviewed and are negative.  Objective:     Vital Signs (Most Recent):  Temp: 98 °F (36.7 °C) (07/26/23 1522)  Pulse: (!) 56 (07/26/23 1522)  Resp: 18 (07/26/23 1522)  BP: (!) 212/101 (07/26/23 1522)  SpO2: 97 % (07/26/23 1522) Vital Signs (24h Range):  Temp:  [98 °F (36.7 °C)-99.2 °F (37.3 °C)] 98 °F (36.7 °C)  Pulse:  [56-74] 56  Resp:  [16-18] 18  SpO2:  [95 %-100 %] 97 %  BP: (153-212)/() 212/101     Weight: (!) 158.8 kg (350 lb 3.2 oz)  Body mass index is 43.77 kg/m².    SpO2: 97 %         Intake/Output Summary (Last 24 hours) at 7/26/2023 1546  Last data filed at 7/26/2023 1407  Gross per 24 hour   Intake 600 ml   Output 1620 ml   Net -1020 ml       Lines/Drains/Airways       Peripheral Intravenous Line  Duration                  Peripheral IV - Single Lumen 07/26/23 0405 20 G Left;Posterior Forearm <1 day                    Physical Exam  Vitals reviewed.   Constitutional:       Appearance: Normal appearance.   HENT:      Mouth/Throat:      Mouth: Mucous membranes are moist.   Eyes:      Extraocular Movements: Extraocular movements intact.      Pupils: Pupils are equal, round, and reactive to light.   Cardiovascular:      Rate and Rhythm: Normal rate and regular rhythm.      Heart sounds: No murmur heard.    No gallop.   Pulmonary:      Effort: Pulmonary effort is normal.      Breath sounds: Normal breath sounds.   Abdominal:      General: Abdomen is flat.      Palpations: Abdomen is soft.   Musculoskeletal:      Cervical back: Normal range of motion.   Skin:     General: Skin is warm and dry.   Neurological:      General: No focal deficit present.      Mental Status: He is alert and oriented to person, place, and time.   Psychiatric:          Mood and Affect: Mood normal.       Significant Labs: BMP:   Recent Labs   Lab 07/26/23  0158   *      K 4.7      CO2 25   BUN 28*   CREATININE 1.8*   CALCIUM 8.8   MG 1.8    and CBC   Recent Labs   Lab 07/26/23  0158   WBC 7.59   HGB 9.8*   HCT 30.5*          Significant Imaging: Echocardiogram: 2D echo with color flow doppler: No results found for this or any previous visit. and Transthoracic echo (TTE) complete (Cupid Only):   Results for orders placed or performed during the hospital encounter of 07/26/23   Echo   Result Value Ref Range    BSA 2.9 m2    TDI SEPTAL 0.09 m/s    LV LATERAL E/E' RATIO 11.00 m/s    LV SEPTAL E/E' RATIO 13.44 m/s    TDI LATERAL 0.11 m/s    LVIDd 5.32 3.5 - 6.0 cm    IVS 1.12 (A) 0.6 - 1.1 cm    Posterior Wall 1.24 (A) 0.6 - 1.1 cm    LVIDs 2.98 2.1 - 4.0 cm    FS 44 28 - 44 %    LV mass 252.21 g    Left Ventricle Relative Wall Thickness 0.47 cm    E/A ratio 2.57     Mean e' 0.10 m/s    E wave deceleration time 190.00 msec    E/E' ratio 12.10 m/s    MV Peak E Fabiano 1.21 m/s    TR Max Fabiano 2.44 m/s    MV Peak A Fabiano 0.47 m/s    LV Systolic Volume 34.40 mL    LV Systolic Volume Index 12.4 mL/m2    LV Diastolic Volume 137.00 mL    LV Diastolic Volume Index 49.28 mL/m2    LV Mass Index 91 g/m2    Triscuspid Valve Regurgitation Peak Gradient 24 mmHg    Right Atrial Pressure (from IVC) 3 mmHg    EF 65 %    TV rest pulmonary artery pressure 27 mmHg    Narrative    · The left ventricle is normal in size with mild concentric hypertrophy   and normal systolic function.  · The estimated ejection fraction is 65%.  · Normal left ventricular diastolic function.  · Normal right ventricular size with normal right ventricular systolic   function.  · Mild mitral regurgitation.  · Mild tricuspid regurgitation.  · Normal central venous pressure (3 mmHg).  · The estimated PA systolic pressure is 27 mmHg.        Assessment and Plan:     Chest pain, could be due to CAD versus  hypertension  Uncontrolled hypertension/hypertensive urgency  Abnormal stress test in February, no significant reversibility  Preserved EF  CKD    Recommendations:  - will need to re-evaluate patient's angina once blood pressure is better controlled.  Patient reports intermittent compliance with antihypertensives.  Patient educated on complete compliance with medications.  Will stop Entresto given normal EF and switch the patient to valsartan twice a day.  Continue with Coreg, spironolactone.  Will add amlodipine at 10 mg.  Monitor blood pressures overnight.  Ambulate patient this evening and tomorrow.  If blood pressure well-controlled tomorrow and no angina on ambulation this evening and tomorrow, he can be discharged with further follow-up in the clinic.  He will need sublingual nitroglycerin prior to discharge.  Will start the patient on baby aspirin.  Angiography can be entertained on the patient if he has recurrent symptoms but there are concerns of noncompliance as well as chronic kidney disease.    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 [L97.523] 02/10/2023 Yes     Chronic    Severe obesity (BMI >= 40) [E66.01] 02/08/2023 Yes    Type 2 diabetes mellitus [E11.9] 03/09/2022 Yes     Chronic    Hypertension [I10] 12/30/2020 Yes     Chronic    Peripheral vascular disease in type 2 diabetes mellitus [E10.51] 12/30/2020 Yes      Problems Resolved During this Admission:       VTE Risk Mitigation (From admission, onward)           Ordered     enoxaparin injection 40 mg  Every 12 hours         07/26/23 0804     IP VTE HIGH RISK PATIENT  Once         07/26/23 0323     Place sequential compression device  Until  discontinued         07/26/23 0323                    Thank you for your consult. I will follow-up with patient. Please contact us if you have any additional questions.    Alek Greenwood MD  Cardiology   [facility]

## 2023-07-26 NOTE — TELEPHONE ENCOUNTER
B/P is 207/105  and repeat of 199/100 HR 67  Patient is also c/o an headache and a cough.    Reason for Disposition   [1] Systolic BP  >= 160 OR Diastolic >= 100 AND [2] cardiac (e.g., breathing difficulty, chest pain) or neurologic symptoms (e.g., new-onset blurred or double vision, unsteady gait)    Additional Information   Negative: Difficult to awaken or acting confused (e.g., disoriented, slurred speech)   Negative: SEVERE difficulty breathing (e.g., struggling for each breath, speaks in single words)   Negative: [1] Weakness of the face, arm or leg on one side of the body AND [2] new-onset   Negative: [1] Numbness (i.e., loss of sensation) of the face, arm or leg on one side of the body AND [2] new-onset   Negative: [1] Chest pain lasts > 5 minutes AND [2] history of heart disease (i.e., heart attack, bypass surgery, angina, angioplasty, CHF)   Negative: [1] Chest pain AND [2] took nitrogylcerin AND [3] pain was not relieved   Negative: Sounds like a life-threatening emergency to the triager    Protocols used: Blood Pressure - High-A-AH

## 2023-07-26 NOTE — ED PROVIDER NOTES
Encounter Date: 7/26/2023       History     Chief Complaint   Patient presents with    Chest Pain     Patient c/o chest that began earlier today.     58-year-old male presented emergency department with substernal left-sided chest pain which started earlier today.  Patient has elevated blood pressure all day.  Patient has history of diabetes and patient has a left foot wound which is currently being treated.  Denies dysuria or hematuria weakness or numbness or fever chills.  Patient was given nitroglycerin by EMS and patient said pes pain got better and blood pressure also improved.    Review of patient's allergies indicates:   Allergen Reactions    Adhesive Itching     Past Medical History:   Diagnosis Date    Depression     Diabetes mellitus     Hypertension     Obesity     Osteomyelitis      No past surgical history on file.  No family history on file.  Social History     Tobacco Use    Smoking status: Never    Smokeless tobacco: Never   Substance Use Topics    Alcohol use: Yes     Comment: occas    Drug use: Yes     Frequency: 1.0 times per week     Types: Marijuana     Comment: last use 2 days ago     Review of Systems   Constitutional: Negative.    HENT: Negative.     Eyes: Negative.    Respiratory: Negative.     Cardiovascular:  Positive for chest pain.   Gastrointestinal: Negative.    Endocrine: Negative.    Genitourinary: Negative.    Musculoskeletal: Negative.    Skin:  Positive for wound.   Allergic/Immunologic: Negative.    Neurological: Negative.    Hematological: Negative.    Psychiatric/Behavioral: Negative.     All other systems reviewed and are negative.    Physical Exam     Initial Vitals [07/26/23 0139]   BP Pulse Resp Temp SpO2   (!) 204/95 74 18 99.2 °F (37.3 °C) 98 %      MAP       --         Physical Exam    Nursing note and vitals reviewed.  Constitutional: He appears well-developed and well-nourished.   HENT:   Head: Normocephalic and atraumatic.   Nose: Nose normal.   Eyes: Conjunctivae and  EOM are normal.   Neck: No tracheal deviation present.   Normal range of motion.  Cardiovascular:  Normal rate, regular rhythm, normal heart sounds and intact distal pulses.     Exam reveals no friction rub.       No murmur heard.  Pulmonary/Chest: Breath sounds normal. No respiratory distress. He has no wheezes. He has no rales. He exhibits no tenderness.   Abdominal: Abdomen is soft. He exhibits no distension. There is no abdominal tenderness.   Musculoskeletal:         General: Normal range of motion.      Cervical back: Normal range of motion.      Comments: Left foot diabetic wound with dressing intact     Neurological: He is alert and oriented to person, place, and time. He has normal strength. GCS score is 15. GCS eye subscore is 4. GCS verbal subscore is 5. GCS motor subscore is 6.   Skin: Skin is warm and dry. Capillary refill takes less than 2 seconds.   Psychiatric: He has a normal mood and affect. Thought content normal.       ED Course   Procedures  Labs Reviewed   CBC W/ AUTO DIFFERENTIAL - Abnormal; Notable for the following components:       Result Value    RBC 3.36 (*)     Hemoglobin 9.8 (*)     Hematocrit 30.5 (*)     All other components within normal limits   COMPREHENSIVE METABOLIC PANEL - Abnormal; Notable for the following components:    Glucose 143 (*)     BUN 28 (*)     Creatinine 1.8 (*)     Alkaline Phosphatase 28 (*)     eGFR 42.6 (*)     All other components within normal limits   MAGNESIUM   TROPONIN I HIGH SENSITIVITY   B-TYPE NATRIURETIC PEPTIDE   TROPONIN I HIGH SENSITIVITY     EKG Readings: (Independently Interpreted)   Initial Reading: No STEMI. Rhythm: Normal Sinus Rhythm. Ectopy: No Ectopy. Conduction: Normal.     Imaging Results              X-Ray Chest AP Portable (In process)                   X-Rays:   Independently Interpreted Readings:   Other Readings:  Chest x-ray unremarkable  Medications   labetaloL injection 10 mg (0 mg Intravenous Hold 7/26/23 0200)   aspirin tablet  325 mg (325 mg Oral Given 7/26/23 0211)   nitroGLYCERIN 2% TD oint ointment 1 inch (1 inch Topical (Top) Given 7/26/23 0209)   acetaminophen tablet 1,000 mg (1,000 mg Oral Given 7/26/23 0258)     Medical Decision Making:   Differential Diagnosis:   58-year-old male presented emergency department with substernal chest pressure and tightness.  Patient's pain resolved with nitroglycerin.  Patient had a headache after nitroglycerin.  Tylenol given to treat headache.  Screening labs and cardiac workup reviewed.  Patient's blood pressure improved with treatment.  Patient's chest pain likely is related to his hypertension.  Patient had complete resolution of chest pain once nitroglycerin given and blood pressure improved.  Screening cardiac workup reviewed.  Troponin within normal limits.  Hospital Medicine consulted for evaluation for further management.  Aspirin given.  Patient currently is pain-free and hospitalist evaluating patient for further management  Independently Interpreted Test(s):   I have ordered and independently interpreted X-rays - see prior notes.  I have ordered and independently interpreted EKG Reading(s) - see prior notes  Clinical Tests:   Lab Tests: Reviewed  Radiological Study: Reviewed  Medical Tests: Reviewed  Other:   I have discussed this case with another health care provider.       <> Summary of the Discussion: Hospitalist consulted for evaluation for further management of chest pain and hypertension.                        Clinical Impression:   Final diagnoses:  [R07.9] Chest pain  [I10] Hypertension, unspecified type (Primary)        ED Disposition Condition    Admit Ajith Ortiz MD  07/26/23 1077

## 2023-07-26 NOTE — H&P
[facility] - Emergency Dept  Hospital Medicine  History & Physical    Patient Name: Abel Gibbs  MRN: [id number]  Patient Class: OP- Observation  Admission Date: 7/26/2023  Attending Physician: Rosalba Ortiz MD   Primary Care Provider: Primary Doctor No         Patient information was obtained from patient and ER records.     Subjective:     Principal Problem:Chest pain    Chief Complaint:   Chief Complaint   Patient presents with    Chest Pain     Patient c/o chest that began earlier today.        HPI: Patient is a 58-year-old male with history of HTN, DM, CHF, diabetic foot ulcer, right foot osteomyeltis, depression and obesity.  He presents to the ED by EMS with complaints of chest pressure 7/10, subjective blood pressure 200/100 at home and frontal headache.  EMS he was nitroglycerin tab to help with chest pressure.  On arrival patient's blood pressure systolic 200, chest pain 6/10 and reports increased shortness of breath.  On assessment in ED initial troponin 8.5, Mag 1.8, creatinine 1.8.  P.o. aspirin, labs, EKG ordered.    On assessment patient is alert, oriented, reports chest pressure 6/10 and has improved with the nitro paste.  He reports his shortness of breath has improved since his blood pressure have reduced.  He reports at home subjective blood pressure greater than 200 S started around 3 or 4:00 p.m. he reports chest pressure radiating to left arm, frontal headache and eye pain.  He reports a PCP appointment on Thursday at 9:00 a.m. he reports being treated for diabetic foot infection/osteomyelitis with antibiotics and is followed podiatrist by Dr. Dawson outpatient.  He denies lightheadedness, dizziness, diaphoresis, nausea, vomiting.  He denies smoking, illicit drugs, alcohol.    Hospitalist asked to admit for , chest pain, hypertension.    Summary echo 02/2023   The left ventricle is normal in size with moderate concentric hypertrophy and normal systolic function.   The estimated  ejection fraction is 65%.   Normal left ventricular diastolic function.   Moderate mitral regurgitation.   Mild tricuspid regurgitation.   Moderate left atrial enlargement.   Mild right atrial enlargement.   Normal right ventricular size with normal right ventricular systolic function.   Normal central venous pressure (3 mmHg).   Atrial fibrillation not observed.EF 65%      Conclusion initial stress test 02/09/2023    The ECG portion of the study is negative for ischemia.    The patient reported no chest pain during the stress test.    There were no arrhythmias during stress.    The nuclear portion of this study will be reported separately.      Past Medical History:   Diagnosis Date    Depression     Diabetes mellitus     Hypertension     Obesity     Osteomyelitis        No past surgical history on file.    Review of patient's allergies indicates:   Allergen Reactions    Adhesive Itching       No current facility-administered medications on file prior to encounter.     Current Outpatient Medications on File Prior to Encounter   Medication Sig    albuterol (PROVENTIL/VENTOLIN HFA) 90 mcg/actuation inhaler Inhale 1-2 puffs into the lungs every 6 (six) hours as needed for Wheezing. Rescue    amLODIPine (NORVASC) 5 MG tablet Take 1 tablet (5 mg total) by mouth once daily.    atorvastatin (LIPITOR) 80 MG tablet Take 1 tablet (80 mg total) by mouth once daily.    blood sugar diagnostic Strp To check BG 4 times daily, to use with insurance preferred meter    blood-glucose meter kit To check BG 4 times daily, to use with insurance preferred meter    carvediloL (COREG) 3.125 MG tablet Take 1 tablet (3.125 mg total) by mouth 2 (two) times daily with meals.    dapagliflozin (FARXIGA) 5 mg Tab tablet Take 1 tablet (5 mg total) by mouth once daily.    gabapentin (NEURONTIN) 800 MG tablet Take 800 mg by mouth 4 (four) times daily.    lactobacillus acidophilus & bulgar (LACTINEX) 100 million cell packet  Take 1 tablet by mouth 3 (three) times daily with meals.    lancets Misc To check BG 4 times daily, to use with insurance preferred meter    polyethylene glycol (MIRALAX) 17 gram/dose powder Take 17 g by mouth once daily. For constipation    spironolactone (ALDACTONE) 25 MG tablet Take 1 tablet (25 mg total) by mouth once daily.     Family History    None       Tobacco Use    Smoking status: Never    Smokeless tobacco: Never   Substance and Sexual Activity    Alcohol use: Yes     Comment: occas    Drug use: Yes     Frequency: 1.0 times per week     Types: Marijuana     Comment: last use 2 days ago    Sexual activity: Not on file     Review of Systems   Constitutional:  Negative for activity change, appetite change, chills, diaphoresis, fatigue, fever and unexpected weight change.   HENT:  Negative for congestion, dental problem, facial swelling, nosebleeds, sinus pressure, sinus pain, sore throat and trouble swallowing.    Eyes:  Negative for pain and redness.   Respiratory:  Negative for apnea, cough, chest tightness, shortness of breath and wheezing.    Cardiovascular:  Positive for chest pain and leg swelling. Negative for palpitations.   Gastrointestinal:  Negative for abdominal distention, abdominal pain, anal bleeding, blood in stool, constipation, diarrhea, nausea and vomiting.   Endocrine: Negative for polyphagia and polyuria.   Genitourinary:  Negative for decreased urine volume, difficulty urinating, dysuria, frequency, hematuria and urgency.   Musculoskeletal:  Negative for back pain, gait problem, joint swelling, myalgias, neck pain and neck stiffness.   Skin:  Negative for color change, pallor, rash and wound (left diabetic foot infection).   Neurological:  Positive for headaches. Negative for dizziness, seizures, syncope, facial asymmetry, speech difficulty, weakness, light-headedness and numbness.   Psychiatric/Behavioral:  Negative for agitation, behavioral problems, confusion,  hallucinations, sleep disturbance and suicidal ideas.    Objective:     Vital Signs (Most Recent):  Temp: 99.2 °F (37.3 °C) (07/26/23 0139)  Pulse: 64 (07/26/23 0301)  Resp: 16 (07/26/23 0229)  BP: (!) 197/95 (07/26/23 0301)  SpO2: 98 % (07/26/23 0301) Vital Signs (24h Range):  Temp:  [99.2 °F (37.3 °C)] 99.2 °F (37.3 °C)  Pulse:  [58-74] 64  Resp:  [16-18] 16  SpO2:  [98 %-99 %] 98 %  BP: (163-204)/(79-95) 197/95     Weight: (!) 157.4 kg (347 lb)  Body mass index is 43.37 kg/m².     Physical Exam  Vitals reviewed.   Constitutional:       General: He is not in acute distress.     Appearance: Normal appearance. He is not ill-appearing, toxic-appearing or diaphoretic.   HENT:      Head: Normocephalic.      Right Ear: External ear normal.      Left Ear: External ear normal.      Nose: No congestion or rhinorrhea.      Mouth/Throat:      Mouth: Mucous membranes are moist.      Pharynx: Oropharynx is clear. No oropharyngeal exudate or posterior oropharyngeal erythema.   Eyes:      Extraocular Movements: Extraocular movements intact.      Conjunctiva/sclera: Conjunctivae normal.      Pupils: Pupils are equal, round, and reactive to light.   Cardiovascular:      Rate and Rhythm: Regular rhythm. Bradycardia present.      Pulses: Normal pulses.      Heart sounds: Normal heart sounds.   Pulmonary:      Effort: Pulmonary effort is normal.      Breath sounds: Normal breath sounds.   Abdominal:      General: Bowel sounds are normal.      Palpations: Abdomen is soft.   Genitourinary:     Rectum: Normal.   Musculoskeletal:         General: Normal range of motion.      Cervical back: Normal range of motion and neck supple.   Skin:     General: Skin is warm and dry.      Capillary Refill: Capillary refill takes less than 2 seconds.   Neurological:      Mental Status: He is alert and oriented to person, place, and time.   Psychiatric:         Mood and Affect: Mood normal.            CRANIAL NERVES     CN III, IV, VI   Pupils are  equal, round, and reactive to light.     Significant Labs: All pertinent labs within the past 24 hours have been reviewed.  Recent Lab Results         07/26/23  0158        Albumin 3.6       Alkaline Phosphatase 28       ALT 21       Anion Gap 8       AST 21       Baso # 0.08       Basophil % 1.1       BILIRUBIN TOTAL 0.5  Comment: For infants and newborns, interpretation of results should be based  on gestational age, weight and in agreement with clinical  observations.    Premature Infant recommended reference ranges:  Up to 24 hours.............<8.0 mg/dL  Up to 48 hours............<12.0 mg/dL  3-5 days..................<15.0 mg/dL  6-29 days.................<15.0 mg/dL         BNP 93  Comment: Values of less than 100 pg/ml are consistent with non-CHF populations.       BUN 28       Calcium 8.8       Chloride 104       CO2 25       Creatinine 1.8       Differential Method Automated       eGFR 42.6       Eos # 0.3       Eosinophil % 3.6       Glucose 143       Gran # (ANC) 3.7       Gran % 48.5       Hematocrit 30.5       Hemoglobin 9.8       Immature Grans (Abs) 0.03  Comment: Mild elevation in immature granulocytes is non specific and   can be seen in a variety of conditions including stress response,   acute inflammation, trauma and pregnancy. Correlation with other   laboratory and clinical findings is essential.         Immature Granulocytes 0.4       Lymph # 3.0       Lymph % 39.7       Magnesium 1.8       MCH 29.2       MCHC 32.1       MCV 91       Mono # 0.5       Mono % 6.7       MPV 10.0       nRBC 0       Platelets 265       Potassium 4.7       PROTEIN TOTAL 6.9       RBC 3.36       RDW 14.4       Sodium 137       Troponin I High Sensitivity 8.5  Comment: Troponin results differ between methods. Do not use   results between Troponin methods interchangeably.    Alkaline Phospatase levels above 400 U/L may   cause false positive results.    Access hsTnI should not be used for patients taking   Asfotase  kaden (Strensiq).         WBC 7.59               Significant Imaging: I have reviewed all pertinent imaging results/findings within the past 24 hours.    Assessment/Plan:     * Chest pain  Karrie  Limited echo  Last stress test 02/2023  Consult Cardiology  Initial troponin 8.5 we will trend x2  EKG no ST elevation    Congestive heart disease  Patient is identified as having diastolic heart failure that is Chronic. CHF is currently controlled. Latest ECHO performed and demonstrates- Results for orders placed during the hospital encounter of 02/07/23    Echo    Interpretation Summary  · The left ventricle is normal in size with moderate concentric hypertrophy and normal systolic function.  · The estimated ejection fraction is 65%.  · Normal left ventricular diastolic function.  · Moderate mitral regurgitation.  · Mild tricuspid regurgitation.  · Moderate left atrial enlargement.  · Mild right atrial enlargement.  · Normal right ventricular size with normal right ventricular systolic function.  · Normal central venous pressure (3 mmHg).  · Atrial fibrillation not observed.  . Continue Beta Blocker and ACE/ARB and monitor clinical status closely. Monitor on telemetry. Patient is on CHF pathway.  Monitor strict Is&Os and daily weights.  Place on fluid restriction of 1.5 L. Cardiology has been consulted. Continue to stress to patient importance of self efficacy and  on diet for CHF. Last BNP reviewed- and noted below   Recent Labs   Lab 07/26/23  0158   BNP 93   .  No CHF exacerbation/chronic  Echo pending      Ulcer of left foot with necrosis of muscle  Left foot diabetic ulcer followed by outpatient podiatry Dr. Dawson  Consider consulting podiatry and wound care if patient is in hospital longer than 24 hours    Type 2 diabetes mellitus  Patient's FSGs are uncontrolled due to hyperglycemia on current medication regimen.  Last A1c reviewed-   Lab Results   Component Value Date    HGBA1C 8.7 (H) 02/07/2023     Most  recent fingerstick glucose reviewed- No results for input(s): POCTGLUCOSE in the last 24 hours.  Current correctional scale  Low  Decrease anti-hyperglycemic dose as follows-   Antihyperglycemics (From admission, onward)    None      POCT/SSI  Hold Oral hypoglycemics while patient is in the hospital.  Check A1c    Hypertension  Initial blood pressure systolic greater than 200 improved blood pressure reduced to 160s  Vital signs q.4  Continue home medication Coreg, Norvasc        VTE Risk Mitigation (From admission, onward)         Ordered     IP VTE HIGH RISK PATIENT  Once         07/26/23 0323     Place sequential compression device  Until discontinued         07/26/23 0323                     On 07/26/2023, patient should be placed in hospital observation services under my care in collaboration with NEENA Elizabeth  Department of Hospital Medicine  [facility] - Emergency Dept

## 2023-07-26 NOTE — ASSESSMENT & PLAN NOTE
Initial blood pressure systolic greater than 200 improved blood pressure reduced to 160s  Vital signs q.4  Continue home medication Coreg, Norvasc

## 2023-07-27 LAB
ALBUMIN SERPL BCP-MCNC: 3.3 G/DL (ref 3.5–5.2)
ALP SERPL-CCNC: 27 U/L (ref 55–135)
ALT SERPL W/O P-5'-P-CCNC: 18 U/L (ref 10–44)
ANION GAP SERPL CALC-SCNC: 4 MMOL/L (ref 8–16)
ANION GAP SERPL CALC-SCNC: 4 MMOL/L (ref 8–16)
AST SERPL-CCNC: 15 U/L (ref 10–40)
BASOPHILS # BLD AUTO: 0.05 K/UL (ref 0–0.2)
BASOPHILS NFR BLD: 0.8 % (ref 0–1.9)
BILIRUB SERPL-MCNC: 0.5 MG/DL (ref 0.1–1)
BUN SERPL-MCNC: 28 MG/DL (ref 6–20)
BUN SERPL-MCNC: 30 MG/DL (ref 6–20)
CALCIUM SERPL-MCNC: 8.6 MG/DL (ref 8.7–10.5)
CALCIUM SERPL-MCNC: 8.8 MG/DL (ref 8.7–10.5)
CHLORIDE SERPL-SCNC: 101 MMOL/L (ref 95–110)
CHLORIDE SERPL-SCNC: 106 MMOL/L (ref 95–110)
CO2 SERPL-SCNC: 27 MMOL/L (ref 23–29)
CO2 SERPL-SCNC: 28 MMOL/L (ref 23–29)
CREAT SERPL-MCNC: 1.7 MG/DL (ref 0.5–1.4)
CREAT SERPL-MCNC: 1.8 MG/DL (ref 0.5–1.4)
DIFFERENTIAL METHOD: ABNORMAL
EOSINOPHIL # BLD AUTO: 0.3 K/UL (ref 0–0.5)
EOSINOPHIL NFR BLD: 4.6 % (ref 0–8)
ERYTHROCYTE [DISTWIDTH] IN BLOOD BY AUTOMATED COUNT: 14.5 % (ref 11.5–14.5)
EST. GFR  (NO RACE VARIABLE): 42.6 ML/MIN/1.73 M^2
EST. GFR  (NO RACE VARIABLE): 45.6 ML/MIN/1.73 M^2
GLUCOSE SERPL-MCNC: 131 MG/DL (ref 70–110)
GLUCOSE SERPL-MCNC: 165 MG/DL (ref 70–110)
GLUCOSE SERPL-MCNC: 177 MG/DL (ref 70–110)
GLUCOSE SERPL-MCNC: 181 MG/DL (ref 70–110)
GLUCOSE SERPL-MCNC: 190 MG/DL (ref 70–110)
GLUCOSE SERPL-MCNC: 270 MG/DL (ref 70–110)
HCT VFR BLD AUTO: 29 % (ref 40–54)
HGB BLD-MCNC: 9.2 G/DL (ref 14–18)
IMM GRANULOCYTES # BLD AUTO: 0.02 K/UL (ref 0–0.04)
IMM GRANULOCYTES NFR BLD AUTO: 0.3 % (ref 0–0.5)
LYMPHOCYTES # BLD AUTO: 2.8 K/UL (ref 1–4.8)
LYMPHOCYTES NFR BLD: 43.3 % (ref 18–48)
MAGNESIUM SERPL-MCNC: 1.8 MG/DL (ref 1.6–2.6)
MCH RBC QN AUTO: 28.8 PG (ref 27–31)
MCHC RBC AUTO-ENTMCNC: 31.7 G/DL (ref 32–36)
MCV RBC AUTO: 91 FL (ref 82–98)
MONOCYTES # BLD AUTO: 0.4 K/UL (ref 0.3–1)
MONOCYTES NFR BLD: 6.3 % (ref 4–15)
NEUTROPHILS # BLD AUTO: 2.8 K/UL (ref 1.8–7.7)
NEUTROPHILS NFR BLD: 44.7 % (ref 38–73)
NRBC BLD-RTO: 0 /100 WBC
PHOSPHATE SERPL-MCNC: 4.6 MG/DL (ref 2.7–4.5)
PLATELET # BLD AUTO: 244 K/UL (ref 150–450)
PMV BLD AUTO: 10.3 FL (ref 9.2–12.9)
POTASSIUM SERPL-SCNC: 4.6 MMOL/L (ref 3.5–5.1)
POTASSIUM SERPL-SCNC: 5.4 MMOL/L (ref 3.5–5.1)
PROT SERPL-MCNC: 6.5 G/DL (ref 6–8.4)
RBC # BLD AUTO: 3.19 M/UL (ref 4.6–6.2)
SODIUM SERPL-SCNC: 133 MMOL/L (ref 136–145)
SODIUM SERPL-SCNC: 137 MMOL/L (ref 136–145)
WBC # BLD AUTO: 6.35 K/UL (ref 3.9–12.7)

## 2023-07-27 PROCEDURE — 96376 TX/PRO/DX INJ SAME DRUG ADON: CPT

## 2023-07-27 PROCEDURE — 99213 OFFICE O/P EST LOW 20 MIN: CPT | Mod: ,,, | Performed by: PODIATRIST

## 2023-07-27 PROCEDURE — 63600175 PHARM REV CODE 636 W HCPCS: Performed by: NURSE PRACTITIONER

## 2023-07-27 PROCEDURE — 25000003 PHARM REV CODE 250: Performed by: STUDENT IN AN ORGANIZED HEALTH CARE EDUCATION/TRAINING PROGRAM

## 2023-07-27 PROCEDURE — 96372 THER/PROPH/DIAG INJ SC/IM: CPT | Performed by: STUDENT IN AN ORGANIZED HEALTH CARE EDUCATION/TRAINING PROGRAM

## 2023-07-27 PROCEDURE — 36415 COLL VENOUS BLD VENIPUNCTURE: CPT

## 2023-07-27 PROCEDURE — 80048 BASIC METABOLIC PNL TOTAL CA: CPT

## 2023-07-27 PROCEDURE — 36415 COLL VENOUS BLD VENIPUNCTURE: CPT | Performed by: NURSE PRACTITIONER

## 2023-07-27 PROCEDURE — 85025 COMPLETE CBC W/AUTO DIFF WBC: CPT | Performed by: STUDENT IN AN ORGANIZED HEALTH CARE EDUCATION/TRAINING PROGRAM

## 2023-07-27 PROCEDURE — 36415 COLL VENOUS BLD VENIPUNCTURE: CPT | Performed by: STUDENT IN AN ORGANIZED HEALTH CARE EDUCATION/TRAINING PROGRAM

## 2023-07-27 PROCEDURE — 25000003 PHARM REV CODE 250

## 2023-07-27 PROCEDURE — 25000003 PHARM REV CODE 250: Performed by: INTERNAL MEDICINE

## 2023-07-27 PROCEDURE — 99214 PR OFFICE/OUTPT VISIT, EST, LEVL IV, 30-39 MIN: ICD-10-PCS | Mod: ,,, | Performed by: INTERNAL MEDICINE

## 2023-07-27 PROCEDURE — 99213 PR OFFICE/OUTPT VISIT, EST, LEVL III, 20-29 MIN: ICD-10-PCS | Mod: ,,, | Performed by: PODIATRIST

## 2023-07-27 PROCEDURE — 63600175 PHARM REV CODE 636 W HCPCS: Performed by: STUDENT IN AN ORGANIZED HEALTH CARE EDUCATION/TRAINING PROGRAM

## 2023-07-27 PROCEDURE — 99214 OFFICE O/P EST MOD 30 MIN: CPT | Mod: ,,, | Performed by: INTERNAL MEDICINE

## 2023-07-27 PROCEDURE — G0378 HOSPITAL OBSERVATION PER HR: HCPCS

## 2023-07-27 PROCEDURE — 84100 ASSAY OF PHOSPHORUS: CPT | Performed by: STUDENT IN AN ORGANIZED HEALTH CARE EDUCATION/TRAINING PROGRAM

## 2023-07-27 PROCEDURE — 83735 ASSAY OF MAGNESIUM: CPT | Performed by: STUDENT IN AN ORGANIZED HEALTH CARE EDUCATION/TRAINING PROGRAM

## 2023-07-27 PROCEDURE — 80053 COMPREHEN METABOLIC PANEL: CPT | Performed by: STUDENT IN AN ORGANIZED HEALTH CARE EDUCATION/TRAINING PROGRAM

## 2023-07-27 PROCEDURE — 25000003 PHARM REV CODE 250: Performed by: NURSE PRACTITIONER

## 2023-07-27 RX ORDER — HYDRALAZINE HYDROCHLORIDE 25 MG/1
25 TABLET, FILM COATED ORAL EVERY 8 HOURS
Status: DISCONTINUED | OUTPATIENT
Start: 2023-07-27 | End: 2023-07-28 | Stop reason: HOSPADM

## 2023-07-27 RX ORDER — SODIUM CHLORIDE 0.9 % (FLUSH) 0.9 %
2 SYRINGE (ML) INJECTION
Status: DISCONTINUED | OUTPATIENT
Start: 2023-07-27 | End: 2023-07-28 | Stop reason: HOSPADM

## 2023-07-27 RX ADMIN — GABAPENTIN 800 MG: 300 CAPSULE ORAL at 01:07

## 2023-07-27 RX ADMIN — FINASTERIDE 5 MG: 5 TABLET, FILM COATED ORAL at 08:07

## 2023-07-27 RX ADMIN — GABAPENTIN 800 MG: 300 CAPSULE ORAL at 08:07

## 2023-07-27 RX ADMIN — SPIRONOLACTONE 50 MG: 50 TABLET ORAL at 08:07

## 2023-07-27 RX ADMIN — HYDRALAZINE HYDROCHLORIDE 25 MG: 25 TABLET ORAL at 09:07

## 2023-07-27 RX ADMIN — ATORVASTATIN CALCIUM 80 MG: 40 TABLET, FILM COATED ORAL at 08:07

## 2023-07-27 RX ADMIN — OXYCODONE AND ACETAMINOPHEN 1 TABLET: 10; 325 TABLET ORAL at 05:07

## 2023-07-27 RX ADMIN — AMLODIPINE BESYLATE 10 MG: 5 TABLET ORAL at 08:07

## 2023-07-27 RX ADMIN — VALSARTAN 80 MG: 80 TABLET, FILM COATED ORAL at 08:07

## 2023-07-27 RX ADMIN — GABAPENTIN 800 MG: 300 CAPSULE ORAL at 05:07

## 2023-07-27 RX ADMIN — ASPIRIN 81 MG: 81 TABLET, COATED ORAL at 08:07

## 2023-07-27 RX ADMIN — HYDRALAZINE HYDROCHLORIDE 10 MG: 20 INJECTION INTRAMUSCULAR; INTRAVENOUS at 07:07

## 2023-07-27 RX ADMIN — OXYCODONE AND ACETAMINOPHEN 1 TABLET: 10; 325 TABLET ORAL at 08:07

## 2023-07-27 RX ADMIN — MORPHINE SULFATE 2 MG: 2 INJECTION, SOLUTION INTRAMUSCULAR; INTRAVENOUS at 05:07

## 2023-07-27 RX ADMIN — MORPHINE SULFATE 2 MG: 2 INJECTION, SOLUTION INTRAMUSCULAR; INTRAVENOUS at 10:07

## 2023-07-27 RX ADMIN — CARVEDILOL 3.12 MG: 3.12 TABLET, FILM COATED ORAL at 07:07

## 2023-07-27 RX ADMIN — OXYCODONE AND ACETAMINOPHEN 1 TABLET: 10; 325 TABLET ORAL at 11:07

## 2023-07-27 RX ADMIN — CARVEDILOL 3.12 MG: 3.12 TABLET, FILM COATED ORAL at 05:07

## 2023-07-27 RX ADMIN — MORPHINE SULFATE 2 MG: 2 INJECTION, SOLUTION INTRAMUSCULAR; INTRAVENOUS at 12:07

## 2023-07-27 RX ADMIN — INSULIN ASPART 3 UNITS: 100 INJECTION, SOLUTION INTRAVENOUS; SUBCUTANEOUS at 10:07

## 2023-07-27 RX ADMIN — ENOXAPARIN SODIUM 40 MG: 100 INJECTION SUBCUTANEOUS at 08:07

## 2023-07-27 NOTE — PROGRESS NOTES
[facility]  Cardiology  Consult Note    Patient Name: Abel Gibbs  MRN: [id number]  Admission Date: 7/26/2023  Hospital Length of Stay: 0 days  Code Status: Full Code   Attending Provider: Darius Qiu MD   Consulting Provider: Nae Torres NP  Primary Care Physician: Rupinder Pagan MD  Principal Problem:Chest pain    Patient information was obtained from patient and ER records.     Consults  Subjective:     INTERVAL HISTORY  7/27/23    Patient is resting in bed during examination.  SR on tele. SB during sleeping hours.  Patient c/o CP with ambulating in halls. Recommend cardiac cath for further evaluation.     HPI  58-year-old male with history of mildly abnormal stress test in February admitted with chest discomfort which was ongoing for past few days.  EKG does not show any new ischemic changes.  Reports intermittent compliance with antihypertensives.  Blood pressure severely elevated greater than 200 on admission.  Chest pain improved with nitro but showed blood pressure.  Echo this morning shows normal EF.  Also has chronic kidney disease.  Troponin negative.  Symptoms resolved this morning and has not had any more chest pain this morning.    Past Medical History:   Diagnosis Date    Depression     Diabetes mellitus     Hypertension     Obesity     Osteomyelitis        No past surgical history on file.    Review of patient's allergies indicates:   Allergen Reactions    Adhesive Itching       No current facility-administered medications on file prior to encounter.     Current Outpatient Medications on File Prior to Encounter   Medication Sig    amLODIPine (NORVASC) 5 MG tablet Take 1 tablet (5 mg total) by mouth once daily.    atorvastatin (LIPITOR) 80 MG tablet Take 1 tablet (80 mg total) by mouth once daily.    blood sugar diagnostic Strp To check BG 4 times daily, to use with insurance preferred meter    blood-glucose meter kit To check BG 4 times daily, to use with insurance  preferred meter    carvediloL (COREG) 3.125 MG tablet Take 1 tablet (3.125 mg total) by mouth 2 (two) times daily with meals.    doxycycline (VIBRA-TABS) 100 MG tablet Take 100 mg by mouth every 12 (twelve) hours.    ENTRESTO 24-26 mg per tablet Take 1 tablet by mouth 2 (two) times daily.    furosemide (LASIX) 20 MG tablet Take 1 tablet by mouth once daily.    gabapentin (NEURONTIN) 800 MG tablet Take 800 mg by mouth 4 (four) times daily.    hydrALAZINE (APRESOLINE) 25 MG tablet Take 37.5 mg by mouth 3 (three) times daily.    lactobacillus acidophilus & bulgar (LACTINEX) 100 million cell packet Take 1 tablet by mouth 3 (three) times daily with meals.    lancets Misc To check BG 4 times daily, to use with insurance preferred meter    LEVEMIR FLEXPEN 100 unit/mL (3 mL) InPn pen Inject 30 Units into the skin once daily.    metFORMIN (GLUCOPHAGE) 1000 MG tablet Take 1,000 mg by mouth 2 (two) times daily with meals.    metoprolol succinate (TOPROL-XL) 25 MG 24 hr tablet Take 25 mg by mouth once daily.    oxyCODONE-acetaminophen (PERCOCET)  mg per tablet Take 1 tablet by mouth every 6 (six) hours as needed.    albuterol (PROVENTIL/VENTOLIN HFA) 90 mcg/actuation inhaler Inhale 1-2 puffs into the lungs every 6 (six) hours as needed for Wheezing. Rescue    cefUROXime (CEFTIN) 500 MG tablet Take 500 mg by mouth 2 (two) times daily.    doxycycline (VIBRAMYCIN) 100 MG Cap Take 100 mg by mouth 2 (two) times daily.    finasteride (PROSCAR) 5 mg tablet Take 5 mg by mouth once daily.    fluticasone propionate (FLONASE) 50 mcg/actuation nasal spray 1 spray by Each Nostril route once daily.    HYDROcodone-acetaminophen (NORCO)  mg per tablet Take 1 tablet by mouth every 6 (six) hours.    polyethylene glycol (MIRALAX) 17 gram/dose powder Take 17 g by mouth once daily. For constipation    spironolactone (ALDACTONE) 25 MG tablet Take 1 tablet (25 mg total) by mouth once daily.    traZODone (DESYREL) 50 MG tablet Take 50 mg  by mouth nightly as needed.     Family History    None       Tobacco Use    Smoking status: Never    Smokeless tobacco: Never   Substance and Sexual Activity    Alcohol use: Yes     Comment: occas    Drug use: Yes     Frequency: 1.0 times per week     Types: Marijuana     Comment: last use 2 days ago    Sexual activity: Not on file     Review of Systems   All other systems reviewed and are negative.  Objective:     Vital Signs (Most Recent):  Temp: 97.8 °F (36.6 °C) (07/27/23 1505)  Pulse: (!) 59 (07/27/23 1505)  Resp: 16 (07/27/23 1505)  BP: (!) 157/75 (07/27/23 1505)  SpO2: 98 % (07/27/23 1505) Vital Signs (24h Range):  Temp:  [97.4 °F (36.3 °C)-98.5 °F (36.9 °C)] 97.8 °F (36.6 °C)  Pulse:  [57-71] 59  Resp:  [16-20] 16  SpO2:  [95 %-99 %] 98 %  BP: (149-190)/() 157/75     Weight: (!) 158.8 kg (350 lb 3.2 oz)  Body mass index is 43.77 kg/m².    SpO2: 98 %         Intake/Output Summary (Last 24 hours) at 7/27/2023 1552  Last data filed at 7/27/2023 1354  Gross per 24 hour   Intake 880 ml   Output 1500 ml   Net -620 ml         Lines/Drains/Airways       Peripheral Intravenous Line  Duration                  Peripheral IV - Single Lumen 07/26/23 0405 20 G Left;Posterior Forearm 1 day                    Physical Exam  Vitals reviewed.   Constitutional:       Appearance: Normal appearance.   HENT:      Mouth/Throat:      Mouth: Mucous membranes are moist.   Eyes:      Extraocular Movements: Extraocular movements intact.      Pupils: Pupils are equal, round, and reactive to light.   Cardiovascular:      Rate and Rhythm: Normal rate and regular rhythm.      Heart sounds: No murmur heard.    No gallop.   Pulmonary:      Effort: Pulmonary effort is normal.      Breath sounds: Normal breath sounds.   Abdominal:      General: Abdomen is flat.      Palpations: Abdomen is soft.   Musculoskeletal:      Cervical back: Normal range of motion.   Skin:     General: Skin is warm and dry.   Neurological:      General: No focal  deficit present.      Mental Status: He is alert and oriented to person, place, and time.   Psychiatric:         Mood and Affect: Mood normal.       Significant Labs: BMP:   Recent Labs   Lab 07/26/23  0158 07/27/23  0335   * 177*    137   K 4.7 4.6    106   CO2 25 27   BUN 28* 28*   CREATININE 1.8* 1.7*   CALCIUM 8.8 8.6*   MG 1.8 1.8      and CBC   Recent Labs   Lab 07/26/23  0158 07/27/23  0335   WBC 7.59 6.35   HGB 9.8* 9.2*   HCT 30.5* 29.0*    244         Significant Imaging: Echocardiogram: 2D echo with color flow doppler: No results found for this or any previous visit. and Transthoracic echo (TTE) complete (Cupid Only):   Results for orders placed or performed during the hospital encounter of 07/26/23   Echo   Result Value Ref Range    BSA 2.9 m2    TDI SEPTAL 0.09 m/s    LV LATERAL E/E' RATIO 11.00 m/s    LV SEPTAL E/E' RATIO 13.44 m/s    TDI LATERAL 0.11 m/s    LVIDd 5.32 3.5 - 6.0 cm    IVS 1.12 (A) 0.6 - 1.1 cm    Posterior Wall 1.24 (A) 0.6 - 1.1 cm    LVIDs 2.98 2.1 - 4.0 cm    FS 44 28 - 44 %    LV mass 252.21 g    Left Ventricle Relative Wall Thickness 0.47 cm    E/A ratio 2.57     Mean e' 0.10 m/s    E wave deceleration time 190.00 msec    E/E' ratio 12.10 m/s    MV Peak E Fabiano 1.21 m/s    TR Max Fabiano 2.44 m/s    MV Peak A Fabiano 0.47 m/s    LV Systolic Volume 34.40 mL    LV Systolic Volume Index 12.4 mL/m2    LV Diastolic Volume 137.00 mL    LV Diastolic Volume Index 49.28 mL/m2    LV Mass Index 91 g/m2    Triscuspid Valve Regurgitation Peak Gradient 24 mmHg    Right Atrial Pressure (from IVC) 3 mmHg    EF 65 %    TV rest pulmonary artery pressure 27 mmHg    Narrative    · The left ventricle is normal in size with mild concentric hypertrophy   and normal systolic function.  · The estimated ejection fraction is 65%.  · Normal left ventricular diastolic function.  · Normal right ventricular size with normal right ventricular systolic   function.  · Mild mitral regurgitation.  ·  Mild tricuspid regurgitation.  · Normal central venous pressure (3 mmHg).  · The estimated PA systolic pressure is 27 mmHg.        Assessment and Plan:     Chest pain, could be due to CAD versus hypertension  Uncontrolled hypertension/hypertensive urgency  Abnormal stress test in February, no significant reversibility  Preserved EF  CKD    Recommendations:  -  Patient remains HTN overnight.  Mildly improved this afternoon. Recommend starting hydralazine 25 mg TID for further BP control. Continue coreg 3.125 mg BID, spironolactone 50 mg daily, valsartan 80 mg BID, and amlodipine 10 mg daily.   - Patient c/o CP with ambulation in halls.  Recommend further evaluation of coronary blockages with cardiac catheterization. Discussed with patient the risks and benefits of the procedure including, but not limited to, the following 1:1000 risk of Heart attack, stroke and death with 3-5% Risk of bleeding, vessel damage, and the need for emergent CABG Surgery.  All questions have been answered to patient's satisfaction.  Informed consent obtained. Will keep him nothing by mouth post midnight and proceed with the coronary angiography possible PCI in the morning.   -  Continue aspirin and statin therapy.      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 [L97.523] 02/10/2023 Yes     Chronic    Severe obesity (BMI >= 40) [E66.01] 02/08/2023 Yes    Type 2 diabetes mellitus [E11.9] 03/09/2022 Yes     Chronic    Hypertension [I10] 12/30/2020 Yes     Chronic    Peripheral vascular disease in type 2 diabetes mellitus [E10.51] 12/30/2020 Yes      Problems Resolved During this Admission:       VTE Risk Mitigation (From admission, onward)            Ordered     enoxaparin injection 40 mg  Every 12 hours         07/26/23 0804     IP VTE HIGH RISK PATIENT  Once         07/26/23 0323     Place sequential compression device  Until discontinued         07/26/23 0323                    Thank you for your consult. I will follow-up with patient. Please contact us if you have any additional questions.    Nae Torres NP  Cardiology   [facility]

## 2023-07-27 NOTE — ASSESSMENT & PLAN NOTE
In setting of elevated BP. EKG and troponin unremarkable. TTE unremarkable  -Telemetry  -Cardiology consulted  -PRN morphine and nitroglycerin  -Statin  -Coreg  -ARB  -ASA

## 2023-07-27 NOTE — CONSULTS
[facility]  Podiatry  Consult Note    Patient Name: Abel Gibbs  MRN: [id number]  Admission Date: 7/26/2023  Hospital Length of Stay: 0 days  Attending Physician: Darius Qiu MD  Primary Care Provider: Rupinder Pagan MD     Inpatient consult to Podiatry  Consult performed by: Jovanni Rivera DPM  Consult ordered by: NEENA Jules      Subjective:     History of Present Illness: Patient is a 58-year-old male with history of HTN, DM, CHF, diabetic foot ulcer, right foot osteomyeltis, depression and obesity.  He presents to the ED by EMS with complaints of chest pressure 7/10, subjective blood pressure 200/100 at home and frontal headache.  EMS he was nitroglycerin tab to help with chest pressure.  On arrival patient's blood pressure systolic 200, chest pain 6/10 and reports increased shortness of breath.  On assessment in ED initial troponin 8.5, Mag 1.8, creatinine 1.8.  P.o. aspirin, labs, EKG ordered.    Patient has been following the outpatient wound care for a chronic wound to the right foot.  Patient states that he has been seeing them twice weekly and has a follow-up scheduled later this week.    Scheduled Meds:   amLODIPine  10 mg Oral Daily    aspirin  81 mg Oral Daily    atorvastatin  80 mg Oral QHS    carvediloL  3.125 mg Oral BID WM    enoxparin  40 mg Subcutaneous Q12H (prophylaxis, 0900/2100)    finasteride  5 mg Oral Daily    gabapentin  800 mg Oral QID    spironolactone  50 mg Oral Daily    valsartan  80 mg Oral BID     Continuous Infusions:  PRN Meds:acetaminophen, dextrose 50%, glucagon (human recombinant), hydrALAZINE, insulin aspart U-100, morphine, naloxone, nitroGLYCERIN, ondansetron, oxyCODONE-acetaminophen, prochlorperazine, sodium chloride 0.9%, traZODone    Review of patient's allergies indicates:   Allergen Reactions    Adhesive Itching        Past Medical History:   Diagnosis Date    Depression     Diabetes mellitus     Hypertension     Obesity      Osteomyelitis      No past surgical history on file.    Family History    None       Tobacco Use    Smoking status: Never    Smokeless tobacco: Never   Substance and Sexual Activity    Alcohol use: Yes     Comment: occas    Drug use: Yes     Frequency: 1.0 times per week     Types: Marijuana     Comment: last use 2 days ago    Sexual activity: Not on file     Review of Systems  Objective:     Vital Signs (Most Recent):  Temp: 98.2 °F (36.8 °C) (07/27/23 0715)  Pulse: 71 (07/27/23 0840)  Resp: 20 (07/27/23 0840)  BP: (!) 173/85 (07/27/23 0840)  SpO2: 97 % (07/27/23 0840) Vital Signs (24h Range):  Temp:  [97.7 °F (36.5 °C)-98.5 °F (36.9 °C)] 98.2 °F (36.8 °C)  Pulse:  [56-71] 71  Resp:  [16-20] 20  SpO2:  [95 %-99 %] 97 %  BP: (149-212)/() 173/85     Weight: (!) 158.8 kg (350 lb 3.2 oz)  Body mass index is 43.77 kg/m².    Foot Exam    Laboratory:  A1C:   Recent Labs   Lab 02/07/23  1505   HGBA1C 8.7*     CBC:   Recent Labs   Lab 07/27/23  0335   WBC 6.35   RBC 3.19*   HGB 9.2*   HCT 29.0*      MCV 91   MCH 28.8   MCHC 31.7*     CMP:   Recent Labs   Lab 07/27/23  0335   *   CALCIUM 8.6*   ALBUMIN 3.3*   PROT 6.5      K 4.6   CO2 27      BUN 28*   CREATININE 1.7*   ALKPHOS 27*   ALT 18   AST 15   BILITOT 0.5     CRP: No results for input(s): CRP in the last 168 hours.  ESR: No results for input(s): SEDRATE in the last 168 hours.    Diagnostic Results:  I have reviewed all pertinent imaging results/findings within the past 24 hours.    Echo  · The left ventricle is normal in size with mild concentric hypertrophy   and normal systolic function.  · The estimated ejection fraction is 65%.  · Normal left ventricular diastolic function.  · Normal right ventricular size with normal right ventricular systolic   function.  · Mild mitral regurgitation.  · Mild tricuspid regurgitation.  · Normal central venous pressure (3 mmHg).  · The estimated PA systolic pressure is 27 mmHg.     X-Ray Chest AP  Portable  Normal chest x-ray at 1:43 AM    Clinical history chest pain    The heart is enlarged. The lungs are clear. There are no acute osseous abnormalities.    IMPRESSION: Mild cardiomegaly no acute process    Electronically signed by:  Jessica Villalobos MD  7/26/2023 7:26 AM CDT Workstation: [id number]        Clinical Findings:    Small amount of callus buildup present under the 1st metatarsal head left foot.  No signs of infection.  Small amount of dried bleeding is present.  Very small partial-thickness wound remains.  No edema erythema or drainage.      Assessment/Plan:     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 [L97.523] 02/10/2023 Yes     Chronic    Severe obesity (BMI >= 40) [E66.01] 02/08/2023 Yes    Type 2 diabetes mellitus [E11.9] 03/09/2022 Yes     Chronic    Hypertension [I10] 12/30/2020 Yes     Chronic    Peripheral vascular disease in type 2 diabetes mellitus [E10.51] 12/30/2020 Yes      Problems Resolved During this Admission:       Patient's ulcer is almost fully healed.  No need for any surgical intervention.  Discussed with wound care will have football wrap reapplied to the left foot and patient can keep his previously scheduled follow-up with the outpatient wound care center.    Thank you for your consult. I will sign off. Please contact us if you have any additional questions.    Jovanni Rivera DPM  Podiatry  [facility]

## 2023-07-27 NOTE — CONSULTS
[facility]  Adult Nutrition   Consult Note (Nutrition Education)     SUMMARY     Dietitian Rounds Brief  Pt is a 59 y/o male with hx of Type 2 Diabetes, CKD stage 3, HTN, CHF, and diabetic foot ulcer, seen today for nutrition education on dietary habits to improve his health conditions, specifically T2DM.    Provided Mr. Gibbs with the Diabetes Education Packet and discussed blood sugar logs, use of the plate method, and moderation portion sizes. Additionally discussed strategies to prevent overeating at night, specifically eating consistent meals throughout the day.     Mr. Gibbs appears to understand that he needs to make dietary and lifestyle changes to improve his health outcomes. Provided outpatient contact info.      Leonides Joseph, Provisional Licensed Dietitian 07/27/2023 3:29 PM

## 2023-07-27 NOTE — PROGRESS NOTES
[facility] Medicine  Progress Note    Patient Name: Abel Gibbs  MRN: [id number]  Patient Class: OP- Observation   Admission Date: 7/26/2023  Length of Stay: 0 days  Attending Physician: Darius Qiu MD  Primary Care Provider: Rupinder Pagan MD        Subjective:     Principal Problem:Chest pain        HPI:  Patient is a 58-year-old male with history of HTN, DM, CHF, diabetic foot ulcer, right foot osteomyeltis, depression and obesity.  He presents to the ED by EMS with complaints of chest pressure 7/10, subjective blood pressure 200/100 at home and frontal headache.  EMS he was nitroglycerin tab to help with chest pressure.  On arrival patient's blood pressure systolic 200, chest pain 6/10 and reports increased shortness of breath.  On assessment in ED initial troponin 8.5, Mag 1.8, creatinine 1.8.  P.o. aspirin, labs, EKG ordered.    On assessment patient is alert, oriented, reports chest pressure 6/10 and has improved with the nitro paste.  He reports his shortness of breath has improved since his blood pressure have reduced.  He reports at home subjective blood pressure greater than 200 S started around 3 or 4:00 p.m. he reports chest pressure radiating to left arm, frontal headache and eye pain.  He reports a PCP appointment on Thursday at 9:00 a.m. he reports being treated for diabetic foot infection/osteomyelitis with antibiotics and is followed podiatrist by Dr. Dawson outpatient.  He denies lightheadedness, dizziness, diaphoresis, nausea, vomiting.  He denies smoking, illicit drugs, alcohol.    Hospitalist asked to admit for , chest pain, hypertension.    Summary echo 02/2023   The left ventricle is normal in size with moderate concentric hypertrophy and normal systolic function.   The estimated ejection fraction is 65%.   Normal left ventricular diastolic function.   Moderate mitral regurgitation.   Mild tricuspid regurgitation.   Moderate left atrial  "enlargement.   Mild right atrial enlargement.   Normal right ventricular size with normal right ventricular systolic function.   Normal central venous pressure (3 mmHg).   Atrial fibrillation not observed.EF 65%      Conclusion initial stress test 02/09/2023    The ECG portion of the study is negative for ischemia.    The patient reported no chest pain during the stress test.    There were no arrhythmias during stress.    The nuclear portion of this study will be reported separately.      Overview/Hospital Course:  Abel Gibbs is a 58 year old male with a past medical history of DM, HTN, HLD, obesity, anemia and CKD who presented with chest pain in the setting of hypertensive urgency. Troponin is unremarkable thus far and being trended. TTE is unremarkable. Home Aldactone has been increased. Cardiology has been consulted and has started the patient on Coreg, amlodipine and valsartan as well. His BP remains high as of 7/27. Podiatry has been consulted as well for a chronic left foot ulceration.      Interval History: see "Hospital Course"    Review of Systems   Respiratory:  Positive for shortness of breath.    Cardiovascular:  Positive for chest pain.   Skin:  Positive for wound.   Allergic/Immunologic: Positive for immunocompromised state.   Objective:     Vital Signs (Most Recent):  Temp: 98.2 °F (36.8 °C) (07/27/23 0715)  Pulse: 66 (07/27/23 0715)  Resp: 20 (07/27/23 0715)  BP: (!) 190/111 (07/27/23 0731)  SpO2: 99 % (07/27/23 0715) Vital Signs (24h Range):  Temp:  [97.7 °F (36.5 °C)-98.5 °F (36.9 °C)] 98.2 °F (36.8 °C)  Pulse:  [56-66] 66  Resp:  [16-20] 20  SpO2:  [95 %-99 %] 99 %  BP: (149-212)/() 190/111     Weight: (!) 158.8 kg (350 lb 3.2 oz)  Body mass index is 43.77 kg/m².    Intake/Output Summary (Last 24 hours) at 7/27/2023 9742  Last data filed at 7/27/2023 0316  Gross per 24 hour   Intake 720 ml   Output 1300 ml   Net -580 ml         Physical Exam  Vitals and nursing note reviewed. "   Constitutional:       General: He is not in acute distress.     Appearance: He is obese.   HENT:      Head: Normocephalic and atraumatic.      Right Ear: External ear normal.      Left Ear: External ear normal.      Nose: Nose normal.      Mouth/Throat:      Mouth: Mucous membranes are moist.      Pharynx: Oropharynx is clear.   Eyes:      Extraocular Movements: Extraocular movements intact.   Cardiovascular:      Rate and Rhythm: Normal rate and regular rhythm.      Pulses: Normal pulses.      Heart sounds: Normal heart sounds.   Pulmonary:      Effort: Pulmonary effort is normal.      Breath sounds: Normal breath sounds.   Abdominal:      General: Bowel sounds are normal.      Palpations: Abdomen is soft.   Musculoskeletal:         General: Normal range of motion.      Cervical back: Normal range of motion and neck supple.   Skin:     General: Skin is warm and dry.      Comments: LLE dressed.   Neurological:      Mental Status: He is alert. Mental status is at baseline.   Psychiatric:         Mood and Affect: Mood normal.         Behavior: Behavior normal.           Significant Labs: All pertinent labs within the past 24 hours have been reviewed.    Significant Imaging: I have reviewed all pertinent imaging results/findings within the past 24 hours.      Assessment/Plan:      * Chest pain  In setting of elevated BP. EKG and troponin unremarkable. TTE unremarkable  -Telemetry  -Cardiology consulted  -PRN morphine and nitroglycerin  -Statin  -Coreg  -ARB  -ASA    Anemia  Stable.  -Trend Hgb with CBC      CKD (chronic kidney disease)  Stable.  -Renally dose medications  -Avoid nephrotoxic agents      BPH (benign prostatic hyperplasia)  -[place]      HLD (hyperlipidemia)  -Continue statin and ASA      Ulcer of left foot with necrosis of muscle  Left foot diabetic ulcer followed by outpatient Podiatry (Dr. Dawson).  -Podiatry consulted  -Wound Care consulted    Severe obesity (BMI >= 40)  Body mass index is 43.77  kg/m². Morbid obesity complicates all aspects of disease management from diagnostic modalities to treatment.          Type 2 diabetes mellitus  Patient's FSGs are uncontrolled due to hyperglycemia on current medication regimen.  Last A1c reviewed-   Lab Results   Component Value Date    HGBA1C 8.7 (H) 02/07/2023     Most recent fingerstick glucose reviewed- No results for input(s): POCTGLUCOSE in the last 24 hours.  Current correctional scale  moderate  Decrease anti-hyperglycemic dose as follows-   Antihyperglycemics (From admission, onward)    Start     Stop Route Frequency Ordered    07/26/23 0857  insulin aspart U-100 pen 1-10 Units         -- SubQ Every 6 hours PRN 07/26/23 0757      POCT/SSI  Hold Oral hypoglycemics while patient is in the hospital.    Hypertension  -Coreg 3.125 BID  -Aldactone 50  -Valsartan 80 BID  -Amlodipine 10  -PRN IV hydralazine      Peripheral vascular disease in type 2 diabetes mellitus  Chronic.  -Podiatry consulted      VTE Risk Mitigation (From admission, onward)         Ordered     enoxaparin injection 40 mg  Every 12 hours         07/26/23 0804     IP VTE HIGH RISK PATIENT  Once         07/26/23 0323     Place sequential compression device  Until discontinued         07/26/23 0323                Discharge Planning   JONATHAN: 7/27/2023     Code Status: Full Code   Is the patient medically ready for discharge?:     Reason for patient still in hospital (select all that apply): Patient trending condition, Treatment and Consult recommendations  Discharge Plan A: Home                  Darius Qiu MD  Department of Hospital Medicine   [facility]

## 2023-07-27 NOTE — NURSING
Patient requested pain medicine nearly every two hours, for pain 8/10 in his left foot, even though he ambulated around the unit and to the snack machine without complaint.

## 2023-07-27 NOTE — NURSING
Dressing change to the plantar  left foot   Dressing removed and area cleaned  pat dry  applied medihoney and covered with aquacel ag  with kerlex wrap cast padding and coban

## 2023-07-27 NOTE — SUBJECTIVE & OBJECTIVE
"Interval History: see "Hospital Course"    Review of Systems   Respiratory:  Positive for shortness of breath.    Cardiovascular:  Positive for chest pain.   Skin:  Positive for wound.   Allergic/Immunologic: Positive for immunocompromised state.   Objective:     Vital Signs (Most Recent):  Temp: 98.2 °F (36.8 °C) (07/27/23 0715)  Pulse: 66 (07/27/23 0715)  Resp: 20 (07/27/23 0715)  BP: (!) 190/111 (07/27/23 0731)  SpO2: 99 % (07/27/23 0715) Vital Signs (24h Range):  Temp:  [97.7 °F (36.5 °C)-98.5 °F (36.9 °C)] 98.2 °F (36.8 °C)  Pulse:  [56-66] 66  Resp:  [16-20] 20  SpO2:  [95 %-99 %] 99 %  BP: (149-212)/() 190/111     Weight: (!) 158.8 kg (350 lb 3.2 oz)  Body mass index is 43.77 kg/m².    Intake/Output Summary (Last 24 hours) at 7/27/2023 0757  Last data filed at 7/27/2023 0316  Gross per 24 hour   Intake 720 ml   Output 1300 ml   Net -580 ml         Physical Exam  Vitals and nursing note reviewed.   Constitutional:       General: He is not in acute distress.     Appearance: He is obese.   HENT:      Head: Normocephalic and atraumatic.      Right Ear: External ear normal.      Left Ear: External ear normal.      Nose: Nose normal.      Mouth/Throat:      Mouth: Mucous membranes are moist.      Pharynx: Oropharynx is clear.   Eyes:      Extraocular Movements: Extraocular movements intact.   Cardiovascular:      Rate and Rhythm: Normal rate and regular rhythm.      Pulses: Normal pulses.      Heart sounds: Normal heart sounds.   Pulmonary:      Effort: Pulmonary effort is normal.      Breath sounds: Normal breath sounds.   Abdominal:      General: Bowel sounds are normal.      Palpations: Abdomen is soft.   Musculoskeletal:         General: Normal range of motion.      Cervical back: Normal range of motion and neck supple.   Skin:     General: Skin is warm and dry.      Comments: LLE dressed.   Neurological:      Mental Status: He is alert. Mental status is at baseline.   Psychiatric:         Mood and " Affect: Mood normal.         Behavior: Behavior normal.           Significant Labs: All pertinent labs within the past 24 hours have been reviewed.    Significant Imaging: I have reviewed all pertinent imaging results/findings within the past 24 hours.

## 2023-07-27 NOTE — PLAN OF CARE
Pt was explained MOSQUEDA. Pt verbalized understanding of MOSQUEDA and signed. MOSQUEDA scanned to .       07/26/23 1142   MOSQUEDA Message   Medicare Outpatient and Observation Notification regarding financial responsibility Given to patient/caregiver;Explained to patient/caregiver;Signed/date by patient/caregiver   Date MOSQUEDA was signed 07/26/23   Time MOSQUEDA was signed 114

## 2023-07-28 VITALS
OXYGEN SATURATION: 96 % | TEMPERATURE: 98 F | RESPIRATION RATE: 18 BRPM | BODY MASS INDEX: 39.17 KG/M2 | DIASTOLIC BLOOD PRESSURE: 80 MMHG | SYSTOLIC BLOOD PRESSURE: 167 MMHG | HEART RATE: 56 BPM | HEIGHT: 75 IN | WEIGHT: 315 LBS

## 2023-07-28 PROBLEM — R07.9 CHEST PAIN: Status: RESOLVED | Noted: 2023-07-26 | Resolved: 2023-07-28

## 2023-07-28 LAB
ALBUMIN SERPL BCP-MCNC: 3.4 G/DL (ref 3.5–5.2)
ALP SERPL-CCNC: 32 U/L (ref 55–135)
ALT SERPL W/O P-5'-P-CCNC: 16 U/L (ref 10–44)
ANION GAP SERPL CALC-SCNC: 3 MMOL/L (ref 8–16)
APTT PPP: 27.7 SEC (ref 21–32)
AST SERPL-CCNC: 14 U/L (ref 10–40)
BASOPHILS # BLD AUTO: 0.05 K/UL (ref 0–0.2)
BASOPHILS NFR BLD: 0.8 % (ref 0–1.9)
BILIRUB SERPL-MCNC: 0.5 MG/DL (ref 0.1–1)
BUN SERPL-MCNC: 35 MG/DL (ref 6–20)
CALCIUM SERPL-MCNC: 8.5 MG/DL (ref 8.7–10.5)
CHLORIDE SERPL-SCNC: 105 MMOL/L (ref 95–110)
CO2 SERPL-SCNC: 28 MMOL/L (ref 23–29)
CREAT SERPL-MCNC: 1.9 MG/DL (ref 0.5–1.4)
DIFFERENTIAL METHOD: ABNORMAL
EOSINOPHIL # BLD AUTO: 0.2 K/UL (ref 0–0.5)
EOSINOPHIL NFR BLD: 3.8 % (ref 0–8)
ERYTHROCYTE [DISTWIDTH] IN BLOOD BY AUTOMATED COUNT: 14.6 % (ref 11.5–14.5)
EST. GFR  (NO RACE VARIABLE): 39.9 ML/MIN/1.73 M^2
GLUCOSE SERPL-MCNC: 173 MG/DL (ref 70–110)
GLUCOSE SERPL-MCNC: 218 MG/DL (ref 70–110)
HBA1C MFR BLD: 8.1 % (ref 4.8–5.6)
HCT VFR BLD AUTO: 29.7 % (ref 40–54)
HGB BLD-MCNC: 9.4 G/DL (ref 14–18)
IMM GRANULOCYTES # BLD AUTO: 0.03 K/UL (ref 0–0.04)
IMM GRANULOCYTES NFR BLD AUTO: 0.5 % (ref 0–0.5)
INR PPP: 0.9 (ref 0.8–1.2)
LYMPHOCYTES # BLD AUTO: 2.2 K/UL (ref 1–4.8)
LYMPHOCYTES NFR BLD: 36.6 % (ref 18–48)
MAGNESIUM SERPL-MCNC: 1.9 MG/DL (ref 1.6–2.6)
MCH RBC QN AUTO: 29.1 PG (ref 27–31)
MCHC RBC AUTO-ENTMCNC: 31.6 G/DL (ref 32–36)
MCV RBC AUTO: 92 FL (ref 82–98)
MONOCYTES # BLD AUTO: 0.5 K/UL (ref 0.3–1)
MONOCYTES NFR BLD: 8.6 % (ref 4–15)
NEUTROPHILS # BLD AUTO: 3 K/UL (ref 1.8–7.7)
NEUTROPHILS NFR BLD: 49.7 % (ref 38–73)
NRBC BLD-RTO: 0 /100 WBC
PHOSPHATE SERPL-MCNC: 4.8 MG/DL (ref 2.7–4.5)
PLATELET # BLD AUTO: 258 K/UL (ref 150–450)
PMV BLD AUTO: 10.5 FL (ref 9.2–12.9)
POTASSIUM SERPL-SCNC: 4.8 MMOL/L (ref 3.5–5.1)
PROT SERPL-MCNC: 6.5 G/DL (ref 6–8.4)
PROTHROMBIN TIME: 10.6 SEC (ref 9–12.5)
RBC # BLD AUTO: 3.23 M/UL (ref 4.6–6.2)
SODIUM SERPL-SCNC: 136 MMOL/L (ref 136–145)
WBC # BLD AUTO: 6.07 K/UL (ref 3.9–12.7)

## 2023-07-28 PROCEDURE — 93010 ELECTROCARDIOGRAM REPORT: CPT | Mod: ,,, | Performed by: SPECIALIST

## 2023-07-28 PROCEDURE — 63600175 PHARM REV CODE 636 W HCPCS: Performed by: INTERNAL MEDICINE

## 2023-07-28 PROCEDURE — 27201423 OPTIME MED/SURG SUP & DEVICES STERILE SUPPLY: Performed by: INTERNAL MEDICINE

## 2023-07-28 PROCEDURE — 25000003 PHARM REV CODE 250: Performed by: STUDENT IN AN ORGANIZED HEALTH CARE EDUCATION/TRAINING PROGRAM

## 2023-07-28 PROCEDURE — 21400001 HC TELEMETRY ROOM

## 2023-07-28 PROCEDURE — 99152 MOD SED SAME PHYS/QHP 5/>YRS: CPT | Performed by: INTERNAL MEDICINE

## 2023-07-28 PROCEDURE — C1887 CATHETER, GUIDING: HCPCS | Performed by: INTERNAL MEDICINE

## 2023-07-28 PROCEDURE — 96376 TX/PRO/DX INJ SAME DRUG ADON: CPT

## 2023-07-28 PROCEDURE — 93005 ELECTROCARDIOGRAM TRACING: CPT | Performed by: SPECIALIST

## 2023-07-28 PROCEDURE — 25000003 PHARM REV CODE 250

## 2023-07-28 PROCEDURE — 99152 MOD SED SAME PHYS/QHP 5/>YRS: CPT | Mod: ,,, | Performed by: INTERNAL MEDICINE

## 2023-07-28 PROCEDURE — 99152 PR MOD CONSCIOUS SEDATION, SAME PHYS, 5+ YRS, FIRST 15 MIN: ICD-10-PCS | Mod: ,,, | Performed by: INTERNAL MEDICINE

## 2023-07-28 PROCEDURE — 80053 COMPREHEN METABOLIC PANEL: CPT | Performed by: STUDENT IN AN ORGANIZED HEALTH CARE EDUCATION/TRAINING PROGRAM

## 2023-07-28 PROCEDURE — C1769 GUIDE WIRE: HCPCS | Performed by: INTERNAL MEDICINE

## 2023-07-28 PROCEDURE — 25000003 PHARM REV CODE 250: Performed by: INTERNAL MEDICINE

## 2023-07-28 PROCEDURE — 93010 EKG 12-LEAD: ICD-10-PCS | Mod: ,,, | Performed by: SPECIALIST

## 2023-07-28 PROCEDURE — 25500020 PHARM REV CODE 255: Performed by: INTERNAL MEDICINE

## 2023-07-28 PROCEDURE — 63600175 PHARM REV CODE 636 W HCPCS: Performed by: NURSE PRACTITIONER

## 2023-07-28 PROCEDURE — 85730 THROMBOPLASTIN TIME PARTIAL: CPT

## 2023-07-28 PROCEDURE — 93458 PR CATH PLACE/CORON ANGIO, IMG SUPER/INTERP,W LEFT HEART VENTRICULOGRAPHY: ICD-10-PCS | Mod: 26,,, | Performed by: INTERNAL MEDICINE

## 2023-07-28 PROCEDURE — 85025 COMPLETE CBC W/AUTO DIFF WBC: CPT | Performed by: STUDENT IN AN ORGANIZED HEALTH CARE EDUCATION/TRAINING PROGRAM

## 2023-07-28 PROCEDURE — 25000003 PHARM REV CODE 250: Performed by: NURSE PRACTITIONER

## 2023-07-28 PROCEDURE — 83735 ASSAY OF MAGNESIUM: CPT | Performed by: STUDENT IN AN ORGANIZED HEALTH CARE EDUCATION/TRAINING PROGRAM

## 2023-07-28 PROCEDURE — 93458 L HRT ARTERY/VENTRICLE ANGIO: CPT | Mod: 26,,, | Performed by: INTERNAL MEDICINE

## 2023-07-28 PROCEDURE — 85610 PROTHROMBIN TIME: CPT

## 2023-07-28 PROCEDURE — 84100 ASSAY OF PHOSPHORUS: CPT | Performed by: STUDENT IN AN ORGANIZED HEALTH CARE EDUCATION/TRAINING PROGRAM

## 2023-07-28 PROCEDURE — 93458 L HRT ARTERY/VENTRICLE ANGIO: CPT | Performed by: INTERNAL MEDICINE

## 2023-07-28 PROCEDURE — C1894 INTRO/SHEATH, NON-LASER: HCPCS | Performed by: INTERNAL MEDICINE

## 2023-07-28 RX ORDER — VALSARTAN 80 MG/1
80 TABLET ORAL 2 TIMES DAILY
Qty: 180 TABLET | Refills: 2 | Status: SHIPPED | OUTPATIENT
Start: 2023-07-28 | End: 2024-01-09 | Stop reason: DRUGHIGH

## 2023-07-28 RX ORDER — NITROGLYCERIN 5 MG/ML
INJECTION, SOLUTION INTRAVENOUS
Status: DISCONTINUED | OUTPATIENT
Start: 2023-07-28 | End: 2023-07-28 | Stop reason: HOSPADM

## 2023-07-28 RX ORDER — SODIUM CHLORIDE 9 MG/ML
INJECTION, SOLUTION INTRAVENOUS ONCE
Status: COMPLETED | OUTPATIENT
Start: 2023-07-28 | End: 2023-07-28

## 2023-07-28 RX ORDER — SODIUM CHLORIDE 9 MG/ML
INJECTION, SOLUTION INTRAVENOUS CONTINUOUS
Status: DISCONTINUED | OUTPATIENT
Start: 2023-07-28 | End: 2023-07-28 | Stop reason: HOSPADM

## 2023-07-28 RX ORDER — IODIXANOL 320 MG/ML
INJECTION, SOLUTION INTRAVASCULAR
Status: DISCONTINUED | OUTPATIENT
Start: 2023-07-28 | End: 2023-07-28 | Stop reason: HOSPADM

## 2023-07-28 RX ORDER — DIPHENHYDRAMINE HCL 25 MG
50 CAPSULE ORAL
Status: DISCONTINUED | OUTPATIENT
Start: 2023-07-28 | End: 2023-07-28 | Stop reason: HOSPADM

## 2023-07-28 RX ORDER — CARVEDILOL 3.12 MG/1
3.12 TABLET ORAL 2 TIMES DAILY WITH MEALS
Qty: 60 TABLET | Refills: 2 | Status: SHIPPED | OUTPATIENT
Start: 2023-07-28 | End: 2024-01-09

## 2023-07-28 RX ORDER — HEPARIN SODIUM 1000 [USP'U]/ML
INJECTION, SOLUTION INTRAVENOUS; SUBCUTANEOUS
Status: DISCONTINUED | OUTPATIENT
Start: 2023-07-28 | End: 2023-07-28 | Stop reason: HOSPADM

## 2023-07-28 RX ORDER — SPIRONOLACTONE 50 MG/1
50 TABLET, FILM COATED ORAL DAILY
Qty: 30 TABLET | Refills: 2 | Status: ON HOLD | OUTPATIENT
Start: 2023-07-29 | End: 2024-01-19 | Stop reason: HOSPADM

## 2023-07-28 RX ORDER — LIDOCAINE HYDROCHLORIDE 10 MG/ML
INJECTION, SOLUTION EPIDURAL; INFILTRATION; INTRACAUDAL; PERINEURAL
Status: DISCONTINUED | OUTPATIENT
Start: 2023-07-28 | End: 2023-07-28 | Stop reason: HOSPADM

## 2023-07-28 RX ORDER — HYDRALAZINE HYDROCHLORIDE 25 MG/1
25 TABLET, FILM COATED ORAL EVERY 8 HOURS
Qty: 90 TABLET | Refills: 2 | Status: SHIPPED | OUTPATIENT
Start: 2023-07-28 | End: 2024-07-27

## 2023-07-28 RX ORDER — FLUTICASONE PROPIONATE 50 MCG
1 SPRAY, SUSPENSION (ML) NASAL DAILY PRN
Refills: 0
Start: 2023-07-28

## 2023-07-28 RX ORDER — ASPIRIN 81 MG/1
81 TABLET ORAL DAILY
Qty: 30 TABLET | Refills: 2 | Status: SHIPPED | OUTPATIENT
Start: 2023-07-29 | End: 2024-07-28

## 2023-07-28 RX ORDER — MIDAZOLAM HYDROCHLORIDE 1 MG/ML
INJECTION INTRAMUSCULAR; INTRAVENOUS
Status: DISCONTINUED | OUTPATIENT
Start: 2023-07-28 | End: 2023-07-28 | Stop reason: HOSPADM

## 2023-07-28 RX ORDER — AMLODIPINE BESYLATE 10 MG/1
10 TABLET ORAL DAILY
Qty: 30 TABLET | Refills: 2 | Status: SHIPPED | OUTPATIENT
Start: 2023-07-29 | End: 2024-07-28

## 2023-07-28 RX ORDER — FENTANYL CITRATE 50 UG/ML
INJECTION, SOLUTION INTRAMUSCULAR; INTRAVENOUS
Status: DISCONTINUED | OUTPATIENT
Start: 2023-07-28 | End: 2023-07-28 | Stop reason: HOSPADM

## 2023-07-28 RX ADMIN — MORPHINE SULFATE 2 MG: 2 INJECTION, SOLUTION INTRAMUSCULAR; INTRAVENOUS at 12:07

## 2023-07-28 RX ADMIN — OXYCODONE AND ACETAMINOPHEN 1 TABLET: 10; 325 TABLET ORAL at 02:07

## 2023-07-28 RX ADMIN — SODIUM CHLORIDE: 0.9 INJECTION, SOLUTION INTRAVENOUS at 08:07

## 2023-07-28 RX ADMIN — OXYCODONE AND ACETAMINOPHEN 1 TABLET: 10; 325 TABLET ORAL at 08:07

## 2023-07-28 RX ADMIN — MORPHINE SULFATE 2 MG: 2 INJECTION, SOLUTION INTRAMUSCULAR; INTRAVENOUS at 10:07

## 2023-07-28 RX ADMIN — SODIUM CHLORIDE: 0.9 INJECTION, SOLUTION INTRAVENOUS at 02:07

## 2023-07-28 RX ADMIN — HYDRALAZINE HYDROCHLORIDE 25 MG: 25 TABLET ORAL at 05:07

## 2023-07-28 RX ADMIN — HYDRALAZINE HYDROCHLORIDE 25 MG: 25 TABLET ORAL at 02:07

## 2023-07-28 RX ADMIN — ASPIRIN 81 MG: 81 TABLET, COATED ORAL at 08:07

## 2023-07-28 RX ADMIN — GABAPENTIN 800 MG: 300 CAPSULE ORAL at 02:07

## 2023-07-28 NOTE — NURSING
PT transferred to room 3007. Cindy RN at bedside. PT site WNL and PT states comfort. Tele box in place.

## 2023-07-28 NOTE — ASSESSMENT & PLAN NOTE
-Coreg 3.125 BID  -Aldactone 50  -Valsartan 80 BID  -Amlodipine 10  -Hydralazine 25 TID  -PRN IV hydralazine

## 2023-07-28 NOTE — PROGRESS NOTES
[facility] Medicine  Progress Note    Patient Name: Abel Gibbs  MRN: [id number]  Patient Class: OP- Observation   Admission Date: 7/26/2023  Length of Stay: 0 days  Attending Physician: Darius Qiu MD  Primary Care Provider: Rupinder Pagan MD        Subjective:     Principal Problem:Chest pain        HPI:  Patient is a 58-year-old male with history of HTN, DM, CHF, diabetic foot ulcer, right foot osteomyeltis, depression and obesity.  He presents to the ED by EMS with complaints of chest pressure 7/10, subjective blood pressure 200/100 at home and frontal headache.  EMS he was nitroglycerin tab to help with chest pressure.  On arrival patient's blood pressure systolic 200, chest pain 6/10 and reports increased shortness of breath.  On assessment in ED initial troponin 8.5, Mag 1.8, creatinine 1.8.  P.o. aspirin, labs, EKG ordered.    On assessment patient is alert, oriented, reports chest pressure 6/10 and has improved with the nitro paste.  He reports his shortness of breath has improved since his blood pressure have reduced.  He reports at home subjective blood pressure greater than 200 S started around 3 or 4:00 p.m. he reports chest pressure radiating to left arm, frontal headache and eye pain.  He reports a PCP appointment on Thursday at 9:00 a.m. he reports being treated for diabetic foot infection/osteomyelitis with antibiotics and is followed podiatrist by Dr. Dawson outpatient.  He denies lightheadedness, dizziness, diaphoresis, nausea, vomiting.  He denies smoking, illicit drugs, alcohol.    Hospitalist asked to admit for , chest pain, hypertension.    Summary echo 02/2023   The left ventricle is normal in size with moderate concentric hypertrophy and normal systolic function.   The estimated ejection fraction is 65%.   Normal left ventricular diastolic function.   Moderate mitral regurgitation.   Mild tricuspid regurgitation.   Moderate left atrial  "enlargement.   Mild right atrial enlargement.   Normal right ventricular size with normal right ventricular systolic function.   Normal central venous pressure (3 mmHg).   Atrial fibrillation not observed.EF 65%      Conclusion initial stress test 02/09/2023    The ECG portion of the study is negative for ischemia.    The patient reported no chest pain during the stress test.    There were no arrhythmias during stress.    The nuclear portion of this study will be reported separately.      Overview/Hospital Course:  Abel Gibbs is a 58 year old male with a past medical history of DM, HTN, HLD, obesity, anemia and CKD who presented with chest pain in the setting of hypertensive urgency. Troponin is unremarkable thus far and being trended. TTE is unremarkable. Home Aldactone has been increased. Cardiology has been consulted and has started the patient on Coreg, hydralazine, amlodipine and valsartan with control of the BP as of 7/28. The patient still reports chest pain with ambulation. Coronary angiogram will be performed 7/28. Podiatry has been consulted as well for a chronic left foot ulceration.      Interval History: see "Hospital Course"    Review of Systems   Respiratory:  Positive for shortness of breath.    Cardiovascular:  Positive for chest pain.   Skin:  Positive for wound.   Allergic/Immunologic: Positive for immunocompromised state.   Objective:     Vital Signs (Most Recent):  Temp: 98.6 °F (37 °C) (07/28/23 0722)  Pulse: 62 (07/28/23 0722)  Resp: 18 (07/28/23 0722)  BP: (!) 182/90 (07/28/23 0722)  SpO2: 97 % (07/28/23 0722) Vital Signs (24h Range):  Temp:  [97.4 °F (36.3 °C)-98.6 °F (37 °C)] 98.6 °F (37 °C)  Pulse:  [55-71] 62  Resp:  [16-20] 18  SpO2:  [96 %-98 %] 97 %  BP: (139-190)/() 182/90     Weight: (!) 158.8 kg (350 lb 3.2 oz)  Body mass index is 43.77 kg/m².    Intake/Output Summary (Last 24 hours) at 7/28/2023 8238  Last data filed at 7/28/2023 0324  Gross per 24 hour   Intake " 1120 ml   Output 1200 ml   Net -80 ml         Physical Exam  Vitals and nursing note reviewed.   Constitutional:       General: He is not in acute distress.     Appearance: He is obese.   HENT:      Head: Normocephalic and atraumatic.      Right Ear: External ear normal.      Left Ear: External ear normal.      Nose: Nose normal.      Mouth/Throat:      Mouth: Mucous membranes are moist.      Pharynx: Oropharynx is clear.   Eyes:      Extraocular Movements: Extraocular movements intact.   Cardiovascular:      Rate and Rhythm: Normal rate and regular rhythm.      Pulses: Normal pulses.      Heart sounds: Normal heart sounds.   Pulmonary:      Effort: Pulmonary effort is normal.      Breath sounds: Normal breath sounds.   Abdominal:      General: Bowel sounds are normal.      Palpations: Abdomen is soft.   Musculoskeletal:         General: Normal range of motion.      Cervical back: Normal range of motion and neck supple.   Skin:     General: Skin is warm and dry.      Comments: LLE dressed.   Neurological:      Mental Status: He is alert. Mental status is at baseline.   Psychiatric:         Mood and Affect: Mood normal.         Behavior: Behavior normal.           Significant Labs: All pertinent labs within the past 24 hours have been reviewed.    Significant Imaging: I have reviewed all pertinent imaging results/findings within the past 24 hours.      Assessment/Plan:      * Chest pain  In setting of elevated BP. EKG and troponin unremarkable. TTE unremarkable.  -Telemetry  -Cardiology consulted; coronary angiogram 7/28  -PRN morphine and nitroglycerin  -Statin  -Coreg  -ARB  -ASA    Anemia  Stable.  -Trend Hgb with CBC      CKD (chronic kidney disease)  Stable.  -Renally dose medications  -Avoid nephrotoxic agents      BPH (benign prostatic hyperplasia)  -[place]      HLD (hyperlipidemia)  -Continue statin and ASA      Ulcer of left foot with necrosis of muscle  Left foot diabetic ulcer followed by outpatient  Podiatry (Dr. Dawson).  -Podiatry consulted  -Wound Care consulted    Severe obesity (BMI >= 40)  Body mass index is 43.77 kg/m². Morbid obesity complicates all aspects of disease management from diagnostic modalities to treatment.          Type 2 diabetes mellitus  Patient's FSGs are uncontrolled due to hyperglycemia on current medication regimen.  Last A1c reviewed-   Lab Results   Component Value Date    HGBA1C 8.7 (H) 02/07/2023     Most recent fingerstick glucose reviewed- No results for input(s): POCTGLUCOSE in the last 24 hours.  Current correctional scale  moderate  Decrease anti-hyperglycemic dose as follows-   Antihyperglycemics (From admission, onward)    Start     Stop Route Frequency Ordered    07/26/23 0857  insulin aspart U-100 pen 1-10 Units         -- SubQ Every 6 hours PRN 07/26/23 0757      POCT/SSI  Hold Oral hypoglycemics while patient is in the hospital.    Hypertension  -Coreg 3.125 BID  -Aldactone 50  -Valsartan 80 BID  -Amlodipine 10  -Hydralazine 25 TID  -PRN IV hydralazine      Peripheral vascular disease in type 2 diabetes mellitus  Chronic.  -Podiatry consulted      VTE Risk Mitigation (From admission, onward)         Ordered     enoxaparin injection 40 mg  Every 12 hours         07/26/23 0804     IP VTE HIGH RISK PATIENT  Once         07/26/23 0323     Place sequential compression device  Until discontinued         07/26/23 0323                Discharge Planning   JONATHAN: 7/28/2023     Code Status: Full Code   Is the patient medically ready for discharge?:     Reason for patient still in hospital (select all that apply): Patient trending condition, Imaging and Consult recommendations  Discharge Plan A: Home                  Darius Qiu MD  Department [facility]   [facility]

## 2023-07-28 NOTE — ASSESSMENT & PLAN NOTE
In setting of elevated BP. EKG and troponin unremarkable. TTE unremarkable.  -Telemetry  -Cardiology consulted; coronary angiogram 7/28  -PRN morphine and nitroglycerin  -Statin  -Coreg  -ARB  -ASA

## 2023-07-28 NOTE — NURSING
Patient refusing to be stuck for a second iv for his angio today, patient educated on necessity of having two iv's.

## 2023-07-28 NOTE — PLAN OF CARE
With permission from Lorene with [facility],  pulled a HD rollator from the E storage room and delivered to patient at bedside along with paper instructions and contact information for [facility].  Delivery ticket completed, signed by patient, and returned to E storage room.     Pt requested information for financial assistance for housing.  delivered resources to Pt at bedside.    Pt to discharge 7/29/2023, pending BP being controled, per Dr. Greenwood.       07/28/23 0943   Post-Acute Status   Post-Acute Authorization [facility] Status Set-up Complete/Auth obtained   Hospital Resources/Appts/Education Provided Community resources provided   Discharge Delays None known at this time   Discharge Plan   Discharge Plan A Home with family   Discharge Plan B Home with family

## 2023-07-28 NOTE — SUBJECTIVE & OBJECTIVE
"Interval History: see "Hospital Course"    Review of Systems   Respiratory:  Positive for shortness of breath.    Cardiovascular:  Positive for chest pain.   Skin:  Positive for wound.   Allergic/Immunologic: Positive for immunocompromised state.   Objective:     Vital Signs (Most Recent):  Temp: 98.6 °F (37 °C) (07/28/23 0722)  Pulse: 62 (07/28/23 0722)  Resp: 18 (07/28/23 0722)  BP: (!) 182/90 (07/28/23 0722)  SpO2: 97 % (07/28/23 0722) Vital Signs (24h Range):  Temp:  [97.4 °F (36.3 °C)-98.6 °F (37 °C)] 98.6 °F (37 °C)  Pulse:  [55-71] 62  Resp:  [16-20] 18  SpO2:  [96 %-98 %] 97 %  BP: (139-190)/() 182/90     Weight: (!) 158.8 kg (350 lb 3.2 oz)  Body mass index is 43.77 kg/m².    Intake/Output Summary (Last 24 hours) at 7/28/2023 0730  Last data filed at 7/28/2023 0324  Gross per 24 hour   Intake 1120 ml   Output 1200 ml   Net -80 ml         Physical Exam  Vitals and nursing note reviewed.   Constitutional:       General: He is not in acute distress.     Appearance: He is obese.   HENT:      Head: Normocephalic and atraumatic.      Right Ear: External ear normal.      Left Ear: External ear normal.      Nose: Nose normal.      Mouth/Throat:      Mouth: Mucous membranes are moist.      Pharynx: Oropharynx is clear.   Eyes:      Extraocular Movements: Extraocular movements intact.   Cardiovascular:      Rate and Rhythm: Normal rate and regular rhythm.      Pulses: Normal pulses.      Heart sounds: Normal heart sounds.   Pulmonary:      Effort: Pulmonary effort is normal.      Breath sounds: Normal breath sounds.   Abdominal:      General: Bowel sounds are normal.      Palpations: Abdomen is soft.   Musculoskeletal:         General: Normal range of motion.      Cervical back: Normal range of motion and neck supple.   Skin:     General: Skin is warm and dry.      Comments: LLE dressed.   Neurological:      Mental Status: He is alert. Mental status is at baseline.   Psychiatric:         Mood and Affect: Mood " normal.         Behavior: Behavior normal.           Significant Labs: All pertinent labs within the past 24 hours have been reviewed.    Significant Imaging: I have reviewed all pertinent imaging results/findings within the past 24 hours.

## 2023-07-28 NOTE — PLAN OF CARE
Problem: Adult Inpatient Plan of Care  Goal: Plan of Care Review  7/28/2023 0553 by Ju Herbert RN  Outcome: Ongoing, Progressing  7/28/2023 0353 by Ju Herbert RN  Outcome: Ongoing, Progressing  Goal: Patient-Specific Goal (Individualized)  7/28/2023 0553 by Ju Herbert RN  Outcome: Ongoing, Progressing  7/28/2023 0353 by Ju Herbert RN  Outcome: Ongoing, Progressing  Goal: Absence of Hospital-Acquired Illness or Injury  7/28/2023 0553 by Ju Herbert RN  Outcome: Ongoing, Progressing  7/28/2023 0353 by Ju Herbert RN  Outcome: Ongoing, Progressing  Goal: Optimal Comfort and Wellbeing  7/28/2023 0553 by Ju Herbert RN  Outcome: Ongoing, Progressing  7/28/2023 0353 by Ju Herbert RN  Outcome: Ongoing, Progressing  Goal: Readiness for Transition of Care  7/28/2023 0553 by Ju Herbert RN  Outcome: Ongoing, Progressing  7/28/2023 0353 by Ju Herbert RN  Outcome: Ongoing, Progressing     Problem: Bariatric Environmental Safety  Goal: Safety Maintained with Care  7/28/2023 0553 by Ju Herbert RN  Outcome: Ongoing, Progressing  7/28/2023 0353 by Ju Herbert RN  Outcome: Ongoing, Progressing     Problem: Diabetes Comorbidity  Goal: Blood Glucose Level Within Targeted Range  7/28/2023 0553 by Ju Herbert RN  Outcome: Ongoing, Progressing  7/28/2023 0353 by Ju Herbert RN  Outcome: Ongoing, Progressing     Problem: Fluid Imbalance (Pneumonia)  Goal: Fluid Balance  7/28/2023 0553 by Ju Herbert RN  Outcome: Ongoing, Progressing  7/28/2023 0353 by Ju Herbert RN  Outcome: Ongoing, Progressing     Problem: Infection (Pneumonia)  Goal: Resolution of Infection Signs and Symptoms  7/28/2023 0553 by Ju Herbert RN  Outcome: Ongoing, Progressing  7/28/2023 0353 by Ju Herbert RN  Outcome: Ongoing, Progressing     Problem: Respiratory Compromise (Pneumonia)  Goal: Effective Oxygenation and Ventilation  7/28/2023 0553 by Ju Herbert  RN  Outcome: Ongoing, Progressing  7/28/2023 0353 by Ju Herbert RN  Outcome: Ongoing, Progressing

## 2023-07-28 NOTE — DISCHARGE SUMMARY
[facility] Medicine  Discharge Summary      Patient Name: Abel Gibbs  MRN: [id number]  RANDOLPH: [id number]  Patient Class: IP- Inpatient  Admission Date: 7/26/2023  Hospital Length of Stay: 0 days  Discharge Date and Time: No discharge date for patient encounter.  Attending Physician: Darius Qiu MD   Discharging Provider: Darius Qiu MD  Primary Care Provider: Rupinder Pagan MD    Primary Care Team: Networked reference to record PCT     HPI:   Patient is a 58-year-old male with history of HTN, DM, CHF, diabetic foot ulcer, right foot osteomyeltis, depression and obesity.  He presents to the ED by EMS with complaints of chest pressure 7/10, subjective blood pressure 200/100 at home and frontal headache.  EMS he was nitroglycerin tab to help with chest pressure.  On arrival patient's blood pressure systolic 200, chest pain 6/10 and reports increased shortness of breath.  On assessment in ED initial troponin 8.5, Mag 1.8, creatinine 1.8.  P.o. aspirin, labs, EKG ordered.    On assessment patient is alert, oriented, reports chest pressure 6/10 and has improved with the nitro paste.  He reports his shortness of breath has improved since his blood pressure have reduced.  He reports at home subjective blood pressure greater than 200 S started around 3 or 4:00 p.m. he reports chest pressure radiating to left arm, frontal headache and eye pain.  He reports a PCP appointment on Thursday at 9:00 a.m. he reports being treated for diabetic foot infection/osteomyelitis with antibiotics and is followed podiatrist by Dr. Dawson outpatient.  He denies lightheadedness, dizziness, diaphoresis, nausea, vomiting.  He denies smoking, illicit drugs, alcohol.    Hospitalist asked to admit for , chest pain, hypertension.    Summary echo 02/2023   The left ventricle is normal in size with moderate concentric hypertrophy and normal systolic function.   The estimated ejection fraction is 65%.   Normal left  ventricular diastolic function.   Moderate mitral regurgitation.   Mild tricuspid regurgitation.   Moderate left atrial enlargement.   Mild right atrial enlargement.   Normal right ventricular size with normal right ventricular systolic function.   Normal central venous pressure (3 mmHg).   Atrial fibrillation not observed.EF 65%      Conclusion initial stress test 02/09/2023    The ECG portion of the study is negative for ischemia.    The patient reported no chest pain during the stress test.    There were no arrhythmias during stress.    The nuclear portion of this study will be reported separately.      Procedure(s) (LRB):  Angiogram, Coronary, with Left Heart Cath (N/A)      Hospital Course:   Abel Gibbs is a 58 year old male with a past medical history of DM, HTN, HLD, obesity, anemia and CKD who presented with chest pain in the setting of hypertensive urgency. Troponin is unremarkable thus far and being trended. TTE is unremarkable. Home Aldactone has been increased. Cardiology has been consulted and has started the patient on Coreg, hydralazine, amlodipine and valsartan with control of the BP as of 7/28. Coronary angiogram was performed 7/28 and unremarkable. Podiatry has been consulted as well for a chronic left foot ulceration. He was discharged 7/28/2023 and will follow up with his PCP in the outpatient setting.       Goals of Care Treatment Preferences:  Code Status: Full Code      Consults:   Consults (From admission, onward)        Status Ordering Provider     Inpatient consult to Registered Dietitian/Nutritionist  Once        Provider:  (Not yet assigned)    JOSHUA Whitley     Inpatient consult to Podiatry  Once        Provider:  Jovanni Rivera DPM    Completed PHAN MACHADO     Inpatient consult to Cardiology  Once        Provider:  Nolan Bass MD    Acknowledged MARAH MEADE     Inpatient consult to Hospitalist  Once        Provider:  Joshua Qiu MD     "Acknowledged MARAH MEADE          Orthopedic  Ulcer of left foot with necrosis of muscle  Left foot diabetic ulcer followed by outpatient Podiatry (Dr. Dawson).  -Podiatry consulted  -Wound Care consulted      Final Active Diagnoses:    Diagnosis Date Noted POA    HLD (hyperlipidemia) [E78.5] 07/26/2023 Yes    BPH (benign prostatic hyperplasia) [N40.0] 07/26/2023 Yes    CKD (chronic kidney disease) [N18.9] 07/26/2023 Yes    Anemia [D64.9] 07/26/2023 Yes    Ulcer of left foot with necrosis of muscle [L97.523] 02/10/2023 Yes     Chronic    Severe obesity (BMI >= 40) [E66.01] 02/08/2023 Yes    Type 2 diabetes mellitus [E11.9] 03/09/2022 Yes     Chronic    Hypertension [I10] 12/30/2020 Yes     Chronic    Peripheral vascular disease in type 2 diabetes mellitus [E10.51] 12/30/2020 Yes      Problems Resolved During this Admission:    Diagnosis Date Noted Date Resolved POA    PRINCIPAL PROBLEM:  Chest pain [R07.9] 07/26/2023 07/28/2023 Yes       Discharged Condition: stable    Disposition: Home or Self Care    Follow Up:   Follow-up Information     Rupinder Pagan MD. Go on 8/2/2023.    Specialty: Internal Medicine  Why: Go to hospital follow-up @10AM on 8/2/2023.  Contact information:  [address] [place] [id number]  [phone]                       Patient Instructions:      WALKER FOR HOME USE     Order Specific Question Answer Comments   Type of Walker: Rollator with brakes and/or seat bariatric   With wheels? Yes    Height: 6' 3" (1.905 m)    Weight: 158.8 kg (350 lb 3.2 oz)    Length of need (1-99 months): 99    Does patient have medical equipment at home? walker, standard    Please check all that apply: Patient's condition impairs ambulation.    Please check all that apply: Patient is unable to safely ambulate without equipment.    Please check all that apply: Patient needs help to get in and out of chair.    Please check all that apply: Walker will be used for gait training.      Diet " diabetic     Notify your health care provider if you experience any of the following:  persistent dizziness, light-headedness, or visual disturbances     Notify your health care provider if you experience any of the following:  severe persistent headache     Notify your health care provider if you experience any of the following:  difficulty breathing or increased cough     Notify your health care provider if you experience any of the following:  severe uncontrolled pain     Notify your health care provider if you experience any of the following:  persistent nausea and vomiting or diarrhea     Notify your health care provider if you experience any of the following:  temperature >100.4     Notify your health care provider if you experience any of the following:  increased confusion or weakness     Activity as tolerated       Significant Diagnostic Studies: Labs: All labs within the past 24 hours have been reviewed    Pending Diagnostic Studies:     Procedure Component Value Units Date/Time    Hemoglobin A1c [[id number]] Collected: 07/26/23 0401    Order Status: Sent Lab Status: In process Updated: 07/26/23 0404    Specimen: Blood          Medications:  Reconciled Home Medications:      Medication List      START taking these medications    aspirin 81 MG EC tablet  Commonly known as: ECOTRIN  Take 1 tablet (81 mg total) by mouth once daily.  Start taking on: July 29, 2023     valsartan 80 MG tablet  Commonly known as: DIOVAN  Take 1 tablet (80 mg total) by mouth 2 (two) times daily.        CHANGE how you take these medications    amLODIPine 10 MG tablet  Commonly known as: NORVASC  Take 1 tablet (10 mg total) by mouth once daily.  Start taking on: July 29, 2023  What changed:   · medication strength  · how much to take     fluticasone propionate 50 mcg/actuation nasal spray  Commonly known as: FLONASE  1 spray (50 mcg total) by Each Nostril route daily as needed for Rhinitis.  What changed:   · when to take  this  · reasons to take this     hydrALAZINE 25 MG tablet  Commonly known as: APRESOLINE  Take 1 tablet (25 mg total) by mouth every 8 (eight) hours.  What changed:   · how much to take  · when to take this     spironolactone 50 MG tablet  Commonly known as: ALDACTONE  Take 1 tablet (50 mg total) by mouth once daily.  Start taking on: July 29, 2023  What changed:   · medication strength  · how much to take        CONTINUE taking these medications    albuterol 90 mcg/actuation inhaler  Commonly known as: PROVENTIL/VENTOLIN HFA  Inhale 1-2 puffs into the lungs every 6 (six) hours as needed for Wheezing. Rescue     atorvastatin 80 MG tablet  Commonly known as: LIPITOR  Take 1 tablet (80 mg total) by mouth once daily.     blood sugar diagnostic Strp  To check BG 4 times daily, to use with insurance preferred meter     blood-glucose meter kit  To check BG 4 times daily, to use with insurance preferred meter     carvediloL 3.125 MG tablet  Commonly known as: COREG  Take 1 tablet (3.125 mg total) by mouth 2 (two) times daily with meals.     finasteride 5 mg tablet  Commonly known as: PROSCAR  Take 5 mg by mouth once daily.     gabapentin 800 MG tablet  Commonly known as: NEURONTIN  Take 800 mg by mouth 4 (four) times daily.     lactobacillus acidophilus & bulgar 100 million cell packet  Commonly known as: LACTINEX  Take 1 tablet by mouth 3 (three) times daily with meals.     lancets Misc  To check BG 4 times daily, to use with insurance preferred meter     LEVEMIR FLEXPEN 100 unit/mL (3 mL) Inpn pen  Generic drug: insulin detemir U-100 (Levemir)  Inject 30 Units into the skin once daily.     metFORMIN 1000 MG tablet  Commonly known as: GLUCOPHAGE  Take 1,000 mg by mouth 2 (two) times daily with meals.     oxyCODONE-acetaminophen  mg per tablet  Commonly known as: PERCOCET  Take 1 tablet by mouth every 6 (six) hours as needed.     polyethylene glycol 17 gram/dose powder  Commonly known as: MIRALAX  Take 17 g by mouth  once daily. For constipation        STOP taking these medications    cefUROXime 500 MG tablet  Commonly known as: CEFTIN     doxycycline 100 MG Cap  Commonly known as: VIBRAMYCIN     doxycycline 100 MG tablet  Commonly known as: VIBRA-TABS     ENTRESTO 24-26 mg per tablet  Generic drug: sacubitriL-valsartan     furosemide 20 MG tablet  Commonly known as: LASIX     HYDROcodone-acetaminophen  mg per tablet  Commonly known as: NORCO     metoprolol succinate 25 MG 24 hr tablet  Commonly known as: TOPROL-XL     traZODone 50 MG tablet  Commonly known as: DESYREL            Indwelling Lines/Drains at time of discharge:   Lines/Drains/Airways     None                 Time spent on the discharge of patient: 34 minutes         Darius Qiu MD  Department of Hospital Medicine  [facility]

## 2023-07-28 NOTE — INTERVAL H&P NOTE
The patient has been examined and the H&P has been reviewed:    I concur with the findings and no changes have occurred since H&P was written.    Procedure risks, benefits and alternative options discussed and understood by patient/family.          Active Hospital Problems    Diagnosis  POA    *Chest pain [R07.9]  Yes    HLD (hyperlipidemia) [E78.5]  Yes    BPH (benign prostatic hyperplasia) [N40.0]  Yes    CKD (chronic kidney disease) [N18.9]  Yes    Anemia [D64.9]  Yes    Ulcer of left foot with necrosis of muscle [L97.523]  Yes     Chronic    Severe obesity (BMI >= 40) [E66.01]  Yes    Type 2 diabetes mellitus [E11.9]  Yes     Chronic    Hypertension [I10]  Yes     Chronic    Peripheral vascular disease in type 2 diabetes mellitus [E10.51]  Yes      Resolved Hospital Problems   No resolved problems to display.

## 2023-07-28 NOTE — NURSING
Pt d/c to home via taxi cab. Pt exited facility via ambulation using rolling walker, escorted by nurse. IV x 1 removed, tolerated well. Pt belongings reconciled

## 2023-07-31 ENCOUNTER — PATIENT OUTREACH (OUTPATIENT)
Dept: ADMINISTRATIVE | Facility: CLINIC | Age: 58
End: 2023-07-31
Payer: MEDICARE

## 2023-07-31 NOTE — PROGRESS NOTES
C3 nurse attempted to contact Abel Gibbs for a TCC post hospital discharge follow up call. The patient is unable to conduct the call @ this time. The patient requested a callback.    The patient has a scheduled HOSFU appointment with Rupinder Pagan MD on 08/02/23 @ 1000. Message sent to Physician staff.     Phone interference, pt states phone about to go out. Patient requests callback.

## 2023-08-02 NOTE — PROGRESS NOTES
3rd Attempt made to reach patient for TCC call. Left voicemail please call [phone] leave first name, last name, and  Yolanda will return your call.

## 2023-08-03 NOTE — PLAN OF CARE
Chart and orders reviewed. Pt discharged home via family transport. Appointments scheduled and added to AVS. Bariatric rollator delivered to bedside. Pt has no other needs to be addressed. Pt cleared by case management.       08/03/23 0846   Final Note   Assessment Type Final Discharge Note   Anticipated Discharge Disposition Home   What phone number can be called within the next 1-3 days to see how you are doing after discharge? [phone]   Hospital Resources/Appts/Education Provided Provided patient/caregiver with written discharge plan information;Appointments scheduled and added to AVS   Post-Acute Status   Post-Acute Authorization E   E Status Set-up Complete/Auth obtained   Coverage Payor:  [email or web address] MANAGED MEDICARE - [facility] HMO PPO SPECIAL NEEDS   Discharge Delays None known at this time

## 2023-11-27 NOTE — ED PROVIDER NOTES
Encounter Date: 2/7/2023       History     Chief Complaint   Patient presents with    Chest Pain     Cough, sob x 1 week     Patient is a 57-year-old male presents the emergency room for evaluation of cough orthopnea and mild shortness breath for the past week.  He has a history of hypertension depression obesity.  He has no history of heart failure cardiac disease.  He is not a smoker.  He is having wheezing and mild lower extremity edema.    Review of patient's allergies indicates:   Allergen Reactions    Adhesive Itching     Past Medical History:   Diagnosis Date    Depression     Diabetes mellitus     Hypertension     Obesity     Osteomyelitis      No past surgical history on file.  No family history on file.  Social History     Tobacco Use    Smoking status: Never    Smokeless tobacco: Never   Substance Use Topics    Alcohol use: Yes     Comment: occas    Drug use: Yes     Frequency: 1.0 times per week     Types: Marijuana     Comment: last use 2 days ago     Review of Systems   Constitutional:  Negative for fever.   HENT:  Negative for ear pain, rhinorrhea and sore throat.    Eyes:  Negative for pain and visual disturbance.   Respiratory:  Positive for cough, shortness of breath and wheezing.    Cardiovascular:  Positive for leg swelling. Negative for chest pain.   Gastrointestinal:  Negative for abdominal pain, diarrhea, nausea and vomiting.   Genitourinary:  Negative for difficulty urinating.   Musculoskeletal:  Negative for arthralgias.   Skin:  Negative for rash.   Neurological:  Negative for weakness, numbness and headaches.   All other systems reviewed and are negative.    Physical Exam     Initial Vitals [02/07/23 1018]   BP Pulse Resp Temp SpO2   (!) 206/102 70 12 98.8 °F (37.1 °C) 99 %      MAP       --         Physical Exam    Nursing note and vitals reviewed.  Constitutional: He appears well-developed and well-nourished. No distress.   HENT:   Head: Normocephalic and atraumatic.   Mouth/Throat:  Patient Education     Viral Upper Respiratory Illness (Adult)    You have a viral upper respiratory illness (URI), which is another term for the common cold. This illness is contagious during the first few days. It is spread through the air by coughing and sneezing. It may also be spread by direct contact (touching the sick person and then touching your own eyes, nose, or mouth). Frequent handwashing will decrease risk of spread. Most viral illnesses go away within 7 to 10 days with rest and simple home remedies. Sometimes the illness may last for several weeks. Antibiotics will not kill a virus, and they are generally not prescribed for this condition.  Home care  If symptoms are severe, rest at home for the first 2 to 3 days. When you resume activity, don't let yourself get too tired.  Don't smoke. If you need help stopping, talk with your healthcare provider.  Avoid being exposed to cigarette smoke (yours or others’).  You may use acetaminophen or ibuprofen to control pain and fever, unless another medicine was prescribed. If you have chronic liver or kidney disease, have ever had a stomach ulcer or gastrointestinal bleeding, or are taking blood-thinning medicines, talk with your healthcare provider before using these medicines. Aspirin should never be given to anyone under 18 years of age who is ill with a viral infection or fever. It may cause severe liver or brain damage.  Your appetite may be poor, so a light diet is fine. Stay well hydrated by drinking 6 to 8 glasses of fluids per day (water, soft drinks, juices, tea, or soup). Extra fluids will help loosen secretions in the nose and lungs.  Over-the-counter cold medicines will not shorten the length of time you’re sick, but they may be helpful for the following symptoms: cough, sore throat, and nasal and sinus congestion. If you take prescription medicines, ask your healthcare provider or pharmacist which over-the-counter medicines are safe to use. (Note:  Don't use decongestants if you have high blood pressure.)  Follow-up care  Follow up with your healthcare provider, or as advised.  When to seek medical advice  Call your healthcare provider right away if any of these occur:  Cough with lots of colored sputum (mucus)  Severe headache; face, neck, or ear pain  Difficulty swallowing due to throat pain  Fever of 100.4°F (38°C) or higher, or as directed by your healthcare provider  Call 911  Call 911 if any of these occur:  Chest pain, shortness of breath, wheezing, or difficulty breathing  Coughing up blood  Very severe pain with swallowing, especially if it goes along with a muffled voice   [employer] last reviewed this educational content on 6/1/2018  © 9889-2415 [employer]. All rights reserved. This information is not intended as a substitute for professional medical care. Always follow your healthcare professional's instructions.            Oropharynx is clear and moist.   Eyes: EOM are normal. Pupils are equal, round, and reactive to light.   Neck: Neck supple.   Cardiovascular:  Normal rate, regular rhythm, normal heart sounds and intact distal pulses.     Exam reveals no friction rub.       No murmur heard.  Pulmonary/Chest: He has wheezes (Bases). He has no rhonchi. He has no rales.   Abdominal: Abdomen is soft. Bowel sounds are normal. There is no abdominal tenderness.   Musculoskeletal:         General: No edema. Normal range of motion.      Cervical back: Neck supple.     Neurological: He is alert and oriented to person, place, and time. He has normal strength. No cranial nerve deficit. GCS score is 15. GCS eye subscore is 4. GCS verbal subscore is 5. GCS motor subscore is 6.   Skin: Skin is warm and dry.   Psychiatric: He has a normal mood and affect.       ED Course   Procedures  Labs Reviewed   CBC W/ AUTO DIFFERENTIAL - Abnormal; Notable for the following components:       Result Value    RBC 3.54 (*)     Hemoglobin 10.3 (*)     Hematocrit 32.2 (*)     All other components within normal limits   COMPREHENSIVE METABOLIC PANEL - Abnormal; Notable for the following components:    Glucose 182 (*)     BUN 23 (*)     Creatinine 1.6 (*)     Calcium 8.4 (*)     Albumin 2.9 (*)     Alkaline Phosphatase 50 (*)     Anion Gap 7 (*)     eGFR 49 (*)     All other components within normal limits   B-TYPE NATRIURETIC PEPTIDE - Abnormal; Notable for the following components:     (*)     All other components within normal limits   INFLUENZA A & B BY MOLECULAR   TROPONIN I   SARS-COV-2 RNA AMPLIFICATION, QUAL   HIV 1 / 2 ANTIBODY   HEPATITIS C ANTIBODY        ECG Results              EKG 12-lead (In process)  Result time 02/07/23 12:39:40      In process by Interface, Lab In [facility] (02/07/23 12:39:40)                   Narrative:    Test Reason : R07.9,    Vent. Rate : 070 BPM     Atrial Rate : 070 BPM     P-R Int : 186 ms          QRS Dur : 116 ms       QT Int : 428 ms       P-R-T Axes : 069 029 091 degrees     QTc Int : 462 ms    Normal sinus rhythm  Incomplete right bundle branch block  Cannot rule out Anteroseptal infarct ,age undetermined  Abnormal ECG  When compared with ECG of 09-MAR-2022 11:25,  Minimal criteria for Anteroseptal infarct are now Present    Referred By: [email or web address]   SELF           Confirmed By:                                   Imaging Results              X-Ray Chest AP Portable (Final result)  Result time 02/07/23 12:17:23      Final result by Martha Cristobal MD (02/07/23 12:17:23)                   Impression:      Mildly prominent markings right infrahilar similar in appearance to prior study of 03/11/2022 with no confluent infiltrate.      Electronically signed by: Martha Cristobal MD  Date:    02/07/2023  Time:    12:17               Narrative:    EXAMINATION:  XR CHEST AP PORTABLE    CLINICAL HISTORY:  Chest Pain;    TECHNIQUE:  Single frontal view of the chest was performed.    COMPARISON:  03/11/2022    FINDINGS:  Stable cardiomediastinal silhouette.  No pleural effusion.  Mildly prominent markings right lung base similar in appearance to the prior exam and accentuated by the overlying soft tissues.  No confluent infiltrate.  No pleural effusion                                       Medications   furosemide injection 40 mg (has no administration in time range)   hydrALAZINE injection 10 mg (has no administration in time range)   albuterol-ipratropium 2.5 mg-0.5 mg/3 mL nebulizer solution 3 mL (3 mLs Nebulization Given 2/7/23 1140)                 ED Course as of 02/07/23 1347   Tue Feb 07, 2023   1126 EKG independently interpreted by me as rate 70 normal sinus rate rhythm incomplete right bundle-branch block T-wave inversion lateral leads no ST segment elevation or depression poor R-wave progression with questionable Q-waves in the anterior leads [JS]   1344 Patient has accelerated hypertension with probable mild pulmonary edema and  heart failure.  Unfortunately he has no primary care physician no medicines worsening renal function and I am concerned that if I discharge him home on Lasix he will have no acute follow-up and no ability to monitor her his renal function his blood pressure and for improvement..  Therefore, I think he should be admitted to the hospital for diuresis.  He may have a viral URI as well. [JS]      ED Course User Index  [JS] Doug Fonseca MD                 Clinical Impression:   Final diagnoses:  [R07.9] Chest pain  [I50.9] Acute heart failure, unspecified heart failure type (Primary)  [I10] Accelerated hypertension  [J06.9] Viral upper respiratory infection        ED Disposition Condition    Observation Stable                Doug Fonseca MD  02/07/23 5359

## 2024-01-09 ENCOUNTER — HOSPITAL ENCOUNTER (INPATIENT)
Facility: HOSPITAL | Age: 59
LOS: 9 days | Discharge: HOME | DRG: 682 | End: 2024-01-19
Attending: EMERGENCY MEDICINE | Admitting: STUDENT IN AN ORGANIZED HEALTH CARE EDUCATION/TRAINING PROGRAM
Payer: MEDICARE

## 2024-01-09 DIAGNOSIS — R07.9 CHEST PAIN: ICD-10-CM

## 2024-01-09 DIAGNOSIS — E11.621 DIABETIC ULCER OF TOE OF RIGHT FOOT ASSOCIATED WITH TYPE 2 DIABETES MELLITUS, WITH NECROSIS OF BONE: Primary | ICD-10-CM

## 2024-01-09 DIAGNOSIS — E87.5 HYPERKALEMIA: ICD-10-CM

## 2024-01-09 DIAGNOSIS — R60.0 BILATERAL LEG EDEMA: ICD-10-CM

## 2024-01-09 DIAGNOSIS — L97.514 DIABETIC ULCER OF TOE OF RIGHT FOOT ASSOCIATED WITH TYPE 2 DIABETES MELLITUS, WITH NECROSIS OF BONE: Primary | ICD-10-CM

## 2024-01-09 DIAGNOSIS — E87.70 HYPERVOLEMIA: ICD-10-CM

## 2024-01-09 PROBLEM — D50.9 HYPOCHROMIC ANEMIA: Status: ACTIVE | Noted: 2023-07-26

## 2024-01-09 LAB
ALBUMIN SERPL BCP-MCNC: 4.2 G/DL (ref 3.5–5.2)
ALP SERPL-CCNC: 35 U/L (ref 55–135)
ALT SERPL W/O P-5'-P-CCNC: 50 U/L (ref 10–44)
ANION GAP SERPL CALC-SCNC: 5 MMOL/L (ref 8–16)
ANION GAP SERPL CALC-SCNC: 6 MMOL/L (ref 8–16)
AST SERPL-CCNC: 35 U/L (ref 10–40)
BASOPHILS # BLD AUTO: 0.09 K/UL (ref 0–0.2)
BASOPHILS NFR BLD: 1.3 % (ref 0–1.9)
BILIRUB SERPL-MCNC: 0.8 MG/DL (ref 0.1–1)
BNP SERPL-MCNC: 80 PG/ML (ref 0–99)
BUN SERPL-MCNC: 41 MG/DL (ref 6–20)
BUN SERPL-MCNC: 43 MG/DL (ref 6–20)
CALCIUM SERPL-MCNC: 8.5 MG/DL (ref 8.7–10.5)
CALCIUM SERPL-MCNC: 8.6 MG/DL (ref 8.7–10.5)
CHLORIDE SERPL-SCNC: 103 MMOL/L (ref 95–110)
CHLORIDE SERPL-SCNC: 106 MMOL/L (ref 95–110)
CO2 SERPL-SCNC: 25 MMOL/L (ref 23–29)
CO2 SERPL-SCNC: 27 MMOL/L (ref 23–29)
CREAT SERPL-MCNC: 2.3 MG/DL (ref 0.5–1.4)
CREAT SERPL-MCNC: 2.5 MG/DL (ref 0.5–1.4)
DIFFERENTIAL METHOD BLD: ABNORMAL
EOSINOPHIL # BLD AUTO: 0.3 K/UL (ref 0–0.5)
EOSINOPHIL NFR BLD: 4.9 % (ref 0–8)
ERYTHROCYTE [DISTWIDTH] IN BLOOD BY AUTOMATED COUNT: 15.9 % (ref 11.5–14.5)
EST. GFR  (NO RACE VARIABLE): 28.7 ML/MIN/1.73 M^2
EST. GFR  (NO RACE VARIABLE): 31.7 ML/MIN/1.73 M^2
GLUCOSE SERPL-MCNC: 107 MG/DL (ref 70–110)
GLUCOSE SERPL-MCNC: 109 MG/DL (ref 70–110)
GLUCOSE SERPL-MCNC: 160 MG/DL (ref 70–110)
GLUCOSE SERPL-MCNC: 170 MG/DL (ref 70–110)
HCT VFR BLD AUTO: 32.3 % (ref 40–54)
HGB BLD-MCNC: 10 G/DL (ref 14–18)
IMM GRANULOCYTES # BLD AUTO: 0.03 K/UL (ref 0–0.04)
IMM GRANULOCYTES NFR BLD AUTO: 0.4 % (ref 0–0.5)
LYMPHOCYTES # BLD AUTO: 2.6 K/UL (ref 1–4.8)
LYMPHOCYTES NFR BLD: 38.8 % (ref 18–48)
MAGNESIUM SERPL-MCNC: 2.1 MG/DL (ref 1.6–2.6)
MCH RBC QN AUTO: 28.9 PG (ref 27–31)
MCHC RBC AUTO-ENTMCNC: 31 G/DL (ref 32–36)
MCV RBC AUTO: 93 FL (ref 82–98)
MONOCYTES # BLD AUTO: 0.6 K/UL (ref 0.3–1)
MONOCYTES NFR BLD: 9.1 % (ref 4–15)
NEUTROPHILS # BLD AUTO: 3.1 K/UL (ref 1.8–7.7)
NEUTROPHILS NFR BLD: 45.5 % (ref 38–73)
NRBC BLD-RTO: 0 /100 WBC
PLATELET # BLD AUTO: 230 K/UL (ref 150–450)
PMV BLD AUTO: 11.6 FL (ref 9.2–12.9)
POTASSIUM SERPL-SCNC: 5.7 MMOL/L (ref 3.5–5.1)
POTASSIUM SERPL-SCNC: 6 MMOL/L (ref 3.5–5.1)
PROT SERPL-MCNC: 7.4 G/DL (ref 6–8.4)
RBC # BLD AUTO: 3.46 M/UL (ref 4.6–6.2)
SARS-COV-2 RDRP RESP QL NAA+PROBE: NEGATIVE
SODIUM SERPL-SCNC: 136 MMOL/L (ref 136–145)
SODIUM SERPL-SCNC: 136 MMOL/L (ref 136–145)
TROPONIN I SERPL HS-MCNC: 16.2 PG/ML (ref 0–14.9)
TROPONIN I SERPL HS-MCNC: 17.2 PG/ML (ref 0–14.9)
TROPONIN I SERPL HS-MCNC: 18.6 PG/ML (ref 0–14.9)
WBC # BLD AUTO: 6.73 K/UL (ref 3.9–12.7)

## 2024-01-09 PROCEDURE — G0378 HOSPITAL OBSERVATION PER HR: HCPCS

## 2024-01-09 PROCEDURE — 80053 COMPREHEN METABOLIC PANEL: CPT | Performed by: EMERGENCY MEDICINE

## 2024-01-09 PROCEDURE — 85025 COMPLETE CBC W/AUTO DIFF WBC: CPT | Performed by: EMERGENCY MEDICINE

## 2024-01-09 PROCEDURE — 82962 GLUCOSE BLOOD TEST: CPT

## 2024-01-09 PROCEDURE — 25000003 PHARM REV CODE 250: Mod: JZ,JG | Performed by: STUDENT IN AN ORGANIZED HEALTH CARE EDUCATION/TRAINING PROGRAM

## 2024-01-09 PROCEDURE — 96365 THER/PROPH/DIAG IV INF INIT: CPT

## 2024-01-09 PROCEDURE — 93005 ELECTROCARDIOGRAM TRACING: CPT | Performed by: GENERAL PRACTICE

## 2024-01-09 PROCEDURE — 83735 ASSAY OF MAGNESIUM: CPT | Performed by: EMERGENCY MEDICINE

## 2024-01-09 PROCEDURE — 36415 COLL VENOUS BLD VENIPUNCTURE: CPT | Performed by: NURSE PRACTITIONER

## 2024-01-09 PROCEDURE — 96375 TX/PRO/DX INJ NEW DRUG ADDON: CPT

## 2024-01-09 PROCEDURE — 93010 ELECTROCARDIOGRAM REPORT: CPT | Mod: ,,, | Performed by: GENERAL PRACTICE

## 2024-01-09 PROCEDURE — U0002 COVID-19 LAB TEST NON-CDC: HCPCS | Performed by: NURSE PRACTITIONER

## 2024-01-09 PROCEDURE — 94640 AIRWAY INHALATION TREATMENT: CPT

## 2024-01-09 PROCEDURE — 63600175 PHARM REV CODE 636 W HCPCS: Performed by: NURSE PRACTITIONER

## 2024-01-09 PROCEDURE — 25000003 PHARM REV CODE 250: Performed by: NURSE PRACTITIONER

## 2024-01-09 PROCEDURE — 83036 HEMOGLOBIN GLYCOSYLATED A1C: CPT | Performed by: EMERGENCY MEDICINE

## 2024-01-09 PROCEDURE — 36415 COLL VENOUS BLD VENIPUNCTURE: CPT | Performed by: STUDENT IN AN ORGANIZED HEALTH CARE EDUCATION/TRAINING PROGRAM

## 2024-01-09 PROCEDURE — 84484 ASSAY OF TROPONIN QUANT: CPT | Performed by: EMERGENCY MEDICINE

## 2024-01-09 PROCEDURE — 25000003 PHARM REV CODE 250: Performed by: EMERGENCY MEDICINE

## 2024-01-09 PROCEDURE — 63600175 PHARM REV CODE 636 W HCPCS: Performed by: EMERGENCY MEDICINE

## 2024-01-09 PROCEDURE — 99285 EMERGENCY DEPT VISIT HI MDM: CPT | Mod: 25

## 2024-01-09 PROCEDURE — 83880 ASSAY OF NATRIURETIC PEPTIDE: CPT | Performed by: EMERGENCY MEDICINE

## 2024-01-09 PROCEDURE — 36415 COLL VENOUS BLD VENIPUNCTURE: CPT | Performed by: EMERGENCY MEDICINE

## 2024-01-09 PROCEDURE — 25000242 PHARM REV CODE 250 ALT 637 W/ HCPCS: Performed by: NURSE PRACTITIONER

## 2024-01-09 PROCEDURE — 84484 ASSAY OF TROPONIN QUANT: CPT | Mod: 91 | Performed by: NURSE PRACTITIONER

## 2024-01-09 PROCEDURE — 99900031 HC PATIENT EDUCATION (STAT)

## 2024-01-09 PROCEDURE — 96372 THER/PROPH/DIAG INJ SC/IM: CPT | Performed by: NURSE PRACTITIONER

## 2024-01-09 PROCEDURE — 80048 BASIC METABOLIC PNL TOTAL CA: CPT | Performed by: STUDENT IN AN ORGANIZED HEALTH CARE EDUCATION/TRAINING PROGRAM

## 2024-01-09 RX ORDER — IBUPROFEN 200 MG
24 TABLET ORAL
Status: DISCONTINUED | OUTPATIENT
Start: 2024-01-09 | End: 2024-01-09

## 2024-01-09 RX ORDER — TALC
6 POWDER (GRAM) TOPICAL NIGHTLY PRN
Status: DISCONTINUED | OUTPATIENT
Start: 2024-01-09 | End: 2024-01-19 | Stop reason: HOSPADM

## 2024-01-09 RX ORDER — IPRATROPIUM BROMIDE AND ALBUTEROL SULFATE 2.5; .5 MG/3ML; MG/3ML
3 SOLUTION RESPIRATORY (INHALATION) EVERY 4 HOURS PRN
Status: DISCONTINUED | OUTPATIENT
Start: 2024-01-09 | End: 2024-01-19 | Stop reason: HOSPADM

## 2024-01-09 RX ORDER — INSULIN ASPART 100 [IU]/ML
0-5 INJECTION, SOLUTION INTRAVENOUS; SUBCUTANEOUS
Status: DISCONTINUED | OUTPATIENT
Start: 2024-01-09 | End: 2024-01-09

## 2024-01-09 RX ORDER — ACETAMINOPHEN 325 MG/1
650 TABLET ORAL EVERY 4 HOURS PRN
Status: DISCONTINUED | OUTPATIENT
Start: 2024-01-09 | End: 2024-01-19 | Stop reason: HOSPADM

## 2024-01-09 RX ORDER — ONDANSETRON HYDROCHLORIDE 2 MG/ML
4 INJECTION, SOLUTION INTRAVENOUS EVERY 8 HOURS PRN
Status: DISCONTINUED | OUTPATIENT
Start: 2024-01-09 | End: 2024-01-19 | Stop reason: HOSPADM

## 2024-01-09 RX ORDER — FUROSEMIDE 10 MG/ML
80 INJECTION INTRAMUSCULAR; INTRAVENOUS
Status: COMPLETED | OUTPATIENT
Start: 2024-01-09 | End: 2024-01-09

## 2024-01-09 RX ORDER — SODIUM,POTASSIUM PHOSPHATES 280-250MG
2 POWDER IN PACKET (EA) ORAL
Status: DISCONTINUED | OUTPATIENT
Start: 2024-01-09 | End: 2024-01-19 | Stop reason: HOSPADM

## 2024-01-09 RX ORDER — METOPROLOL SUCCINATE 25 MG/1
25 TABLET, EXTENDED RELEASE ORAL DAILY
COMMUNITY
Start: 2023-12-27

## 2024-01-09 RX ORDER — KETOCONAZOLE 20 MG/G
1 CREAM TOPICAL DAILY
COMMUNITY
Start: 2023-11-30

## 2024-01-09 RX ORDER — DAPAGLIFLOZIN 5 MG/1
5 TABLET, FILM COATED ORAL DAILY
Status: ON HOLD | COMMUNITY
Start: 2023-12-27 | End: 2024-01-19 | Stop reason: HOSPADM

## 2024-01-09 RX ORDER — LANOLIN ALCOHOL/MO/W.PET/CERES
800 CREAM (GRAM) TOPICAL
Status: DISCONTINUED | OUTPATIENT
Start: 2024-01-09 | End: 2024-01-19 | Stop reason: HOSPADM

## 2024-01-09 RX ORDER — LABETALOL HYDROCHLORIDE 5 MG/ML
10 INJECTION, SOLUTION INTRAVENOUS EVERY 4 HOURS PRN
Status: DISCONTINUED | OUTPATIENT
Start: 2024-01-09 | End: 2024-01-19 | Stop reason: HOSPADM

## 2024-01-09 RX ORDER — OXYCODONE AND ACETAMINOPHEN 10; 325 MG/1; MG/1
1 TABLET ORAL 3 TIMES DAILY PRN
COMMUNITY

## 2024-01-09 RX ORDER — SODIUM CHLORIDE 0.9 % (FLUSH) 0.9 %
10 SYRINGE (ML) INJECTION EVERY 12 HOURS PRN
Status: DISCONTINUED | OUTPATIENT
Start: 2024-01-09 | End: 2024-01-19 | Stop reason: HOSPADM

## 2024-01-09 RX ORDER — IBUPROFEN 200 MG
24 TABLET ORAL
Status: DISCONTINUED | OUTPATIENT
Start: 2024-01-09 | End: 2024-01-13

## 2024-01-09 RX ORDER — ATORVASTATIN CALCIUM 40 MG/1
80 TABLET, FILM COATED ORAL DAILY
Status: DISCONTINUED | OUTPATIENT
Start: 2024-01-10 | End: 2024-01-13

## 2024-01-09 RX ORDER — HYDRALAZINE HYDROCHLORIDE 20 MG/ML
10 INJECTION INTRAMUSCULAR; INTRAVENOUS EVERY 8 HOURS PRN
Status: DISCONTINUED | OUTPATIENT
Start: 2024-01-09 | End: 2024-01-09

## 2024-01-09 RX ORDER — TRAZODONE HYDROCHLORIDE 50 MG/1
50 TABLET ORAL NIGHTLY PRN
Status: DISCONTINUED | OUTPATIENT
Start: 2024-01-09 | End: 2024-01-19 | Stop reason: HOSPADM

## 2024-01-09 RX ORDER — FLUTICASONE PROPIONATE 50 MCG
2 SPRAY, SUSPENSION (ML) NASAL NIGHTLY
Status: DISCONTINUED | OUTPATIENT
Start: 2024-01-09 | End: 2024-01-19 | Stop reason: HOSPADM

## 2024-01-09 RX ORDER — CETIRIZINE HYDROCHLORIDE 5 MG/1
10 TABLET ORAL DAILY
Status: DISCONTINUED | OUTPATIENT
Start: 2024-01-10 | End: 2024-01-19 | Stop reason: HOSPADM

## 2024-01-09 RX ORDER — POLYETHYLENE GLYCOL 3350 17 G/17G
17 POWDER, FOR SOLUTION ORAL DAILY
Status: DISCONTINUED | OUTPATIENT
Start: 2024-01-10 | End: 2024-01-19 | Stop reason: HOSPADM

## 2024-01-09 RX ORDER — CALCIUM GLUCONATE 20 MG/ML
1 INJECTION, SOLUTION INTRAVENOUS
Status: COMPLETED | OUTPATIENT
Start: 2024-01-09 | End: 2024-01-10

## 2024-01-09 RX ORDER — VALSARTAN 160 MG/1
160 TABLET ORAL DAILY
Status: ON HOLD | COMMUNITY
Start: 2023-12-27 | End: 2024-01-19 | Stop reason: HOSPADM

## 2024-01-09 RX ORDER — FINASTERIDE 5 MG/1
5 TABLET, FILM COATED ORAL DAILY
Status: DISCONTINUED | OUTPATIENT
Start: 2024-01-10 | End: 2024-01-19 | Stop reason: HOSPADM

## 2024-01-09 RX ORDER — OXYCODONE AND ACETAMINOPHEN 10; 325 MG/1; MG/1
1 TABLET ORAL EVERY 8 HOURS PRN
Status: DISCONTINUED | OUTPATIENT
Start: 2024-01-09 | End: 2024-01-10

## 2024-01-09 RX ORDER — INSULIN ASPART 100 [IU]/ML
0-5 INJECTION, SOLUTION INTRAVENOUS; SUBCUTANEOUS
Status: DISCONTINUED | OUTPATIENT
Start: 2024-01-09 | End: 2024-01-13

## 2024-01-09 RX ORDER — POLYETHYLENE GLYCOL 3350 17 G/17G
17 POWDER, FOR SOLUTION ORAL ONCE
Status: DISCONTINUED | OUTPATIENT
Start: 2024-01-10 | End: 2024-01-09

## 2024-01-09 RX ORDER — LIDOCAINE 50 MG/G
1 PATCH TOPICAL DAILY
COMMUNITY
Start: 2024-01-02

## 2024-01-09 RX ORDER — TIRZEPATIDE 7.5 MG/.5ML
7.5 INJECTION, SOLUTION SUBCUTANEOUS
COMMUNITY
Start: 2023-12-18

## 2024-01-09 RX ORDER — TRAZODONE HYDROCHLORIDE 50 MG/1
50 TABLET ORAL NIGHTLY PRN
COMMUNITY
Start: 2023-08-25

## 2024-01-09 RX ORDER — GLUCAGON 1 MG
1 KIT INJECTION
Status: DISCONTINUED | OUTPATIENT
Start: 2024-01-09 | End: 2024-01-13

## 2024-01-09 RX ORDER — GLUCAGON 1 MG
1 KIT INJECTION
Status: DISCONTINUED | OUTPATIENT
Start: 2024-01-09 | End: 2024-01-09

## 2024-01-09 RX ORDER — SPIRONOLACTONE 50 MG/1
50 TABLET, FILM COATED ORAL DAILY
Status: DISCONTINUED | OUTPATIENT
Start: 2024-01-10 | End: 2024-01-09

## 2024-01-09 RX ORDER — NALOXONE HCL 0.4 MG/ML
0.02 VIAL (ML) INJECTION
Status: DISCONTINUED | OUTPATIENT
Start: 2024-01-09 | End: 2024-01-19 | Stop reason: HOSPADM

## 2024-01-09 RX ORDER — FUROSEMIDE 20 MG/1
20 TABLET ORAL DAILY
Status: ON HOLD | COMMUNITY
Start: 2023-11-07 | End: 2024-01-19

## 2024-01-09 RX ORDER — ASPIRIN 325 MG
325 TABLET ORAL
Status: COMPLETED | OUTPATIENT
Start: 2024-01-09 | End: 2024-01-09

## 2024-01-09 RX ORDER — AMLODIPINE BESYLATE 5 MG/1
10 TABLET ORAL DAILY
Status: DISCONTINUED | OUTPATIENT
Start: 2024-01-10 | End: 2024-01-17

## 2024-01-09 RX ORDER — IBUPROFEN 200 MG
16 TABLET ORAL
Status: DISCONTINUED | OUTPATIENT
Start: 2024-01-09 | End: 2024-01-13

## 2024-01-09 RX ORDER — HYDRALAZINE HYDROCHLORIDE 25 MG/1
25 TABLET, FILM COATED ORAL EVERY 8 HOURS
Status: DISCONTINUED | OUTPATIENT
Start: 2024-01-09 | End: 2024-01-17

## 2024-01-09 RX ORDER — HEPARIN SODIUM 5000 [USP'U]/ML
5000 INJECTION, SOLUTION INTRAVENOUS; SUBCUTANEOUS EVERY 8 HOURS
Status: DISCONTINUED | OUTPATIENT
Start: 2024-01-09 | End: 2024-01-19 | Stop reason: HOSPADM

## 2024-01-09 RX ORDER — MELOXICAM 15 MG/1
15 TABLET ORAL DAILY
Status: ON HOLD | COMMUNITY
Start: 2023-12-29 | End: 2024-01-19 | Stop reason: HOSPADM

## 2024-01-09 RX ORDER — METOPROLOL SUCCINATE 25 MG/1
25 TABLET, EXTENDED RELEASE ORAL DAILY
Status: DISCONTINUED | OUTPATIENT
Start: 2024-01-10 | End: 2024-01-19 | Stop reason: HOSPADM

## 2024-01-09 RX ORDER — ASPIRIN 81 MG/1
81 TABLET ORAL DAILY
Status: DISCONTINUED | OUTPATIENT
Start: 2024-01-10 | End: 2024-01-19 | Stop reason: HOSPADM

## 2024-01-09 RX ORDER — HYDRALAZINE HYDROCHLORIDE 20 MG/ML
10 INJECTION INTRAMUSCULAR; INTRAVENOUS EVERY 4 HOURS PRN
Status: DISCONTINUED | OUTPATIENT
Start: 2024-01-09 | End: 2024-01-19 | Stop reason: HOSPADM

## 2024-01-09 RX ORDER — IBUPROFEN 200 MG
16 TABLET ORAL
Status: DISCONTINUED | OUTPATIENT
Start: 2024-01-09 | End: 2024-01-09

## 2024-01-09 RX ADMIN — ASPIRIN 325 MG ORAL TABLET 325 MG: 325 PILL ORAL at 02:01

## 2024-01-09 RX ADMIN — CALCIUM GLUCONATE 1 G: 20 INJECTION, SOLUTION INTRAVENOUS at 11:01

## 2024-01-09 RX ADMIN — SODIUM ZIRCONIUM CYCLOSILICATE 10 G: 5 POWDER, FOR SUSPENSION ORAL at 11:01

## 2024-01-09 RX ADMIN — NITROGLYCERIN 1 INCH: 20 OINTMENT TOPICAL at 02:01

## 2024-01-09 RX ADMIN — HYDRALAZINE HYDROCHLORIDE 25 MG: 25 TABLET ORAL at 09:01

## 2024-01-09 RX ADMIN — INSULIN DETEMIR 15 UNITS: 100 INJECTION, SOLUTION SUBCUTANEOUS at 09:01

## 2024-01-09 RX ADMIN — OXYCODONE HYDROCHLORIDE AND ACETAMINOPHEN 1 TABLET: 10; 325 TABLET ORAL at 09:01

## 2024-01-09 RX ADMIN — TRAZODONE HYDROCHLORIDE 50 MG: 50 TABLET ORAL at 09:01

## 2024-01-09 RX ADMIN — IPRATROPIUM BROMIDE AND ALBUTEROL SULFATE 3 ML: 2.5; .5 SOLUTION RESPIRATORY (INHALATION) at 08:01

## 2024-01-09 RX ADMIN — HYDRALAZINE HYDROCHLORIDE 10 MG: 20 INJECTION INTRAMUSCULAR; INTRAVENOUS at 07:01

## 2024-01-09 RX ADMIN — HEPARIN SODIUM 5000 UNITS: 5000 INJECTION INTRAVENOUS; SUBCUTANEOUS at 09:01

## 2024-01-09 RX ADMIN — FLUTICASONE PROPIONATE 100 MCG: 50 SPRAY, METERED NASAL at 09:01

## 2024-01-09 RX ADMIN — GABAPENTIN 800 MG: 300 CAPSULE ORAL at 09:01

## 2024-01-09 RX ADMIN — FUROSEMIDE 80 MG: 10 INJECTION, SOLUTION INTRAMUSCULAR; INTRAVENOUS at 02:01

## 2024-01-09 NOTE — ED PROVIDER NOTES
Encounter Date: 1/9/2024       History     Chief Complaint   Patient presents with    Chest Pain    Shortness of Breath     + edema     58-year-old male with history hypertension, diabetes, presenting with complaint of chest pressure/heaviness, shortness O breath, lower extremity edema.  He has developed these symptoms over the last several days.  He says he has been taking his Lasix with no improvement in his leg swelling.  His chest pain/shortness of breath feels worse when he tries to lay flat and also when he exerts himself.  He currently has severe chest heaviness at rest.        Review of patient's allergies indicates:   Allergen Reactions    Adhesive Itching     Past Medical History:   Diagnosis Date    Depression     Diabetes mellitus     Hypertension     Obesity     Osteomyelitis      Past Surgical History:   Procedure Laterality Date    ANGIOGRAM, CORONARY, WITH LEFT HEART CATHETERIZATION N/A 7/28/2023    Procedure: Angiogram, Coronary, with Left Heart Cath;  Surgeon: Alek Greenwood MD;  [facility] CATH[facility]EP LAB;  Service: Cardiology;  Laterality: N/A;     No family history on file.  Social History     Tobacco Use    Smoking status: Never    Smokeless tobacco: Never   Substance Use Topics    Alcohol use: Yes     Comment: occas    Drug use: Yes     Frequency: 1.0 times per week     Types: Marijuana     Comment: last use 2 days ago     Review of Systems   Respiratory:  Positive for shortness of breath.    Cardiovascular:  Positive for chest pain and leg swelling.   All other systems reviewed and are negative.      Physical Exam     Initial Vitals [01/09/24 1323]   BP Pulse Resp Temp SpO2   (!) 180/91 94 20 98.1 °F (36.7 °C) 97 %      MAP       --         Physical Exam    Nursing note and vitals reviewed.  Constitutional: He appears well-developed and well-nourished. He is not diaphoretic. No distress.   HENT:   Head: Normocephalic and atraumatic.   Eyes: Conjunctivae are normal.   Neck: Neck supple.    Normal range of motion.  Cardiovascular:  Normal rate.           Pulmonary/Chest: No respiratory distress.   Abdominal: He exhibits no distension.   Musculoskeletal:         General: Edema present.      Cervical back: Normal range of motion and neck supple.      Comments: 2+ lower extremity edema bilaterally     Neurological: He is alert. He has normal strength.   Skin: Skin is warm and dry. No rash noted. No erythema.   Psychiatric: He has a normal mood and affect.         ED Course   Procedures  Labs Reviewed   CBC W/ AUTO DIFFERENTIAL - Abnormal; Notable for the following components:       Result Value    RBC 3.46 (*)     Hemoglobin 10.0 (*)     Hematocrit 32.3 (*)     MCHC 31.0 (*)     RDW 15.9 (*)     All other components within normal limits   COMPREHENSIVE METABOLIC PANEL - Abnormal; Notable for the following components:    Potassium 5.7 (*)     BUN 41 (*)     Creatinine 2.3 (*)     Calcium 8.6 (*)     Alkaline Phosphatase 35 (*)     ALT 50 (*)     eGFR 31.7 (*)     Anion Gap 5 (*)     All other components within normal limits   TROPONIN I HIGH SENSITIVITY - Abnormal; Notable for the following components:    Troponin I High Sensitivity 17.2 (*)     All other components within normal limits   B-TYPE NATRIURETIC PEPTIDE   MAGNESIUM     EKG Readings: (Independently Interpreted)   Sinus rhythm.  Sixty-two beats/minute.  Left axis deviation.  No ST elevation.     ECG Results              EKG 12-lead (In process)  Result time 01/09/24 14:22:16      In process by Interface, Lab In [facility] (01/09/24 14:22:16)                   Narrative:    Test Reason : R07.9,    Vent. Rate : 062 BPM     Atrial Rate : 062 BPM     P-R Int : 194 ms          QRS Dur : 112 ms      QT Int : 432 ms       P-R-T Axes : 036 024 069 degrees     QTc Int : 438 ms    Normal sinus rhythm  Incomplete right bundle branch block  Borderline Abnormal ECG  When compared with ECG of 09-JAN-2024 13:28,  No significant change was found    Referred By:  AAAREFERR   SELF           Confirmed By:                                   Imaging Results              US Lower Extremity Veins Bilateral (Final result)  Result time 01/09/24 16:37:13      Final result by Sinai Moreno IV, MD (01/09/24 16:37:13)                   Narrative:    Bilateral lower extremity venous Doppler evaluation    HISTORY: Leg pain and swelling.    Real-time, duplex and color Doppler interrogation are performed. This demonstrates patency of the common and superficial femoral veins, popliteal veins, major calf veins and greater saphenous veins bilaterally. There are no findings of deep vein thrombosis.    IMPRESSION:    No evidence of deep venous thrombosis.    Electronically signed by:  Sinai Moreno MD  01/09/2024 04:37 PM CST Workstation: [id number]                                     X-Ray Chest AP (Final result)  Result time 01/09/24 14:51:18      Final result by Sinai Moreno IV, MD (01/09/24 14:51:18)                   Narrative:    Chest, single view    HISTORY: Shortness of breath and chest pain.    Comparison 7/26/2023.    The heart size is within normal limits. The central pulmonary vasculature is not acutely engorged.    The lungs are appropriately expanded. There are no confluent areas of airspace disease or significant volume loss. No effusion is demonstrated. Monitoring electrodes overlie the chest.    IMPRESSION:    No acute cardiopulmonary disease.    Electronically signed by:  Sinai Moreno MD  01/09/2024 02:51 PM CST Workstation: [id number]                                     Medications   furosemide injection 80 mg (80 mg Intravenous Given 1/9/24 1435)   nitroGLYCERIN 2% TD oint ointment 1 inch (1 inch Topical (Top) Given 1/9/24 1435)   aspirin tablet 325 mg (325 mg Oral Given 1/9/24 1435)     Medical Decision Making  Patient's symptoms sound very concerning for cardiac chest pain.  Patient was very hypotensive and his legs are very edematous and he has orthopnea.  He was given  a dose of IV Lasix for suspected congestive heart failure and volume overload.  He was also given nitroglycerin paste and aspirin.  Patient's EKG shows no ischemic changes.  Pulse ox is in the upper 90s on room air.  CXR obtained in his clear, no evidence of pulmonary edema.  BNP is actually normal.  Creatinine is mildly elevated at 2.3 above his baseline of 1.9.  Potassium mildly elevated at 5.7.  Troponin is mildly elevated at 19.  Upon re-evaluation the patient's BP has improved however he denies improvement in his chest heaviness and shortness a breath.  Will consult hospitalist for admission.    Amount and/or Complexity of Data Reviewed  Labs: ordered.  Radiology: ordered.    Risk  OTC drugs.  Prescription drug management.                                      Clinical Impression:  Final diagnoses:  [R07.9] Chest pain  [R60.0] Bilateral leg edema                 Kris Sidhu MD  01/09/24 9161

## 2024-01-09 NOTE — HPI
Abel Gibbs is a 58 y.o. male with a history as  has a past medical history of Depression, Diabetes mellitus, Hypertension, Obesity, and Osteomyelitis. who presented to the ED with a Chest Pain and Shortness of Breath (+ edema)    Patient presents to the emergency room with a complaint of midsternal chest pain radiating into left chest wall that started approximately 3 days ago.  He further reports shortness for breath when attempting to lye flat with chest pain worsening. He tell me he was seen by wound care for right foot ulcer who felt as though his face appeared a little swollen and recommended that he go to the emergency.     Patient does report having a sinus infection about 1-2 weeks ago completed course of antibiotics.  He further states after completion of antibiotics he started with congestion and seems as though symptoms have gotten worse. Additionally, he reports BLE edema that chronic but appears a little worse.    Patient unclear of medications as he reports the nurse fills his medication container weekly. He does tell me that he is on furosemide but has not taken it for last 3-4 days d/t leg cramps. Patient also prescribed spirolactone but does not know if he has been taking. Also on mounjaro.     Denies fever, chills, diaphoresis, dizziness, HA, palpitations, NVD, recent trauma or any other associated symptoms. Does not smoke cigarettes, drinks occasionally and uses marijuana daily.      Lab and imaging obtained and reviewed.  CBC completed and shows RBCs 3.48 H/H 10.0/32.3 MCHC 31.0 RDW is 15.9.  Chemistry profile shows potassium 5.7 Ag 5 BUN 41 creatinine 2.3 GFR 31.7 calcium 8.6 ALP 35 ALT 50.  BNP within normal range.  Initial high sensitive troponin 17.2. EKG shows NSR with incomplete R-BBB. CXR without acute cardiopulmonary findings.  On admit, afebrile HR 94 RR 20 /91 with O2 sats 97% on room air.        Ultrasound bilateral lower extremity without evidence of deep venous thrombosis.       Renal US  IMPRESSION: Asymmetric small right kidney. Otherwise normal sonographic appearance of the bilateral kidneys..     Angiogram 07/2023  Summary     The estimated blood loss was <50 mL.    The pre-procedure left ventricular end diastolic pressure was 16.    Nonobstructive coronaries with mild luminal irregularities in RCA and left circumflex and focal moderate stenosis of mid to distal LAD for which medical management is recommended.       Echo 07/2023  Summary  The left ventricle is normal in size with mild concentric hypertrophy and normal systolic function.  The estimated ejection fraction is 65%.  Normal left ventricular diastolic function.  Normal right ventricular size with normal right ventricular systolic function.  Mild mitral regurgitation.  Mild tricuspid regurgitation.  Normal central venous pressure (3 mmHg).  The estimated PA systolic pressure is 27 mmHg.    Per ED provider patient presents for evaluation of chest pain with lower extremity edema for 1 week. Labs reviewed and showed mildly elevated troponin without EKG changes. Also noted to have elevated BP and MINI, given ASA IV diuretics with NTG paste applied. US BLE negative for DVT. Will admit for ACS r/o.

## 2024-01-10 ENCOUNTER — CLINICAL SUPPORT (OUTPATIENT)
Dept: CARDIOLOGY | Facility: HOSPITAL | Age: 59
DRG: 682 | End: 2024-01-10
Attending: EMERGENCY MEDICINE
Payer: MEDICARE

## 2024-01-10 VITALS — BODY MASS INDEX: 39.17 KG/M2 | HEIGHT: 75 IN | WEIGHT: 315 LBS

## 2024-01-10 LAB
ALBUMIN SERPL BCP-MCNC: 3.8 G/DL (ref 3.5–5.2)
ALP SERPL-CCNC: 30 U/L (ref 55–135)
ALT SERPL W/O P-5'-P-CCNC: 39 U/L (ref 10–44)
ANION GAP SERPL CALC-SCNC: 6 MMOL/L (ref 8–16)
AORTIC ROOT ANNULUS: 3.8 CM
AORTIC VALVE CUSP SEPERATION: 2.9 CM
ASCENDING AORTA: 3.7 CM
AST SERPL-CCNC: 29 U/L (ref 10–40)
AV INDEX (PROSTH): 0.73
AV MEAN GRADIENT: 4 MMHG
AV PEAK GRADIENT: 7 MMHG
AV VALVE AREA BY VELOCITY RATIO: 3.71 CM²
AV VALVE AREA: 3.32 CM²
AV VELOCITY RATIO: 0.82
BACTERIA #/AREA URNS HPF: NEGATIVE /HPF
BASOPHILS # BLD AUTO: 0.07 K/UL (ref 0–0.2)
BASOPHILS NFR BLD: 0.9 % (ref 0–1.9)
BILIRUB SERPL-MCNC: 0.5 MG/DL (ref 0.1–1)
BILIRUB UR QL STRIP: NEGATIVE
BSA FOR ECHO PROCEDURE: 3 M2
BUN SERPL-MCNC: 45 MG/DL (ref 6–20)
CALCIUM SERPL-MCNC: 8.3 MG/DL (ref 8.7–10.5)
CHLORIDE SERPL-SCNC: 103 MMOL/L (ref 95–110)
CHOLEST SERPL-MCNC: 132 MG/DL (ref 120–199)
CHOLEST/HDLC SERPL: 2.6 {RATIO} (ref 2–5)
CLARITY UR: CLEAR
CO2 SERPL-SCNC: 25 MMOL/L (ref 23–29)
COLOR UR: YELLOW
CREAT SERPL-MCNC: 2.7 MG/DL (ref 0.5–1.4)
CREAT UR-MCNC: 163.8 MG/DL (ref 23–375)
CV ECHO LV RWT: 0.53 CM
DIFFERENTIAL METHOD BLD: ABNORMAL
DOP CALC AO PEAK VEL: 1.34 M/S
DOP CALC AO VTI: 32.7 CM
DOP CALC LVOT AREA: 4.5 CM2
DOP CALC LVOT DIAMETER: 2.4 CM
DOP CALC LVOT PEAK VEL: 1.1 M/S
DOP CALC LVOT STROKE VOLUME: 108.52 CM3
DOP CALC MV VTI: 41.4 CM
DOP CALCLVOT PEAK VEL VTI: 24 CM
E WAVE DECELERATION TIME: 218 MSEC
E/A RATIO: 1.77
E/E' RATIO: 7.43 M/S
ECHO LV POSTERIOR WALL: 1.49 CM (ref 0.6–1.1)
EOSINOPHIL # BLD AUTO: 0.4 K/UL (ref 0–0.5)
EOSINOPHIL NFR BLD: 5 % (ref 0–8)
ERYTHROCYTE [DISTWIDTH] IN BLOOD BY AUTOMATED COUNT: 15.9 % (ref 11.5–14.5)
EST. GFR  (NO RACE VARIABLE): 26.2 ML/MIN/1.73 M^2
ESTIMATED AVG GLUCOSE: 140 MG/DL (ref 68–131)
FRACTIONAL SHORTENING: 37 % (ref 28–44)
GLUCOSE SERPL-MCNC: 132 MG/DL (ref 70–110)
GLUCOSE SERPL-MCNC: 137 MG/DL (ref 70–110)
GLUCOSE SERPL-MCNC: 138 MG/DL (ref 70–110)
GLUCOSE SERPL-MCNC: 139 MG/DL (ref 70–110)
GLUCOSE SERPL-MCNC: 164 MG/DL (ref 70–110)
GLUCOSE UR QL STRIP: ABNORMAL
HBA1C MFR BLD: 6.5 % (ref 4.5–6.2)
HCT VFR BLD AUTO: 29.4 % (ref 40–54)
HDLC SERPL-MCNC: 51 MG/DL (ref 40–75)
HDLC SERPL: 38.6 % (ref 20–50)
HGB BLD-MCNC: 9.1 G/DL (ref 14–18)
HGB UR QL STRIP: NEGATIVE
HYALINE CASTS #/AREA URNS LPF: 2 /LPF
IMM GRANULOCYTES # BLD AUTO: 0.03 K/UL (ref 0–0.04)
IMM GRANULOCYTES NFR BLD AUTO: 0.4 % (ref 0–0.5)
INTERVENTRICULAR SEPTUM: 1.41 CM (ref 0.6–1.1)
IVC DIAMETER: 2.09 CM
KETONES UR QL STRIP: NEGATIVE
LDLC SERPL CALC-MCNC: 57.8 MG/DL (ref 63–159)
LEFT ATRIUM VOLUME INDEX MOD: 47.9 ML/M2
LEFT ATRIUM VOLUME MOD: 137 CM3
LEFT INTERNAL DIMENSION IN SYSTOLE: 3.54 CM (ref 2.1–4)
LEFT VENTRICLE DIASTOLIC VOLUME INDEX: 54.9 ML/M2
LEFT VENTRICLE DIASTOLIC VOLUME: 157 ML
LEFT VENTRICLE MASS INDEX: 130 G/M2
LEFT VENTRICLE SYSTOLIC VOLUME INDEX: 18.3 ML/M2
LEFT VENTRICLE SYSTOLIC VOLUME: 52.3 ML
LEFT VENTRICULAR INTERNAL DIMENSION IN DIASTOLE: 5.65 CM (ref 3.5–6)
LEFT VENTRICULAR MASS: 370.56 G
LEUKOCYTE ESTERASE UR QL STRIP: NEGATIVE
LV LATERAL E/E' RATIO: 7.8 M/S
LV SEPTAL E/E' RATIO: 7.09 M/S
LVOT MG: 3 MMHG
LVOT MV: 0.7 CM/S
LYMPHOCYTES # BLD AUTO: 3.1 K/UL (ref 1–4.8)
LYMPHOCYTES NFR BLD: 40.9 % (ref 18–48)
MAGNESIUM SERPL-MCNC: 2 MG/DL (ref 1.6–2.6)
MCH RBC QN AUTO: 29 PG (ref 27–31)
MCHC RBC AUTO-ENTMCNC: 31 G/DL (ref 32–36)
MCV RBC AUTO: 94 FL (ref 82–98)
MICROSCOPIC COMMENT: ABNORMAL
MONOCYTES # BLD AUTO: 0.7 K/UL (ref 0.3–1)
MONOCYTES NFR BLD: 8.8 % (ref 4–15)
MV MEAN GRADIENT: 2 MMHG
MV PEAK A VEL: 0.44 M/S
MV PEAK E VEL: 0.78 M/S
MV PEAK GRADIENT: 9 MMHG
MV STENOSIS PRESSURE HALF TIME: 84 MS
MV VALVE AREA BY CONTINUITY EQUATION: 2.62 CM2
MV VALVE AREA P 1/2 METHOD: 2.62 CM2
NEUTROPHILS # BLD AUTO: 3.3 K/UL (ref 1.8–7.7)
NEUTROPHILS NFR BLD: 44 % (ref 38–73)
NITRITE UR QL STRIP: NEGATIVE
NONHDLC SERPL-MCNC: 81 MG/DL
NRBC BLD-RTO: 0 /100 WBC
OHS LV EJECTION FRACTION SIMPSONS BIPLANE MOD: 57 %
PH UR STRIP: 6 [PH] (ref 5–8)
PHOSPHATE SERPL-MCNC: 5.4 MG/DL (ref 2.7–4.5)
PISA TR MAX VEL: 2.33 M/S
PLATELET # BLD AUTO: 205 K/UL (ref 150–450)
PMV BLD AUTO: 11.9 FL (ref 9.2–12.9)
POTASSIUM SERPL-SCNC: 5.6 MMOL/L (ref 3.5–5.1)
PROT SERPL-MCNC: 6.5 G/DL (ref 6–8.4)
PROT UR QL STRIP: ABNORMAL
PROT UR-MCNC: 66 MG/DL (ref 6–15)
PROT/CREAT UR: 0.4 MG/G{CREAT} (ref 0–0.2)
PTH-INTACT SERPL-MCNC: 105 PG/ML (ref 9–77)
PV MV: 0.64 M/S
PV PEAK GRADIENT: 3 MMHG
PV PEAK VELOCITY: 0.93 M/S
RA PRESSURE ESTIMATED: 3 MMHG
RA PRESSURE: 3 MMHG
RBC # BLD AUTO: 3.14 M/UL (ref 4.6–6.2)
RBC #/AREA URNS HPF: 1 /HPF (ref 0–4)
RV TB RVSP: 5 MMHG
RV TISSUE DOPPLER FREE WALL SYSTOLIC VELOCITY 1 (APICAL 4 CHAMBER VIEW): 13.7 CM/S
SINUS: 3.48 CM
SODIUM SERPL-SCNC: 134 MMOL/L (ref 136–145)
SP GR UR STRIP: 1.02 (ref 1–1.03)
SQUAMOUS #/AREA URNS HPF: 0 /HPF
STJ: 2.87 CM
TDI LATERAL: 0.1 M/S
TDI SEPTAL: 0.11 M/S
TDI: 0.11 M/S
TR MAX PG: 22 MMHG
TRICUSPID ANNULAR PLANE SYSTOLIC EXCURSION: 2.76 CM
TRIGL SERPL-MCNC: 116 MG/DL (ref 30–150)
TROPONIN I SERPL HS-MCNC: 17.2 PG/ML (ref 0–14.9)
TV REST PULMONARY ARTERY PRESSURE: 25 MMHG
URN SPEC COLLECT METH UR: ABNORMAL
UROBILINOGEN UR STRIP-ACNC: NEGATIVE EU/DL
WBC # BLD AUTO: 7.53 K/UL (ref 3.9–12.7)
WBC #/AREA URNS HPF: 0 /HPF (ref 0–5)
Z-SCORE OF LEFT VENTRICULAR DIMENSION IN END DIASTOLE: -21.31
Z-SCORE OF LEFT VENTRICULAR DIMENSION IN END SYSTOLE: -16.06

## 2024-01-10 PROCEDURE — 25000003 PHARM REV CODE 250: Performed by: NURSE PRACTITIONER

## 2024-01-10 PROCEDURE — 99900035 HC TECH TIME PER 15 MIN (STAT)

## 2024-01-10 PROCEDURE — 36415 COLL VENOUS BLD VENIPUNCTURE: CPT | Performed by: NURSE PRACTITIONER

## 2024-01-10 PROCEDURE — 63600175 PHARM REV CODE 636 W HCPCS: Performed by: NURSE PRACTITIONER

## 2024-01-10 PROCEDURE — 85025 COMPLETE CBC W/AUTO DIFF WBC: CPT | Performed by: NURSE PRACTITIONER

## 2024-01-10 PROCEDURE — 25000242 PHARM REV CODE 250 ALT 637 W/ HCPCS: Performed by: NURSE PRACTITIONER

## 2024-01-10 PROCEDURE — 84484 ASSAY OF TROPONIN QUANT: CPT | Performed by: NURSE PRACTITIONER

## 2024-01-10 PROCEDURE — 96366 THER/PROPH/DIAG IV INF ADDON: CPT

## 2024-01-10 PROCEDURE — 93306 TTE W/DOPPLER COMPLETE: CPT | Mod: 26,,, | Performed by: GENERAL PRACTICE

## 2024-01-10 PROCEDURE — 25000003 PHARM REV CODE 250: Mod: JZ,JG | Performed by: STUDENT IN AN ORGANIZED HEALTH CARE EDUCATION/TRAINING PROGRAM

## 2024-01-10 PROCEDURE — 99222 1ST HOSP IP/OBS MODERATE 55: CPT | Mod: ,,, | Performed by: FAMILY MEDICINE

## 2024-01-10 PROCEDURE — 94761 N-INVAS EAR/PLS OXIMETRY MLT: CPT

## 2024-01-10 PROCEDURE — 96372 THER/PROPH/DIAG INJ SC/IM: CPT | Performed by: NURSE PRACTITIONER

## 2024-01-10 PROCEDURE — 21400001 HC TELEMETRY ROOM

## 2024-01-10 PROCEDURE — 94640 AIRWAY INHALATION TREATMENT: CPT

## 2024-01-10 PROCEDURE — 83735 ASSAY OF MAGNESIUM: CPT | Performed by: NURSE PRACTITIONER

## 2024-01-10 PROCEDURE — 84156 ASSAY OF PROTEIN URINE: CPT | Performed by: INTERNAL MEDICINE

## 2024-01-10 PROCEDURE — 80061 LIPID PANEL: CPT | Performed by: NURSE PRACTITIONER

## 2024-01-10 PROCEDURE — 84100 ASSAY OF PHOSPHORUS: CPT | Performed by: NURSE PRACTITIONER

## 2024-01-10 PROCEDURE — 83970 ASSAY OF PARATHORMONE: CPT | Performed by: NURSE PRACTITIONER

## 2024-01-10 PROCEDURE — 93306 TTE W/DOPPLER COMPLETE: CPT

## 2024-01-10 PROCEDURE — 25000003 PHARM REV CODE 250: Performed by: INTERNAL MEDICINE

## 2024-01-10 PROCEDURE — 81001 URINALYSIS AUTO W/SCOPE: CPT | Performed by: INTERNAL MEDICINE

## 2024-01-10 PROCEDURE — 80053 COMPREHEN METABOLIC PANEL: CPT | Performed by: NURSE PRACTITIONER

## 2024-01-10 RX ORDER — OXYCODONE AND ACETAMINOPHEN 10; 325 MG/1; MG/1
1 TABLET ORAL EVERY 6 HOURS PRN
Status: DISCONTINUED | OUTPATIENT
Start: 2024-01-10 | End: 2024-01-19 | Stop reason: HOSPADM

## 2024-01-10 RX ADMIN — CETIRIZINE HYDROCHLORIDE 10 MG: 10 TABLET, FILM COATED ORAL at 08:01

## 2024-01-10 RX ADMIN — CALCIUM GLUCONATE 1 G: 20 INJECTION, SOLUTION INTRAVENOUS at 01:01

## 2024-01-10 RX ADMIN — INSULIN DETEMIR 15 UNITS: 100 INJECTION, SOLUTION SUBCUTANEOUS at 10:01

## 2024-01-10 RX ADMIN — FINASTERIDE 5 MG: 5 TABLET, FILM COATED ORAL at 08:01

## 2024-01-10 RX ADMIN — OXYCODONE HYDROCHLORIDE AND ACETAMINOPHEN 1 TABLET: 10; 325 TABLET ORAL at 08:01

## 2024-01-10 RX ADMIN — FLUTICASONE PROPIONATE 100 MCG: 50 SPRAY, METERED NASAL at 10:01

## 2024-01-10 RX ADMIN — HEPARIN SODIUM 5000 UNITS: 5000 INJECTION INTRAVENOUS; SUBCUTANEOUS at 10:01

## 2024-01-10 RX ADMIN — AMLODIPINE BESYLATE 10 MG: 5 TABLET ORAL at 08:01

## 2024-01-10 RX ADMIN — POLYETHYLENE GLYCOL 3350 17 G: 17 POWDER, FOR SOLUTION ORAL at 08:01

## 2024-01-10 RX ADMIN — HYDRALAZINE HYDROCHLORIDE 25 MG: 25 TABLET ORAL at 05:01

## 2024-01-10 RX ADMIN — HEPARIN SODIUM 5000 UNITS: 5000 INJECTION INTRAVENOUS; SUBCUTANEOUS at 05:01

## 2024-01-10 RX ADMIN — OXYCODONE HYDROCHLORIDE AND ACETAMINOPHEN 1 TABLET: 10; 325 TABLET ORAL at 05:01

## 2024-01-10 RX ADMIN — GABAPENTIN 800 MG: 300 CAPSULE ORAL at 10:01

## 2024-01-10 RX ADMIN — HEPARIN SODIUM 5000 UNITS: 5000 INJECTION INTRAVENOUS; SUBCUTANEOUS at 02:01

## 2024-01-10 RX ADMIN — GABAPENTIN 800 MG: 300 CAPSULE ORAL at 08:01

## 2024-01-10 RX ADMIN — HYDRALAZINE HYDROCHLORIDE 25 MG: 25 TABLET ORAL at 02:01

## 2024-01-10 RX ADMIN — OXYCODONE HYDROCHLORIDE AND ACETAMINOPHEN 1 TABLET: 10; 325 TABLET ORAL at 02:01

## 2024-01-10 RX ADMIN — ASPIRIN 81 MG: 81 TABLET, COATED ORAL at 08:01

## 2024-01-10 RX ADMIN — METOPROLOL SUCCINATE 25 MG: 25 TABLET, EXTENDED RELEASE ORAL at 08:01

## 2024-01-10 RX ADMIN — SODIUM ZIRCONIUM CYCLOSILICATE 10 G: 5 POWDER, FOR SUSPENSION ORAL at 12:01

## 2024-01-10 RX ADMIN — GABAPENTIN 800 MG: 300 CAPSULE ORAL at 02:01

## 2024-01-10 RX ADMIN — IPRATROPIUM BROMIDE AND ALBUTEROL SULFATE 3 ML: 2.5; .5 SOLUTION RESPIRATORY (INHALATION) at 03:01

## 2024-01-10 RX ADMIN — HYDRALAZINE HYDROCHLORIDE 25 MG: 25 TABLET ORAL at 10:01

## 2024-01-10 RX ADMIN — ATORVASTATIN CALCIUM 80 MG: 40 TABLET, FILM COATED ORAL at 08:01

## 2024-01-10 NOTE — HOSPITAL COURSE
Abel Gibbs is a 58 y.o. male with  has a past medical history of Depression, Diabetes mellitus, Hypertension, Obesity, and Osteomyelitis. who presented to the ED with a Chest Pain and Shortness of Breath (+ edema)    Patient presented for evaluation of 3 day history of CP and increased SOB after being treated for antibiotics for sinus infection for 1-2 weeks - as well as he endorsed not taking his medication for about a week due to leg cramps. He was found to be hyperkalemic 6.0, with acute on chronic kidney failure with BUN/cr/GFR 46/2.5/28.7. His BP was also elevated 180/91 at the time of presentation. His home BP medications were restarted amlodipine, metoprolol and hydralazine added 2/2 renal. BP improved. Nephrology consulted for management of kidney impairment and hyperkalemia. Renal US showed showed no sonographic evidence of renal artery stenosis. Started on lokelma for hyperkalemia - slow to improve. Lasix IV added, fluid restriction 1.5L. CXR on 01/09/23 did not demonstrate evidence of effusion or increased congestion and 2D echo 01/10/23 showed hypertrophy, EF 55-60% with normal DF. Has right foot ulcer present on admission followed by wound care outpatient and by wound care and Dr. Serrato inpatient. XR right foot ulcer showed severe degenerative changes with no acute osseous abnormality.       Patient with history of morbid obesity with BMI 47, suspected undiagnosed untreated AMBREEN, CKD stage 3, diabetes mellitus with HbA1c 6.5%, chronic right/great toe diabetic ulcer, followed by wound care, hypertension, heart failure with preserved ejection fraction, anemia, BPH, depression.  He presented from outpatient wound care due to concerns for volume overload.  Hypertensive urgency in the ED with hyperkalemia, he was admitted with Nephrology consultation and started on IV diuresis.  Renal ultrasound negative with no evidence of renal artery stenosis.  Echo with EF of 55-60%.  Ongoing hyperkalemia and  started on scheduled lokelma  Still volume overloaded and on 1/12 transfer to telemetry floor and initiated on Lasix infusion at 5 milligrams/hour with 5 mg daily metolazone.  Unfortunately despite aggressive IV diuresis, still significantly volume overloaded with up trending renal function.  On 01/15, general surgery placed left-sided Trialysis catheter and on 01/16 patient began on renal replacement therapy for volume removal.  Received dialysis on 01/16/2024 and 01/17/2024.    24 hour urine was collected and shows appropriate renal clearance.  Patient will likely not need any further hemodialysis.  Nephrology recommends removing Trialysis catheter and discharged home on Lasix 40 mg oral b.i.d..  Continue to hold Arb and Aldactone.    Follow up with PCP, Cardiology, Wound Care Clinic, and Nephrology.  Recommend repeating CMP at follow-up appointment.    Physical Exam  Vitals and nursing note reviewed.   Constitutional:       Appearance: He is obese.      Comments: Lying in bed   HENT:      Head: Normocephalic and atraumatic.      Mouth/Throat:      Mouth: Mucous membranes are moist.   Neck:      Comments: Left-sided Trialysis line in place  Cardiovascular:      Rate and Rhythm: Normal rate and regular rhythm.      Comments: 2+ lower extremity pitting edema  Pulmonary:      Comments: On room air, distant breath sounds, no wheeze or accessory muscle use  Abdominal:      Palpations: Abdomen is soft.      Tenderness: There is no abdominal tenderness.   Skin:     Comments: Rash in upper extremity and torso, improved from yesterday   Neurological:      Mental Status: He is alert and oriented to person, place, and time.   Psychiatric:         Mood and Affect: Mood normal.    Imaging Results              US Retroperitoneal Complete (Final result)  Result time 01/09/24 17:29:05      Final result by Fede Tavares Jr., MD (01/09/24 17:29:05)                   Narrative:    HISTORY: Acute renal disease..    FINDINGS: The  right kidney measures 8.9 cm in length, with normal cortical thickness and parenchymal echotexture, and no echogenic calculi or hydronephrosis. The left kidney measures 10.4 cm in length and has normal cortical thickness and parenchymal echotexture, without echogenic calculi or hydronephrosis.    The urinary bladder is normal, with no wall thickening or intraluminal mass. The abdominal aorta, aortic bifurcation and IVC are unremarkable.    IMPRESSION: Asymmetric small right kidney. Otherwise normal sonographic appearance of the bilateral kidneys..    Electronically signed by:  Fede Tavares MD  01/09/2024 05:29 PM CST Workstation: [id number]                                     US Lower Extremity Veins Bilateral (Final result)  Result time 01/09/24 16:37:13      Final result by Sinai Moreno IV, MD (01/09/24 16:37:13)                   Narrative:    Bilateral lower extremity venous Doppler evaluation    HISTORY: Leg pain and swelling.    Real-time, duplex and color Doppler interrogation are performed. This demonstrates patency of the common and superficial femoral veins, popliteal veins, major calf veins and greater saphenous veins bilaterally. There are no findings of deep vein thrombosis.    IMPRESSION:    No evidence of deep venous thrombosis.    Electronically signed by:  Sinai Moreno MD  01/09/2024 04:37 PM CST Workstation: [id number]                                     X-Ray Chest AP (Final result)  Result time 01/09/24 14:51:18      Final result by Sinai Moreno IV, MD (01/09/24 14:51:18)                   Narrative:    Chest, single view    HISTORY: Shortness of breath and chest pain.    Comparison 7/26/2023.    The heart size is within normal limits. The central pulmonary vasculature is not acutely engorged.    The lungs are appropriately expanded. There are no confluent areas of airspace disease or significant volume loss. No effusion is demonstrated. Monitoring electrodes overlie the  chest.    IMPRESSION:    No acute cardiopulmonary disease.    Electronically signed by:  Sinai Moreno MD  01/09/2024 02:51 PM CST Workstation: [id number]

## 2024-01-10 NOTE — NURSING
Spoke with pt about wound  he states that the clinic had changed it yesterday and it would be due on Friday  said I may change it on Friday if he is here .  Askes if he wouild be ok speaking with Dr Love   he said yes  anyone but the skinny  that is all over the place  I said ok and thanked him for his time

## 2024-01-10 NOTE — ASSESSMENT & PLAN NOTE
"Patient's FSGs are controlled on current medication regimen.  Last A1c reviewed-   Lab Results   Component Value Date    HGBA1C 8.7 (H) 02/07/2023     Most recent fingerstick glucose reviewed- No results for input(s): "POCTGLUCOSE" in the last 24 hours.  Current correctional scale  Low  Maintain anti-hyperglycemic dose as follows-   Antihyperglycemics (From admission, onward)      Start     Stop Route Frequency Ordered    01/09/24 2100  insulin detemir U-100 (Levemir) pen 15 Units         -- SubQ Nightly 01/09/24 1947    01/09/24 1923  insulin aspart U-100 pen 0-5 Units         -- SubQ Before meals & nightly PRN 01/09/24 1823          Hold Oral hypoglycemics and mounjaro while patient is in the hospital.  Wound care consult appreciated.  "

## 2024-01-10 NOTE — ASSESSMENT & PLAN NOTE
Patient with acute kidney injury/acute renal failure likely due to  medication non-adherence to furosemide and spirolactone  +/- use of mounjaro. MINI is currently stable. Baseline creatinine  1.5  - Labs reviewed- Renal function/electrolytes with Estimated Creatinine Clearance: 57.2 mL/min (A) (based on SCr of 2.3 mg/dL (H)). according to latest data. Monitor urine output and serial BMP and adjust therapy as needed. Avoid nephrotoxins and renally dose meds for GFR listed above.

## 2024-01-10 NOTE — H&P
[facility] Medicine  History & Physical    Patient Name: Abel Gibbs  MRN: [id number]  Patient Class: OP- Observation  Admission Date: 1/9/2024  Attending Physician: Asa Leach MD   Primary Care Provider: Rupinder Pagan MD         Patient information was obtained from patient and ER records.     Subjective:     Principal Problem:Chest pain    Chief Complaint:   Chief Complaint   Patient presents with    Chest Pain    Shortness of Breath     + edema        HPI: Abel Gibbs is a 58 y.o. male with a history as  has a past medical history of Depression, Diabetes mellitus, Hypertension, Obesity, and Osteomyelitis. who presented to the ED with a Chest Pain and Shortness of Breath (+ edema)    Patient presents to the emergency room with a complaint of midsternal chest pain radiating into left chest wall that started approximately 3 days ago.  He further reports shortness for breath when attempting to lye flat with chest pain worsening. He tell me he was seen by wound care for right foot ulcer who felt as though his face appeared a little swollen and recommended that he go to the emergency.     Patient does report having a sinus infection about 1-2 weeks ago completed course of antibiotics.  He further states after completion of antibiotics he started with congestion and seems as though symptoms have gotten worse. Additionally, he reports BLE edema that chronic but appears a little worse.    Patient unclear of medications as he reports the nurse fills his medication container weekly. He does tell me that he is on furosemide but has not taken it for last 3-4 days d/t leg cramps. Patient also prescribed spirolactone but does not know if he has been taking. Also on mounjaro.     Denies fever, chills, diaphoresis, dizziness, HA, palpitations, NVD, recent trauma or any other associated symptoms. Does not smoke cigarettes, drinks occasionally and uses marijuana daily.      Lab and  imaging obtained and reviewed.  CBC completed and shows RBCs 3.48 H/H 10.0/32.3 MCHC 31.0 RDW is 15.9.  Chemistry profile shows potassium 5.7 Ag 5 BUN 41 creatinine 2.3 GFR 31.7 calcium 8.6 ALP 35 ALT 50.  BNP within normal range.  Initial high sensitive troponin 17.2. EKG shows NSR with incomplete R-BBB. CXR without acute cardiopulmonary findings.  On admit, afebrile HR 94 RR 20 /91 with O2 sats 97% on room air.        Ultrasound bilateral lower extremity without evidence of deep venous thrombosis.      Renal US  IMPRESSION: Asymmetric small right kidney. Otherwise normal sonographic appearance of the bilateral kidneys..     Angiogram 07/2023  Summary     The estimated blood loss was <50 mL.    The pre-procedure left ventricular end diastolic pressure was 16.    Nonobstructive coronaries with mild luminal irregularities in RCA and left circumflex and focal moderate stenosis of mid to distal LAD for which medical management is recommended.       Echo 07/2023  Summary  The left ventricle is normal in size with mild concentric hypertrophy and normal systolic function.  The estimated ejection fraction is 65%.  Normal left ventricular diastolic function.  Normal right ventricular size with normal right ventricular systolic function.  Mild mitral regurgitation.  Mild tricuspid regurgitation.  Normal central venous pressure (3 mmHg).  The estimated PA systolic pressure is 27 mmHg.    Per ED provider patient presents for evaluation of chest pain with lower extremity edema for 1 week. Labs reviewed and showed mildly elevated troponin without EKG changes. Also noted to have elevated BP and MINI, given ASA IV diuretics with NTG paste applied. US BLE negative for DVT. Will admit for ACS r/o.        Past Medical History:   Diagnosis Date    Depression     Diabetes mellitus     Hypertension     Obesity     Osteomyelitis        Past Surgical History:   Procedure Laterality Date    ANGIOGRAM, CORONARY, WITH LEFT HEART  CATHETERIZATION N/A 7/28/2023    Procedure: Angiogram, Coronary, with Left Heart Cath;  Surgeon: Alek Greenwood MD;  Location: [facility] CATH/EP LAB;  Service: Cardiology;  Laterality: N/A;       Review of patient's allergies indicates:   Allergen Reactions    Adhesive Itching       No current facility-administered medications on file prior to encounter.     Current Outpatient Medications on File Prior to Encounter   Medication Sig    amLODIPine (NORVASC) 10 MG tablet Take 1 tablet (10 mg total) by mouth once daily.    aspirin (ECOTRIN) 81 MG EC tablet Take 1 tablet (81 mg total) by mouth once daily.    atorvastatin (LIPITOR) 80 MG tablet Take 1 tablet (80 mg total) by mouth once daily.    FARXIGA 5 mg Tab tablet Take 5 mg by mouth once daily.    finasteride (PROSCAR) 5 mg tablet Take 5 mg by mouth once daily.    furosemide (LASIX) 20 MG tablet Take 20 mg by mouth once daily.    gabapentin (NEURONTIN) 800 MG tablet Take 800 mg by mouth 4 (four) times daily.    hydrALAZINE (APRESOLINE) 25 MG tablet Take 1 tablet (25 mg total) by mouth every 8 (eight) hours.    ketoconazole (NIZORAL) 2 % cream Apply 1 Application topically once daily.    LEVEMIR FLEXPEN 100 unit/mL (3 mL) InPn pen Inject 30 Units into the skin once daily.    LIDOcaine (LIDODERM) 5 % Place 1 patch onto the skin once daily.    meloxicam (MOBIC) 15 MG tablet Take 15 mg by mouth once daily.    metFORMIN (GLUCOPHAGE) 1000 MG tablet Take 1,000 mg by mouth 2 (two) times daily with meals.    metoprolol succinate (TOPROL-XL) 25 MG 24 hr tablet Take 25 mg by mouth once daily.    MOUNJARO 7.5 mg/0.5 mL PnIj Inject 7.5 mg into the skin every 7 days.    oxyCODONE-acetaminophen (PERCOCET)  mg per tablet Take 1 tablet by mouth 3 (three) times daily as needed for Pain.    spironolactone (ALDACTONE) 50 MG tablet Take 1 tablet (50 mg total) by mouth once daily.    traZODone (DESYREL) 50 MG tablet Take 50 mg by mouth nightly as needed.    valsartan (DIOVAN) 160 MG  tablet Take 160 mg by mouth once daily.    albuterol (PROVENTIL/VENTOLIN HFA) 90 mcg/actuation inhaler Inhale 1-2 puffs into the lungs every 6 (six) hours as needed for Wheezing. Rescue    blood sugar diagnostic Strp To check BG 4 times daily, to use with insurance preferred meter    blood-glucose meter kit To check BG 4 times daily, to use with insurance preferred meter    fluticasone propionate (FLONASE) 50 mcg/actuation nasal spray 1 spray (50 mcg total) by Each Nostril route daily as needed for Rhinitis.    lactobacillus acidophilus & bulgar (LACTINEX) 100 million cell packet Take 1 tablet by mouth 3 (three) times daily with meals.    lancets Misc To check BG 4 times daily, to use with insurance preferred meter    polyethylene glycol (MIRALAX) 17 gram/dose powder Take 17 g by mouth once daily. For constipation    [DISCONTINUED] carvediloL (COREG) 3.125 MG tablet Take 1 tablet (3.125 mg total) by mouth 2 (two) times daily with meals.    [DISCONTINUED] oxyCODONE-acetaminophen (PERCOCET)  mg per tablet Take 1 tablet by mouth every 6 (six) hours as needed.    [DISCONTINUED] valsartan (DIOVAN) 80 MG tablet Take 1 tablet (80 mg total) by mouth 2 (two) times daily.     Family History    None       Tobacco Use    Smoking status: Never    Smokeless tobacco: Never   Substance and Sexual Activity    Alcohol use: Yes     Comment: occas    Drug use: Yes     Frequency: 1.0 times per week     Types: Marijuana     Comment: last use 2 days ago    Sexual activity: Not on file     Review of Systems   Constitutional:  Negative for chills, diaphoresis and fever.   HENT:  Negative for congestion, postnasal drip, sinus pressure and sore throat.    Eyes:  Negative for visual disturbance.   Respiratory:  Positive for shortness of breath. Negative for cough, chest tightness and wheezing.    Cardiovascular:  Positive for chest pain and leg swelling. Negative for palpitations.   Gastrointestinal:  Negative for abdominal distention,  abdominal pain, blood in stool, constipation, diarrhea, nausea and vomiting.   Endocrine: Negative.    Genitourinary:  Negative for dysuria.   Musculoskeletal: Negative.    Skin: Negative.    Allergic/Immunologic: Negative.    Neurological:  Negative for dizziness, weakness, numbness and headaches.   Hematological: Negative.    Psychiatric/Behavioral: Negative.       Objective:     Vital Signs (Most Recent):  Temp: 97.6 °F (36.4 °C) (01/09/24 1943)  Pulse: (!) 58 (01/09/24 2048)  Resp: 18 (01/09/24 2145)  BP: (!) 199/92 (01/09/24 1956)  SpO2: 98 % (01/09/24 2050) Vital Signs (24h Range):  Temp:  [97.6 °F (36.4 °C)-98.1 °F (36.7 °C)] 97.6 °F (36.4 °C)  Pulse:  [50-94] 58  Resp:  [16-23] 18  SpO2:  [95 %-99 %] 98 %  BP: (151-208)/() 199/92     Weight: (!) 169.6 kg (374 lb)  Body mass index is 46.75 kg/m².     Physical Exam  Constitutional:       General: He is not in acute distress.     Appearance: Normal appearance. He is well-developed. He is morbidly obese. He is not toxic-appearing or diaphoretic.   HENT:      Head: Normocephalic and atraumatic.   Eyes:      General: Lids are normal.      Conjunctiva/sclera: Conjunctivae normal.      Pupils: Pupils are equal, round, and reactive to light.   Neck:      Thyroid: No thyroid mass or thyromegaly.      Vascular: Normal carotid pulses. No JVD.      Trachea: Trachea normal. No tracheal deviation.   Cardiovascular:      Rate and Rhythm: Normal rate and regular rhythm.      Pulses: Normal pulses.      Heart sounds: Normal heart sounds, S1 normal and S2 normal.   Pulmonary:      Effort: Pulmonary effort is normal.      Breath sounds: Normal breath sounds. No stridor.   Abdominal:      General: Bowel sounds are normal.      Palpations: Abdomen is soft.      Tenderness: There is no abdominal tenderness.   Musculoskeletal:         General: Normal range of motion.      Cervical back: Full passive range of motion without pain, normal range of motion and neck supple.       Right lower le+ Pitting Edema present.      Left lower le+ Pitting Edema present.   Skin:     General: Skin is warm and dry.      Nails: There is no clubbing.   Neurological:      Mental Status: He is alert and oriented to person, place, and time.      Cranial Nerves: No cranial nerve deficit.      Sensory: No sensory deficit.   Psychiatric:         Speech: Speech normal.         Behavior: Behavior normal. Behavior is cooperative.         Thought Content: Thought content normal.         Judgment: Judgment normal.              CRANIAL NERVES     CN III, IV, VI   Pupils are equal, round, and reactive to light.       Significant Labs: All pertinent labs within the past 24 hours have been reviewed.    Bilirubin:   Recent Labs   Lab 24  1414   BILITOT 0.8     CBC:   Recent Labs   Lab 24  1414   WBC 6.73   HGB 10.0*   HCT 32.3*        CMP:   Recent Labs   Lab 24  1414      K 5.7*      CO2 25      BUN 41*   CREATININE 2.3*   CALCIUM 8.6*   PROT 7.4   ALBUMIN 4.2   BILITOT 0.8   ALKPHOS 35*   AST 35   ALT 50*   ANIONGAP 5*     Cardiac Markers:   Recent Labs   Lab 24  1414   BNP 80     Magnesium:   Recent Labs   Lab 24  1414   MG 2.1     Troponin:   Recent Labs   Lab 24  1414 24  1815 24  2035   TROPONINIHS 17.2* 16.2* 18.6*     TSH:   Recent Labs   Lab 23  0401   TSH 2.550       Significant Imaging: I have reviewed all pertinent imaging results/findings within the past 24 hours.  US Retroperitoneal Complete    Result Date: 2024  HISTORY: Acute renal disease.. FINDINGS: The right kidney measures 8.9 cm in length, with normal cortical thickness and parenchymal echotexture, and no echogenic calculi or hydronephrosis. The left kidney measures 10.4 cm in length and has normal cortical thickness and parenchymal echotexture, without echogenic calculi or hydronephrosis. The urinary bladder is normal, with no wall thickening or intraluminal  mass. The abdominal aorta, aortic bifurcation and IVC are unremarkable. IMPRESSION: Asymmetric small right kidney. Otherwise normal sonographic appearance of the bilateral kidneys.. Electronically signed by:  Fede Tavares MD  01/09/2024 05:29 PM CST Workstation: [id number]    US Lower Extremity Veins Bilateral    Result Date: 1/9/2024  Bilateral lower extremity venous Doppler evaluation HISTORY: Leg pain and swelling. Real-time, duplex and color Doppler interrogation are performed. This demonstrates patency of the common and superficial femoral veins, popliteal veins, major calf veins and greater saphenous veins bilaterally. There are no findings of deep vein thrombosis. IMPRESSION: No evidence of deep venous thrombosis. Electronically signed by:  Sinai Moreno MD  01/09/2024 04:37 PM CST Workstation: [id number]    X-Ray Chest AP    Result Date: 1/9/2024  Chest, single view HISTORY: Shortness of breath and chest pain. Comparison 7/26/2023. The heart size is within normal limits. The central pulmonary vasculature is not acutely engorged. The lungs are appropriately expanded. There are no confluent areas of airspace disease or significant volume loss. No effusion is demonstrated. Monitoring electrodes overlie the chest. IMPRESSION: No acute cardiopulmonary disease. Electronically signed by:  Sinai Moreno MD  01/09/2024 02:51 PM CST Workstation: [id number]     Assessment/Plan:     Active Hospital Problems    Diagnosis  POA    *Chest pain [R07.9]  Yes    Hyperkalemia [E87.5]  Yes    BPH (benign prostatic hyperplasia) [N40.0]  Yes    Hypochromic anemia [D50.9]  Yes    HLD (hyperlipidemia) [E78.5]  Yes    Severe obesity (BMI >= 40) [E66.01]  Yes    Non compliance with medical treatment [Z91.199]  Not Applicable    Type 2 diabetes mellitus [E11.9]  Yes     Chronic    Diabetic ulcer of toe of right foot associated with type 2 diabetes mellitus, with necrosis of bone [E11.621, L97.514]  Yes    Acute kidney injury  superimposed on chronic kidney disease [N17.9, N18.9]  Yes    Hypertension [I10]  Yes     Chronic      Resolved Hospital Problems   No resolved problems to display.       * Chest pain  Admitted for rule out ACS. Patient with recent negative cardiac work-up (07/2023). EKG is non-ischemic. Initial and 2nd troponin mildly elevated but flat. BNP wnl. Plan for continuous cardiac monitoring. Continue to trend serial troponins q6 hours X 2. ASA/NTG 0.4 mg SL PRN CP. Echo pending.   Daily labs + lipid panel + A1c  Will defer cardiology consult to AM hospitalist pending testing results  Further plan as per clinical course            Hyperkalemia  This patient has hyperkalemia which is controlled. We will monitor for arrhythmias with EKG or continuous telemetry. We will treat the hyperkalemia with Furosemide. The likely etiology of the hyperkalemia is MINI.  The patients latest potassium has been reviewed and the results are listed below  Recent Labs   Lab 01/09/24  1414   K 5.7*             Hypochromic anemia  Patient's anemia is currently controlled. Has not received any PRBCs to date. Etiology likely d/t chronic disease due to Chronic Kidney Disease  Current CBC reviewed-   Lab Results   Component Value Date    HGB 10.0 (L) 01/09/2024    HCT 32.3 (L) 01/09/2024     Monitor serial CBC and transfuse if patient becomes hemodynamically unstable, symptomatic or H/H drops below 7/21.    BPH (benign prostatic hyperplasia)  Continue finasteride as prescribed      HLD (hyperlipidemia)  Continue ASA/Statin      Severe obesity (BMI >= 40)  Body mass index is 46.75 kg/m². Morbid obesity complicates all aspects of disease management from diagnostic modalities to treatment. Weight loss encouraged and health benefits explained to patient.         Type 2 diabetes mellitus  Patient's FSGs are controlled on current medication regimen.  Last A1c reviewed-   Lab Results   Component Value Date    HGBA1C 8.7 (H) 02/07/2023     Most recent  "fingerstick glucose reviewed- No results for input(s): "POCTGLUCOSE" in the last 24 hours.  Current correctional scale  Low  Maintain anti-hyperglycemic dose as follows-   Antihyperglycemics (From admission, onward)      Start     Stop Route Frequency Ordered    01/09/24 2100  insulin detemir U-100 (Levemir) pen 15 Units         -- SubQ Nightly 01/09/24 1947    01/09/24 1923  insulin aspart U-100 pen 0-5 Units         -- SubQ Before meals & nightly PRN 01/09/24 1823          Hold Oral hypoglycemics and mounjaro while patient is in the hospital.    Hypertension  Chronic, controlled. Latest blood pressure and vitals reviewed-     Temp:  [97.6 °F (36.4 °C)-98.1 °F (36.7 °C)]   Pulse:  [50-94]   Resp:  [16-23]   BP: (151-208)/()   SpO2:  [95 %-99 %] .   Home meds for hypertension were reviewed and noted below.   Hypertension Medications               amLODIPine (NORVASC) 10 MG tablet Take 1 tablet (10 mg total) by mouth once daily.    furosemide (LASIX) 20 MG tablet Take 20 mg by mouth once daily.    hydrALAZINE (APRESOLINE) 25 MG tablet Take 1 tablet (25 mg total) by mouth every 8 (eight) hours.    metoprolol succinate (TOPROL-XL) 25 MG 24 hr tablet Take 25 mg by mouth once daily.    spironolactone (ALDACTONE) 50 MG tablet Take 1 tablet (50 mg total) by mouth once daily.    valsartan (DIOVAN) 160 MG tablet Take 160 mg by mouth once daily.            While in the hospital, will manage blood pressure as follows; Continue home antihypertensive regimen    Will utilize p.r.n. blood pressure medication only if patient's blood pressure greater than 160/100 and he develops symptoms such as worsening chest pain or shortness of breath.    Acute kidney injury superimposed on chronic kidney disease  Patient with acute kidney injury/acute renal failure likely due to  medication non-adherence to furosemide and spirolactone  +/- use of mounjaro. MINI is currently stable. Baseline creatinine  1.5  - Labs reviewed- Renal " "function/electrolytes with Estimated Creatinine Clearance: 57.2 mL/min (A) (based on SCr of 2.3 mg/dL (H)). according to latest data. Monitor urine output and serial BMP and adjust therapy as needed. Avoid nephrotoxins and renally dose meds for GFR listed above.    Diabetic ulcer of toe of right foot associated with type 2 diabetes mellitus, with necrosis of bone  Patient's FSGs are controlled on current medication regimen.  Last A1c reviewed-   Lab Results   Component Value Date    HGBA1C 8.7 (H) 02/07/2023     Most recent fingerstick glucose reviewed- No results for input(s): "POCTGLUCOSE" in the last 24 hours.  Current correctional scale  Low  Maintain anti-hyperglycemic dose as follows-   Antihyperglycemics (From admission, onward)      Start     Stop Route Frequency Ordered    01/09/24 2100  insulin detemir U-100 (Levemir) pen 15 Units         -- SubQ Nightly 01/09/24 1947    01/09/24 1923  insulin aspart U-100 pen 0-5 Units         -- SubQ Before meals & nightly PRN 01/09/24 1823          Hold Oral hypoglycemics and mounjaro while patient is in the hospital.  Wound care consult appreciated.      VTE Risk Mitigation (From admission, onward)           Ordered     heparin (porcine) injection 5,000 Units  Every 8 hours         01/09/24 2040     IP VTE HIGH RISK PATIENT  Once         01/09/24 2040     Place sequential compression device  Until discontinued         01/09/24 2040     Place MARCELINO hose  Until discontinued         01/09/24 1653     Place sequential compression device  Until discontinued         01/09/24 1653                         On 01/09/2024, patient should be placed in hospital observation services under my care in collaboration with Dr. Leach.           AMINATA Izaguirre  Department of Hospital Medicine  [facility]          "

## 2024-01-10 NOTE — SUBJECTIVE & OBJECTIVE
Interval History: See HPI    Review of Systems   Constitutional:  Positive for activity change. Negative for appetite change, chills, diaphoresis and fever.   HENT:  Negative for congestion, hearing loss, sore throat, tinnitus and trouble swallowing.    Eyes:  Negative for photophobia, discharge, itching and visual disturbance.   Respiratory:  Positive for shortness of breath. Negative for apnea, cough, wheezing and stridor.    Cardiovascular:  Negative for chest pain, palpitations and leg swelling.   Gastrointestinal:  Negative for abdominal distention, abdominal pain, blood in stool, constipation, diarrhea and nausea.   Endocrine: Negative for polydipsia, polyphagia and polyuria.   Genitourinary:  Negative for difficulty urinating, dysuria, flank pain and frequency.   Musculoskeletal:  Negative for arthralgias, joint swelling and neck stiffness.   Skin:  Negative for color change, rash and wound.   Neurological:  Positive for weakness. Negative for dizziness, tremors, seizures, light-headedness, numbness and headaches.   Hematological:  Negative for adenopathy.   Psychiatric/Behavioral:  Negative for hallucinations and self-injury.      Objective:     Vital Signs (Most Recent):  Temp: 97.9 °F (36.6 °C) (01/10/24 1114)  Pulse: (!) 55 (01/10/24 1114)  Resp: 19 (01/10/24 1114)  BP: (!) 143/76 (01/10/24 1114)  SpO2: 99 % (01/10/24 1114) Vital Signs (24h Range):  Temp:  [97.4 °F (36.3 °C)-97.9 °F (36.6 °C)] 97.9 °F (36.6 °C)  Pulse:  [50-69] 55  Resp:  [8-23] 19  SpO2:  [95 %-99 %] 99 %  BP: (139-208)/() 143/76     Weight: (!) 169.6 kg (374 lb)  Body mass index is 46.75 kg/m².    Intake/Output Summary (Last 24 hours) at 1/10/2024 1341  Last data filed at 1/10/2024 1314  Gross per 24 hour   Intake 2400 ml   Output 1050 ml   Net 1350 ml         Physical Exam  Constitutional:       Appearance: He is well-developed.   HENT:      Head: Normocephalic and atraumatic.   Eyes:      General: Lids are normal.       Conjunctiva/sclera: Conjunctivae normal.   Neck:      Thyroid: No thyroid mass.      Vascular: No JVD.   Cardiovascular:      Heart sounds: S1 normal and S2 normal. No murmur heard.  Pulmonary:      Effort: Pulmonary effort is normal.   Abdominal:      General: Bowel sounds are normal. There is no distension or abdominal bruit.      Palpations: Abdomen is soft. There is no hepatomegaly or splenomegaly.      Tenderness: There is no abdominal tenderness.   Musculoskeletal:      Cervical back: Full passive range of motion without pain and neck supple. No edema.      Comments: Back pain chronic   Lymphadenopathy:      Cervical: No cervical adenopathy.   Skin:     General: Skin is warm and dry.   Neurological:      Mental Status: He is alert and oriented to person, place, and time.      Motor: No tremor or seizure activity.      Deep Tendon Reflexes: Reflexes are normal and symmetric.   Psychiatric:         Speech: Speech normal.         Behavior: Behavior is cooperative.         Thought Content: Thought content normal.             Significant Labs: .  CBC:   Recent Labs   Lab 01/09/24  1414 01/10/24  0308   WBC 6.73 7.53   HGB 10.0* 9.1*   HCT 32.3* 29.4*    205     CMP:   Recent Labs   Lab 01/09/24  1414 01/09/24  2311 01/10/24  0308    136 134*   K 5.7* 6.0* 5.6*    103 103   CO2 25 27 25    160* 164*   BUN 41* 43* 45*   CREATININE 2.3* 2.5* 2.7*   CALCIUM 8.6* 8.5* 8.3*   PROT 7.4  --  6.5   ALBUMIN 4.2  --  3.8   BILITOT 0.8  --  0.5   ALKPHOS 35*  --  30*   AST 35  --  29   ALT 50*  --  39   ANIONGAP 5* 6* 6*     Urine Studies:   Recent Labs   Lab 01/10/24  1138   COLORU Yellow   APPEARANCEUA Clear   PHUR 6.0   SPECGRAV 1.020   PROTEINUA 1+*   GLUCUA 1+*   KETONESU Negative   BILIRUBINUA Negative   OCCULTUA Negative   NITRITE Negative   UROBILINOGEN Negative   LEUKOCYTESUR Negative   RBCUA 1   WBCUA 0   BACTERIA Negative   SQUAMEPITHEL 0   HYALINECASTS 2*       Significant Imaging:    Imaging Results              US Retroperitoneal Complete (Final result)  Result time 01/09/24 17:29:05      Final result by Fede Tavares Jr., MD (01/09/24 17:29:05)                   Narrative:    HISTORY: Acute renal disease..    FINDINGS: The right kidney measures 8.9 cm in length, with normal cortical thickness and parenchymal echotexture, and no echogenic calculi or hydronephrosis. The left kidney measures 10.4 cm in length and has normal cortical thickness and parenchymal echotexture, without echogenic calculi or hydronephrosis.    The urinary bladder is normal, with no wall thickening or intraluminal mass. The abdominal aorta, aortic bifurcation and IVC are unremarkable.    IMPRESSION: Asymmetric small right kidney. Otherwise normal sonographic appearance of the bilateral kidneys..    Electronically signed by:  Fede Tavares MD  01/09/2024 05:29 PM CST Workstation: [id number]                                     US Lower Extremity Veins Bilateral (Final result)  Result time 01/09/24 16:37:13      Final result by Sinai Moreno IV, MD (01/09/24 16:37:13)                   Narrative:    Bilateral lower extremity venous Doppler evaluation    HISTORY: Leg pain and swelling.    Real-time, duplex and color Doppler interrogation are performed. This demonstrates patency of the common and superficial femoral veins, popliteal veins, major calf veins and greater saphenous veins bilaterally. There are no findings of deep vein thrombosis.    IMPRESSION:    No evidence of deep venous thrombosis.    Electronically signed by:  Sinai Moreno MD  01/09/2024 04:37 PM CST Workstation: [id number]                                     X-Ray Chest AP (Final result)  Result time 01/09/24 14:51:18      Final result by Sinai Moreno IV, MD (01/09/24 14:51:18)                   Narrative:    Chest, single view    HISTORY: Shortness of breath and chest pain.    Comparison 7/26/2023.    The heart size is within normal limits. The  central pulmonary vasculature is not acutely engorged.    The lungs are appropriately expanded. There are no confluent areas of airspace disease or significant volume loss. No effusion is demonstrated. Monitoring electrodes overlie the chest.    IMPRESSION:    No acute cardiopulmonary disease.    Electronically signed by:  Sinai Moreno MD  01/09/2024 02:51 PM CST Workstation: [id number]

## 2024-01-10 NOTE — SUBJECTIVE & OBJECTIVE
Past Medical History:   Diagnosis Date    Depression     Diabetes mellitus     Hypertension     Obesity     Osteomyelitis        Past Surgical History:   Procedure Laterality Date    ANGIOGRAM, CORONARY, WITH LEFT HEART CATHETERIZATION N/A 7/28/2023    Procedure: Angiogram, Coronary, with Left Heart Cath;  Surgeon: Alek Greenwood MD;  Location: [facility] CATH/EP LAB;  Service: Cardiology;  Laterality: N/A;       Review of patient's allergies indicates:   Allergen Reactions    Adhesive Itching       No current facility-administered medications on file prior to encounter.     Current Outpatient Medications on File Prior to Encounter   Medication Sig    amLODIPine (NORVASC) 10 MG tablet Take 1 tablet (10 mg total) by mouth once daily.    aspirin (ECOTRIN) 81 MG EC tablet Take 1 tablet (81 mg total) by mouth once daily.    atorvastatin (LIPITOR) 80 MG tablet Take 1 tablet (80 mg total) by mouth once daily.    FARXIGA 5 mg Tab tablet Take 5 mg by mouth once daily.    finasteride (PROSCAR) 5 mg tablet Take 5 mg by mouth once daily.    furosemide (LASIX) 20 MG tablet Take 20 mg by mouth once daily.    gabapentin (NEURONTIN) 800 MG tablet Take 800 mg by mouth 4 (four) times daily.    hydrALAZINE (APRESOLINE) 25 MG tablet Take 1 tablet (25 mg total) by mouth every 8 (eight) hours.    ketoconazole (NIZORAL) 2 % cream Apply 1 Application topically once daily.    LEVEMIR FLEXPEN 100 unit/mL (3 mL) InPn pen Inject 30 Units into the skin once daily.    LIDOcaine (LIDODERM) 5 % Place 1 patch onto the skin once daily.    meloxicam (MOBIC) 15 MG tablet Take 15 mg by mouth once daily.    metFORMIN (GLUCOPHAGE) 1000 MG tablet Take 1,000 mg by mouth 2 (two) times daily with meals.    metoprolol succinate (TOPROL-XL) 25 MG 24 hr tablet Take 25 mg by mouth once daily.    MOUNJARO 7.5 mg/0.5 mL PnIj Inject 7.5 mg into the skin every 7 days.    oxyCODONE-acetaminophen (PERCOCET)  mg per tablet Take 1 tablet by mouth 3 (three) times daily  as needed for Pain.    spironolactone (ALDACTONE) 50 MG tablet Take 1 tablet (50 mg total) by mouth once daily.    traZODone (DESYREL) 50 MG tablet Take 50 mg by mouth nightly as needed.    valsartan (DIOVAN) 160 MG tablet Take 160 mg by mouth once daily.    albuterol (PROVENTIL/VENTOLIN HFA) 90 mcg/actuation inhaler Inhale 1-2 puffs into the lungs every 6 (six) hours as needed for Wheezing. Rescue    blood sugar diagnostic Strp To check BG 4 times daily, to use with insurance preferred meter    blood-glucose meter kit To check BG 4 times daily, to use with insurance preferred meter    fluticasone propionate (FLONASE) 50 mcg/actuation nasal spray 1 spray (50 mcg total) by Each Nostril route daily as needed for Rhinitis.    lactobacillus acidophilus & bulgar (LACTINEX) 100 million cell packet Take 1 tablet by mouth 3 (three) times daily with meals.    lancets Misc To check BG 4 times daily, to use with insurance preferred meter    polyethylene glycol (MIRALAX) 17 gram/dose powder Take 17 g by mouth once daily. For constipation    [DISCONTINUED] carvediloL (COREG) 3.125 MG tablet Take 1 tablet (3.125 mg total) by mouth 2 (two) times daily with meals.    [DISCONTINUED] oxyCODONE-acetaminophen (PERCOCET)  mg per tablet Take 1 tablet by mouth every 6 (six) hours as needed.    [DISCONTINUED] valsartan (DIOVAN) 80 MG tablet Take 1 tablet (80 mg total) by mouth 2 (two) times daily.     Family History    None       Tobacco Use    Smoking status: Never    Smokeless tobacco: Never   Substance and Sexual Activity    Alcohol use: Yes     Comment: occas    Drug use: Yes     Frequency: 1.0 times per week     Types: Marijuana     Comment: last use 2 days ago    Sexual activity: Not on file     Review of Systems   Constitutional:  Negative for chills, diaphoresis and fever.   HENT:  Negative for congestion, postnasal drip, sinus pressure and sore throat.    Eyes:  Negative for visual disturbance.   Respiratory:  Positive for  shortness of breath. Negative for cough, chest tightness and wheezing.    Cardiovascular:  Positive for chest pain and leg swelling. Negative for palpitations.   Gastrointestinal:  Negative for abdominal distention, abdominal pain, blood in stool, constipation, diarrhea, nausea and vomiting.   Endocrine: Negative.    Genitourinary:  Negative for dysuria.   Musculoskeletal: Negative.    Skin: Negative.    Allergic/Immunologic: Negative.    Neurological:  Negative for dizziness, weakness, numbness and headaches.   Hematological: Negative.    Psychiatric/Behavioral: Negative.       Objective:     Vital Signs (Most Recent):  Temp: 97.6 °F (36.4 °C) (01/09/24 1943)  Pulse: (!) 58 (01/09/24 2048)  Resp: 18 (01/09/24 2145)  BP: (!) 199/92 (01/09/24 1956)  SpO2: 98 % (01/09/24 2050) Vital Signs (24h Range):  Temp:  [97.6 °F (36.4 °C)-98.1 °F (36.7 °C)] 97.6 °F (36.4 °C)  Pulse:  [50-94] 58  Resp:  [16-23] 18  SpO2:  [95 %-99 %] 98 %  BP: (151-208)/() 199/92     Weight: (!) 169.6 kg (374 lb)  Body mass index is 46.75 kg/m².     Physical Exam  Constitutional:       General: He is not in acute distress.     Appearance: Normal appearance. He is well-developed. He is morbidly obese. He is not toxic-appearing or diaphoretic.   HENT:      Head: Normocephalic and atraumatic.   Eyes:      General: Lids are normal.      Conjunctiva/sclera: Conjunctivae normal.      Pupils: Pupils are equal, round, and reactive to light.   Neck:      Thyroid: No thyroid mass or thyromegaly.      Vascular: Normal carotid pulses. No JVD.      Trachea: Trachea normal. No tracheal deviation.   Cardiovascular:      Rate and Rhythm: Normal rate and regular rhythm.      Pulses: Normal pulses.      Heart sounds: Normal heart sounds, S1 normal and S2 normal.   Pulmonary:      Effort: Pulmonary effort is normal.      Breath sounds: Normal breath sounds. No stridor.   Abdominal:      General: Bowel sounds are normal.      Palpations: Abdomen is soft.       Tenderness: There is no abdominal tenderness.   Musculoskeletal:         General: Normal range of motion.      Cervical back: Full passive range of motion without pain, normal range of motion and neck supple.      Right lower le+ Pitting Edema present.      Left lower le+ Pitting Edema present.   Skin:     General: Skin is warm and dry.      Nails: There is no clubbing.   Neurological:      Mental Status: He is alert and oriented to person, place, and time.      Cranial Nerves: No cranial nerve deficit.      Sensory: No sensory deficit.   Psychiatric:         Speech: Speech normal.         Behavior: Behavior normal. Behavior is cooperative.         Thought Content: Thought content normal.         Judgment: Judgment normal.              CRANIAL NERVES     CN III, IV, VI   Pupils are equal, round, and reactive to light.       Significant Labs: All pertinent labs within the past 24 hours have been reviewed.    Bilirubin:   Recent Labs   Lab 24  1414   BILITOT 0.8     CBC:   Recent Labs   Lab 24  1414   WBC 6.73   HGB 10.0*   HCT 32.3*        CMP:   Recent Labs   Lab 24  1414      K 5.7*      CO2 25      BUN 41*   CREATININE 2.3*   CALCIUM 8.6*   PROT 7.4   ALBUMIN 4.2   BILITOT 0.8   ALKPHOS 35*   AST 35   ALT 50*   ANIONGAP 5*     Cardiac Markers:   Recent Labs   Lab 24  1414   BNP 80     Magnesium:   Recent Labs   Lab 24  1414   MG 2.1     Troponin:   Recent Labs   Lab 24  1414 24  1815 24  2035   TROPONINIHS 17.2* 16.2* 18.6*     TSH:   Recent Labs   Lab 23  0401   TSH 2.550       Significant Imaging: I have reviewed all pertinent imaging results/findings within the past 24 hours.  US Retroperitoneal Complete    Result Date: 2024  HISTORY: Acute renal disease.. FINDINGS: The right kidney measures 8.9 cm in length, with normal cortical thickness and parenchymal echotexture, and no echogenic calculi or hydronephrosis. The left  kidney measures 10.4 cm in length and has normal cortical thickness and parenchymal echotexture, without echogenic calculi or hydronephrosis. The urinary bladder is normal, with no wall thickening or intraluminal mass. The abdominal aorta, aortic bifurcation and IVC are unremarkable. IMPRESSION: Asymmetric small right kidney. Otherwise normal sonographic appearance of the bilateral kidneys.. Electronically signed by:  Fede Tavares MD  01/09/2024 05:29 PM CST Workstation: [id number]    US Lower Extremity Veins Bilateral    Result Date: 1/9/2024  Bilateral lower extremity venous Doppler evaluation HISTORY: Leg pain and swelling. Real-time, duplex and color Doppler interrogation are performed. This demonstrates patency of the common and superficial femoral veins, popliteal veins, major calf veins and greater saphenous veins bilaterally. There are no findings of deep vein thrombosis. IMPRESSION: No evidence of deep venous thrombosis. Electronically signed by:  Sinai Moreno MD  01/09/2024 04:37 PM CST Workstation: [id number]    X-Ray Chest AP    Result Date: 1/9/2024  Chest, single view HISTORY: Shortness of breath and chest pain. Comparison 7/26/2023. The heart size is within normal limits. The central pulmonary vasculature is not acutely engorged. The lungs are appropriately expanded. There are no confluent areas of airspace disease or significant volume loss. No effusion is demonstrated. Monitoring electrodes overlie the chest. IMPRESSION: No acute cardiopulmonary disease. Electronically signed by:  Sinai Moreno MD  01/09/2024 02:51 PM CST Workstation: [id number]

## 2024-01-10 NOTE — ASSESSMENT & PLAN NOTE
Admitted for rule out ACS. Patient with recent negative cardiac work-up (07/2023). EKG is non-ischemic. Initial and 2nd troponin mildly elevated but flat. BNP wnl. Plan for continuous cardiac monitoring. Continue to trend serial troponins q6 hours X 2. ASA/NTG 0.4 mg SL PRN CP. Echo pending.   Daily labs + lipid panel + A1c  Will defer cardiology consult to AM hospitalist pending testing results  Further plan as per clinical course

## 2024-01-10 NOTE — ASSESSMENT & PLAN NOTE
Patient's anemia is currently controlled. Has not received any PRBCs to date. Etiology likely d/t chronic disease due to Chronic Kidney Disease  Current CBC reviewed-   Lab Results   Component Value Date    HGB 10.0 (L) 01/09/2024    HCT 32.3 (L) 01/09/2024     Monitor serial CBC and transfuse if patient becomes hemodynamically unstable, symptomatic or H/H drops below 7/21.

## 2024-01-10 NOTE — CONSULTS
INPATIENT NEPHROLOGY CONSULT   [facility]    Patient Name: Abel Gibbs  Date: 01/10/2024    Reason for consultation: MINI    Chief Complaint:   Chief Complaint   Patient presents with    Chest Pain    Shortness of Breath     + edema       History of Present Illness:  Abel Gibbs is a 58 y.o. male with a history as  has a past medical history of Depression, Diabetes mellitus, Hypertension, Obesity, Osteomyelitis and CKD III who presented to the ED with a Chest Pain and Shortness of Breath (+ edema). Patient presents to the emergency room with a complaint of midsternal chest pain radiating into left chest wall that started approximately 3 days ago. He further reports shortness for breath when attempting to lie flat with chest pain worsening. He was seen by wound care for right foot ulcer who felt as though his face appeared a little swollen and recommended that he go to the emergency. Patient does report having a sinus infection about 1-2 weeks ago completed course of antibiotics.  He further states after completion of antibiotics he started with congestion and seems as though symptoms have gotten worse. Additionally, he reports BLE edema that chronic but appears a little worse. Patient unclear of medications as he reports the nurse fills his medication container weekly. He does tell me that he is on furosemide but has not taken it for last 3-4 days d/t leg cramps. Patient also prescribed spirolactone but does not know if he has been taking. Also on mounjaro. Denies fever, chills, diaphoresis, dizziness, HA, palpitations, NVD, recent trauma or any other associated symptoms. Does not smoke cigarettes, drinks occasionally and uses marijuana daily. Consulted for MINI/CKD.    Renal u/s:  The right kidney measures 8.9 cm in length, with normal cortical thickness and parenchymal echotexture, and no echogenic calculi or hydronephrosis. The left kidney measures 10.4 cm in length and has normal  cortical thickness and parenchymal echotexture, without echogenic calculi or hydronephrosis.     Notable home meds:  Norvasc, metoprolol, hydralazine, diovan, lasix, aldactone, farxiga, finasteride, meloxicam    Interval History:  1/10- highest /97, on RA, UOP 1L, CXR clear, no DVT, BNP normal, trop stable, echo pending    Plan of Care:    Assessment:  MINI/CKD III  HTN urgency/Edema  Hyponatremia  Hyperkalemia  SHPT  MARBELLA/Anemia of CKD    Plan:    - suspect MINI on CKD due to elevated BP with ischemic ATN (hx of neg acute GN w/u in 2/2023)  - work on BP control - medications noted- resume metoprolol, hydralazine for now  - echo pending- ordered renal duplex- ordered UA and UPCR  - depending on results, will decide on ARB, MRA and diuretic  - hold farxiga and meloxicam  - serum albumin preserved  - ordered lokelma 10g daily- continue renal diet- added fluid restriction 1.5L  - phos borderline- check PTH  - resume iron supplement- no acute RL needs- prior paraprotein w/u negative    Thank you for allowing us to participate in this patient's care. We will continue to follow.    Vital Signs:  Temp Readings from Last 3 Encounters:   01/10/24 97.4 °F (36.3 °C) (Oral)   07/28/23 98.2 °F (36.8 °C) (Oral)   06/04/23 98.5 °F (36.9 °C) (Oral)       Pulse Readings from Last 3 Encounters:   01/10/24 (!) 57   07/28/23 (!) 56   06/04/23 (!) 53       BP Readings from Last 3 Encounters:   01/10/24 (!) 142/71   07/28/23 (!) 167/80   06/04/23 133/62       Weight:  Wt Readings from Last 3 Encounters:   01/09/24 (!) 169.6 kg (374 lb)   07/26/23 (!) 158.8 kg (350 lb 3.2 oz)   07/26/23 (!) 158.8 kg (350 lb)       Past Medical & Surgical History:  Past Medical History:   Diagnosis Date    Depression     Diabetes mellitus     Hypertension     Obesity     Osteomyelitis        Past Surgical History:   Procedure Laterality Date    ANGIOGRAM, CORONARY, WITH LEFT HEART CATHETERIZATION N/A 7/28/2023    Procedure: Angiogram, Coronary, with  Left Heart Cath;  Surgeon: Alek Greenwood MD;  Location: [facility] CATH/EP LAB;  Service: Cardiology;  Laterality: N/A;       Past Social History:  Social History     Socioeconomic History    Marital status: Single   Tobacco Use    Smoking status: Never    Smokeless tobacco: Never   Substance and Sexual Activity    Alcohol use: Yes     Comment: occas    Drug use: Yes     Frequency: 1.0 times per week     Types: Marijuana     Comment: last use 2 days ago     Social Determinants of Health     Financial Resource Strain: Patient Declined (2/8/2023)    Overall Financial Resource Strain (CARDIA)     Difficulty of Paying Living Expenses: Patient declined   Food Insecurity: Patient Declined (2/8/2023)    Hunger Vital Sign     Worried About Running Out of Food in the Last Year: Patient declined     Ran Out of Food in the Last Year: Patient declined   Transportation Needs: Patient Declined (2/8/2023)    PRAPARE - Transportation     Lack of Transportation (Medical): Patient declined     Lack of Transportation (Non-Medical): Patient declined   Physical Activity: Patient Declined (2/8/2023)    Exercise Vital Sign     Days of Exercise per Week: Patient declined     Minutes of Exercise per Session: Patient declined   Stress: Patient Declined (2/8/2023)    [employer] - Occupational Stress Questionnaire     Feeling of Stress : Patient declined   Social Connections: Patient Declined (2/8/2023)    Social Connection and Isolation Panel [NHANES]     Frequency of Communication with Friends and Family: Patient declined     Frequency of Social Gatherings with Friends and Family: Patient declined     Attends Yarsani Services: Patient declined     Active Member of Clubs or Organizations: Patient declined     Attends Club or Organization Meetings: Patient declined     Marital Status: Patient declined   Housing Stability: Unknown (2/8/2023)    Housing Stability Vital Sign     Unable to Pay for Housing in the Last  Year: Patient refused     Unstable Housing in the Last Year: Patient refused       Medications:  Scheduled Meds:   amLODIPine  10 mg Oral Daily    aspirin  81 mg Oral Daily    atorvastatin  80 mg Oral Daily    cetirizine  10 mg Oral Daily    finasteride  5 mg Oral Daily    fluticasone propionate  2 spray Each Nostril QHS    gabapentin  800 mg Oral TID    heparin (porcine)  5,000 Units Subcutaneous Q8H    hydrALAZINE  25 mg Oral Q8H    insulin detemir U-100 (Levemir)  15 Units Subcutaneous QHS    metoprolol succinate  25 mg Oral Daily    polyethylene glycol  17 g Oral Daily     Continuous Infusions:  PRN Meds:.acetaminophen, albuterol-ipratropium, dextrose 50%, dextrose 50%, glucagon (human recombinant), glucose, glucose, hydrALAZINE, insulin aspart U-100, labetalol, magnesium oxide, magnesium oxide, melatonin, naloxone, ondansetron, oxyCODONE-acetaminophen, potassium bicarbonate, potassium bicarbonate, potassium bicarbonate, potassium, sodium phosphates, potassium, sodium phosphates, potassium, sodium phosphates, sodium chloride 0.9%, traZODone  No current facility-administered medications on file prior to encounter.     Current Outpatient Medications on File Prior to Encounter   Medication Sig Dispense Refill    amLODIPine (NORVASC) 10 MG tablet Take 1 tablet (10 mg total) by mouth once daily. 30 tablet 2    aspirin (ECOTRIN) 81 MG EC tablet Take 1 tablet (81 mg total) by mouth once daily. 30 tablet 2    atorvastatin (LIPITOR) 80 MG tablet Take 1 tablet (80 mg total) by mouth once daily. 30 tablet 5    FARXIGA 5 mg Tab tablet Take 5 mg by mouth once daily.      finasteride (PROSCAR) 5 mg tablet Take 5 mg by mouth once daily.      furosemide (LASIX) 20 MG tablet Take 20 mg by mouth once daily.      gabapentin (NEURONTIN) 800 MG tablet Take 800 mg by mouth 4 (four) times daily.      hydrALAZINE (APRESOLINE) 25 MG tablet Take 1 tablet (25 mg total) by mouth every 8 (eight) hours. 90 tablet 2    ketoconazole (NIZORAL)  2 % cream Apply 1 Application topically once daily.      LEVEMIR FLEXPEN 100 unit/mL (3 mL) InPn pen Inject 30 Units into the skin once daily.      LIDOcaine (LIDODERM) 5 % Place 1 patch onto the skin once daily.      meloxicam (MOBIC) 15 MG tablet Take 15 mg by mouth once daily.      metFORMIN (GLUCOPHAGE) 1000 MG tablet Take 1,000 mg by mouth 2 (two) times daily with meals.      metoprolol succinate (TOPROL-XL) 25 MG 24 hr tablet Take 25 mg by mouth once daily.      MOUNJARO 7.5 mg/0.5 mL PnIj Inject 7.5 mg into the skin every 7 days.      oxyCODONE-acetaminophen (PERCOCET)  mg per tablet Take 1 tablet by mouth 3 (three) times daily as needed for Pain.      spironolactone (ALDACTONE) 50 MG tablet Take 1 tablet (50 mg total) by mouth once daily. 30 tablet 2    traZODone (DESYREL) 50 MG tablet Take 50 mg by mouth nightly as needed.      valsartan (DIOVAN) 160 MG tablet Take 160 mg by mouth once daily.      albuterol (PROVENTIL/VENTOLIN HFA) 90 mcg/actuation inhaler Inhale 1-2 puffs into the lungs every 6 (six) hours as needed for Wheezing. Rescue 18 g 5    blood sugar diagnostic Strp To check BG 4 times daily, to use with insurance preferred meter 200 each 0    blood-glucose meter kit To check BG 4 times daily, to use with insurance preferred meter 1 each 0    fluticasone propionate (FLONASE) 50 mcg/actuation nasal spray 1 spray (50 mcg total) by Each Nostril route daily as needed for Rhinitis.  0    lactobacillus acidophilus & bulgar (LACTINEX) 100 million cell packet Take 1 tablet by mouth 3 (three) times daily with meals.      lancets Misc To check BG 4 times daily, to use with insurance preferred meter 200 each 0    polyethylene glycol (MIRALAX) 17 gram/dose powder Take 17 g by mouth once daily. For constipation 850 g 2       Allergies:  Adhesive    Past Family History:  Reviewed; refer to Hospitalist Admission Note    Review of Systems:  Review of Systems - All 14 systems reviewed and negative, except as  noted in HPI    Physical Exam:  General Appearance:    NAD, AAO x 3, cooperative, appears stated age   Head:    Normocephalic, atraumatic   Eyes:    PER, EOMI, and conjunctiva/sclera clear bilaterally       Mouth:   Moist mucus membranes, no thrush or oral lesions,       normal dentition   Back:     No CVA tenderness   Lungs:     Clear to auscultation bilaterally, no wheezes, crackles,           rales or rhonchi, symmetric air movement, respirations unlabored   Chest wall:    No tenderness or deformity   Heart:    Regular rate and rhythm, S1 and S2 normal, no murmur, rub   or gallop   Abdomen:     Soft, non-tender, non-distended, bowel sounds active all four   quadrants, no RT or guarding, no masses, no organomegaly   Extremities:   Warm and well perfused, distal pulses are intact, no cyanosis, + edema   MSK:   No joint or muscle swelling, tenderness or deformity   Skin:   Skin color, texture, turgor normal, no rashes or lesions   Neurologic/Psychiatric:   CNII-XII intact, normal strength and sensation       throughout, no asterixis; normal affect, memory, judgement     and insight      Results:  Lab Results   Component Value Date     (L) 01/10/2024    K 5.6 (H) 01/10/2024     01/10/2024    CO2 25 01/10/2024    BUN 45 (H) 01/10/2024    CREATININE 2.7 (H) 01/10/2024    CALCIUM 8.3 (L) 01/10/2024    ANIONGAP 6 (L) 01/10/2024    ESTGFRAFRICA >60 03/11/2022    EGFRNONAA >60 03/11/2022       Lab Results   Component Value Date    CALCIUM 8.3 (L) 01/10/2024    PHOS 5.4 (H) 01/10/2024       Recent Labs   Lab 01/10/24  0308   WBC 7.53   RBC 3.14*   HGB 9.1*   HCT 29.4*      MCV 94   MCH 29.0   MCHC 31.0*       I have personally reviewed pertinent radiological imaging and reports.    I have spent > 70 minutes providing care for this patient for the above diagnoses. These services have included chart/data/imaging review, evaluation, exam, formulation of plan, , note preparation, and discussions  with staff involved in this patient's care.    Marlen Velazquez MD MPH  [facility] [address] [address]  [phone] (p)  [phone] (f)

## 2024-01-10 NOTE — ASSESSMENT & PLAN NOTE
"Patient's FSGs are controlled on current medication regimen.  Last A1c reviewed-   Lab Results   Component Value Date    HGBA1C 6.5 (H) 01/10/2024     Most recent fingerstick glucose reviewed- No results for input(s): "POCTGLUCOSE" in the last 24 hours.  Current correctional scale  Low  Maintain anti-hyperglycemic dose as follows-   Antihyperglycemics (From admission, onward)    Start     Stop Route Frequency Ordered    01/09/24 2100  insulin detemir U-100 (Levemir) pen 15 Units         -- SubQ Nightly 01/09/24 1947    01/09/24 1923  insulin aspart U-100 pen 0-5 Units         -- SubQ Before meals & nightly PRN 01/09/24 1823        Hold Oral hypoglycemics and mounjaro while patient is in the hospital.  "

## 2024-01-10 NOTE — PLAN OF CARE
01/10/24 1224   MOSQUEDA Message   Medicare Outpatient and Observation Notification regarding financial responsibility Explained to patient/caregiver;Signed/date by patient/caregiver   Date MOSQUEDA was signed 01/10/24   Time MOSQUEDA was signed 1224

## 2024-01-10 NOTE — ASSESSMENT & PLAN NOTE
"Patient's FSGs are controlled on current medication regimen.  Last A1c reviewed-   Lab Results   Component Value Date    HGBA1C 6.5 (H) 01/10/2024     Most recent fingerstick glucose reviewed- No results for input(s): "POCTGLUCOSE" in the last 24 hours.  Current correctional scale  Low  Maintain anti-hyperglycemic dose as follows-   Antihyperglycemics (From admission, onward)    Start     Stop Route Frequency Ordered    01/09/24 2100  insulin detemir U-100 (Levemir) pen 15 Units         -- SubQ Nightly 01/09/24 1947    01/09/24 1923  insulin aspart U-100 pen 0-5 Units         -- SubQ Before meals & nightly PRN 01/09/24 1823        Hold Oral hypoglycemics and mounjaro while patient is in the hospital.  Wound care consult appreciated.  "

## 2024-01-10 NOTE — ASSESSMENT & PLAN NOTE
This patient has hyperkalemia which is controlled. We will monitor for arrhythmias with EKG or continuous telemetry. We will treat the hyperkalemia with Furosemide. The likely etiology of the hyperkalemia is MINI.  The patients latest potassium has been reviewed and the results are listed below  Recent Labs   Lab 01/09/24  1414   K 5.7*

## 2024-01-10 NOTE — PLAN OF CARE
[facility]  Initial Discharge Assessment       Primary Care Provider: Rupinder Pagan MD    Admission Diagnosis: Chest pain [R07.9]    Admission Date: 1/9/2024  Expected Discharge Date: 1/11/2024    Met with pt at bedside to complete discharge assessment, verified PCP, pharmacy and information on facesheet. Pt current with [facility] and see DME below.  No dialysis or coumadin.  Pt will need Medicaid transportation home.  Will resume HH upon discharge.      Transition of Care Barriers: None    Payor: [employer] MEDICARE / Plan: [employer] HMO PPO SPECIAL NEEDS / Product Type: Medicare Advantage /     Extended Emergency Contact Information  Primary Emergency Contact: Abel Gibbs Jr   [place]  Mobile Phone: [phone]  Relation: Son  Secondary Emergency Contact: Nga Owens   [facility]  Home Phone: [phone]  Mobile Phone: [phone]  Relation: Sister    Discharge Plan A: Home  Discharge Plan B: Home      [facility] - [place], [place] - [address] [facility]  [address] [address] [address]  Phone: [phone] Fax: [phone]      Initial Assessment (most recent)       Adult Discharge Assessment - 01/10/24 1405          Discharge Assessment    Assessment Type Discharge Planning Assessment     Confirmed/corrected address, phone number and insurance Yes     Confirmed Demographics Correct on Facesheet     Source of Information patient     Does patient/caregiver understand observation status Yes     Communicated JONATHAN with patient/caregiver No     People in Home alone     Do you expect to return to your current living situation? Yes     Prior to hospitilization cognitive status: Alert/Oriented     Current cognitive status: Alert/Oriented     Walking or Climbing Stairs Difficulty no     Dressing/Bathing Difficulty no     Home Accessibility wheelchair accessible     Home Layout Able to live on 1st floor     Equipment Currently Used at Home  glucometer;grab bar     Readmission within 30 days? No     Patient currently being followed by outpatient case management? No     Do you currently have service(s) that help you manage your care at home? Yes     Name and Contact number of agency [facility]     Is the pt/caregiver preference to resume services with current agency Yes     Do you take prescription medications? Yes     Do you have prescription coverage? Yes     Coverage [employer]     Do you have any problems affording any of your prescribed medications? No     Is the patient taking medications as prescribed? yes     Who is going to help you get home at discharge? Medicaid     How do you get to doctors appointments? health plan transportation     Are you on dialysis? No     Do you take coumadin? No     Discharge Plan A Home     Discharge Plan B Home     DME Needed Upon Discharge  none     Discharge Plan discussed with: Patient     Transition of Care Barriers None

## 2024-01-10 NOTE — ASSESSMENT & PLAN NOTE
Body mass index is 46.75 kg/m². Morbid obesity complicates all aspects of disease management from diagnostic modalities to treatment. Weight loss encouraged and health benefits explained to patient.

## 2024-01-10 NOTE — ASSESSMENT & PLAN NOTE
"Patient's FSGs are controlled on current medication regimen.  Last A1c reviewed-   Lab Results   Component Value Date    HGBA1C 8.7 (H) 02/07/2023     Most recent fingerstick glucose reviewed- No results for input(s): "POCTGLUCOSE" in the last 24 hours.  Current correctional scale  Low  Maintain anti-hyperglycemic dose as follows-   Antihyperglycemics (From admission, onward)      Start     Stop Route Frequency Ordered    01/09/24 2100  insulin detemir U-100 (Levemir) pen 15 Units         -- SubQ Nightly 01/09/24 1947    01/09/24 1923  insulin aspart U-100 pen 0-5 Units         -- SubQ Before meals & nightly PRN 01/09/24 1823          Hold Oral hypoglycemics and mounjaro while patient is in the hospital.  "

## 2024-01-10 NOTE — PLAN OF CARE
Problem: Adult Inpatient Plan of Care  Goal: Plan of Care Review  Outcome: Ongoing, Progressing  Goal: Patient-Specific Goal (Individualized)  Outcome: Ongoing, Progressing  Goal: Absence of Hospital-Acquired Illness or Injury  Outcome: Ongoing, Progressing  Goal: Optimal Comfort and Wellbeing  Outcome: Ongoing, Progressing  Goal: Readiness for Transition of Care  Outcome: Ongoing, Progressing     Problem: Bariatric Environmental Safety  Goal: Safety Maintained with Care  Outcome: Ongoing, Progressing     Problem: Diabetes Comorbidity  Goal: Blood Glucose Level Within Targeted Range  Outcome: Ongoing, Progressing  Patient noncompliant making multiple trips to the snack machine     Problem: Fluid and Electrolyte Imbalance (Acute Kidney Injury/Impairment)  Goal: Fluid and Electrolyte Balance  Outcome: Ongoing, Progressing  Patient educated on FR 1.5L, patient continues to make multiple trips to the ice and water machine.     Problem: Oral Intake Inadequate (Acute Kidney Injury/Impairment)  Goal: Optimal Nutrition Intake  Outcome: Ongoing, Progressing   Educated on FR 1.5l, noncompliant    Problem: Renal Function Impairment (Acute Kidney Injury/Impairment)  Goal: Effective Renal Function  Outcome: Ongoing, Progressing     Problem: Impaired Wound Healing  Goal: Optimal Wound Healing  Outcome: Ongoing, Progressing  Patient refused to allow staff to unbandage dressing to right great toe, related to he see's wound care and home health staff comes out to his house on Monday and Friday's.

## 2024-01-10 NOTE — PROGRESS NOTES
[facility] Medicine  Progress Note    Patient Name: Abel Gibbs  MRN: [id number]  Patient Class: OP- Observation   Admission Date: 1/9/2024  Length of Stay: 0 days  Attending Physician: Carlos Montelongo MD  Primary Care Provider: Rupinder Pagan MD        Subjective:     Principal Problem:Acute kidney injury superimposed on chronic kidney disease        HPI:  Abel Gibbs is a 58 y.o. male with a history as  has a past medical history of Depression, Diabetes mellitus, Hypertension, Obesity, and Osteomyelitis. who presented to the ED with a Chest Pain and Shortness of Breath (+ edema)    Patient presents to the emergency room with a complaint of midsternal chest pain radiating into left chest wall that started approximately 3 days ago.  He further reports shortness for breath when attempting to lye flat with chest pain worsening. He tell me he was seen by wound care for right foot ulcer who felt as though his face appeared a little swollen and recommended that he go to the emergency.     Patient does report having a sinus infection about 1-2 weeks ago completed course of antibiotics.  He further states after completion of antibiotics he started with congestion and seems as though symptoms have gotten worse. Additionally, he reports BLE edema that chronic but appears a little worse.    Patient unclear of medications as he reports the nurse fills his medication container weekly. He does tell me that he is on furosemide but has not taken it for last 3-4 days d/t leg cramps. Patient also prescribed spirolactone but does not know if he has been taking. Also on mounjaro.     Denies fever, chills, diaphoresis, dizziness, HA, palpitations, NVD, recent trauma or any other associated symptoms. Does not smoke cigarettes, drinks occasionally and uses marijuana daily.      Lab and imaging obtained and reviewed.  CBC completed and shows RBCs 3.48 H/H 10.0/32.3 MCHC 31.0 RDW is 15.9.  Chemistry  profile shows potassium 5.7 Ag 5 BUN 41 creatinine 2.3 GFR 31.7 calcium 8.6 ALP 35 ALT 50.  BNP within normal range.  Initial high sensitive troponin 17.2. EKG shows NSR with incomplete R-BBB. CXR without acute cardiopulmonary findings.  On admit, afebrile HR 94 RR 20 /91 with O2 sats 97% on room air.        Ultrasound bilateral lower extremity without evidence of deep venous thrombosis.      Renal US  IMPRESSION: Asymmetric small right kidney. Otherwise normal sonographic appearance of the bilateral kidneys..     Angiogram 07/2023  Summary     The estimated blood loss was <50 mL.    The pre-procedure left ventricular end diastolic pressure was 16.    Nonobstructive coronaries with mild luminal irregularities in RCA and left circumflex and focal moderate stenosis of mid to distal LAD for which medical management is recommended.       Echo 07/2023  Summary  The left ventricle is normal in size with mild concentric hypertrophy and normal systolic function.  The estimated ejection fraction is 65%.  Normal left ventricular diastolic function.  Normal right ventricular size with normal right ventricular systolic function.  Mild mitral regurgitation.  Mild tricuspid regurgitation.  Normal central venous pressure (3 mmHg).  The estimated PA systolic pressure is 27 mmHg.    Per ED provider patient presents for evaluation of chest pain with lower extremity edema for 1 week. Labs reviewed and showed mildly elevated troponin without EKG changes. Also noted to have elevated BP and MINI, given ASA IV diuretics with NTG paste applied. US BLE negative for DVT. Will admit for ACS r/o.        Overview/Hospital Course:  Abel Gibbs is a 58 y.o. male with  has a past medical history of Depression, Diabetes mellitus, Hypertension, Obesity, and Osteomyelitis. who presented to the ED with a Chest Pain and Shortness of Breath (+ edema)    Patient is known to this hospital - he presented for evaluation of 3 day history of CP and  increased SOB after being treated for antibiotics for sinus infection for 1-2 weeks - as well as he endorsed not taking his medication for about a week due to leg cramps. He was found to have acute on chronic kidney failure with BUN/cr/GFR 46/2.5/28.7. His BP was also elevated 180/91 at the time of presentation. His home BP medications were restarted amlodipine, metoprolol and hydralazine added 2/2 renal. BP improved. Nephrology consulted for management of kidney impairment.     Nephrology recommendations for tighter BP control, agree with metoprolol and hydralazine for now. Awaiting labs/diagnostics including renal US, 2D echo and UPcr before decided to move forward with other medication management.     - ordered lokelma 10g daily- continue renal diet- added fluid restriction 1.5L  - phos borderline- check PTH  - resume iron supplement- no acute RL needs- prior paraprotein w/u negative    Interval History: See HPI    Review of Systems   Constitutional:  Positive for activity change. Negative for appetite change, chills, diaphoresis and fever.   HENT:  Negative for congestion, hearing loss, sore throat, tinnitus and trouble swallowing.    Eyes:  Negative for photophobia, discharge, itching and visual disturbance.   Respiratory:  Positive for shortness of breath. Negative for apnea, cough, wheezing and stridor.    Cardiovascular:  Negative for chest pain, palpitations and leg swelling.   Gastrointestinal:  Negative for abdominal distention, abdominal pain, blood in stool, constipation, diarrhea and nausea.   Endocrine: Negative for polydipsia, polyphagia and polyuria.   Genitourinary:  Negative for difficulty urinating, dysuria, flank pain and frequency.   Musculoskeletal:  Negative for arthralgias, joint swelling and neck stiffness.   Skin:  Negative for color change, rash and wound.   Neurological:  Positive for weakness. Negative for dizziness, tremors, seizures, light-headedness, numbness and headaches.    Hematological:  Negative for adenopathy.   Psychiatric/Behavioral:  Negative for hallucinations and self-injury.      Objective:     Vital Signs (Most Recent):  Temp: 97.9 °F (36.6 °C) (01/10/24 1114)  Pulse: (!) 55 (01/10/24 1114)  Resp: 19 (01/10/24 1114)  BP: (!) 143/76 (01/10/24 1114)  SpO2: 99 % (01/10/24 1114) Vital Signs (24h Range):  Temp:  [97.4 °F (36.3 °C)-97.9 °F (36.6 °C)] 97.9 °F (36.6 °C)  Pulse:  [50-69] 55  Resp:  [8-23] 19  SpO2:  [95 %-99 %] 99 %  BP: (139-208)/() 143/76     Weight: (!) 169.6 kg (374 lb)  Body mass index is 46.75 kg/m².    Intake/Output Summary (Last 24 hours) at 1/10/2024 1341  Last data filed at 1/10/2024 1314  Gross per 24 hour   Intake 2400 ml   Output 1050 ml   Net 1350 ml         Physical Exam  Constitutional:       Appearance: He is well-developed.   HENT:      Head: Normocephalic and atraumatic.   Eyes:      General: Lids are normal.      Conjunctiva/sclera: Conjunctivae normal.   Neck:      Thyroid: No thyroid mass.      Vascular: No JVD.   Cardiovascular:      Heart sounds: S1 normal and S2 normal. No murmur heard.  Pulmonary:      Effort: Pulmonary effort is normal.   Abdominal:      General: Bowel sounds are normal. There is no distension or abdominal bruit.      Palpations: Abdomen is soft. There is no hepatomegaly or splenomegaly.      Tenderness: There is no abdominal tenderness.   Musculoskeletal:      Cervical back: Full passive range of motion without pain and neck supple. No edema.      Comments: Back pain chronic   Lymphadenopathy:      Cervical: No cervical adenopathy.   Skin:     General: Skin is warm and dry.   Neurological:      Mental Status: He is alert and oriented to person, place, and time.      Motor: No tremor or seizure activity.      Deep Tendon Reflexes: Reflexes are normal and symmetric.   Psychiatric:         Speech: Speech normal.         Behavior: Behavior is cooperative.         Thought Content: Thought content normal.              Significant Labs: .  CBC:   Recent Labs   Lab 01/09/24  1414 01/10/24  0308   WBC 6.73 7.53   HGB 10.0* 9.1*   HCT 32.3* 29.4*    205     CMP:   Recent Labs   Lab 01/09/24  1414 01/09/24  2311 01/10/24  0308    136 134*   K 5.7* 6.0* 5.6*    103 103   CO2 25 27 25    160* 164*   BUN 41* 43* 45*   CREATININE 2.3* 2.5* 2.7*   CALCIUM 8.6* 8.5* 8.3*   PROT 7.4  --  6.5   ALBUMIN 4.2  --  3.8   BILITOT 0.8  --  0.5   ALKPHOS 35*  --  30*   AST 35  --  29   ALT 50*  --  39   ANIONGAP 5* 6* 6*     Urine Studies:   Recent Labs   Lab 01/10/24  1138   COLORU Yellow   APPEARANCEUA Clear   PHUR 6.0   SPECGRAV 1.020   PROTEINUA 1+*   GLUCUA 1+*   KETONESU Negative   BILIRUBINUA Negative   OCCULTUA Negative   NITRITE Negative   UROBILINOGEN Negative   LEUKOCYTESUR Negative   RBCUA 1   WBCUA 0   BACTERIA Negative   SQUAMEPITHEL 0   HYALINECASTS 2*       Significant Imaging:   Imaging Results              US Retroperitoneal Complete (Final result)  Result time 01/09/24 17:29:05      Final result by Fede Tavares Jr., MD (01/09/24 17:29:05)                   Narrative:    HISTORY: Acute renal disease..    FINDINGS: The right kidney measures 8.9 cm in length, with normal cortical thickness and parenchymal echotexture, and no echogenic calculi or hydronephrosis. The left kidney measures 10.4 cm in length and has normal cortical thickness and parenchymal echotexture, without echogenic calculi or hydronephrosis.    The urinary bladder is normal, with no wall thickening or intraluminal mass. The abdominal aorta, aortic bifurcation and IVC are unremarkable.    IMPRESSION: Asymmetric small right kidney. Otherwise normal sonographic appearance of the bilateral kidneys..    Electronically signed by:  Fede Tavares MD  01/09/2024 05:29 PM CST Workstation: [id number]                                     US Lower Extremity Veins Bilateral (Final result)  Result time 01/09/24 16:37:13      Final result by  Sinai Moreno IV, MD (01/09/24 16:37:13)                   Narrative:    Bilateral lower extremity venous Doppler evaluation    HISTORY: Leg pain and swelling.    Real-time, duplex and color Doppler interrogation are performed. This demonstrates patency of the common and superficial femoral veins, popliteal veins, major calf veins and greater saphenous veins bilaterally. There are no findings of deep vein thrombosis.    IMPRESSION:    No evidence of deep venous thrombosis.    Electronically signed by:  Sinai Moreno MD  01/09/2024 04:37 PM CST Workstation: [id number]                                     X-Ray Chest AP (Final result)  Result time 01/09/24 14:51:18      Final result by Sinai Moreno IV, MD (01/09/24 14:51:18)                   Narrative:    Chest, single view    HISTORY: Shortness of breath and chest pain.    Comparison 7/26/2023.    The heart size is within normal limits. The central pulmonary vasculature is not acutely engorged.    The lungs are appropriately expanded. There are no confluent areas of airspace disease or significant volume loss. No effusion is demonstrated. Monitoring electrodes overlie the chest.    IMPRESSION:    No acute cardiopulmonary disease.    Electronically signed by:  Sinai Moreno MD  01/09/2024 02:51 PM CST Workstation: [id number]                                      Assessment/Plan:      * Acute kidney injury superimposed on chronic kidney disease  Patient with acute kidney injury/acute renal failure likely due to  medication non-adherence to furosemide and spirolactone  +/- use of mounjaro. MINI is currently stable. Baseline creatinine  1.5  - Labs reviewed- Renal function/electrolytes with Estimated Creatinine Clearance: 48.8 mL/min (A) (based on SCr of 2.7 mg/dL (H)). according to latest data. Monitor urine output and serial BMP and adjust therapy as needed. Avoid nephrotoxins and renally dose meds for GFR listed above.  -Nephrology following  -Tighter BP  control  -Awaiting labs/diagnostics including renal US, 2D echo and UPcr before decided to move forward with other medication management.   - ordered lokelma 10g daily- continue renal diet- added fluid restriction 1.5L  - phos borderline- check PTH  - resume iron supplement- no acute RL needs- prior paraprotein w/u negative    Hyperkalemia  This patient has hyperkalemia which is uncontrolled. We will monitor for arrhythmias with EKG or continuous telemetry. We will treat the hyperkalemia with  lokelma . The likely etiology of the hyperkalemia is MINI.  The patients latest potassium has been reviewed and the results are listed below  Recent Labs   Lab 01/10/24  0308   K 5.6*               Hypochromic anemia  Patient's anemia is currently controlled. Has not received any PRBCs to date. Etiology likely d/t chronic disease due to Chronic Kidney Disease  Current CBC reviewed-   Lab Results   Component Value Date    HGB 10.0 (L) 01/09/2024    HCT 32.3 (L) 01/09/2024     Monitor serial CBC and transfuse if patient becomes hemodynamically unstable, symptomatic or H/H drops below 7/21.    BPH (benign prostatic hyperplasia)  Continue finasteride as prescribed      HLD (hyperlipidemia)  Continue ASA/Statin      Chest pain  Admitted for rule out ACS. Patient with recent negative cardiac work-up (07/2023). EKG is non-ischemic. Initial and 2nd troponin mildly elevated but flat. BNP wnl. Plan for continuous cardiac monitoring. Continue to trend serial troponins q6 hours X 2. ASA/NTG 0.4 mg SL PRN CP. Echo pending.   Daily labs + lipid panel + A1c  Will defer cardiology consult to AM hospitalist pending testing results  Further plan as per clinical course            Severe obesity (BMI >= 40)  Body mass index is 46.75 kg/m². Morbid obesity complicates all aspects of disease management from diagnostic modalities to treatment. Weight loss encouraged and health benefits explained to patient.         Type 2 diabetes mellitus  Patient's  "FSGs are controlled on current medication regimen.  Last A1c reviewed-   Lab Results   Component Value Date    HGBA1C 6.5 (H) 01/10/2024     Most recent fingerstick glucose reviewed- No results for input(s): "POCTGLUCOSE" in the last 24 hours.  Current correctional scale  Low  Maintain anti-hyperglycemic dose as follows-   Antihyperglycemics (From admission, onward)      Start     Stop Route Frequency Ordered    01/09/24 2100  insulin detemir U-100 (Levemir) pen 15 Units         -- SubQ Nightly 01/09/24 1947    01/09/24 1923  insulin aspart U-100 pen 0-5 Units         -- SubQ Before meals & nightly PRN 01/09/24 1823          Hold Oral hypoglycemics and mounjaro while patient is in the hospital.    Hypertension  Chronic, controlled. Latest blood pressure and vitals reviewed-     Temp:  [97.4 °F (36.3 °C)-97.9 °F (36.6 °C)]   Pulse:  [50-69]   Resp:  [8-23]   BP: (139-208)/()   SpO2:  [95 %-99 %] .   Home meds for hypertension were reviewed and noted below.   Hypertension Medications               amLODIPine (NORVASC) 10 MG tablet Take 1 tablet (10 mg total) by mouth once daily.    furosemide (LASIX) 20 MG tablet Take 20 mg by mouth once daily.    hydrALAZINE (APRESOLINE) 25 MG tablet Take 1 tablet (25 mg total) by mouth every 8 (eight) hours.    metoprolol succinate (TOPROL-XL) 25 MG 24 hr tablet Take 25 mg by mouth once daily.    spironolactone (ALDACTONE) 50 MG tablet Take 1 tablet (50 mg total) by mouth once daily.    valsartan (DIOVAN) 160 MG tablet Take 160 mg by mouth once daily.            While in the hospital, will manage blood pressure as follows; hydralazine, metoprolol, amlodipine    Will utilize p.r.n. blood pressure medication only if patient's blood pressure greater than 160/100 and he develops symptoms such as worsening chest pain or shortness of breath.    Diabetic ulcer of toe of right foot associated with type 2 diabetes mellitus, with necrosis of bone  Patient's FSGs are controlled on " "current medication regimen.  Last A1c reviewed-   Lab Results   Component Value Date    HGBA1C 6.5 (H) 01/10/2024     Most recent fingerstick glucose reviewed- No results for input(s): "POCTGLUCOSE" in the last 24 hours.  Current correctional scale  Low  Maintain anti-hyperglycemic dose as follows-   Antihyperglycemics (From admission, onward)      Start     Stop Route Frequency Ordered    01/09/24 2100  insulin detemir U-100 (Levemir) pen 15 Units         -- SubQ Nightly 01/09/24 1947    01/09/24 1923  insulin aspart U-100 pen 0-5 Units         -- SubQ Before meals & nightly PRN 01/09/24 1823          Hold Oral hypoglycemics and mounjaro while patient is in the hospital.  Wound care consult appreciated.      VTE Risk Mitigation (From admission, onward)           Ordered     heparin (porcine) injection 5,000 Units  Every 8 hours         01/09/24 2040     IP VTE HIGH RISK PATIENT  Once         01/09/24 2040     Place sequential compression device  Until discontinued         01/09/24 2040     Place MARCELINO hose  Until discontinued         01/09/24 1653     Place sequential compression device  Until discontinued         01/09/24 1653                    Discharge Planning   JONATHAN: 1/11/2024     Code Status: Full Code   Is the patient medically ready for discharge?:     Reason for patient still in hospital (select all that apply): Patient trending condition, Laboratory test, and Consult recommendations               Amy Hernandez, DNP, APRN, FNP-C  [facility]  [facility]  [email or web address]    "

## 2024-01-10 NOTE — CARE UPDATE
Patient has prn tx available   01/10/24 0740   Patient Assessment/Suction   Level of Consciousness (AVPU) alert   Respiratory Effort Normal;Unlabored   Expansion/Accessory Muscles/Retractions no use of accessory muscles;expansion symmetric   All Lung Fields Breath Sounds clear;diminished   URSULA Breath Sounds clear   LLL Breath Sounds diminished   RUL Breath Sounds clear   RML Breath Sounds clear   RLL Breath Sounds diminished   Rhythm/Pattern, Respiratory unlabored;depth regular;no shortness of breath reported   Cough Frequency no cough   PRE-TX-O2   Device (Oxygen Therapy) room air   SpO2 95 %   Pulse Oximetry Type Intermittent   $ Pulse Oximetry - Multiple Charge Pulse Oximetry - Multiple   Positioning   Body Position position changed independently   Head of Bed (HOB) Positioning HOB elevated   Aerosol Therapy   $ Aerosol Therapy Charges PRN treatment not required

## 2024-01-10 NOTE — ASSESSMENT & PLAN NOTE
Chronic, controlled. Latest blood pressure and vitals reviewed-     Temp:  [97.4 °F (36.3 °C)-97.9 °F (36.6 °C)]   Pulse:  [50-69]   Resp:  [8-23]   BP: (139-208)/()   SpO2:  [95 %-99 %] .   Home meds for hypertension were reviewed and noted below.   Hypertension Medications               amLODIPine (NORVASC) 10 MG tablet Take 1 tablet (10 mg total) by mouth once daily.    furosemide (LASIX) 20 MG tablet Take 20 mg by mouth once daily.    hydrALAZINE (APRESOLINE) 25 MG tablet Take 1 tablet (25 mg total) by mouth every 8 (eight) hours.    metoprolol succinate (TOPROL-XL) 25 MG 24 hr tablet Take 25 mg by mouth once daily.    spironolactone (ALDACTONE) 50 MG tablet Take 1 tablet (50 mg total) by mouth once daily.    valsartan (DIOVAN) 160 MG tablet Take 160 mg by mouth once daily.            While in the hospital, will manage blood pressure as follows; hydralazine, metoprolol, amlodipine    Will utilize p.r.n. blood pressure medication only if patient's blood pressure greater than 160/100 and he develops symptoms such as worsening chest pain or shortness of breath.

## 2024-01-10 NOTE — ASSESSMENT & PLAN NOTE
Chronic, controlled. Latest blood pressure and vitals reviewed-     Temp:  [97.6 °F (36.4 °C)-98.1 °F (36.7 °C)]   Pulse:  [50-94]   Resp:  [16-23]   BP: (151-208)/()   SpO2:  [95 %-99 %] .   Home meds for hypertension were reviewed and noted below.   Hypertension Medications               amLODIPine (NORVASC) 10 MG tablet Take 1 tablet (10 mg total) by mouth once daily.    furosemide (LASIX) 20 MG tablet Take 20 mg by mouth once daily.    hydrALAZINE (APRESOLINE) 25 MG tablet Take 1 tablet (25 mg total) by mouth every 8 (eight) hours.    metoprolol succinate (TOPROL-XL) 25 MG 24 hr tablet Take 25 mg by mouth once daily.    spironolactone (ALDACTONE) 50 MG tablet Take 1 tablet (50 mg total) by mouth once daily.    valsartan (DIOVAN) 160 MG tablet Take 160 mg by mouth once daily.            While in the hospital, will manage blood pressure as follows; Continue home antihypertensive regimen    Will utilize p.r.n. blood pressure medication only if patient's blood pressure greater than 160/100 and he develops symptoms such as worsening chest pain or shortness of breath.

## 2024-01-10 NOTE — ASSESSMENT & PLAN NOTE
Patient with acute kidney injury/acute renal failure likely due to  medication non-adherence to furosemide and spirolactone  +/- use of mounjaro. MINI is currently stable. Baseline creatinine  1.5  - Labs reviewed- Renal function/electrolytes with Estimated Creatinine Clearance: 48.8 mL/min (A) (based on SCr of 2.7 mg/dL (H)). according to latest data. Monitor urine output and serial BMP and adjust therapy as needed. Avoid nephrotoxins and renally dose meds for GFR listed above.  -Nephrology following  -Tighter BP control  -Awaiting labs/diagnostics including renal US, 2D echo and UPcr before decided to move forward with other medication management.   - ordered lokelma 10g daily- continue renal diet- added fluid restriction 1.5L  - phos borderline- check PTH  - resume iron supplement- no acute RL needs- prior paraprotein w/u negative

## 2024-01-10 NOTE — CARE UPDATE
01/09/24 2050   Patient Assessment/Suction   Level of Consciousness (AVPU) alert   Respiratory Effort Normal;Unlabored   Expansion/Accessory Muscles/Retractions no use of accessory muscles;no retractions   URSULA Breath Sounds wheezes, expiratory   LLL Breath Sounds wheezes, expiratory   RUL Breath Sounds wheezes, expiratory   RML Breath Sounds wheezes, expiratory   RLL Breath Sounds wheezes, expiratory   Rhythm/Pattern, Respiratory pattern regular;unlabored   Cough Frequency infrequent   Cough Type good;dry;nonproductive   PRE-TX-O2   Device (Oxygen Therapy) room air   SpO2 98 %   Aerosol Therapy   $ Aerosol Therapy Charges Aerosol Treatment  (PRN treatment given for wheezing)   Daily Review of Necessity (SVN) completed   Respiratory Treatment Status (SVN) given   Treatment Route (SVN) mouthpiece   Patient Position (SVN) sitting on edge of bed   Post Treatment Assessment (SVN) increased aeration;breath sounds improved   Education   $ Education Bronchodilator;15 min

## 2024-01-10 NOTE — ASSESSMENT & PLAN NOTE
This patient has hyperkalemia which is uncontrolled. We will monitor for arrhythmias with EKG or continuous telemetry. We will treat the hyperkalemia with  lokelma . The likely etiology of the hyperkalemia is MINI.  The patients latest potassium has been reviewed and the results are listed below  Recent Labs   Lab 01/10/24  0308   K 5.6*

## 2024-01-11 LAB
ALBUMIN SERPL BCP-MCNC: 3.8 G/DL (ref 3.5–5.2)
ALP SERPL-CCNC: 29 U/L (ref 55–135)
ALT SERPL W/O P-5'-P-CCNC: 32 U/L (ref 10–44)
ANION GAP SERPL CALC-SCNC: 6 MMOL/L (ref 8–16)
AST SERPL-CCNC: 27 U/L (ref 10–40)
BASOPHILS # BLD AUTO: 0.08 K/UL (ref 0–0.2)
BASOPHILS NFR BLD: 1.2 % (ref 0–1.9)
BILIRUB SERPL-MCNC: 0.4 MG/DL (ref 0.1–1)
BUN SERPL-MCNC: 55 MG/DL (ref 6–20)
CALCIUM SERPL-MCNC: 7.9 MG/DL (ref 8.7–10.5)
CHLORIDE SERPL-SCNC: 102 MMOL/L (ref 95–110)
CO2 SERPL-SCNC: 26 MMOL/L (ref 23–29)
CREAT SERPL-MCNC: 2.8 MG/DL (ref 0.5–1.4)
DIFFERENTIAL METHOD BLD: ABNORMAL
EOSINOPHIL # BLD AUTO: 0.3 K/UL (ref 0–0.5)
EOSINOPHIL NFR BLD: 3.8 % (ref 0–8)
ERYTHROCYTE [DISTWIDTH] IN BLOOD BY AUTOMATED COUNT: 15.7 % (ref 11.5–14.5)
EST. GFR  (NO RACE VARIABLE): 25 ML/MIN/1.73 M^2
GLUCOSE SERPL-MCNC: 119 MG/DL (ref 70–110)
GLUCOSE SERPL-MCNC: 129 MG/DL (ref 70–110)
GLUCOSE SERPL-MCNC: 147 MG/DL (ref 70–110)
GLUCOSE SERPL-MCNC: 150 MG/DL (ref 70–110)
GLUCOSE SERPL-MCNC: 173 MG/DL (ref 70–110)
HCT VFR BLD AUTO: 28.9 % (ref 40–54)
HGB BLD-MCNC: 9 G/DL (ref 14–18)
IMM GRANULOCYTES # BLD AUTO: 0.02 K/UL (ref 0–0.04)
IMM GRANULOCYTES NFR BLD AUTO: 0.3 % (ref 0–0.5)
LYMPHOCYTES # BLD AUTO: 2.5 K/UL (ref 1–4.8)
LYMPHOCYTES NFR BLD: 36.2 % (ref 18–48)
MAGNESIUM SERPL-MCNC: 2.1 MG/DL (ref 1.6–2.6)
MCH RBC QN AUTO: 29.1 PG (ref 27–31)
MCHC RBC AUTO-ENTMCNC: 31.1 G/DL (ref 32–36)
MCV RBC AUTO: 94 FL (ref 82–98)
MONOCYTES # BLD AUTO: 0.7 K/UL (ref 0.3–1)
MONOCYTES NFR BLD: 9.8 % (ref 4–15)
NEUTROPHILS # BLD AUTO: 3.3 K/UL (ref 1.8–7.7)
NEUTROPHILS NFR BLD: 48.7 % (ref 38–73)
NRBC BLD-RTO: 0 /100 WBC
PHOSPHATE SERPL-MCNC: 5.3 MG/DL (ref 2.7–4.5)
PLATELET # BLD AUTO: 196 K/UL (ref 150–450)
PMV BLD AUTO: 11.6 FL (ref 9.2–12.9)
POTASSIUM SERPL-SCNC: 5.7 MMOL/L (ref 3.5–5.1)
PROT SERPL-MCNC: 6.7 G/DL (ref 6–8.4)
RBC # BLD AUTO: 3.09 M/UL (ref 4.6–6.2)
SODIUM SERPL-SCNC: 134 MMOL/L (ref 136–145)
WBC # BLD AUTO: 6.76 K/UL (ref 3.9–12.7)

## 2024-01-11 PROCEDURE — 25000242 PHARM REV CODE 250 ALT 637 W/ HCPCS: Performed by: NURSE PRACTITIONER

## 2024-01-11 PROCEDURE — 25000003 PHARM REV CODE 250: Performed by: INTERNAL MEDICINE

## 2024-01-11 PROCEDURE — 83735 ASSAY OF MAGNESIUM: CPT | Performed by: NURSE PRACTITIONER

## 2024-01-11 PROCEDURE — 80053 COMPREHEN METABOLIC PANEL: CPT | Performed by: NURSE PRACTITIONER

## 2024-01-11 PROCEDURE — 25000003 PHARM REV CODE 250: Performed by: NURSE PRACTITIONER

## 2024-01-11 PROCEDURE — 94761 N-INVAS EAR/PLS OXIMETRY MLT: CPT

## 2024-01-11 PROCEDURE — 36415 COLL VENOUS BLD VENIPUNCTURE: CPT | Performed by: NURSE PRACTITIONER

## 2024-01-11 PROCEDURE — 85025 COMPLETE CBC W/AUTO DIFF WBC: CPT | Performed by: NURSE PRACTITIONER

## 2024-01-11 PROCEDURE — 99900031 HC PATIENT EDUCATION (STAT)

## 2024-01-11 PROCEDURE — 94640 AIRWAY INHALATION TREATMENT: CPT

## 2024-01-11 PROCEDURE — 84100 ASSAY OF PHOSPHORUS: CPT | Performed by: NURSE PRACTITIONER

## 2024-01-11 PROCEDURE — 63600175 PHARM REV CODE 636 W HCPCS: Performed by: INTERNAL MEDICINE

## 2024-01-11 PROCEDURE — 63600175 PHARM REV CODE 636 W HCPCS: Performed by: NURSE PRACTITIONER

## 2024-01-11 PROCEDURE — 99900035 HC TECH TIME PER 15 MIN (STAT)

## 2024-01-11 PROCEDURE — 21400001 HC TELEMETRY ROOM

## 2024-01-11 RX ORDER — FUROSEMIDE 10 MG/ML
20 INJECTION INTRAMUSCULAR; INTRAVENOUS ONCE
Status: DISCONTINUED | OUTPATIENT
Start: 2024-01-11 | End: 2024-01-11

## 2024-01-11 RX ORDER — FUROSEMIDE 10 MG/ML
20 INJECTION INTRAMUSCULAR; INTRAVENOUS
Status: DISCONTINUED | OUTPATIENT
Start: 2024-01-11 | End: 2024-01-11

## 2024-01-11 RX ORDER — GUAIFENESIN AND DEXTROMETHORPHAN HYDROBROMIDE 10; 100 MG/5ML; MG/5ML
10 SYRUP ORAL EVERY 6 HOURS
Status: DISPENSED | OUTPATIENT
Start: 2024-01-11 | End: 2024-01-12

## 2024-01-11 RX ORDER — FUROSEMIDE 10 MG/ML
40 INJECTION INTRAMUSCULAR; INTRAVENOUS
Status: DISCONTINUED | OUTPATIENT
Start: 2024-01-11 | End: 2024-01-12

## 2024-01-11 RX ORDER — LANOLIN ALCOHOL/MO/W.PET/CERES
1 CREAM (GRAM) TOPICAL DAILY
Status: DISCONTINUED | OUTPATIENT
Start: 2024-01-11 | End: 2024-01-19 | Stop reason: HOSPADM

## 2024-01-11 RX ADMIN — FUROSEMIDE 40 MG: 10 INJECTION, SOLUTION INTRAVENOUS at 11:01

## 2024-01-11 RX ADMIN — OXYCODONE HYDROCHLORIDE AND ACETAMINOPHEN 1 TABLET: 10; 325 TABLET ORAL at 12:01

## 2024-01-11 RX ADMIN — HYDRALAZINE HYDROCHLORIDE 25 MG: 25 TABLET ORAL at 06:01

## 2024-01-11 RX ADMIN — HEPARIN SODIUM 5000 UNITS: 5000 INJECTION INTRAVENOUS; SUBCUTANEOUS at 11:01

## 2024-01-11 RX ADMIN — IPRATROPIUM BROMIDE AND ALBUTEROL SULFATE 3 ML: 2.5; .5 SOLUTION RESPIRATORY (INHALATION) at 09:01

## 2024-01-11 RX ADMIN — HEPARIN SODIUM 5000 UNITS: 5000 INJECTION INTRAVENOUS; SUBCUTANEOUS at 01:01

## 2024-01-11 RX ADMIN — OXYCODONE HYDROCHLORIDE AND ACETAMINOPHEN 1 TABLET: 10; 325 TABLET ORAL at 06:01

## 2024-01-11 RX ADMIN — GABAPENTIN 800 MG: 300 CAPSULE ORAL at 08:01

## 2024-01-11 RX ADMIN — GABAPENTIN 800 MG: 300 CAPSULE ORAL at 04:01

## 2024-01-11 RX ADMIN — INSULIN DETEMIR 15 UNITS: 100 INJECTION, SOLUTION SUBCUTANEOUS at 08:01

## 2024-01-11 RX ADMIN — HYDRALAZINE HYDROCHLORIDE 25 MG: 25 TABLET ORAL at 11:01

## 2024-01-11 RX ADMIN — ATORVASTATIN CALCIUM 80 MG: 40 TABLET, FILM COATED ORAL at 08:01

## 2024-01-11 RX ADMIN — SODIUM ZIRCONIUM CYCLOSILICATE 10 G: 5 POWDER, FOR SUSPENSION ORAL at 08:01

## 2024-01-11 RX ADMIN — FINASTERIDE 5 MG: 5 TABLET, FILM COATED ORAL at 08:01

## 2024-01-11 RX ADMIN — AMLODIPINE BESYLATE 10 MG: 5 TABLET ORAL at 08:01

## 2024-01-11 RX ADMIN — POLYETHYLENE GLYCOL 3350 17 G: 17 POWDER, FOR SOLUTION ORAL at 08:01

## 2024-01-11 RX ADMIN — FERROUS SULFATE TAB 325 MG (65 MG ELEMENTAL FE) 1 EACH: 325 (65 FE) TAB at 11:01

## 2024-01-11 RX ADMIN — FLUTICASONE PROPIONATE 100 MCG: 50 SPRAY, METERED NASAL at 11:01

## 2024-01-11 RX ADMIN — GUAIFENESIN AND DEXTROMETHORPHAN 10 ML: 100; 10 SYRUP ORAL at 01:01

## 2024-01-11 RX ADMIN — HYDRALAZINE HYDROCHLORIDE 25 MG: 25 TABLET ORAL at 01:01

## 2024-01-11 RX ADMIN — CETIRIZINE HYDROCHLORIDE 10 MG: 10 TABLET, FILM COATED ORAL at 08:01

## 2024-01-11 RX ADMIN — GUAIFENESIN AND DEXTROMETHORPHAN 10 ML: 100; 10 SYRUP ORAL at 11:01

## 2024-01-11 RX ADMIN — METOPROLOL SUCCINATE 25 MG: 25 TABLET, EXTENDED RELEASE ORAL at 08:01

## 2024-01-11 RX ADMIN — HEPARIN SODIUM 5000 UNITS: 5000 INJECTION INTRAVENOUS; SUBCUTANEOUS at 06:01

## 2024-01-11 RX ADMIN — ASPIRIN 81 MG: 81 TABLET, COATED ORAL at 08:01

## 2024-01-11 NOTE — ASSESSMENT & PLAN NOTE
Chronic, controlled. Latest blood pressure and vitals reviewed-     Temp:  [93.8 °F (34.3 °C)-98.1 °F (36.7 °C)]   Pulse:  [61-77]   Resp:  [17-22]   BP: (117-178)/(57-81)   SpO2:  [93 %-98 %] .   Home meds for hypertension were reviewed and noted below.   Hypertension Medications               amLODIPine (NORVASC) 10 MG tablet Take 1 tablet (10 mg total) by mouth once daily.    furosemide (LASIX) 20 MG tablet Take 20 mg by mouth once daily.    hydrALAZINE (APRESOLINE) 25 MG tablet Take 1 tablet (25 mg total) by mouth every 8 (eight) hours.    metoprolol succinate (TOPROL-XL) 25 MG 24 hr tablet Take 25 mg by mouth once daily.    spironolactone (ALDACTONE) 50 MG tablet Take 1 tablet (50 mg total) by mouth once daily.    valsartan (DIOVAN) 160 MG tablet Take 160 mg by mouth once daily.          Grossly improved with restarting home medications and adjusting for renal  While in the hospital, will manage blood pressure as follows; hydralazine, metoprolol, amlodipine  Will utilize p.r.n. blood pressure medication only if patient's blood pressure greater than 160/100 and he develops symptoms such as worsening chest pain or shortness of breath.

## 2024-01-11 NOTE — ASSESSMENT & PLAN NOTE
Patient with acute kidney injury/acute renal failure likely due to  medication non-adherence to furosemide and spirolactone  +/- use of mounjaro. MINI is currently stable. Baseline creatinine  1.5  - Labs reviewed- Renal function/electrolytes with Estimated Creatinine Clearance: 47 mL/min (A) (based on SCr of 2.8 mg/dL (H)). according to latest data. Monitor urine output and serial BMP and adjust therapy as needed. Avoid nephrotoxins and renally dose meds for GFR listed above.  -Nephrology following  -Tighter BP control  - Continue ordered lokelma 10g daily- continue renal diet- added fluid restriction 1.5L   Principal Discharge DX:	 (normal spontaneous vaginal delivery)  Goal:	Delivered  Instructions for follow-up, activity and diet:	Take medications as directed, regular diet, activity as tolerated.  Follow up with [facility] clinic in 6 weeks for postpartum care.  Secondary Diagnosis:	Anemia  Goal:	Stable  Instructions for follow-up, activity and diet:	H/H 9.6/29.9    Take ferrous sulfate and vitamin C. Principal Discharge DX:	 (normal spontaneous vaginal delivery)  Goal:	Delivered  Assessment and plan of treatment:	Take medications as directed, regular diet, activity as tolerated.  Follow up with [facility] clinic in 6 weeks for postpartum care.  Secondary Diagnosis:	Anemia  Goal:	Stable  Assessment and plan of treatment:	H/H 9.6/29.9    Take ferrous sulfate and vitamin C.

## 2024-01-11 NOTE — SUBJECTIVE & OBJECTIVE
Interval History: See HPI    Review of Systems   Constitutional:  Negative for activity change, appetite change, chills, diaphoresis and fever.   HENT:  Negative for congestion, hearing loss, sore throat, tinnitus and trouble swallowing.    Eyes:  Negative for photophobia, discharge, itching and visual disturbance.   Respiratory:  Negative for apnea, cough, shortness of breath, wheezing and stridor.    Cardiovascular:  Negative for chest pain, palpitations and leg swelling.   Gastrointestinal:  Negative for abdominal distention, abdominal pain, blood in stool, constipation, diarrhea and nausea.   Endocrine: Negative for polydipsia, polyphagia and polyuria.   Genitourinary:  Negative for difficulty urinating, dysuria, flank pain and frequency.   Musculoskeletal:  Negative for arthralgias, joint swelling and neck stiffness.   Skin:  Negative for color change, rash and wound.   Neurological:  Negative for dizziness, tremors, seizures, weakness, light-headedness, numbness and headaches.   Hematological:  Negative for adenopathy.   Psychiatric/Behavioral:  Negative for hallucinations and self-injury.      Objective:     Vital Signs (Most Recent):  Temp: 97.9 °F (36.6 °C) (01/11/24 0758)  Pulse: 76 (01/11/24 0758)  Resp: 18 (01/11/24 0602)  BP: 129/63 (01/11/24 0758)  SpO2: 97 % (01/11/24 0800) Vital Signs (24h Range):  Temp:  [93.8 °F (34.3 °C)-98.1 °F (36.7 °C)] 97.9 °F (36.6 °C)  Pulse:  [61-77] 76  Resp:  [17-22] 18  SpO2:  [93 %-98 %] 97 %  BP: (117-178)/(57-81) 129/63     Weight: (!) 169.6 kg (374 lb)  Body mass index is 46.75 kg/m².    Intake/Output Summary (Last 24 hours) at 1/11/2024 1157  Last data filed at 1/11/2024 0413  Gross per 24 hour   Intake 800 ml   Output --   Net 800 ml           Physical Exam  Constitutional:       Appearance: He is well-developed.   HENT:      Head: Normocephalic and atraumatic.      Nose: Congestion present.   Eyes:      General: Lids are normal.      Conjunctiva/sclera:  Conjunctivae normal.   Neck:      Thyroid: No thyroid mass.      Vascular: No JVD.   Cardiovascular:      Heart sounds: S1 normal and S2 normal. No murmur heard.  Pulmonary:      Effort: Pulmonary effort is normal.      Comments: Intermittent cough - non-productive  Abdominal:      General: Bowel sounds are normal. There is no distension or abdominal bruit.      Palpations: Abdomen is soft. There is no hepatomegaly or splenomegaly.      Tenderness: There is no abdominal tenderness.   Musculoskeletal:      Cervical back: Full passive range of motion without pain and neck supple. No edema.      Comments: Back pain chronic; right foot toe ulcer   Lymphadenopathy:      Cervical: No cervical adenopathy.   Skin:     General: Skin is warm and dry.   Neurological:      Mental Status: He is alert and oriented to person, place, and time.      Motor: No tremor or seizure activity.      Deep Tendon Reflexes: Reflexes are normal and symmetric.   Psychiatric:         Speech: Speech normal.         Behavior: Behavior is cooperative.         Thought Content: Thought content normal.                       Significant Labs: .  CBC:   Recent Labs   Lab 01/09/24  1414 01/10/24  0308 01/11/24  0328   WBC 6.73 7.53 6.76   HGB 10.0* 9.1* 9.0*   HCT 32.3* 29.4* 28.9*    205 196       CMP:   Recent Labs   Lab 01/09/24  1414 01/09/24  2311 01/10/24  0308 01/11/24  0328    136 134* 134*   K 5.7* 6.0* 5.6* 5.7*    103 103 102   CO2 25 27 25 26    160* 164* 173*   BUN 41* 43* 45* 55*   CREATININE 2.3* 2.5* 2.7* 2.8*   CALCIUM 8.6* 8.5* 8.3* 7.9*   PROT 7.4  --  6.5 6.7   ALBUMIN 4.2  --  3.8 3.8   BILITOT 0.8  --  0.5 0.4   ALKPHOS 35*  --  30* 29*   AST 35  --  29 27   ALT 50*  --  39 32   ANIONGAP 5* 6* 6* 6*       Urine Studies:   Recent Labs   Lab 01/10/24  1138   COLORU Yellow   APPEARANCEUA Clear   PHUR 6.0   SPECGRAV 1.020   PROTEINUA 1+*   GLUCUA 1+*   KETONESU Negative   BILIRUBINUA Negative   OCCULTUA Negative    NITRITE Negative   UROBILINOGEN Negative   LEUKOCYTESUR Negative   RBCUA 1   WBCUA 0   BACTERIA Negative   SQUAMEPITHEL 0   HYALINECASTS 2*         Significant Imaging:   Imaging Results              US Retroperitoneal Complete (Final result)  Result time 01/09/24 17:29:05      Final result by Fede Tavares Jr., MD (01/09/24 17:29:05)                   Narrative:    HISTORY: Acute renal disease..    FINDINGS: The right kidney measures 8.9 cm in length, with normal cortical thickness and parenchymal echotexture, and no echogenic calculi or hydronephrosis. The left kidney measures 10.4 cm in length and has normal cortical thickness and parenchymal echotexture, without echogenic calculi or hydronephrosis.    The urinary bladder is normal, with no wall thickening or intraluminal mass. The abdominal aorta, aortic bifurcation and IVC are unremarkable.    IMPRESSION: Asymmetric small right kidney. Otherwise normal sonographic appearance of the bilateral kidneys..    Electronically signed by:  Fede Tavares MD  01/09/2024 05:29 PM CST Workstation: [id number]                                     US Lower Extremity Veins Bilateral (Final result)  Result time 01/09/24 16:37:13      Final result by Sinai Moreno IV, MD (01/09/24 16:37:13)                   Narrative:    Bilateral lower extremity venous Doppler evaluation    HISTORY: Leg pain and swelling.    Real-time, duplex and color Doppler interrogation are performed. This demonstrates patency of the common and superficial femoral veins, popliteal veins, major calf veins and greater saphenous veins bilaterally. There are no findings of deep vein thrombosis.    IMPRESSION:    No evidence of deep venous thrombosis.    Electronically signed by:  Sinai Moreno MD  01/09/2024 04:37 PM CST Workstation: [id number]                                     X-Ray Chest AP (Final result)  Result time 01/09/24 14:51:18      Final result by Sinai Moreno IV, MD (01/09/24 14:51:18)                    Narrative:    Chest, single view    HISTORY: Shortness of breath and chest pain.    Comparison 7/26/2023.    The heart size is within normal limits. The central pulmonary vasculature is not acutely engorged.    The lungs are appropriately expanded. There are no confluent areas of airspace disease or significant volume loss. No effusion is demonstrated. Monitoring electrodes overlie the chest.    IMPRESSION:    No acute cardiopulmonary disease.    Electronically signed by:  Sinai Moreno MD  01/09/2024 02:51 PM CST Workstation: [id number]

## 2024-01-11 NOTE — PLAN OF CARE
Patient AA0 x3. Ambulate independently. Patient tolerating diabetic diet and po. Po pain medication administered. Tele in place. Safety maintained. Condition stable.

## 2024-01-11 NOTE — PROGRESS NOTES
INPATIENT NEPHROLOGY Progress Note  [facility]    Patient Name: Abel Gibbs  Date: 01/11/2024    Reason for consultation: MINI    Chief Complaint:   Chief Complaint   Patient presents with    Chest Pain    Shortness of Breath     + edema       History of Present Illness:  Abel Gibbs is a 58 y.o. male with a history as  has a past medical history of Depression, Diabetes mellitus, Hypertension, Obesity, Osteomyelitis and CKD III who presented to the ED with a Chest Pain and Shortness of Breath (+ edema). Patient presents to the emergency room with a complaint of midsternal chest pain radiating into left chest wall that started approximately 3 days ago. He further reports shortness for breath when attempting to lie flat with chest pain worsening. He was seen by wound care for right foot ulcer who felt as though his face appeared a little swollen and recommended that he go to the emergency. Patient does report having a sinus infection about 1-2 weeks ago completed course of antibiotics.  He further states after completion of antibiotics he started with congestion and seems as though symptoms have gotten worse. Additionally, he reports BLE edema that chronic but appears a little worse. Patient unclear of medications as he reports the nurse fills his medication container weekly. He does tell me that he is on furosemide but has not taken it for last 3-4 days d/t leg cramps. Patient also prescribed spirolactone but does not know if he has been taking. Also on mounjaro. Denies fever, chills, diaphoresis, dizziness, HA, palpitations, NVD, recent trauma or any other associated symptoms. Does not smoke cigarettes, drinks occasionally and uses marijuana daily. Consulted for MINI/CKD.    Renal u/s:  The right kidney measures 8.9 cm in length, with normal cortical thickness and parenchymal echotexture, and no echogenic calculi or hydronephrosis. The left kidney measures 10.4 cm in length and has normal  cortical thickness and parenchymal echotexture, without echogenic calculi or hydronephrosis.     Notable home meds:  Norvasc, metoprolol, hydralazine, diovan, lasix, aldactone, farxiga, finasteride, meloxicam    Interval History:  1/10- highest /97, on RA, UOP 1L, CXR clear, no DVT, BNP normal, trop stable, echo pending  1/11- BP improved, on RA, voiding, echo with LVH, renal duplex pending, c/o dyspnea, cough- edema unchanged    Plan of Care:    Assessment:  MINI/CKD III  HTN urgency/Edema  Hyponatremia  Hyperkalemia  SHPT  MARBELLA/Anemia of CKD    Plan:    - suspect MINI on CKD due to elevated BP with ischemic ATN (hx of neg acute GN w/u in 2/2023)  - BP improved- remains edematous and hyperkalemic- ordered lasix 40mg IV BID  - echo noted- f/u renal duplex- UA with 1+ protein- 400mg proteinuria   - in setting of MINI and hyperK, hold ARB, MRA, farxiga and meloxicam  - continue lokelma 10g daily- continue renal diet- added fluid restriction 1.5L  - phos borderline- PTH c/w CKD III  - resume iron supplement- no acute RL needs- prior paraprotein w/u negative    Thank you for allowing us to participate in this patient's care. We will continue to follow.    Vital Signs:  Temp Readings from Last 3 Encounters:   01/11/24 97.9 °F (36.6 °C) (Oral)   07/28/23 98.2 °F (36.8 °C) (Oral)   06/04/23 98.5 °F (36.9 °C) (Oral)       Pulse Readings from Last 3 Encounters:   01/11/24 76   07/28/23 (!) 56   06/04/23 (!) 53       BP Readings from Last 3 Encounters:   01/11/24 129/63   07/28/23 (!) 167/80   06/04/23 133/62       Weight:  Wt Readings from Last 3 Encounters:   01/09/24 (!) 169.6 kg (374 lb)   01/10/24 (!) 169.6 kg (374 lb)   07/26/23 (!) 158.8 kg (350 lb 3.2 oz)       Medications:  Scheduled Meds:   amLODIPine  10 mg Oral Daily    aspirin  81 mg Oral Daily    atorvastatin  80 mg Oral Daily    cetirizine  10 mg Oral Daily    finasteride  5 mg Oral Daily    fluticasone propionate  2 spray Each Nostril QHS    gabapentin  800  mg Oral TID    heparin (porcine)  5,000 Units Subcutaneous Q8H    hydrALAZINE  25 mg Oral Q8H    insulin detemir U-100 (Levemir)  15 Units Subcutaneous QHS    metoprolol succinate  25 mg Oral Daily    polyethylene glycol  17 g Oral Daily    sodium zirconium cyclosilicate  10 g Oral Daily     Continuous Infusions:  PRN Meds:.acetaminophen, albuterol-ipratropium, dextrose 50%, dextrose 50%, glucagon (human recombinant), glucose, glucose, hydrALAZINE, insulin aspart U-100, labetalol, magnesium oxide, magnesium oxide, melatonin, naloxone, ondansetron, oxyCODONE-acetaminophen, potassium bicarbonate, potassium bicarbonate, potassium bicarbonate, potassium, sodium phosphates, potassium, sodium phosphates, potassium, sodium phosphates, sodium chloride 0.9%, traZODone  No current facility-administered medications on file prior to encounter.     Current Outpatient Medications on File Prior to Encounter   Medication Sig Dispense Refill    amLODIPine (NORVASC) 10 MG tablet Take 1 tablet (10 mg total) by mouth once daily. 30 tablet 2    aspirin (ECOTRIN) 81 MG EC tablet Take 1 tablet (81 mg total) by mouth once daily. 30 tablet 2    atorvastatin (LIPITOR) 80 MG tablet Take 1 tablet (80 mg total) by mouth once daily. 30 tablet 5    FARXIGA 5 mg Tab tablet Take 5 mg by mouth once daily.      finasteride (PROSCAR) 5 mg tablet Take 5 mg by mouth once daily.      furosemide (LASIX) 20 MG tablet Take 20 mg by mouth once daily.      gabapentin (NEURONTIN) 800 MG tablet Take 800 mg by mouth 4 (four) times daily.      hydrALAZINE (APRESOLINE) 25 MG tablet Take 1 tablet (25 mg total) by mouth every 8 (eight) hours. 90 tablet 2    ketoconazole (NIZORAL) 2 % cream Apply 1 Application topically once daily.      LEVEMIR FLEXPEN 100 unit/mL (3 mL) InPn pen Inject 30 Units into the skin once daily.      LIDOcaine (LIDODERM) 5 % Place 1 patch onto the skin once daily.      meloxicam (MOBIC) 15 MG tablet Take 15 mg by mouth once daily.       metFORMIN (GLUCOPHAGE) 1000 MG tablet Take 1,000 mg by mouth 2 (two) times daily with meals.      metoprolol succinate (TOPROL-XL) 25 MG 24 hr tablet Take 25 mg by mouth once daily.      MOUNJARO 7.5 mg/0.5 mL PnIj Inject 7.5 mg into the skin every 7 days.      oxyCODONE-acetaminophen (PERCOCET)  mg per tablet Take 1 tablet by mouth 3 (three) times daily as needed for Pain.      spironolactone (ALDACTONE) 50 MG tablet Take 1 tablet (50 mg total) by mouth once daily. 30 tablet 2    traZODone (DESYREL) 50 MG tablet Take 50 mg by mouth nightly as needed.      valsartan (DIOVAN) 160 MG tablet Take 160 mg by mouth once daily.      albuterol (PROVENTIL/VENTOLIN HFA) 90 mcg/actuation inhaler Inhale 1-2 puffs into the lungs every 6 (six) hours as needed for Wheezing. Rescue 18 g 5    blood sugar diagnostic Strp To check BG 4 times daily, to use with insurance preferred meter 200 each 0    blood-glucose meter kit To check BG 4 times daily, to use with insurance preferred meter 1 each 0    fluticasone propionate (FLONASE) 50 mcg/actuation nasal spray 1 spray (50 mcg total) by Each Nostril route daily as needed for Rhinitis.  0    lactobacillus acidophilus & bulgar (LACTINEX) 100 million cell packet Take 1 tablet by mouth 3 (three) times daily with meals.      lancets Misc To check BG 4 times daily, to use with insurance preferred meter 200 each 0    polyethylene glycol (MIRALAX) 17 gram/dose powder Take 17 g by mouth once daily. For constipation 850 g 2       Review of Systems:  Neg    Physical Exam:  General Appearance:    NAD, AAO x 3, cooperative, appears stated age   Head:    Normocephalic, atraumatic   Eyes:    PER, EOMI, and conjunctiva/sclera clear bilaterally       Mouth:   Moist mucus membranes, no thrush or oral lesions,       normal dentition   Back:     No CVA tenderness   Lungs:     Crackles   Chest wall:    No tenderness or deformity   Heart:    Regular rate and rhythm, S1 and S2 normal, no murmur, rub    or gallop   Abdomen:     Soft, non-tender, non-distended, bowel sounds active all four   quadrants, no RT or guarding, no masses, no organomegaly   Extremities:   Warm and well perfused, distal pulses are intact, no cyanosis, + edema   MSK:   No joint or muscle swelling, tenderness or deformity   Skin:   Skin color, texture, turgor normal, no rashes or lesions   Neurologic/Psychiatric:   CNII-XII intact, normal strength and sensation       throughout, no asterixis; normal affect, memory, judgement     and insight      Results:  Lab Results   Component Value Date     (L) 01/11/2024    K 5.7 (H) 01/11/2024     01/11/2024    CO2 26 01/11/2024    BUN 55 (H) 01/11/2024    CREATININE 2.8 (H) 01/11/2024    CALCIUM 7.9 (L) 01/11/2024    ANIONGAP 6 (L) 01/11/2024    ESTGFRAFRICA >60 03/11/2022    EGFRNONAA >60 03/11/2022       Lab Results   Component Value Date    CALCIUM 7.9 (L) 01/11/2024    PHOS 5.3 (H) 01/11/2024       Recent Labs   Lab 01/11/24  0328   WBC 6.76   RBC 3.09*   HGB 9.0*   HCT 28.9*      MCV 94   MCH 29.1   MCHC 31.1*         I have personally reviewed pertinent radiological imaging and reports.    I have spent > 35 minutes providing care for this patient for the above diagnoses. These services have included chart/data/imaging review, evaluation, exam, formulation of plan, , note preparation, and discussions with staff involved in this patient's care.    Marlen Velazquez MD MPH  [facility]  [address] [place] [id number]  [phone] (p)  [phone] (f)

## 2024-01-11 NOTE — ASSESSMENT & PLAN NOTE
This patient has hyperkalemia which is uncontrolled. We will monitor for arrhythmias with EKG or continuous telemetry. We will treat the hyperkalemia with  lokelma . The likely etiology of the hyperkalemia is MINI.  The patients latest potassium has been reviewed and the results are listed below  Recent Labs   Lab 01/11/24  0328   K 5.7*     Continue lokelma 10 mg  Nephrology added Lasix IV  Follow chemistry  Follow mag & phos

## 2024-01-11 NOTE — PROGRESS NOTES
[facility] Medicine  Progress Note    Patient Name: Abel Gibbs  MRN: [id number]  Patient Class: IP- Inpatient   Admission Date: 1/9/2024  Length of Stay: 1 days  Attending Physician: Carlos Montelongo MD  Primary Care Provider: Rupinder Pagan MD        Subjective:     Principal Problem:Hyperkalemia        HPI:  Abel Gibbs is a 58 y.o. male with a history as  has a past medical history of Depression, Diabetes mellitus, Hypertension, Obesity, and Osteomyelitis. who presented to the ED with a Chest Pain and Shortness of Breath (+ edema)    Patient presents to the emergency room with a complaint of midsternal chest pain radiating into left chest wall that started approximately 3 days ago.  He further reports shortness for breath when attempting to lye flat with chest pain worsening. He tell me he was seen by wound care for right foot ulcer who felt as though his face appeared a little swollen and recommended that he go to the emergency.     Patient does report having a sinus infection about 1-2 weeks ago completed course of antibiotics.  He further states after completion of antibiotics he started with congestion and seems as though symptoms have gotten worse. Additionally, he reports BLE edema that chronic but appears a little worse.    Patient unclear of medications as he reports the nurse fills his medication container weekly. He does tell me that he is on furosemide but has not taken it for last 3-4 days d/t leg cramps. Patient also prescribed spirolactone but does not know if he has been taking. Also on mounjaro.     Denies fever, chills, diaphoresis, dizziness, HA, palpitations, NVD, recent trauma or any other associated symptoms. Does not smoke cigarettes, drinks occasionally and uses marijuana daily.      Lab and imaging obtained and reviewed.  CBC completed and shows RBCs 3.48 H/H 10.0/32.3 MCHC 31.0 RDW is 15.9.  Chemistry profile shows potassium 5.7 Ag 5 BUN 41 creatinine 2.3  GFR 31.7 calcium 8.6 ALP 35 ALT 50.  BNP within normal range.  Initial high sensitive troponin 17.2. EKG shows NSR with incomplete R-BBB. CXR without acute cardiopulmonary findings.  On admit, afebrile HR 94 RR 20 /91 with O2 sats 97% on room air.        Ultrasound bilateral lower extremity without evidence of deep venous thrombosis.      Renal US  IMPRESSION: Asymmetric small right kidney. Otherwise normal sonographic appearance of the bilateral kidneys..     Angiogram 07/2023  Summary     The estimated blood loss was <50 mL.    The pre-procedure left ventricular end diastolic pressure was 16.    Nonobstructive coronaries with mild luminal irregularities in RCA and left circumflex and focal moderate stenosis of mid to distal LAD for which medical management is recommended.       Echo 07/2023  Summary  The left ventricle is normal in size with mild concentric hypertrophy and normal systolic function.  The estimated ejection fraction is 65%.  Normal left ventricular diastolic function.  Normal right ventricular size with normal right ventricular systolic function.  Mild mitral regurgitation.  Mild tricuspid regurgitation.  Normal central venous pressure (3 mmHg).  The estimated PA systolic pressure is 27 mmHg.    Per ED provider patient presents for evaluation of chest pain with lower extremity edema for 1 week. Labs reviewed and showed mildly elevated troponin without EKG changes. Also noted to have elevated BP and MINI, given ASA IV diuretics with NTG paste applied. US BLE negative for DVT. Will admit for ACS r/o.        Overview/Hospital Course:  Abel Gibbs is a 58 y.o. male with  has a past medical history of Depression, Diabetes mellitus, Hypertension, Obesity, and Osteomyelitis. who presented to the ED with a Chest Pain and Shortness of Breath (+ edema)    Patient presented for evaluation of 3 day history of CP and increased SOB after being treated for antibiotics for sinus infection for 1-2 weeks -  as well as he endorsed not taking his medication for about a week due to leg cramps. He was found to be hyperkalemic 6.0, with acute on chronic kidney failure with BUN/cr/GFR 46/2.5/28.7. His BP was also elevated 180/91 at the time of presentation. His home BP medications were restarted amlodipine, metoprolol and hydralazine added 2/2 renal. BP improved. Nephrology consulted for management of kidney impairment. Renal US showed showed no sonographic evidence of renal artery stenosis. Started on lokelma for hyperkalemia - slow to improve. Lasix IV added, fluid restriction 1.5L. CXR on 01/09/23 did not demonstrate evidence of effusion or increased congestion and 2D echo 01/10/23 showed hypertrophy, EF 55-60% with normal DF. Has right foot ulcer present on admission that wound care and Dr. Serrato is seeing - has been followed outpatient and while in the hospital. XR right foot ulcer showed severe degenerative changes with no acute osseous abnormality.     BP significantly improved today 129/63, continue lokelma 10g daily- continue renal diet with 1.5L. Added Lasix IV. Monitor potassium levels, continue tele monitoring    Interval History: See HPI    Review of Systems   Constitutional:  Negative for activity change, appetite change, chills, diaphoresis and fever.   HENT:  Negative for congestion, hearing loss, sore throat, tinnitus and trouble swallowing.    Eyes:  Negative for photophobia, discharge, itching and visual disturbance.   Respiratory:  Negative for apnea, cough, shortness of breath, wheezing and stridor.    Cardiovascular:  Negative for chest pain, palpitations and leg swelling.   Gastrointestinal:  Negative for abdominal distention, abdominal pain, blood in stool, constipation, diarrhea and nausea.   Endocrine: Negative for polydipsia, polyphagia and polyuria.   Genitourinary:  Negative for difficulty urinating, dysuria, flank pain and frequency.   Musculoskeletal:  Negative for arthralgias, joint swelling  and neck stiffness.   Skin:  Negative for color change, rash and wound.   Neurological:  Negative for dizziness, tremors, seizures, weakness, light-headedness, numbness and headaches.   Hematological:  Negative for adenopathy.   Psychiatric/Behavioral:  Negative for hallucinations and self-injury.      Objective:     Vital Signs (Most Recent):  Temp: 97.9 °F (36.6 °C) (01/11/24 0758)  Pulse: 76 (01/11/24 0758)  Resp: 18 (01/11/24 0602)  BP: 129/63 (01/11/24 0758)  SpO2: 97 % (01/11/24 0800) Vital Signs (24h Range):  Temp:  [93.8 °F (34.3 °C)-98.1 °F (36.7 °C)] 97.9 °F (36.6 °C)  Pulse:  [61-77] 76  Resp:  [17-22] 18  SpO2:  [93 %-98 %] 97 %  BP: (117-178)/(57-81) 129/63     Weight: (!) 169.6 kg (374 lb)  Body mass index is 46.75 kg/m².    Intake/Output Summary (Last 24 hours) at 1/11/2024 1159  Last data filed at 1/11/2024 0413  Gross per 24 hour   Intake 800 ml   Output --   Net 800 ml           Physical Exam  Constitutional:       Appearance: He is well-developed.   HENT:      Head: Normocephalic and atraumatic.      Nose: Congestion present.   Eyes:      General: Lids are normal.      Conjunctiva/sclera: Conjunctivae normal.   Neck:      Thyroid: No thyroid mass.      Vascular: No JVD.   Cardiovascular:      Heart sounds: S1 normal and S2 normal. No murmur heard.  Pulmonary:      Effort: Pulmonary effort is normal.      Comments: Intermittent cough - non-productive  Abdominal:      General: Bowel sounds are normal. There is no distension or abdominal bruit.      Palpations: Abdomen is soft. There is no hepatomegaly or splenomegaly.      Tenderness: There is no abdominal tenderness.   Musculoskeletal:      Cervical back: Full passive range of motion without pain and neck supple. No edema.      Comments: Back pain chronic; right foot toe ulcer   Lymphadenopathy:      Cervical: No cervical adenopathy.   Skin:     General: Skin is warm and dry.   Neurological:      Mental Status: He is alert and oriented to person,  place, and time.      Motor: No tremor or seizure activity.      Deep Tendon Reflexes: Reflexes are normal and symmetric.   Psychiatric:         Speech: Speech normal.         Behavior: Behavior is cooperative.         Thought Content: Thought content normal.                       Significant Labs: .  CBC:   Recent Labs   Lab 01/09/24  1414 01/10/24  0308 01/11/24  0328   WBC 6.73 7.53 6.76   HGB 10.0* 9.1* 9.0*   HCT 32.3* 29.4* 28.9*    205 196       CMP:   Recent Labs   Lab 01/09/24  1414 01/09/24  2311 01/10/24  0308 01/11/24  0328    136 134* 134*   K 5.7* 6.0* 5.6* 5.7*    103 103 102   CO2 25 27 25 26    160* 164* 173*   BUN 41* 43* 45* 55*   CREATININE 2.3* 2.5* 2.7* 2.8*   CALCIUM 8.6* 8.5* 8.3* 7.9*   PROT 7.4  --  6.5 6.7   ALBUMIN 4.2  --  3.8 3.8   BILITOT 0.8  --  0.5 0.4   ALKPHOS 35*  --  30* 29*   AST 35  --  29 27   ALT 50*  --  39 32   ANIONGAP 5* 6* 6* 6*       Urine Studies:   Recent Labs   Lab 01/10/24  1138   COLORU Yellow   APPEARANCEUA Clear   PHUR 6.0   SPECGRAV 1.020   PROTEINUA 1+*   GLUCUA 1+*   KETONESU Negative   BILIRUBINUA Negative   OCCULTUA Negative   NITRITE Negative   UROBILINOGEN Negative   LEUKOCYTESUR Negative   RBCUA 1   WBCUA 0   BACTERIA Negative   SQUAMEPITHEL 0   HYALINECASTS 2*         Significant Imaging:   Imaging Results              US Retroperitoneal Complete (Final result)  Result time 01/09/24 17:29:05      Final result by Fede Tavares Jr., MD (01/09/24 17:29:05)                   Narrative:    HISTORY: Acute renal disease..    FINDINGS: The right kidney measures 8.9 cm in length, with normal cortical thickness and parenchymal echotexture, and no echogenic calculi or hydronephrosis. The left kidney measures 10.4 cm in length and has normal cortical thickness and parenchymal echotexture, without echogenic calculi or hydronephrosis.    The urinary bladder is normal, with no wall thickening or intraluminal mass. The abdominal aorta,  aortic bifurcation and IVC are unremarkable.    IMPRESSION: Asymmetric small right kidney. Otherwise normal sonographic appearance of the bilateral kidneys..    Electronically signed by:  Fede Tavares MD  01/09/2024 05:29 PM CST Workstation: [id number]                                     US Lower Extremity Veins Bilateral (Final result)  Result time 01/09/24 16:37:13      Final result by Sinai Moreno IV, MD (01/09/24 16:37:13)                   Narrative:    Bilateral lower extremity venous Doppler evaluation    HISTORY: Leg pain and swelling.    Real-time, duplex and color Doppler interrogation are performed. This demonstrates patency of the common and superficial femoral veins, popliteal veins, major calf veins and greater saphenous veins bilaterally. There are no findings of deep vein thrombosis.    IMPRESSION:    No evidence of deep venous thrombosis.    Electronically signed by:  Sinai Moreno MD  01/09/2024 04:37 PM CST Workstation: [id number]                                     X-Ray Chest AP (Final result)  Result time 01/09/24 14:51:18      Final result by Sinai Moreno IV, MD (01/09/24 14:51:18)                   Narrative:    Chest, single view    HISTORY: Shortness of breath and chest pain.    Comparison 7/26/2023.    The heart size is within normal limits. The central pulmonary vasculature is not acutely engorged.    The lungs are appropriately expanded. There are no confluent areas of airspace disease or significant volume loss. No effusion is demonstrated. Monitoring electrodes overlie the chest.    IMPRESSION:    No acute cardiopulmonary disease.    Electronically signed by:  Sinai Moreno MD  01/09/2024 02:51 PM CST Workstation: [id number]                                      Assessment/Plan:      * Hyperkalemia  This patient has hyperkalemia which is uncontrolled. We will monitor for arrhythmias with EKG or continuous telemetry. We will treat the hyperkalemia with  lokelma . The likely  etiology of the hyperkalemia is MINI.  The patients latest potassium has been reviewed and the results are listed below  Recent Labs   Lab 01/11/24  0328   K 5.7*     Continue lokelma 10 mg  Nephrology added Lasix IV  Follow chemistry  Follow mag & phos          Acute kidney injury superimposed on chronic kidney disease  Patient with acute kidney injury/acute renal failure likely due to  medication non-adherence to furosemide and spirolactone  +/- use of mounjaro. MINI is currently stable. Baseline creatinine  1.5  - Labs reviewed- Renal function/electrolytes with Estimated Creatinine Clearance: 47 mL/min (A) (based on SCr of 2.8 mg/dL (H)). according to latest data. Monitor urine output and serial BMP and adjust therapy as needed. Avoid nephrotoxins and renally dose meds for GFR listed above.  -Nephrology following  -Tighter BP control  - Continue ordered lokelma 10g daily- continue renal diet- added fluid restriction 1.5L    Hypochromic anemia  Patient's anemia is currently controlled. Has not received any PRBCs to date. Etiology likely d/t chronic disease due to Chronic Kidney Disease  Current CBC reviewed-   Lab Results   Component Value Date    HGB 10.0 (L) 01/09/2024    HCT 32.3 (L) 01/09/2024     Monitor serial CBC and transfuse if patient becomes hemodynamically unstable, symptomatic or H/H drops below 7/21.    BPH (benign prostatic hyperplasia)  Continue finasteride as prescribed      HLD (hyperlipidemia)  Continue ASA/Statin      Chest pain  Admitted for rule out ACS. Patient with recent negative cardiac work-up (07/2023). EKG is non-ischemic. Initial and 2nd troponin mildly elevated but flat. BNP wnl. Plan for continuous cardiac monitoring. Continue to trend serial troponins q6 hours X 2. ASA/NTG 0.4 mg SL PRN CP. Echo pending.   Daily labs + lipid panel + A1c  Will defer cardiology consult to AM hospitalist pending testing results  Further plan as per clinical course            Severe obesity (BMI >=  "40)  Body mass index is 46.75 kg/m². Morbid obesity complicates all aspects of disease management from diagnostic modalities to treatment. Weight loss encouraged and health benefits explained to patient.         Type 2 diabetes mellitus  Patient's FSGs are controlled on current medication regimen.  Last A1c reviewed-   Lab Results   Component Value Date    HGBA1C 6.5 (H) 01/10/2024     Most recent fingerstick glucose reviewed- No results for input(s): "POCTGLUCOSE" in the last 24 hours.  Current correctional scale  Low  Maintain anti-hyperglycemic dose as follows-   Antihyperglycemics (From admission, onward)      Start     Stop Route Frequency Ordered    01/09/24 2100  insulin detemir U-100 (Levemir) pen 15 Units         -- SubQ Nightly 01/09/24 1947    01/09/24 1923  insulin aspart U-100 pen 0-5 Units         -- SubQ Before meals & nightly PRN 01/09/24 1823          Hold Oral hypoglycemics and mounjaro while patient is in the hospital.    Hypertension  Chronic, controlled. Latest blood pressure and vitals reviewed-     Temp:  [93.8 °F (34.3 °C)-98.1 °F (36.7 °C)]   Pulse:  [61-77]   Resp:  [17-22]   BP: (117-178)/(57-81)   SpO2:  [93 %-98 %] .   Home meds for hypertension were reviewed and noted below.   Hypertension Medications               amLODIPine (NORVASC) 10 MG tablet Take 1 tablet (10 mg total) by mouth once daily.    furosemide (LASIX) 20 MG tablet Take 20 mg by mouth once daily.    hydrALAZINE (APRESOLINE) 25 MG tablet Take 1 tablet (25 mg total) by mouth every 8 (eight) hours.    metoprolol succinate (TOPROL-XL) 25 MG 24 hr tablet Take 25 mg by mouth once daily.    spironolactone (ALDACTONE) 50 MG tablet Take 1 tablet (50 mg total) by mouth once daily.    valsartan (DIOVAN) 160 MG tablet Take 160 mg by mouth once daily.          Grossly improved with restarting home medications and adjusting for renal  While in the hospital, will manage blood pressure as follows; hydralazine, metoprolol, " "amlodipine  Will utilize p.r.n. blood pressure medication only if patient's blood pressure greater than 160/100 and he develops symptoms such as worsening chest pain or shortness of breath.    Diabetic ulcer of toe of right foot associated with type 2 diabetes mellitus, with necrosis of bone  Patient's FSGs are controlled on current medication regimen.  Last A1c reviewed-   Lab Results   Component Value Date    HGBA1C 6.5 (H) 01/10/2024     Most recent fingerstick glucose reviewed- No results for input(s): "POCTGLUCOSE" in the last 24 hours.  Current correctional scale  Low  Maintain anti-hyperglycemic dose as follows-   Antihyperglycemics (From admission, onward)      Start     Stop Route Frequency Ordered    01/09/24 2100  insulin detemir U-100 (Levemir) pen 15 Units         -- SubQ Nightly 01/09/24 1947    01/09/24 1923  insulin aspart U-100 pen 0-5 Units         -- SubQ Before meals & nightly PRN 01/09/24 1823          Hold Oral hypoglycemics and mounjaro while patient is in the hospital.  Wound care consult appreciated.  Foot XR without osseous process      VTE Risk Mitigation (From admission, onward)           Ordered     heparin (porcine) injection 5,000 Units  Every 8 hours         01/09/24 2040     IP VTE HIGH RISK PATIENT  Once         01/09/24 2040     Place sequential compression device  Until discontinued         01/09/24 2040     Place MARCELINO hose  Until discontinued         01/09/24 1653     Place sequential compression device  Until discontinued         01/09/24 1653                    Discharge Planning   JONATHAN: 1/12/2024     Code Status: Full Code   Is the patient medically ready for discharge?:     Reason for patient still in hospital (select all that apply): Patient trending condition, Treatment, and Consult recommendations  Discharge Plan A: Home            Amy Hernandez, DNP, APRN, FNP-C  [facility]  [facility]  [email or web address]    "

## 2024-01-11 NOTE — ASSESSMENT & PLAN NOTE
"Patient's FSGs are controlled on current medication regimen.  Last A1c reviewed-   Lab Results   Component Value Date    HGBA1C 6.5 (H) 01/10/2024     Most recent fingerstick glucose reviewed- No results for input(s): "POCTGLUCOSE" in the last 24 hours.  Current correctional scale  Low  Maintain anti-hyperglycemic dose as follows-   Antihyperglycemics (From admission, onward)      Start     Stop Route Frequency Ordered    01/09/24 2100  insulin detemir U-100 (Levemir) pen 15 Units         -- SubQ Nightly 01/09/24 1947    01/09/24 1923  insulin aspart U-100 pen 0-5 Units         -- SubQ Before meals & nightly PRN 01/09/24 1823          Hold Oral hypoglycemics and mounjaro while patient is in the hospital.  Wound care consult appreciated.  Foot XR without osseous process  "

## 2024-01-11 NOTE — CONSULTS
Chief complaint:  Chest Pain and Shortness of Breath (+ edema)      HPI:  Abel Gibbs is a 58 y.o. male presenting with a DM ulcer of the plantar right foot and right great toe. Pt has been seeing [facility] for the wounds, and has had them for months. Pt has no other complaints today.    PMH:  As per HPI and below:  Past Medical History:   Diagnosis Date    Depression     Diabetes mellitus     Hypertension     Obesity     Osteomyelitis        Social History     Socioeconomic History    Marital status: Single   Tobacco Use    Smoking status: Never    Smokeless tobacco: Never   Substance and Sexual Activity    Alcohol use: Yes     Comment: occas    Drug use: Yes     Frequency: 1.0 times per week     Types: Marijuana     Comment: last use 2 days ago     Social Determinants of Health     Financial Resource Strain: Patient Declined (2/8/2023)    Overall Financial Resource Strain (CARDIA)     Difficulty of Paying Living Expenses: Patient declined   Food Insecurity: Patient Declined (2/8/2023)    Hunger Vital Sign     Worried About Running Out of Food in the Last Year: Patient declined     Ran Out of Food in the Last Year: Patient declined   Transportation Needs: Patient Declined (2/8/2023)    PRAPARE - Transportation     Lack of Transportation (Medical): Patient declined     Lack of Transportation (Non-Medical): Patient declined   Physical Activity: Patient Declined (2/8/2023)    Exercise Vital Sign     Days of Exercise per Week: Patient declined     Minutes of Exercise per Session: Patient declined   Stress: Patient Declined (2/8/2023)    [employer] - Occupational Stress Questionnaire     Feeling of Stress : Patient declined   Social Connections: Patient Declined (2/8/2023)    Social Connection and Isolation Panel [NHANES]     Frequency of Communication with Friends and Family: Patient declined     Frequency of Social Gatherings with Friends and Family: Patient declined     Attends  [employer] Services: Patient declined     Active Member of Clubs or Organizations: Patient declined     Attends Club or Organization Meetings: Patient declined     Marital Status: Patient declined   Housing Stability: Unknown (2/8/2023)    Housing Stability Vital Sign     Unable to Pay for Housing in the Last Year: Patient refused     Unstable Housing in the Last Year: Patient refused       Past Surgical History:   Procedure Laterality Date    ANGIOGRAM, CORONARY, WITH LEFT HEART CATHETERIZATION N/A 7/28/2023    Procedure: Angiogram, Coronary, with Left Heart Cath;  Surgeon: Alek Greenwood MD;  Location: [facility] CATH/EP LAB;  Service: Cardiology;  Laterality: N/A;       No family history on file.    Review of patient's allergies indicates:   Allergen Reactions    Adhesive Itching       No current facility-administered medications on file prior to encounter.     Current Outpatient Medications on File Prior to Encounter   Medication Sig Dispense Refill    amLODIPine (NORVASC) 10 MG tablet Take 1 tablet (10 mg total) by mouth once daily. 30 tablet 2    aspirin (ECOTRIN) 81 MG EC tablet Take 1 tablet (81 mg total) by mouth once daily. 30 tablet 2    atorvastatin (LIPITOR) 80 MG tablet Take 1 tablet (80 mg total) by mouth once daily. 30 tablet 5    FARXIGA 5 mg Tab tablet Take 5 mg by mouth once daily.      finasteride (PROSCAR) 5 mg tablet Take 5 mg by mouth once daily.      furosemide (LASIX) 20 MG tablet Take 20 mg by mouth once daily.      gabapentin (NEURONTIN) 800 MG tablet Take 800 mg by mouth 4 (four) times daily.      hydrALAZINE (APRESOLINE) 25 MG tablet Take 1 tablet (25 mg total) by mouth every 8 (eight) hours. 90 tablet 2    ketoconazole (NIZORAL) 2 % cream Apply 1 Application topically once daily.      LEVEMIR FLEXPEN 100 unit/mL (3 mL) InPn pen Inject 30 Units into the skin once daily.      LIDOcaine (LIDODERM) 5 % Place 1 patch onto the skin once daily.      meloxicam (MOBIC) 15 MG tablet Take 15 mg by mouth  once daily.      metFORMIN (GLUCOPHAGE) 1000 MG tablet Take 1,000 mg by mouth 2 (two) times daily with meals.      metoprolol succinate (TOPROL-XL) 25 MG 24 hr tablet Take 25 mg by mouth once daily.      MOUNJARO 7.5 mg/0.5 mL PnIj Inject 7.5 mg into the skin every 7 days.      oxyCODONE-acetaminophen (PERCOCET)  mg per tablet Take 1 tablet by mouth 3 (three) times daily as needed for Pain.      spironolactone (ALDACTONE) 50 MG tablet Take 1 tablet (50 mg total) by mouth once daily. 30 tablet 2    traZODone (DESYREL) 50 MG tablet Take 50 mg by mouth nightly as needed.      valsartan (DIOVAN) 160 MG tablet Take 160 mg by mouth once daily.      albuterol (PROVENTIL/VENTOLIN HFA) 90 mcg/actuation inhaler Inhale 1-2 puffs into the lungs every 6 (six) hours as needed for Wheezing. Rescue 18 g 5    blood sugar diagnostic Strp To check BG 4 times daily, to use with insurance preferred meter 200 each 0    blood-glucose meter kit To check BG 4 times daily, to use with insurance preferred meter 1 each 0    fluticasone propionate (FLONASE) 50 mcg/actuation nasal spray 1 spray (50 mcg total) by Each Nostril route daily as needed for Rhinitis.  0    lactobacillus acidophilus & bulgar (LACTINEX) 100 million cell packet Take 1 tablet by mouth 3 (three) times daily with meals.      lancets Misc To check BG 4 times daily, to use with insurance preferred meter 200 each 0    polyethylene glycol (MIRALAX) 17 gram/dose powder Take 17 g by mouth once daily. For constipation 850 g 2       ROS: As per HPI and below:  Pertinent items are noted in HPI.      Physical Exam:     Vitals:    01/10/24 1114 01/10/24 1445 01/10/24 1538 01/10/24 1547   BP: (!) 143/76  (!) 176/77    BP Location: Left arm  Left arm    Patient Position: Lying  Lying    Pulse: (!) 55  62 61   Resp: 19 17 18 18   Temp: 97.9 °F (36.6 °C)  (!) 93.8 °F (34.3 °C)    TempSrc: Oral  Oral    SpO2: 99%  98% 97%   Weight:       Height:           BP  Min: 139/72  Max:  "208/97  Temp  Av.1 °F (36.2 °C)  Min: 93.8 °F (34.3 °C)  Max: 98.1 °F (36.7 °C)  Pulse  Av  Min: 50  Max: 94  Resp  Av.8  Min: 8  Max: 23  SpO2  Av.4 %  Min: 95 %  Max: 99 %  Height  Av' 3" (190.5 cm)  Min: 6' 3" (190.5 cm)  Max: 6' 3" (190.5 cm)  Weight  Av.9 kg (368 lb)  Min: 161.5 kg (356 lb)  Max: 169.6 kg (374 lb)    Body mass index is 46.75 kg/m².          General:             Well developed, well nourished, no apparent distress  HEENT:              NCAT, no JVD, mucous membranes moist, EOM intact  Cardiovascular:  Regular rate and rhythm, normal S1, normal S2, No murmurs, rubs, or gallops  Respiratory:        Normal breath sounds, no wheezes, no rales, no rhonchi  Abdomen:           Bowel sounds present, non tender, non distended, no masses, no hepatojugular reflux  Extremities:        No clubbing, no cyanosis, no edema  Vascular:            2+ b/l radial.  Peripheral pulses intact.  No carotid bruits.  Neurological:      No focal deficits  Skin:                   No obvious rashes or erythema, right great toe and foot DM ulcer    Lab Results   Component Value Date    WBC 7.53 01/10/2024    HGB 9.1 (L) 01/10/2024    HCT 29.4 (L) 01/10/2024    MCV 94 01/10/2024     01/10/2024     Lab Results   Component Value Date    CHOL 132 01/10/2024    CHOL 220 (H) 2017     Lab Results   Component Value Date    HDL 51 01/10/2024    HDL 48 2017     Lab Results   Component Value Date    LDLCALC 57.8 (L) 01/10/2024    LDLCALC 155.0 2017     Lab Results   Component Value Date    TRIG 116 01/10/2024    TRIG 85 2017     Lab Results   Component Value Date    CHOLHDL 38.6 01/10/2024    CHOLHDL 21.8 2017     CMP  Recent Labs   Lab 01/10/24  0308   *   CALCIUM 8.3*   ALBUMIN 3.8   PROT 6.5   *   K 5.6*   CO2 25      BUN 45*   CREATININE 2.7*   ALKPHOS 30*   ALT 39   AST 29   BILITOT 0.5      Lab Results   Component Value Date    TSH 2.550 " 07/26/2023       .      Assessment and Recommendations       Diagnoses:    1. Right great toe DM ulcer  2. Right foot DM ulcer    Plan:  1. Calcium alginate + dry dressing to the right great toe and foot ulcer  2. Xray right great toe    Complexity:  moderate

## 2024-01-12 LAB
ALBUMIN SERPL BCP-MCNC: 3.9 G/DL (ref 3.5–5.2)
ALP SERPL-CCNC: 32 U/L (ref 55–135)
ALT SERPL W/O P-5'-P-CCNC: 28 U/L (ref 10–44)
ANION GAP SERPL CALC-SCNC: 5 MMOL/L (ref 8–16)
AST SERPL-CCNC: 28 U/L (ref 10–40)
BASOPHILS # BLD AUTO: 0.05 K/UL (ref 0–0.2)
BASOPHILS NFR BLD: 0.7 % (ref 0–1.9)
BILIRUB SERPL-MCNC: 0.6 MG/DL (ref 0.1–1)
BUN SERPL-MCNC: 54 MG/DL (ref 6–20)
CALCIUM SERPL-MCNC: 8.7 MG/DL (ref 8.7–10.5)
CHLORIDE SERPL-SCNC: 104 MMOL/L (ref 95–110)
CO2 SERPL-SCNC: 26 MMOL/L (ref 23–29)
CREAT SERPL-MCNC: 2.8 MG/DL (ref 0.5–1.4)
DIFFERENTIAL METHOD BLD: ABNORMAL
EOSINOPHIL # BLD AUTO: 0.3 K/UL (ref 0–0.5)
EOSINOPHIL NFR BLD: 4.5 % (ref 0–8)
ERYTHROCYTE [DISTWIDTH] IN BLOOD BY AUTOMATED COUNT: 15.7 % (ref 11.5–14.5)
EST. GFR  (NO RACE VARIABLE): 25 ML/MIN/1.73 M^2
GLUCOSE SERPL-MCNC: 126 MG/DL (ref 70–110)
GLUCOSE SERPL-MCNC: 132 MG/DL (ref 70–110)
GLUCOSE SERPL-MCNC: 146 MG/DL (ref 70–110)
GLUCOSE SERPL-MCNC: 192 MG/DL (ref 70–110)
HCT VFR BLD AUTO: 28.6 % (ref 40–54)
HGB BLD-MCNC: 9.1 G/DL (ref 14–18)
IMM GRANULOCYTES # BLD AUTO: 0.01 K/UL (ref 0–0.04)
IMM GRANULOCYTES NFR BLD AUTO: 0.1 % (ref 0–0.5)
LYMPHOCYTES # BLD AUTO: 1.7 K/UL (ref 1–4.8)
LYMPHOCYTES NFR BLD: 24.2 % (ref 18–48)
MAGNESIUM SERPL-MCNC: 2 MG/DL (ref 1.6–2.6)
MCH RBC QN AUTO: 29 PG (ref 27–31)
MCHC RBC AUTO-ENTMCNC: 31.8 G/DL (ref 32–36)
MCV RBC AUTO: 91 FL (ref 82–98)
MONOCYTES # BLD AUTO: 0.8 K/UL (ref 0.3–1)
MONOCYTES NFR BLD: 11.2 % (ref 4–15)
NEUTROPHILS # BLD AUTO: 4.1 K/UL (ref 1.8–7.7)
NEUTROPHILS NFR BLD: 59.3 % (ref 38–73)
NRBC BLD-RTO: 0 /100 WBC
PHOSPHATE SERPL-MCNC: 4.1 MG/DL (ref 2.7–4.5)
PLATELET # BLD AUTO: 187 K/UL (ref 150–450)
PMV BLD AUTO: 12.3 FL (ref 9.2–12.9)
POTASSIUM SERPL-SCNC: 5.8 MMOL/L (ref 3.5–5.1)
PROT SERPL-MCNC: 6.9 G/DL (ref 6–8.4)
RBC # BLD AUTO: 3.14 M/UL (ref 4.6–6.2)
SODIUM SERPL-SCNC: 135 MMOL/L (ref 136–145)
WBC # BLD AUTO: 6.89 K/UL (ref 3.9–12.7)

## 2024-01-12 PROCEDURE — 94640 AIRWAY INHALATION TREATMENT: CPT

## 2024-01-12 PROCEDURE — 63600175 PHARM REV CODE 636 W HCPCS: Performed by: NURSE PRACTITIONER

## 2024-01-12 PROCEDURE — 25000003 PHARM REV CODE 250: Performed by: NURSE PRACTITIONER

## 2024-01-12 PROCEDURE — 83735 ASSAY OF MAGNESIUM: CPT | Performed by: NURSE PRACTITIONER

## 2024-01-12 PROCEDURE — 63600175 PHARM REV CODE 636 W HCPCS: Performed by: INTERNAL MEDICINE

## 2024-01-12 PROCEDURE — 99900031 HC PATIENT EDUCATION (STAT)

## 2024-01-12 PROCEDURE — 85025 COMPLETE CBC W/AUTO DIFF WBC: CPT | Performed by: NURSE PRACTITIONER

## 2024-01-12 PROCEDURE — 21400001 HC TELEMETRY ROOM

## 2024-01-12 PROCEDURE — 36415 COLL VENOUS BLD VENIPUNCTURE: CPT | Performed by: NURSE PRACTITIONER

## 2024-01-12 PROCEDURE — 94761 N-INVAS EAR/PLS OXIMETRY MLT: CPT

## 2024-01-12 PROCEDURE — 25000242 PHARM REV CODE 250 ALT 637 W/ HCPCS: Performed by: NURSE PRACTITIONER

## 2024-01-12 PROCEDURE — 80053 COMPREHEN METABOLIC PANEL: CPT | Performed by: NURSE PRACTITIONER

## 2024-01-12 PROCEDURE — 25000003 PHARM REV CODE 250: Performed by: INTERNAL MEDICINE

## 2024-01-12 PROCEDURE — 84100 ASSAY OF PHOSPHORUS: CPT | Performed by: NURSE PRACTITIONER

## 2024-01-12 PROCEDURE — 25000003 PHARM REV CODE 250: Performed by: STUDENT IN AN ORGANIZED HEALTH CARE EDUCATION/TRAINING PROGRAM

## 2024-01-12 RX ORDER — METOLAZONE 2.5 MG/1
5 TABLET ORAL ONCE
Status: COMPLETED | OUTPATIENT
Start: 2024-01-12 | End: 2024-01-12

## 2024-01-12 RX ORDER — LACTULOSE 10 G/15ML
30 SOLUTION ORAL 3 TIMES DAILY
Status: DISCONTINUED | OUTPATIENT
Start: 2024-01-12 | End: 2024-01-13

## 2024-01-12 RX ADMIN — FLUTICASONE PROPIONATE 100 MCG: 50 SPRAY, METERED NASAL at 10:01

## 2024-01-12 RX ADMIN — FINASTERIDE 5 MG: 5 TABLET, FILM COATED ORAL at 09:01

## 2024-01-12 RX ADMIN — HYDRALAZINE HYDROCHLORIDE 25 MG: 25 TABLET ORAL at 05:01

## 2024-01-12 RX ADMIN — FUROSEMIDE 5 MG/HR: 10 INJECTION, SOLUTION INTRAVENOUS at 01:01

## 2024-01-12 RX ADMIN — HYDRALAZINE HYDROCHLORIDE 25 MG: 25 TABLET ORAL at 01:01

## 2024-01-12 RX ADMIN — POLYETHYLENE GLYCOL 3350 17 G: 17 POWDER, FOR SOLUTION ORAL at 09:01

## 2024-01-12 RX ADMIN — GUAIFENESIN AND DEXTROMETHORPHAN 10 ML: 100; 10 SYRUP ORAL at 05:01

## 2024-01-12 RX ADMIN — SODIUM ZIRCONIUM CYCLOSILICATE 10 G: 10 POWDER, FOR SUSPENSION ORAL at 10:01

## 2024-01-12 RX ADMIN — HEPARIN SODIUM 5000 UNITS: 5000 INJECTION INTRAVENOUS; SUBCUTANEOUS at 01:01

## 2024-01-12 RX ADMIN — TRAZODONE HYDROCHLORIDE 50 MG: 50 TABLET ORAL at 10:01

## 2024-01-12 RX ADMIN — AMLODIPINE BESYLATE 10 MG: 5 TABLET ORAL at 09:01

## 2024-01-12 RX ADMIN — METOLAZONE 5 MG: 2.5 TABLET ORAL at 12:01

## 2024-01-12 RX ADMIN — INSULIN DETEMIR 15 UNITS: 100 INJECTION, SOLUTION SUBCUTANEOUS at 10:01

## 2024-01-12 RX ADMIN — GABAPENTIN 800 MG: 300 CAPSULE ORAL at 03:01

## 2024-01-12 RX ADMIN — GABAPENTIN 800 MG: 300 CAPSULE ORAL at 10:01

## 2024-01-12 RX ADMIN — OXYCODONE HYDROCHLORIDE AND ACETAMINOPHEN 1 TABLET: 10; 325 TABLET ORAL at 06:01

## 2024-01-12 RX ADMIN — OXYCODONE HYDROCHLORIDE AND ACETAMINOPHEN 1 TABLET: 10; 325 TABLET ORAL at 12:01

## 2024-01-12 RX ADMIN — HYDRALAZINE HYDROCHLORIDE 25 MG: 25 TABLET ORAL at 10:01

## 2024-01-12 RX ADMIN — OXYCODONE HYDROCHLORIDE AND ACETAMINOPHEN 1 TABLET: 10; 325 TABLET ORAL at 05:01

## 2024-01-12 RX ADMIN — METOPROLOL SUCCINATE 25 MG: 25 TABLET, EXTENDED RELEASE ORAL at 09:01

## 2024-01-12 RX ADMIN — GABAPENTIN 800 MG: 300 CAPSULE ORAL at 09:01

## 2024-01-12 RX ADMIN — ASPIRIN 81 MG: 81 TABLET, COATED ORAL at 09:01

## 2024-01-12 RX ADMIN — HEPARIN SODIUM 5000 UNITS: 5000 INJECTION INTRAVENOUS; SUBCUTANEOUS at 05:01

## 2024-01-12 RX ADMIN — IPRATROPIUM BROMIDE AND ALBUTEROL SULFATE 3 ML: 2.5; .5 SOLUTION RESPIRATORY (INHALATION) at 09:01

## 2024-01-12 RX ADMIN — CETIRIZINE HYDROCHLORIDE 10 MG: 10 TABLET, FILM COATED ORAL at 09:01

## 2024-01-12 RX ADMIN — SODIUM ZIRCONIUM CYCLOSILICATE 10 G: 10 POWDER, FOR SUSPENSION ORAL at 09:01

## 2024-01-12 RX ADMIN — ACETAMINOPHEN 650 MG: 325 TABLET ORAL at 10:01

## 2024-01-12 RX ADMIN — HEPARIN SODIUM 5000 UNITS: 5000 INJECTION INTRAVENOUS; SUBCUTANEOUS at 10:01

## 2024-01-12 RX ADMIN — ATORVASTATIN CALCIUM 80 MG: 40 TABLET, FILM COATED ORAL at 09:01

## 2024-01-12 RX ADMIN — IPRATROPIUM BROMIDE AND ALBUTEROL SULFATE 3 ML: 2.5; .5 SOLUTION RESPIRATORY (INHALATION) at 11:01

## 2024-01-12 RX ADMIN — FERROUS SULFATE TAB 325 MG (65 MG ELEMENTAL FE) 1 EACH: 325 (65 FE) TAB at 09:01

## 2024-01-12 NOTE — ASSESSMENT & PLAN NOTE
This patient has hyperkalemia which is uncontrolled. We will monitor for arrhythmias with EKG or continuous telemetry. We will treat the hyperkalemia with  lokelma . The likely etiology of the hyperkalemia is MINI.  The patients latest potassium has been reviewed and the results are listed below  Recent Labs   Lab 01/12/24  0628   K 5.8*     Continue lokelma 10 mg  Lasix infusion per nephrology  Follow chemistry  Follow mag & phos

## 2024-01-12 NOTE — PLAN OF CARE
Patient AA 0x4. Ambulate independently. Tolerating Diabetic diet and po well. Po pain medication administered x 2 on this shift. Condition stable.

## 2024-01-12 NOTE — ASSESSMENT & PLAN NOTE
Chronic, controlled. Latest blood pressure and vitals reviewed-     Temp:  [98.2 °F (36.8 °C)-99.2 °F (37.3 °C)]   Pulse:  [66-81]   Resp:  [17-19]   BP: (112-155)/(60-85)   SpO2:  [93 %-98 %] .   Home meds for hypertension were reviewed and noted below.   Hypertension Medications               amLODIPine (NORVASC) 10 MG tablet Take 1 tablet (10 mg total) by mouth once daily.    furosemide (LASIX) 20 MG tablet Take 20 mg by mouth once daily.    hydrALAZINE (APRESOLINE) 25 MG tablet Take 1 tablet (25 mg total) by mouth every 8 (eight) hours.    metoprolol succinate (TOPROL-XL) 25 MG 24 hr tablet Take 25 mg by mouth once daily.    spironolactone (ALDACTONE) 50 MG tablet Take 1 tablet (50 mg total) by mouth once daily.    valsartan (DIOVAN) 160 MG tablet Take 160 mg by mouth once daily.          Grossly improved with restarting home medications and adjusting for renal  While in the hospital, will manage blood pressure as follows; hydralazine, metoprolol, amlodipine  Will utilize p.r.n. blood pressure medication only if patient's blood pressure greater than 160/100 and he develops symptoms such as worsening chest pain or shortness of breath.

## 2024-01-12 NOTE — PROGRESS NOTES
INPATIENT NEPHROLOGY Progress Note  [facility]    Patient Name: Abel Gibbs  Date: 01/12/2024    Reason for consultation: MINI    Chief Complaint:   Chief Complaint   Patient presents with    Chest Pain    Shortness of Breath     + edema       History of Present Illness:  Abel Gibbs is a 58 y.o. male with a history as  has a past medical history of Depression, Diabetes mellitus, Hypertension, Obesity, Osteomyelitis and CKD III who presented to the ED with a Chest Pain and Shortness of Breath (+ edema). Patient presents to the emergency room with a complaint of midsternal chest pain radiating into left chest wall that started approximately 3 days ago. He further reports shortness for breath when attempting to lie flat with chest pain worsening. He was seen by wound care for right foot ulcer who felt as though his face appeared a little swollen and recommended that he go to the emergency. Patient does report having a sinus infection about 1-2 weeks ago completed course of antibiotics.  He further states after completion of antibiotics he started with congestion and seems as though symptoms have gotten worse. Additionally, he reports BLE edema that chronic but appears a little worse. Patient unclear of medications as he reports the nurse fills his medication container weekly. He does tell me that he is on furosemide but has not taken it for last 3-4 days d/t leg cramps. Patient also prescribed spirolactone but does not know if he has been taking. Also on mounjaro. Denies fever, chills, diaphoresis, dizziness, HA, palpitations, NVD, recent trauma or any other associated symptoms. Does not smoke cigarettes, drinks occasionally and uses marijuana daily. Consulted for MINI/CKD.    Renal u/s:  The right kidney measures 8.9 cm in length, with normal cortical thickness and parenchymal echotexture, and no echogenic calculi or hydronephrosis. The left kidney measures 10.4 cm in length and has normal  cortical thickness and parenchymal echotexture, without echogenic calculi or hydronephrosis.     Notable home meds:  Norvasc, metoprolol, hydralazine, diovan, lasix, aldactone, farxiga, finasteride, meloxicam    Interval History:  1/10- highest /97, on RA, UOP 1L, CXR clear, no DVT, BNP normal, trop stable, echo pending  1/11- BP improved, on RA, voiding, echo with LVH, renal duplex pending, c/o dyspnea, cough- edema unchanged  1/12- VSS, on RA, UOP 3.1L, no change in weight, still short of breath with edema- move to cardiology for lasix gtt at 5mg/hr- ordered metolazone 5mg x 1- if cannot remove adequate fluid will need RRT- patient not keen on RRT but understands plan, renal imaging with normal sized kidneys and no KAREN    Plan of Care:    Assessment:  MINI/CKD III  HTN urgency/Edema- driven by renal disease   Hyponatremia  Hyperkalemia  SHPT  MARBELLA/Anemia of CKD  BPH    Plan:    - suspect MINI on CKD due to elevated BP with ischemic ATN and CRS both driven by underlying renal disease rather than cardiac dysfuncion  - BP improved but remains edematous and hyperkalemic- switch to lasix gtt- add metolazone today (unfortunately both norvasc and hydralazine will contribute to edema but options are limited due to renal dysfunction)  - echo noted- renal duplex noted- UA with 1+ protein- 400mg proteinuria - c/w HTN  - in setting of MINI and hyperK, hold ARB, MRA, farxiga and meloxicam  - make lokelma 10g BID- continue renal diet- continue fluid restriction 1.5L  - phos borderline- PTH c/w CKD III  - continue iron supplement- no acute RL needs- prior paraprotein w/u negative  - continue finasteride     Thank you for allowing us to participate in this patient's care. We will continue to follow.    Vital Signs:  Temp Readings from Last 3 Encounters:   01/12/24 99.2 °F (37.3 °C) (Oral)   07/28/23 98.2 °F (36.8 °C) (Oral)   06/04/23 98.5 °F (36.9 °C) (Oral)       Pulse Readings from Last 3 Encounters:   01/12/24 (!) 58    07/28/23 (!) 56   06/04/23 (!) 53       BP Readings from Last 3 Encounters:   01/12/24 137/60   07/28/23 (!) 167/80   06/04/23 133/62       Weight:  Wt Readings from Last 3 Encounters:   01/12/24 (!) 170 kg (374 lb 11.2 oz)   01/10/24 (!) 169.6 kg (374 lb)   07/26/23 (!) 158.8 kg (350 lb 3.2 oz)       Medications:  Scheduled Meds:   amLODIPine  10 mg Oral Daily    aspirin  81 mg Oral Daily    atorvastatin  80 mg Oral Daily    cetirizine  10 mg Oral Daily    ferrous sulfate  1 tablet Oral Daily    finasteride  5 mg Oral Daily    fluticasone propionate  2 spray Each Nostril QHS    gabapentin  800 mg Oral TID    heparin (porcine)  5,000 Units Subcutaneous Q8H    hydrALAZINE  25 mg Oral Q8H    insulin detemir U-100 (Levemir)  15 Units Subcutaneous QHS    lactulose  30 g Oral TID    metOLazone  5 mg Oral Once    metoprolol succinate  25 mg Oral Daily    polyethylene glycol  17 g Oral Daily    sodium zirconium cyclosilicate  10 g Oral BID     Continuous Infusions:   furosemide (LASIX) 2 mg/mL continuous infusion (non-titrating)       PRN Meds:.acetaminophen, albuterol-ipratropium, dextrose 50%, dextrose 50%, glucagon (human recombinant), glucose, glucose, hydrALAZINE, insulin aspart U-100, labetalol, magnesium oxide, magnesium oxide, melatonin, naloxone, ondansetron, oxyCODONE-acetaminophen, potassium bicarbonate, potassium bicarbonate, potassium bicarbonate, potassium, sodium phosphates, potassium, sodium phosphates, potassium, sodium phosphates, sodium chloride 0.9%, traZODone  No current facility-administered medications on file prior to encounter.     Current Outpatient Medications on File Prior to Encounter   Medication Sig Dispense Refill    amLODIPine (NORVASC) 10 MG tablet Take 1 tablet (10 mg total) by mouth once daily. 30 tablet 2    aspirin (ECOTRIN) 81 MG EC tablet Take 1 tablet (81 mg total) by mouth once daily. 30 tablet 2    atorvastatin (LIPITOR) 80 MG tablet Take 1 tablet (80 mg total) by mouth once  daily. 30 tablet 5    FARXIGA 5 mg Tab tablet Take 5 mg by mouth once daily.      finasteride (PROSCAR) 5 mg tablet Take 5 mg by mouth once daily.      furosemide (LASIX) 20 MG tablet Take 20 mg by mouth once daily.      gabapentin (NEURONTIN) 800 MG tablet Take 800 mg by mouth 4 (four) times daily.      hydrALAZINE (APRESOLINE) 25 MG tablet Take 1 tablet (25 mg total) by mouth every 8 (eight) hours. 90 tablet 2    ketoconazole (NIZORAL) 2 % cream Apply 1 Application topically once daily.      LEVEMIR FLEXPEN 100 unit/mL (3 mL) InPn pen Inject 30 Units into the skin once daily.      LIDOcaine (LIDODERM) 5 % Place 1 patch onto the skin once daily.      meloxicam (MOBIC) 15 MG tablet Take 15 mg by mouth once daily.      metFORMIN (GLUCOPHAGE) 1000 MG tablet Take 1,000 mg by mouth 2 (two) times daily with meals.      metoprolol succinate (TOPROL-XL) 25 MG 24 hr tablet Take 25 mg by mouth once daily.      MOUNJARO 7.5 mg/0.5 mL PnIj Inject 7.5 mg into the skin every 7 days.      oxyCODONE-acetaminophen (PERCOCET)  mg per tablet Take 1 tablet by mouth 3 (three) times daily as needed for Pain.      spironolactone (ALDACTONE) 50 MG tablet Take 1 tablet (50 mg total) by mouth once daily. 30 tablet 2    traZODone (DESYREL) 50 MG tablet Take 50 mg by mouth nightly as needed.      valsartan (DIOVAN) 160 MG tablet Take 160 mg by mouth once daily.      albuterol (PROVENTIL/VENTOLIN HFA) 90 mcg/actuation inhaler Inhale 1-2 puffs into the lungs every 6 (six) hours as needed for Wheezing. Rescue 18 g 5    blood sugar diagnostic Strp To check BG 4 times daily, to use with insurance preferred meter 200 each 0    blood-glucose meter kit To check BG 4 times daily, to use with insurance preferred meter 1 each 0    fluticasone propionate (FLONASE) 50 mcg/actuation nasal spray 1 spray (50 mcg total) by Each Nostril route daily as needed for Rhinitis.  0    lactobacillus acidophilus & bulgar (LACTINEX) 100 million cell packet Take 1  tablet by mouth 3 (three) times daily with meals.      lancets Misc To check BG 4 times daily, to use with insurance preferred meter 200 each 0    polyethylene glycol (MIRALAX) 17 gram/dose powder Take 17 g by mouth once daily. For constipation 850 g 2       Review of Systems:  Neg    Physical Exam:  General Appearance:    NAD, AAO x 3, cooperative, appears stated age   Head:    Normocephalic, atraumatic   Eyes:    PER, EOMI, and conjunctiva/sclera clear bilaterally       Mouth:   Moist mucus membranes, no thrush or oral lesions,       normal dentition   Back:     No CVA tenderness   Lungs:     Crackles   Chest wall:    No tenderness or deformity   Heart:    Regular rate and rhythm, S1 and S2 normal, no murmur, rub   or gallop   Abdomen:     Soft, non-tender, non-distended, bowel sounds active all four   quadrants, no RT or guarding, no masses, no organomegaly   Extremities:   Warm and well perfused, distal pulses are intact, no cyanosis, + edema   MSK:   No joint or muscle swelling, tenderness or deformity   Skin:   Skin color, texture, turgor normal, no rashes or lesions   Neurologic/Psychiatric:   CNII-XII intact, normal strength and sensation       throughout, no asterixis; normal affect, memory, judgement     and insight      Results:  Lab Results   Component Value Date     (L) 01/12/2024    K 5.8 (H) 01/12/2024     01/12/2024    CO2 26 01/12/2024    BUN 54 (H) 01/12/2024    CREATININE 2.8 (H) 01/12/2024    CALCIUM 8.7 01/12/2024    ANIONGAP 5 (L) 01/12/2024    ESTGFRAFRICA >60 03/11/2022    EGFRNONAA >60 03/11/2022       Lab Results   Component Value Date    CALCIUM 8.7 01/12/2024    PHOS 4.1 01/12/2024       Recent Labs   Lab 01/12/24  0628   WBC 6.89   RBC 3.14*   HGB 9.1*   HCT 28.6*      MCV 91   MCH 29.0   MCHC 31.8*         I have personally reviewed pertinent radiological imaging and reports.    I have spent > 35 minutes providing care for this patient for the above diagnoses. These  services have included chart/data/imaging review, evaluation, exam, formulation of plan, , note preparation, and discussions with staff involved in this patient's care.    Marlen Velazquez MD MPH  [facility] [address] [address]  [phone] (p)  [phone] (f)

## 2024-01-12 NOTE — PLAN OF CARE
Problem: Adult Inpatient Plan of Care  Goal: Plan of Care Review  Outcome: Ongoing, Progressing  Goal: Patient-Specific Goal (Individualized)  Outcome: Ongoing, Progressing  Goal: Absence of Hospital-Acquired Illness or Injury  Outcome: Ongoing, Progressing  Goal: Optimal Comfort and Wellbeing  Outcome: Ongoing, Progressing  Goal: Readiness for Transition of Care  Outcome: Ongoing, Progressing     Problem: Bariatric Environmental Safety  Goal: Safety Maintained with Care  Outcome: Ongoing, Progressing     Problem: Diabetes Comorbidity  Goal: Blood Glucose Level Within Targeted Range  Outcome: Ongoing, Progressing     Problem: Fluid and Electrolyte Imbalance (Acute Kidney Injury/Impairment)  Goal: Fluid and Electrolyte Balance  Outcome: Ongoing, Progressing     Problem: Oral Intake Inadequate (Acute Kidney Injury/Impairment)  Goal: Optimal Nutrition Intake  Outcome: Ongoing, Progressing     Problem: Renal Function Impairment (Acute Kidney Injury/Impairment)  Goal: Effective Renal Function  Outcome: Ongoing, Progressing     Problem: Impaired Wound Healing  Goal: Optimal Wound Healing  Outcome: Ongoing, Progressing

## 2024-01-12 NOTE — ASSESSMENT & PLAN NOTE
Patient with acute kidney injury/acute renal failure likely due to  medication non-adherence to furosemide and spirolactone  +/- use of mounjaro. MINI is currently stable. Baseline creatinine  1.5  - Labs reviewed- Renal function/electrolytes with Estimated Creatinine Clearance: 47.1 mL/min (A) (based on SCr of 2.8 mg/dL (H)). according to latest data. Monitor urine output and serial BMP and adjust therapy as needed. Avoid nephrotoxins and renally dose meds for GFR listed above.  -Nephrology following  -Tighter BP control  - Continue ordered lokelma 10g daily- continue renal diet- added fluid restriction 1.5L

## 2024-01-12 NOTE — NURSING
Nurses Note -- 4 Eyes      1/12/2024   1:52 PM      Skin assessed during: Transfer      [] No Altered Skin Integrity Present    []Prevention Measures Documented      [x] Yes- Altered Skin Integrity Present or Discovered   [x] LDA Added if Not in Epic (Describe Wound)   [] New Altered Skin Integrity was Present on Admit and Documented in LDA   [] Wound Image Taken  Wound on right foot toe   Wound Care Consulted? Yes    Attending Nurse:  Pollo Parsons RN/Staff Member:   [id number]

## 2024-01-12 NOTE — PROGRESS NOTES
[facility] Medicine  Progress Note    Patient Name: Abel Gibbs  MRN: [id number]  Patient Class: IP- Inpatient   Admission Date: 1/9/2024  Length of Stay: 2 days  Attending Physician: Carlos Montelongo MD  Primary Care Provider: Rupinder Pagan MD        Subjective:     Principal Problem:Hyperkalemia        HPI:  Abel Gibbs is a 58 y.o. male with a history as  has a past medical history of Depression, Diabetes mellitus, Hypertension, Obesity, and Osteomyelitis. who presented to the ED with a Chest Pain and Shortness of Breath (+ edema)    Patient presents to the emergency room with a complaint of midsternal chest pain radiating into left chest wall that started approximately 3 days ago.  He further reports shortness for breath when attempting to lye flat with chest pain worsening. He tell me he was seen by wound care for right foot ulcer who felt as though his face appeared a little swollen and recommended that he go to the emergency.     Patient does report having a sinus infection about 1-2 weeks ago completed course of antibiotics.  He further states after completion of antibiotics he started with congestion and seems as though symptoms have gotten worse. Additionally, he reports BLE edema that chronic but appears a little worse.    Patient unclear of medications as he reports the nurse fills his medication container weekly. He does tell me that he is on furosemide but has not taken it for last 3-4 days d/t leg cramps. Patient also prescribed spirolactone but does not know if he has been taking. Also on mounjaro.     Denies fever, chills, diaphoresis, dizziness, HA, palpitations, NVD, recent trauma or any other associated symptoms. Does not smoke cigarettes, drinks occasionally and uses marijuana daily.      Lab and imaging obtained and reviewed.  CBC completed and shows RBCs 3.48 H/H 10.0/32.3 MCHC 31.0 RDW is 15.9.  Chemistry profile shows potassium 5.7 Ag 5 BUN 41 creatinine 2.3  GFR 31.7 calcium 8.6 ALP 35 ALT 50.  BNP within normal range.  Initial high sensitive troponin 17.2. EKG shows NSR with incomplete R-BBB. CXR without acute cardiopulmonary findings.  On admit, afebrile HR 94 RR 20 /91 with O2 sats 97% on room air.        Ultrasound bilateral lower extremity without evidence of deep venous thrombosis.      Renal US  IMPRESSION: Asymmetric small right kidney. Otherwise normal sonographic appearance of the bilateral kidneys..     Angiogram 07/2023  Summary     The estimated blood loss was <50 mL.    The pre-procedure left ventricular end diastolic pressure was 16.    Nonobstructive coronaries with mild luminal irregularities in RCA and left circumflex and focal moderate stenosis of mid to distal LAD for which medical management is recommended.       Echo 07/2023  Summary  The left ventricle is normal in size with mild concentric hypertrophy and normal systolic function.  The estimated ejection fraction is 65%.  Normal left ventricular diastolic function.  Normal right ventricular size with normal right ventricular systolic function.  Mild mitral regurgitation.  Mild tricuspid regurgitation.  Normal central venous pressure (3 mmHg).  The estimated PA systolic pressure is 27 mmHg.    Per ED provider patient presents for evaluation of chest pain with lower extremity edema for 1 week. Labs reviewed and showed mildly elevated troponin without EKG changes. Also noted to have elevated BP and MINI, given ASA IV diuretics with NTG paste applied. US BLE negative for DVT. Will admit for ACS r/o.        Overview/Hospital Course:  Abel Gibbs is a 58 y.o. male with  has a past medical history of Depression, Diabetes mellitus, Hypertension, Obesity, and Osteomyelitis. who presented to the ED with a Chest Pain and Shortness of Breath (+ edema)    Patient presented for evaluation of 3 day history of CP and increased SOB after being treated for antibiotics for sinus infection for 1-2 weeks -  as well as he endorsed not taking his medication for about a week due to leg cramps. He was found to be hyperkalemic 6.0, with acute on chronic kidney failure with BUN/cr/GFR 46/2.5/28.7. His BP was also elevated 180/91 at the time of presentation. His home BP medications were restarted amlodipine, metoprolol and hydralazine added 2/2 renal. BP improved. Nephrology consulted for management of kidney impairment and hyperkalemia. Renal US showed showed no sonographic evidence of renal artery stenosis. Started on lokelma for hyperkalemia - slow to improve. Lasix IV added, fluid restriction 1.5L. CXR on 01/09/23 did not demonstrate evidence of effusion or increased congestion and 2D echo 01/10/23 showed hypertrophy, EF 55-60% with normal DF. Has right foot ulcer present on admission followed by wound care outpatient and by wound care and Dr. Serrato inpatient. XR right foot ulcer showed severe degenerative changes with no acute osseous abnormality.     BP significantly improved, continue lokelma 10g daily hyperkalemia - continue renal diet with 1.5L. Non compliant on UOP collection (I&Os inaccurate); Monitor potassium levels, Lasix infusion per nephrology - continue tele monitoring - appropriate to transfer to floor bed         Interval History: LE edema, persistent hyperkalemia    Review of Systems   Constitutional:  Positive for activity change (activity intolerance). Negative for appetite change, chills, diaphoresis and fever.   HENT:  Negative for congestion, hearing loss, sore throat, tinnitus and trouble swallowing.    Eyes:  Negative for photophobia, discharge, itching and visual disturbance.   Respiratory:  Positive for shortness of breath. Negative for apnea, cough, wheezing and stridor.    Cardiovascular:  Positive for leg swelling (2+ edema). Negative for chest pain and palpitations.   Gastrointestinal:  Negative for abdominal distention, abdominal pain, blood in stool, constipation, diarrhea and nausea.    Endocrine: Negative for polydipsia, polyphagia and polyuria.   Genitourinary:  Negative for difficulty urinating, dysuria, flank pain and frequency.   Musculoskeletal:  Negative for arthralgias, joint swelling and neck stiffness.   Skin:  Negative for color change, rash and wound.   Neurological:  Negative for dizziness, tremors, seizures, light-headedness, numbness and headaches.   Hematological:  Negative for adenopathy.   Psychiatric/Behavioral:  Negative for hallucinations and self-injury.      Objective:     Vital Signs (Most Recent):  Temp: 99.2 °F (37.3 °C) (01/12/24 0803)  Pulse: 74 (01/12/24 0803)  Resp: 18 (01/12/24 0803)  BP: 137/60 (01/12/24 0803)  SpO2: (!) 93 % (01/12/24 0803) Vital Signs (24h Range):  Temp:  [98.2 °F (36.8 °C)-99.2 °F (37.3 °C)] 99.2 °F (37.3 °C)  Pulse:  [66-81] 74  Resp:  [17-19] 18  SpO2:  [93 %-98 %] 93 %  BP: (112-155)/(60-85) 137/60     Weight: (!) 170 kg (374 lb 11.2 oz)  Body mass index is 46.83 kg/m².    Intake/Output Summary (Last 24 hours) at 1/12/2024 1033  Last data filed at 1/12/2024 0359  Gross per 24 hour   Intake 800 ml   Output 3125 ml   Net -2325 ml         Physical Exam  HENT:      Nose: Congestion present.   Eyes:      Extraocular Movements: Extraocular movements intact.      Pupils: Pupils are equal, round, and reactive to light.   Pulmonary:      Breath sounds: No wheezing.      Comments: Activity intolerance; maintaining oxygen sat  Musculoskeletal:      Right lower leg: Edema (2+ edema) present.      Left lower leg: Edema (2+ edema) present.      Comments: Reports activity intolerance - noted to be ambulating about the unit maintaining sat   Skin:     General: Skin is warm and dry.   Neurological:      Mental Status: He is oriented to person, place, and time.             Significant Labs: All pertinent labs within the past 24 hours have been reviewed.  CBC:   Recent Labs   Lab 01/11/24  0328 01/12/24  0628   WBC 6.76 6.89   HGB 9.0* 9.1*   HCT 28.9* 28.6*     187     CMP:   Recent Labs   Lab 01/11/24  0328 01/12/24  0628   * 135*   K 5.7* 5.8*    104   CO2 26 26   * 146*   BUN 55* 54*   CREATININE 2.8* 2.8*   CALCIUM 7.9* 8.7   PROT 6.7 6.9   ALBUMIN 3.8 3.9   BILITOT 0.4 0.6   ALKPHOS 29* 32*   AST 27 28   ALT 32 28   ANIONGAP 6* 5*     TSH:   Recent Labs   Lab 07/26/23  0401   TSH 2.550       Urine Studies:   Recent Labs   Lab 01/10/24  1138   COLORU Yellow   APPEARANCEUA Clear   PHUR 6.0   SPECGRAV 1.020   PROTEINUA 1+*   GLUCUA 1+*   KETONESU Negative   BILIRUBINUA Negative   OCCULTUA Negative   NITRITE Negative   UROBILINOGEN Negative   LEUKOCYTESUR Negative   RBCUA 1   WBCUA 0   BACTERIA Negative   SQUAMEPITHEL 0   HYALINECASTS 2*       Significant Imaging:  Imaging Results              US Retroperitoneal Complete (Final result)  Result time 01/09/24 17:29:05      Final result by Fede Tavares Jr., MD (01/09/24 17:29:05)                   Narrative:    HISTORY: Acute renal disease..    FINDINGS: The right kidney measures 8.9 cm in length, with normal cortical thickness and parenchymal echotexture, and no echogenic calculi or hydronephrosis. The left kidney measures 10.4 cm in length and has normal cortical thickness and parenchymal echotexture, without echogenic calculi or hydronephrosis.    The urinary bladder is normal, with no wall thickening or intraluminal mass. The abdominal aorta, aortic bifurcation and IVC are unremarkable.    IMPRESSION: Asymmetric small right kidney. Otherwise normal sonographic appearance of the bilateral kidneys..    Electronically signed by:  Fede Tavares MD  01/09/2024 05:29 PM CST Workstation: [id number]                                     US Lower Extremity Veins Bilateral (Final result)  Result time 01/09/24 16:37:13      Final result by Sinai Moreno IV, MD (01/09/24 16:37:13)                   Narrative:    Bilateral lower extremity venous Doppler evaluation    HISTORY: Leg pain and  swelling.    Real-time, duplex and color Doppler interrogation are performed. This demonstrates patency of the common and superficial femoral veins, popliteal veins, major calf veins and greater saphenous veins bilaterally. There are no findings of deep vein thrombosis.    IMPRESSION:    No evidence of deep venous thrombosis.    Electronically signed by:  Sinai Moreno MD  01/09/2024 04:37 PM CST Workstation: [id number]                                     X-Ray Chest AP (Final result)  Result time 01/09/24 14:51:18      Final result by Sinai Moreno IV, MD (01/09/24 14:51:18)                   Narrative:    Chest, single view    HISTORY: Shortness of breath and chest pain.    Comparison 7/26/2023.    The heart size is within normal limits. The central pulmonary vasculature is not acutely engorged.    The lungs are appropriately expanded. There are no confluent areas of airspace disease or significant volume loss. No effusion is demonstrated. Monitoring electrodes overlie the chest.    IMPRESSION:    No acute cardiopulmonary disease.    Electronically signed by:  Sinai Moreno MD  01/09/2024 02:51 PM CST Workstation: [id number]                                      Assessment/Plan:      * Hyperkalemia  This patient has hyperkalemia which is uncontrolled. We will monitor for arrhythmias with EKG or continuous telemetry. We will treat the hyperkalemia with  lokelma . The likely etiology of the hyperkalemia is MINI.  The patients latest potassium has been reviewed and the results are listed below  Recent Labs   Lab 01/12/24  0628   K 5.8*     Continue lokelma 10 mg  Lasix infusion per nephrology  Follow chemistry  Follow mag & phos          Acute kidney injury superimposed on chronic kidney disease  Patient with acute kidney injury/acute renal failure likely due to  medication non-adherence to furosemide and spirolactone  +/- use of mounjaro. MINI is currently stable. Baseline creatinine  1.5  - Labs reviewed- Renal  function/electrolytes with Estimated Creatinine Clearance: 47.1 mL/min (A) (based on SCr of 2.8 mg/dL (H)). according to latest data. Monitor urine output and serial BMP and adjust therapy as needed. Avoid nephrotoxins and renally dose meds for GFR listed above.  -Nephrology following  -Tighter BP control  - Continue ordered lokelma 10g daily- continue renal diet- added fluid restriction 1.5L    Hypochromic anemia  Patient's anemia is currently controlled. Has not received any PRBCs to date. Etiology likely d/t chronic disease due to Chronic Kidney Disease  Current CBC reviewed-   Lab Results   Component Value Date    HGB 10.0 (L) 01/09/2024    HCT 32.3 (L) 01/09/2024     Monitor serial CBC and transfuse if patient becomes hemodynamically unstable, symptomatic or H/H drops below 7/21.    BPH (benign prostatic hyperplasia)  Continue finasteride as prescribed      HLD (hyperlipidemia)  Continue ASA/Statin      Chest pain  Admitted for rule out ACS. Patient with recent negative cardiac work-up (07/2023). EKG is non-ischemic. Initial and 2nd troponin mildly elevated but flat. BNP wnl. Plan for continuous cardiac monitoring. Continue to trend serial troponins q6 hours X 2. ASA/NTG 0.4 mg SL PRN CP. Echo pending.   Daily labs + lipid panel + A1c  Will defer cardiology consult to AM hospitalist pending testing results  Further plan as per clinical course            Severe obesity (BMI >= 40)  Body mass index is 46.75 kg/m². Morbid obesity complicates all aspects of disease management from diagnostic modalities to treatment. Weight loss encouraged and health benefits explained to patient.         Non compliance with medical treatment  Not taking BP meds at home  BP improved with restarting home meds  Now with FVO and acute on chronic kidney failure  Treatment ongoing      Type 2 diabetes mellitus  Patient's FSGs are controlled on current medication regimen.  Last A1c reviewed-   Lab Results   Component Value Date    HGBA1C  "6.5 (H) 01/10/2024     Most recent fingerstick glucose reviewed- No results for input(s): "POCTGLUCOSE" in the last 24 hours.  Current correctional scale  Low  Maintain anti-hyperglycemic dose as follows-   Antihyperglycemics (From admission, onward)      Start     Stop Route Frequency Ordered    01/09/24 2100  insulin detemir U-100 (Levemir) pen 15 Units         -- SubQ Nightly 01/09/24 1947    01/09/24 1923  insulin aspart U-100 pen 0-5 Units         -- SubQ Before meals & nightly PRN 01/09/24 1823          Hold Oral hypoglycemics and mounjaro while patient is in the hospital.    Hypertension  Chronic, controlled. Latest blood pressure and vitals reviewed-     Temp:  [98.2 °F (36.8 °C)-99.2 °F (37.3 °C)]   Pulse:  [66-81]   Resp:  [17-19]   BP: (112-155)/(60-85)   SpO2:  [93 %-98 %] .   Home meds for hypertension were reviewed and noted below.   Hypertension Medications               amLODIPine (NORVASC) 10 MG tablet Take 1 tablet (10 mg total) by mouth once daily.    furosemide (LASIX) 20 MG tablet Take 20 mg by mouth once daily.    hydrALAZINE (APRESOLINE) 25 MG tablet Take 1 tablet (25 mg total) by mouth every 8 (eight) hours.    metoprolol succinate (TOPROL-XL) 25 MG 24 hr tablet Take 25 mg by mouth once daily.    spironolactone (ALDACTONE) 50 MG tablet Take 1 tablet (50 mg total) by mouth once daily.    valsartan (DIOVAN) 160 MG tablet Take 160 mg by mouth once daily.          Grossly improved with restarting home medications and adjusting for renal  While in the hospital, will manage blood pressure as follows; hydralazine, metoprolol, amlodipine  Will utilize p.r.n. blood pressure medication only if patient's blood pressure greater than 160/100 and he develops symptoms such as worsening chest pain or shortness of breath.    Diabetic ulcer of toe of right foot associated with type 2 diabetes mellitus, with necrosis of bone  Patient's FSGs are controlled on current medication regimen.  Last A1c reviewed-   Lab " "Results   Component Value Date    HGBA1C 6.5 (H) 01/10/2024     Most recent fingerstick glucose reviewed- No results for input(s): "POCTGLUCOSE" in the last 24 hours.  Current correctional scale  Low  Maintain anti-hyperglycemic dose as follows-   Antihyperglycemics (From admission, onward)      Start     Stop Route Frequency Ordered    01/09/24 2100  insulin detemir U-100 (Levemir) pen 15 Units         -- SubQ Nightly 01/09/24 1947    01/09/24 1923  insulin aspart U-100 pen 0-5 Units         -- SubQ Before meals & nightly PRN 01/09/24 1823          Hold Oral hypoglycemics and mounjaro while patient is in the hospital.  Wound care consult appreciated.  Foot XR without osseous process      VTE Risk Mitigation (From admission, onward)           Ordered     heparin (porcine) injection 5,000 Units  Every 8 hours         01/09/24 2040     IP VTE HIGH RISK PATIENT  Once         01/09/24 2040     Place sequential compression device  Until discontinued         01/09/24 2040     Place MARCELINO hose  Until discontinued         01/09/24 1653     Place sequential compression device  Until discontinued         01/09/24 1653                    Discharge Planning   JONATHAN: 1/12/2024     Code Status: Full Code   Is the patient medically ready for discharge?:     Reason for patient still in hospital (select all that apply): Laboratory test, Treatment, and Consult recommendations  Discharge Plan A: Home              Amy Hernandez, DNP, APRN, FNP-C  [facility]  [facility]  [email or web address]    "

## 2024-01-12 NOTE — ASSESSMENT & PLAN NOTE
Not taking BP meds at home  BP improved with restarting home meds  Now with FVO and acute on chronic kidney failure  Treatment ongoing

## 2024-01-12 NOTE — NURSING
1315- Received patient to unit via wheelchair. No acute distress noted. Patient ambulated to bed. Call light in reach, bed in lowest position, wheels locked, side rails up x2, and bed alarm refused.

## 2024-01-12 NOTE — CARE UPDATE
01/11/24 2130   Patient Assessment/Suction   Level of Consciousness (AVPU) alert   Respiratory Effort Normal   Expansion/Accessory Muscles/Retractions no use of accessory muscles   All Lung Fields Breath Sounds diminished;wheezes, expiratory   Rhythm/Pattern, Respiratory unlabored   Cough Frequency no cough   PRE-TX-O2   Device (Oxygen Therapy) room air   SpO2 97 %   Pulse Oximetry Type Intermittent   $ Pulse Oximetry - Multiple Charge Pulse Oximetry - Multiple   Pulse 66   Resp 19   Aerosol Therapy   $ Aerosol Therapy Charges Aerosol Treatment   Daily Review of Necessity (SVN) completed   Respiratory Treatment Status (SVN) given   Treatment Route (SVN) mouthpiece   Patient Position (SVN) sitting on edge of bed   Post Treatment Assessment (SVN) breath sounds unchanged;vital signs unchanged   Signs of Intolerance (SVN) none   Education   $ Education Bronchodilator;15 min   Respiratory Evaluation   $ Care Plan Tech Time 15 min

## 2024-01-12 NOTE — SUBJECTIVE & OBJECTIVE
Interval History: LE edema, persistent hyperkalemia    Review of Systems   Constitutional:  Positive for activity change (activity intolerance). Negative for appetite change, chills, diaphoresis and fever.   HENT:  Negative for congestion, hearing loss, sore throat, tinnitus and trouble swallowing.    Eyes:  Negative for photophobia, discharge, itching and visual disturbance.   Respiratory:  Positive for shortness of breath. Negative for apnea, cough, wheezing and stridor.    Cardiovascular:  Positive for leg swelling (2+ edema). Negative for chest pain and palpitations.   Gastrointestinal:  Negative for abdominal distention, abdominal pain, blood in stool, constipation, diarrhea and nausea.   Endocrine: Negative for polydipsia, polyphagia and polyuria.   Genitourinary:  Negative for difficulty urinating, dysuria, flank pain and frequency.   Musculoskeletal:  Negative for arthralgias, joint swelling and neck stiffness.   Skin:  Negative for color change, rash and wound.   Neurological:  Negative for dizziness, tremors, seizures, light-headedness, numbness and headaches.   Hematological:  Negative for adenopathy.   Psychiatric/Behavioral:  Negative for hallucinations and self-injury.      Objective:     Vital Signs (Most Recent):  Temp: 99.2 °F (37.3 °C) (01/12/24 0803)  Pulse: 74 (01/12/24 0803)  Resp: 18 (01/12/24 0803)  BP: 137/60 (01/12/24 0803)  SpO2: (!) 93 % (01/12/24 0803) Vital Signs (24h Range):  Temp:  [98.2 °F (36.8 °C)-99.2 °F (37.3 °C)] 99.2 °F (37.3 °C)  Pulse:  [66-81] 74  Resp:  [17-19] 18  SpO2:  [93 %-98 %] 93 %  BP: (112-155)/(60-85) 137/60     Weight: (!) 170 kg (374 lb 11.2 oz)  Body mass index is 46.83 kg/m².    Intake/Output Summary (Last 24 hours) at 1/12/2024 1033  Last data filed at 1/12/2024 0359  Gross per 24 hour   Intake 800 ml   Output 3125 ml   Net -2325 ml         Physical Exam  HENT:      Nose: Congestion present.   Eyes:      Extraocular Movements: Extraocular movements intact.       Pupils: Pupils are equal, round, and reactive to light.   Pulmonary:      Breath sounds: No wheezing.      Comments: Activity intolerance; maintaining oxygen sat  Musculoskeletal:      Right lower leg: Edema (2+ edema) present.      Left lower leg: Edema (2+ edema) present.      Comments: Reports activity intolerance - noted to be ambulating about the unit maintaining sat   Skin:     General: Skin is warm and dry.   Neurological:      Mental Status: He is oriented to person, place, and time.             Significant Labs: All pertinent labs within the past 24 hours have been reviewed.  CBC:   Recent Labs   Lab 01/11/24 0328 01/12/24  0628   WBC 6.76 6.89   HGB 9.0* 9.1*   HCT 28.9* 28.6*    187     CMP:   Recent Labs   Lab 01/11/24 0328 01/12/24  0628   * 135*   K 5.7* 5.8*    104   CO2 26 26   * 146*   BUN 55* 54*   CREATININE 2.8* 2.8*   CALCIUM 7.9* 8.7   PROT 6.7 6.9   ALBUMIN 3.8 3.9   BILITOT 0.4 0.6   ALKPHOS 29* 32*   AST 27 28   ALT 32 28   ANIONGAP 6* 5*     TSH:   Recent Labs   Lab 07/26/23  0401   TSH 2.550       Urine Studies:   Recent Labs   Lab 01/10/24  1138   COLORU Yellow   APPEARANCEUA Clear   PHUR 6.0   SPECGRAV 1.020   PROTEINUA 1+*   GLUCUA 1+*   KETONESU Negative   BILIRUBINUA Negative   OCCULTUA Negative   NITRITE Negative   UROBILINOGEN Negative   LEUKOCYTESUR Negative   RBCUA 1   WBCUA 0   BACTERIA Negative   SQUAMEPITHEL 0   HYALINECASTS 2*       Significant Imaging:  Imaging Results              US Retroperitoneal Complete (Final result)  Result time 01/09/24 17:29:05      Final result by Fede Tavares Jr., MD (01/09/24 17:29:05)                   Narrative:    HISTORY: Acute renal disease..    FINDINGS: The right kidney measures 8.9 cm in length, with normal cortical thickness and parenchymal echotexture, and no echogenic calculi or hydronephrosis. The left kidney measures 10.4 cm in length and has normal cortical thickness and parenchymal echotexture,  without echogenic calculi or hydronephrosis.    The urinary bladder is normal, with no wall thickening or intraluminal mass. The abdominal aorta, aortic bifurcation and IVC are unremarkable.    IMPRESSION: Asymmetric small right kidney. Otherwise normal sonographic appearance of the bilateral kidneys..    Electronically signed by:  Fede Tavares MD  01/09/2024 05:29 PM CST Workstation: [id number]                                     US Lower Extremity Veins Bilateral (Final result)  Result time 01/09/24 16:37:13      Final result by Sinai Moreno IV, MD (01/09/24 16:37:13)                   Narrative:    Bilateral lower extremity venous Doppler evaluation    HISTORY: Leg pain and swelling.    Real-time, duplex and color Doppler interrogation are performed. This demonstrates patency of the common and superficial femoral veins, popliteal veins, major calf veins and greater saphenous veins bilaterally. There are no findings of deep vein thrombosis.    IMPRESSION:    No evidence of deep venous thrombosis.    Electronically signed by:  Sinai Moreno MD  01/09/2024 04:37 PM CST Workstation: [id number]                                     X-Ray Chest AP (Final result)  Result time 01/09/24 14:51:18      Final result by Sinai Moreno IV, MD (01/09/24 14:51:18)                   Narrative:    Chest, single view    HISTORY: Shortness of breath and chest pain.    Comparison 7/26/2023.    The heart size is within normal limits. The central pulmonary vasculature is not acutely engorged.    The lungs are appropriately expanded. There are no confluent areas of airspace disease or significant volume loss. No effusion is demonstrated. Monitoring electrodes overlie the chest.    IMPRESSION:    No acute cardiopulmonary disease.    Electronically signed by:  Sinai Moreno MD  01/09/2024 02:51 PM CST Workstation: [id number]

## 2024-01-13 PROBLEM — E11.621 DIABETIC ULCER OF TOE OF RIGHT FOOT ASSOCIATED WITH TYPE 2 DIABETES MELLITUS, WITH NECROSIS OF BONE: Chronic | Status: ACTIVE | Noted: 2020-12-30

## 2024-01-13 PROBLEM — F12.20 DELTA-9-TETRAHYDROCANNABINOL (THC) DEPENDENCE: Chronic | Status: ACTIVE | Noted: 2024-01-13

## 2024-01-13 PROBLEM — I16.0 HYPERTENSIVE URGENCY: Status: RESOLVED | Noted: 2024-01-13 | Resolved: 2024-01-13

## 2024-01-13 PROBLEM — E87.70 HYPERVOLEMIA: Status: ACTIVE | Noted: 2024-01-13

## 2024-01-13 PROBLEM — D64.9 ANEMIA: Status: ACTIVE | Noted: 2024-01-13

## 2024-01-13 PROBLEM — I50.33 ACUTE ON CHRONIC DIASTOLIC HEART FAILURE: Status: ACTIVE | Noted: 2024-01-13

## 2024-01-13 PROBLEM — I16.0 HYPERTENSIVE URGENCY: Status: ACTIVE | Noted: 2024-01-13

## 2024-01-13 PROBLEM — N18.30 CKD (CHRONIC KIDNEY DISEASE), STAGE III: Status: ACTIVE | Noted: 2024-01-13

## 2024-01-13 PROBLEM — F12.20 DELTA-9-TETRAHYDROCANNABINOL (THC) DEPENDENCE: Status: ACTIVE | Noted: 2024-01-13

## 2024-01-13 PROBLEM — L97.514 DIABETIC ULCER OF TOE OF RIGHT FOOT ASSOCIATED WITH TYPE 2 DIABETES MELLITUS, WITH NECROSIS OF BONE: Chronic | Status: ACTIVE | Noted: 2020-12-30

## 2024-01-13 LAB
ALBUMIN SERPL BCP-MCNC: 3.7 G/DL (ref 3.5–5.2)
ALP SERPL-CCNC: 30 U/L (ref 55–135)
ALT SERPL W/O P-5'-P-CCNC: 23 U/L (ref 10–44)
ANION GAP SERPL CALC-SCNC: 4 MMOL/L (ref 8–16)
AST SERPL-CCNC: 23 U/L (ref 10–40)
BASOPHILS # BLD AUTO: 0.08 K/UL (ref 0–0.2)
BASOPHILS NFR BLD: 1.3 % (ref 0–1.9)
BILIRUB SERPL-MCNC: 0.4 MG/DL (ref 0.1–1)
BUN SERPL-MCNC: 64 MG/DL (ref 6–20)
CALCIUM SERPL-MCNC: 8.1 MG/DL (ref 8.7–10.5)
CHLORIDE SERPL-SCNC: 103 MMOL/L (ref 95–110)
CO2 SERPL-SCNC: 29 MMOL/L (ref 23–29)
CREAT SERPL-MCNC: 3.2 MG/DL (ref 0.5–1.4)
DIFFERENTIAL METHOD BLD: ABNORMAL
EOSINOPHIL # BLD AUTO: 0.3 K/UL (ref 0–0.5)
EOSINOPHIL NFR BLD: 5.4 % (ref 0–8)
ERYTHROCYTE [DISTWIDTH] IN BLOOD BY AUTOMATED COUNT: 15.5 % (ref 11.5–14.5)
EST. GFR  (NO RACE VARIABLE): 21.3 ML/MIN/1.73 M^2
FERRITIN SERPL-MCNC: 69.9 NG/ML (ref 20–300)
FOLATE SERPL-MCNC: 14 NG/ML (ref 4–24)
GLUCOSE SERPL-MCNC: 153 MG/DL (ref 70–110)
GLUCOSE SERPL-MCNC: 177 MG/DL (ref 70–110)
GLUCOSE SERPL-MCNC: 213 MG/DL (ref 70–110)
HCT VFR BLD AUTO: 29.2 % (ref 40–54)
HGB BLD-MCNC: 9 G/DL (ref 14–18)
IMM GRANULOCYTES # BLD AUTO: 0.02 K/UL (ref 0–0.04)
IMM GRANULOCYTES NFR BLD AUTO: 0.3 % (ref 0–0.5)
IRON SERPL-MCNC: 46 UG/DL (ref 45–160)
LYMPHOCYTES # BLD AUTO: 2.2 K/UL (ref 1–4.8)
LYMPHOCYTES NFR BLD: 34.3 % (ref 18–48)
MAGNESIUM SERPL-MCNC: 2.2 MG/DL (ref 1.6–2.6)
MCH RBC QN AUTO: 28.6 PG (ref 27–31)
MCHC RBC AUTO-ENTMCNC: 30.8 G/DL (ref 32–36)
MCV RBC AUTO: 93 FL (ref 82–98)
MONOCYTES # BLD AUTO: 0.8 K/UL (ref 0.3–1)
MONOCYTES NFR BLD: 12.3 % (ref 4–15)
NEUTROPHILS # BLD AUTO: 2.9 K/UL (ref 1.8–7.7)
NEUTROPHILS NFR BLD: 46.4 % (ref 38–73)
NRBC BLD-RTO: 0 /100 WBC
PHOSPHATE SERPL-MCNC: 5.7 MG/DL (ref 2.7–4.5)
PLATELET # BLD AUTO: 180 K/UL (ref 150–450)
PMV BLD AUTO: 11.5 FL (ref 9.2–12.9)
POTASSIUM SERPL-SCNC: 5.8 MMOL/L (ref 3.5–5.1)
PROT SERPL-MCNC: 6.7 G/DL (ref 6–8.4)
RBC # BLD AUTO: 3.15 M/UL (ref 4.6–6.2)
SATURATED IRON: 14 % (ref 20–50)
SODIUM SERPL-SCNC: 136 MMOL/L (ref 136–145)
TOTAL IRON BINDING CAPACITY: 329 UG/DL (ref 250–450)
TRANSFERRIN SERPL-MCNC: 235 MG/DL (ref 200–375)
VIT B12 SERPL-MCNC: 336 PG/ML (ref 210–950)
WBC # BLD AUTO: 6.26 K/UL (ref 3.9–12.7)

## 2024-01-13 PROCEDURE — 25000003 PHARM REV CODE 250: Performed by: INTERNAL MEDICINE

## 2024-01-13 PROCEDURE — 82728 ASSAY OF FERRITIN: CPT | Performed by: NURSE PRACTITIONER

## 2024-01-13 PROCEDURE — 99900035 HC TECH TIME PER 15 MIN (STAT)

## 2024-01-13 PROCEDURE — 83735 ASSAY OF MAGNESIUM: CPT | Performed by: NURSE PRACTITIONER

## 2024-01-13 PROCEDURE — 25000003 PHARM REV CODE 250: Performed by: STUDENT IN AN ORGANIZED HEALTH CARE EDUCATION/TRAINING PROGRAM

## 2024-01-13 PROCEDURE — 82746 ASSAY OF FOLIC ACID SERUM: CPT | Performed by: NURSE PRACTITIONER

## 2024-01-13 PROCEDURE — 84100 ASSAY OF PHOSPHORUS: CPT | Performed by: NURSE PRACTITIONER

## 2024-01-13 PROCEDURE — 63600175 PHARM REV CODE 636 W HCPCS: Performed by: NURSE PRACTITIONER

## 2024-01-13 PROCEDURE — 21400001 HC TELEMETRY ROOM

## 2024-01-13 PROCEDURE — 63600175 PHARM REV CODE 636 W HCPCS: Performed by: INTERNAL MEDICINE

## 2024-01-13 PROCEDURE — 94640 AIRWAY INHALATION TREATMENT: CPT

## 2024-01-13 PROCEDURE — 99900031 HC PATIENT EDUCATION (STAT)

## 2024-01-13 PROCEDURE — 36415 COLL VENOUS BLD VENIPUNCTURE: CPT | Performed by: NURSE PRACTITIONER

## 2024-01-13 PROCEDURE — 25000003 PHARM REV CODE 250: Performed by: NURSE PRACTITIONER

## 2024-01-13 PROCEDURE — 25000242 PHARM REV CODE 250 ALT 637 W/ HCPCS: Performed by: NURSE PRACTITIONER

## 2024-01-13 PROCEDURE — 82607 VITAMIN B-12: CPT | Performed by: NURSE PRACTITIONER

## 2024-01-13 PROCEDURE — 85025 COMPLETE CBC W/AUTO DIFF WBC: CPT | Performed by: NURSE PRACTITIONER

## 2024-01-13 PROCEDURE — 94761 N-INVAS EAR/PLS OXIMETRY MLT: CPT

## 2024-01-13 PROCEDURE — 83540 ASSAY OF IRON: CPT | Performed by: NURSE PRACTITIONER

## 2024-01-13 PROCEDURE — 80053 COMPREHEN METABOLIC PANEL: CPT | Performed by: NURSE PRACTITIONER

## 2024-01-13 RX ORDER — INSULIN ASPART 100 [IU]/ML
0-10 INJECTION, SOLUTION INTRAVENOUS; SUBCUTANEOUS
Status: DISCONTINUED | OUTPATIENT
Start: 2024-01-13 | End: 2024-01-19 | Stop reason: HOSPADM

## 2024-01-13 RX ORDER — METOLAZONE 2.5 MG/1
5 TABLET ORAL ONCE
Status: COMPLETED | OUTPATIENT
Start: 2024-01-13 | End: 2024-01-13

## 2024-01-13 RX ORDER — GLUCAGON 1 MG
1 KIT INJECTION
Status: DISCONTINUED | OUTPATIENT
Start: 2024-01-13 | End: 2024-01-19 | Stop reason: HOSPADM

## 2024-01-13 RX ORDER — IBUPROFEN 200 MG
24 TABLET ORAL
Status: DISCONTINUED | OUTPATIENT
Start: 2024-01-13 | End: 2024-01-19 | Stop reason: HOSPADM

## 2024-01-13 RX ORDER — IBUPROFEN 200 MG
16 TABLET ORAL
Status: DISCONTINUED | OUTPATIENT
Start: 2024-01-13 | End: 2024-01-19 | Stop reason: HOSPADM

## 2024-01-13 RX ORDER — AMOXICILLIN 250 MG
2 CAPSULE ORAL 2 TIMES DAILY
Status: DISCONTINUED | OUTPATIENT
Start: 2024-01-13 | End: 2024-01-19 | Stop reason: HOSPADM

## 2024-01-13 RX ORDER — ATORVASTATIN CALCIUM 40 MG/1
80 TABLET, FILM COATED ORAL NIGHTLY
Status: DISCONTINUED | OUTPATIENT
Start: 2024-01-14 | End: 2024-01-19 | Stop reason: HOSPADM

## 2024-01-13 RX ADMIN — INSULIN ASPART 2 UNITS: 100 INJECTION, SOLUTION INTRAVENOUS; SUBCUTANEOUS at 06:01

## 2024-01-13 RX ADMIN — IPRATROPIUM BROMIDE AND ALBUTEROL SULFATE 3 ML: 2.5; .5 SOLUTION RESPIRATORY (INHALATION) at 01:01

## 2024-01-13 RX ADMIN — GABAPENTIN 800 MG: 300 CAPSULE ORAL at 03:01

## 2024-01-13 RX ADMIN — GABAPENTIN 800 MG: 300 CAPSULE ORAL at 09:01

## 2024-01-13 RX ADMIN — INSULIN DETEMIR 15 UNITS: 100 INJECTION, SOLUTION SUBCUTANEOUS at 09:01

## 2024-01-13 RX ADMIN — HEPARIN SODIUM 5000 UNITS: 5000 INJECTION INTRAVENOUS; SUBCUTANEOUS at 09:01

## 2024-01-13 RX ADMIN — AMLODIPINE BESYLATE 10 MG: 5 TABLET ORAL at 09:01

## 2024-01-13 RX ADMIN — CETIRIZINE HYDROCHLORIDE 10 MG: 10 TABLET, FILM COATED ORAL at 09:01

## 2024-01-13 RX ADMIN — FINASTERIDE 5 MG: 5 TABLET, FILM COATED ORAL at 09:01

## 2024-01-13 RX ADMIN — SODIUM ZIRCONIUM CYCLOSILICATE 10 G: 10 POWDER, FOR SUSPENSION ORAL at 09:01

## 2024-01-13 RX ADMIN — OXYCODONE HYDROCHLORIDE AND ACETAMINOPHEN 1 TABLET: 10; 325 TABLET ORAL at 05:01

## 2024-01-13 RX ADMIN — SENNOSIDES AND DOCUSATE SODIUM 2 TABLET: 8.6; 5 TABLET ORAL at 09:01

## 2024-01-13 RX ADMIN — OXYCODONE HYDROCHLORIDE AND ACETAMINOPHEN 1 TABLET: 10; 325 TABLET ORAL at 07:01

## 2024-01-13 RX ADMIN — FUROSEMIDE 5 MG/HR: 10 INJECTION, SOLUTION INTRAVENOUS at 04:01

## 2024-01-13 RX ADMIN — HEPARIN SODIUM 5000 UNITS: 5000 INJECTION INTRAVENOUS; SUBCUTANEOUS at 05:01

## 2024-01-13 RX ADMIN — FERROUS SULFATE TAB 325 MG (65 MG ELEMENTAL FE) 1 EACH: 325 (65 FE) TAB at 09:01

## 2024-01-13 RX ADMIN — HEPARIN SODIUM 5000 UNITS: 5000 INJECTION INTRAVENOUS; SUBCUTANEOUS at 03:01

## 2024-01-13 RX ADMIN — IPRATROPIUM BROMIDE AND ALBUTEROL SULFATE 3 ML: 2.5; .5 SOLUTION RESPIRATORY (INHALATION) at 07:01

## 2024-01-13 RX ADMIN — METOLAZONE 5 MG: 2.5 TABLET ORAL at 11:01

## 2024-01-13 RX ADMIN — OXYCODONE HYDROCHLORIDE AND ACETAMINOPHEN 1 TABLET: 10; 325 TABLET ORAL at 12:01

## 2024-01-13 RX ADMIN — HYDRALAZINE HYDROCHLORIDE 25 MG: 25 TABLET ORAL at 05:01

## 2024-01-13 RX ADMIN — SENNOSIDES AND DOCUSATE SODIUM 2 TABLET: 8.6; 5 TABLET ORAL at 11:01

## 2024-01-13 RX ADMIN — OXYCODONE HYDROCHLORIDE AND ACETAMINOPHEN 1 TABLET: 10; 325 TABLET ORAL at 11:01

## 2024-01-13 RX ADMIN — HYDRALAZINE HYDROCHLORIDE 25 MG: 25 TABLET ORAL at 09:01

## 2024-01-13 RX ADMIN — ASPIRIN 81 MG: 81 TABLET, COATED ORAL at 09:01

## 2024-01-13 RX ADMIN — HYDRALAZINE HYDROCHLORIDE 25 MG: 25 TABLET ORAL at 03:01

## 2024-01-13 RX ADMIN — FLUTICASONE PROPIONATE 100 MCG: 50 SPRAY, METERED NASAL at 09:01

## 2024-01-13 RX ADMIN — LACTULOSE 30 G: 20 SOLUTION ORAL at 09:01

## 2024-01-13 RX ADMIN — METOPROLOL SUCCINATE 25 MG: 25 TABLET, EXTENDED RELEASE ORAL at 09:01

## 2024-01-13 NOTE — SUBJECTIVE & OBJECTIVE
Interval History:  See hospital course.  Patient does report shortness of breath with minimal exertion.  States last bowel movement was 2 days ago.  States he has not been tested for AMBREEN.  Telemetry sinus rhythm, on room air, afebrile, BP trend better.  Labs with hemoglobin 9, potassium 5.8, BUN/creatinine 64/3.2, HbA1c 6.5%.  Echo with EF of 55-60%.  Documented urine output last 24 hours 3375 cc.  Discussed with patient.  Discussed with nursing.    Review of Systems   Constitutional:  Negative for fever.   Respiratory:  Positive for shortness of breath.    Cardiovascular:  Positive for leg swelling.   Skin:  Positive for wound.     Objective:     Vital Signs (Most Recent):  Temp: 98.1 °F (36.7 °C) (01/13/24 0708)  Pulse: 68 (01/13/24 0744)  Resp: 15 (01/13/24 0744)  BP: 111/78 (01/13/24 0708)  SpO2: 95 % (01/13/24 0744) Vital Signs (24h Range):  Temp:  [97.4 °F (36.3 °C)-98.7 °F (37.1 °C)] 98.1 °F (36.7 °C)  Pulse:  [58-72] 68  Resp:  [15-20] 15  SpO2:  [92 %-99 %] 95 %  BP: (111-166)/(66-88) 111/78     Weight: (!) 170.5 kg (375 lb 14.2 oz)  Body mass index is 46.98 kg/m².    Intake/Output Summary (Last 24 hours) at 1/13/2024 1047  Last data filed at 1/13/2024 1038  Gross per 24 hour   Intake 835 ml   Output 2975 ml   Net -2140 ml         Physical Exam  Vitals and nursing note reviewed.   Constitutional:       Appearance: He is obese.      Comments: Lying in bed   HENT:      Head: Normocephalic and atraumatic.      Mouth/Throat:      Mouth: Mucous membranes are moist.   Cardiovascular:      Rate and Rhythm: Normal rate and regular rhythm.      Comments: 1+ lower extremity pitting edema  Pulmonary:      Comments: On room air, distant breath sounds, no wheeze or accessory muscle use  Abdominal:      Palpations: Abdomen is soft.      Tenderness: There is no abdominal tenderness.   Skin:     Comments: Dressing to the right foot.  Wearing Evert compression stockings.  Area of erythema/excoriation bilateral upper  "extremities   Neurological:      Mental Status: He is alert and oriented to person, place, and time.   Psychiatric:         Mood and Affect: Mood normal.             Significant Labs: BMP:   Recent Labs   Lab 01/13/24 0527   *      K 5.8*      CO2 29   BUN 64*   CREATININE 3.2*   CALCIUM 8.1*   MG 2.2     CBC:   Recent Labs   Lab 01/12/24 0628 01/13/24 0527   WBC 6.89 6.26   HGB 9.1* 9.0*   HCT 28.6* 29.2*    180     CMP:   Recent Labs   Lab 01/12/24 0628 01/13/24 0527   * 136   K 5.8* 5.8*    103   CO2 26 29   * 153*   BUN 54* 64*   CREATININE 2.8* 3.2*   CALCIUM 8.7 8.1*   PROT 6.9 6.7   ALBUMIN 3.9 3.7   BILITOT 0.6 0.4   ALKPHOS 32* 30*   AST 28 23   ALT 28 23   ANIONGAP 5* 4*     Cardiac Markers: No results for input(s): "CKMB", "MYOGLOBIN", "BNP", "TROPISTAT" in the last 48 hours.  Lactic Acid: No results for input(s): "LACTATE" in the last 48 hours.  Magnesium:   Recent Labs   Lab 01/12/24 0628 01/13/24 0527   MG 2.0 2.2     POCT Glucose: No results for input(s): "POCTGLUCOSE" in the last 48 hours.    Significant Imaging: I have reviewed all pertinent imaging results/findings within the past 24 hours.  "

## 2024-01-13 NOTE — PLAN OF CARE
CM noted patient plans to resume home health services at discharge.  [facility]  CM following for CESAR date.

## 2024-01-13 NOTE — PROGRESS NOTES
INPATIENT NEPHROLOGY Progress Note  [facility]    Patient Name: Abel Gibbs  Date: 01/13/2024    Reason for consultation: MINI    Chief Complaint:   Chief Complaint   Patient presents with    Chest Pain    Shortness of Breath     + edema       History of Present Illness:  Abel Gibbs is a 58 y.o. male with a history as  has a past medical history of Depression, Diabetes mellitus, Hypertension, Obesity, Osteomyelitis and CKD III who presented to the ED with a Chest Pain and Shortness of Breath (+ edema). Patient presents to the emergency room with a complaint of midsternal chest pain radiating into left chest wall that started approximately 3 days ago. He further reports shortness for breath when attempting to lie flat with chest pain worsening. He was seen by wound care for right foot ulcer who felt as though his face appeared a little swollen and recommended that he go to the emergency. Patient does report having a sinus infection about 1-2 weeks ago completed course of antibiotics.  He further states after completion of antibiotics he started with congestion and seems as though symptoms have gotten worse. Additionally, he reports BLE edema that chronic but appears a little worse. Patient unclear of medications as he reports the nurse fills his medication container weekly. He does tell me that he is on furosemide but has not taken it for last 3-4 days d/t leg cramps. Patient also prescribed spirolactone but does not know if he has been taking. Also on mounjaro. Denies fever, chills, diaphoresis, dizziness, HA, palpitations, NVD, recent trauma or any other associated symptoms. Does not smoke cigarettes, drinks occasionally and uses marijuana daily. Consulted for MINI/CKD.    Renal u/s:  The right kidney measures 8.9 cm in length, with normal cortical thickness and parenchymal echotexture, and no echogenic calculi or hydronephrosis. The left kidney measures 10.4 cm in length and has normal  cortical thickness and parenchymal echotexture, without echogenic calculi or hydronephrosis.     Notable home meds:  Norvasc, metoprolol, hydralazine, diovan, lasix, aldactone, farxiga, finasteride, meloxicam    Interval History:  1/10- highest /97, on RA, UOP 1L, CXR clear, no DVT, BNP normal, trop stable, echo pending  1/11- BP improved, on RA, voiding, echo with LVH, renal duplex pending, c/o dyspnea, cough- edema unchanged  1/12- VSS, on RA, UOP 3.1L, no change in weight, still short of breath with edema- move to cardiology for lasix gtt at 5mg/hr- ordered metolazone 5mg x 1- if cannot remove adequate fluid will need RRT- patient not keen on RRT but understands plan, renal imaging with normal sized kidneys and no KAREN  1/13  VSS, UOP 3.375L.  hyperkalemic w/bump in BUN/Scr.  No complaints.    Plan of Care:    Assessment:  MINI/CKD III  HTN urgency/Edema- driven by renal disease   Hyponatremia  Hyperkalemia  SHPT  MARBELLA/Anemia of CKD  BPH    Plan:    - suspect MINI on CKD due to elevated BP with ischemic ATN and CRS both driven by underlying renal disease rather than cardiac dysfuncion  - BP improved but remains edematous and hyperkalemic- switch to lasix gtt- add metolazone 1/12 (unfortunately both norvasc and hydralazine will contribute to edema but options are limited due to renal dysfunction)  - echo noted- renal duplex noted- UA with 1+ protein- 400mg proteinuria - c/w HTN  - in setting of MINI and hyperK, hold ARB, MRA, farxiga and meloxicam  - make lokelma 10g BID- continue renal diet- continue fluid restriction 1.5L  - phos borderline- PTH c/w CKD III  - continue iron supplement- no acute RL needs- prior paraprotein w/u negative  - continue finasteride     Thank you for allowing us to participate in this patient's care. We will continue to follow.    Vital Signs:  Temp Readings from Last 3 Encounters:   01/13/24 98.1 °F (36.7 °C) (Oral)   07/28/23 98.2 °F (36.8 °C) (Oral)   06/04/23 98.5 °F (36.9 °C)  (Oral)       Pulse Readings from Last 3 Encounters:   01/13/24 68   07/28/23 (!) 56   06/04/23 (!) 53       BP Readings from Last 3 Encounters:   01/13/24 111/78   07/28/23 (!) 167/80   06/04/23 133/62       Weight:  Wt Readings from Last 3 Encounters:   01/12/24 (!) 170.5 kg (375 lb 14.2 oz)   01/10/24 (!) 169.6 kg (374 lb)   07/26/23 (!) 158.8 kg (350 lb 3.2 oz)       Medications:  Scheduled Meds:   amLODIPine  10 mg Oral Daily    aspirin  81 mg Oral Daily    [START ON 1/14/2024] atorvastatin  80 mg Oral QHS    cetirizine  10 mg Oral Daily    ferrous sulfate  1 tablet Oral Daily    finasteride  5 mg Oral Daily    fluticasone propionate  2 spray Each Nostril QHS    gabapentin  800 mg Oral TID    heparin (porcine)  5,000 Units Subcutaneous Q8H    hydrALAZINE  25 mg Oral Q8H    insulin detemir U-100 (Levemir)  15 Units Subcutaneous QHS    metOLazone  5 mg Oral Once    metoprolol succinate  25 mg Oral Daily    polyethylene glycol  17 g Oral Daily    senna-docusate 8.6-50 mg  2 tablet Oral BID    sodium zirconium cyclosilicate  10 g Oral BID     Continuous Infusions:   furosemide (LASIX) 2 mg/mL continuous infusion (non-titrating) 5 mg/hr (01/12/24 1339)     PRN Meds:.acetaminophen, albuterol-ipratropium, dextrose 50%, dextrose 50%, glucagon (human recombinant), glucose, glucose, hydrALAZINE, insulin aspart U-100, labetalol, magnesium oxide, magnesium oxide, melatonin, naloxone, ondansetron, oxyCODONE-acetaminophen, potassium bicarbonate, potassium bicarbonate, potassium bicarbonate, potassium, sodium phosphates, potassium, sodium phosphates, potassium, sodium phosphates, sodium chloride 0.9%, traZODone  No current facility-administered medications on file prior to encounter.     Current Outpatient Medications on File Prior to Encounter   Medication Sig Dispense Refill    amLODIPine (NORVASC) 10 MG tablet Take 1 tablet (10 mg total) by mouth once daily. 30 tablet 2    aspirin (ECOTRIN) 81 MG EC tablet Take 1 tablet (81  mg total) by mouth once daily. 30 tablet 2    atorvastatin (LIPITOR) 80 MG tablet Take 1 tablet (80 mg total) by mouth once daily. 30 tablet 5    FARXIGA 5 mg Tab tablet Take 5 mg by mouth once daily.      finasteride (PROSCAR) 5 mg tablet Take 5 mg by mouth once daily.      furosemide (LASIX) 20 MG tablet Take 20 mg by mouth once daily.      gabapentin (NEURONTIN) 800 MG tablet Take 800 mg by mouth 4 (four) times daily.      hydrALAZINE (APRESOLINE) 25 MG tablet Take 1 tablet (25 mg total) by mouth every 8 (eight) hours. 90 tablet 2    ketoconazole (NIZORAL) 2 % cream Apply 1 Application topically once daily.      LEVEMIR FLEXPEN 100 unit/mL (3 mL) InPn pen Inject 30 Units into the skin once daily.      LIDOcaine (LIDODERM) 5 % Place 1 patch onto the skin once daily.      meloxicam (MOBIC) 15 MG tablet Take 15 mg by mouth once daily.      metFORMIN (GLUCOPHAGE) 1000 MG tablet Take 1,000 mg by mouth 2 (two) times daily with meals.      metoprolol succinate (TOPROL-XL) 25 MG 24 hr tablet Take 25 mg by mouth once daily.      MOUNJARO 7.5 mg/0.5 mL PnIj Inject 7.5 mg into the skin every 7 days.      oxyCODONE-acetaminophen (PERCOCET)  mg per tablet Take 1 tablet by mouth 3 (three) times daily as needed for Pain.      spironolactone (ALDACTONE) 50 MG tablet Take 1 tablet (50 mg total) by mouth once daily. 30 tablet 2    traZODone (DESYREL) 50 MG tablet Take 50 mg by mouth nightly as needed.      valsartan (DIOVAN) 160 MG tablet Take 160 mg by mouth once daily.      albuterol (PROVENTIL/VENTOLIN HFA) 90 mcg/actuation inhaler Inhale 1-2 puffs into the lungs every 6 (six) hours as needed for Wheezing. Rescue 18 g 5    blood sugar diagnostic Strp To check BG 4 times daily, to use with insurance preferred meter 200 each 0    blood-glucose meter kit To check BG 4 times daily, to use with insurance preferred meter 1 each 0    fluticasone propionate (FLONASE) 50 mcg/actuation nasal spray 1 spray (50 mcg total) by Each  Nostril route daily as needed for Rhinitis.  0    lactobacillus acidophilus & bulgar (LACTINEX) 100 million cell packet Take 1 tablet by mouth 3 (three) times daily with meals.      lancets Misc To check BG 4 times daily, to use with insurance preferred meter 200 each 0    polyethylene glycol (MIRALAX) 17 gram/dose powder Take 17 g by mouth once daily. For constipation 850 g 2       Review of Systems:  Neg    Physical Exam:  General Appearance:    NAD, AAO x 3, cooperative, appears stated age   Head:    Normocephalic, atraumatic   Eyes:    PER, EOMI, and conjunctiva/sclera clear bilaterally       Mouth:   Moist mucus membranes, no thrush or oral lesions,       normal dentition   Back:     No CVA tenderness   Lungs:     Crackles   Chest wall:    No tenderness or deformity   Heart:    Regular rate and rhythm, S1 and S2 normal, no murmur, rub   or gallop   Abdomen:     Soft, non-tender, non-distended, bowel sounds active all four   quadrants, no RT or guarding, no masses, no organomegaly   Extremities:   Warm and well perfused, distal pulses are intact, no cyanosis, + edema   MSK:   No joint or muscle swelling, tenderness or deformity   Skin:   Skin color, texture, turgor normal, no rashes or lesions   Neurologic/Psychiatric:   CNII-XII intact, normal strength and sensation       throughout, no asterixis; normal affect, memory, judgement     and insight      Results:  Lab Results   Component Value Date     01/13/2024    K 5.8 (H) 01/13/2024     01/13/2024    CO2 29 01/13/2024    BUN 64 (H) 01/13/2024    CREATININE 3.2 (H) 01/13/2024    CALCIUM 8.1 (L) 01/13/2024    ANIONGAP 4 (L) 01/13/2024    ESTGFRAFRICA >60 03/11/2022    EGFRNONAA >60 03/11/2022       Lab Results   Component Value Date    CALCIUM 8.1 (L) 01/13/2024    PHOS 5.7 (H) 01/13/2024       Recent Labs   Lab 01/13/24  0527   WBC 6.26   RBC 3.15*   HGB 9.0*   HCT 29.2*      MCV 93   MCH 28.6   MCHC 30.8*         I have personally reviewed  pertinent radiological imaging and reports.    I have spent > 35 minutes providing care for this patient for the above diagnoses. These services have included chart/data/imaging review, evaluation, exam, formulation of plan, , note preparation, and discussions with staff involved in this patient's care.    Marlen Velazquez MD MPH  [facility]  [address] [place] [id number]  [phone] (p)  [phone] (f)

## 2024-01-13 NOTE — PROGRESS NOTES
[facility] Medicine  Progress Note    Patient Name: Abel Gibbs  MRN: [id number]  Patient Class: IP- Inpatient   Admission Date: 1/9/2024  Length of Stay: 3 days  Attending Physician: Eli Stacy MD  Primary Care Provider: Rupinder Pagan MD        Subjective:     Principal Problem:Hypervolemia        HPI:  Abel Gibbs is a 58 y.o. male with a history as  has a past medical history of Depression, Diabetes mellitus, Hypertension, Obesity, and Osteomyelitis. who presented to the ED with a Chest Pain and Shortness of Breath (+ edema)    Patient presents to the emergency room with a complaint of midsternal chest pain radiating into left chest wall that started approximately 3 days ago.  He further reports shortness for breath when attempting to lye flat with chest pain worsening. He tell me he was seen by wound care for right foot ulcer who felt as though his face appeared a little swollen and recommended that he go to the emergency.     Patient does report having a sinus infection about 1-2 weeks ago completed course of antibiotics.  He further states after completion of antibiotics he started with congestion and seems as though symptoms have gotten worse. Additionally, he reports BLE edema that chronic but appears a little worse.    Patient unclear of medications as he reports the nurse fills his medication container weekly. He does tell me that he is on furosemide but has not taken it for last 3-4 days d/t leg cramps. Patient also prescribed spirolactone but does not know if he has been taking. Also on mounjaro.     Denies fever, chills, diaphoresis, dizziness, HA, palpitations, NVD, recent trauma or any other associated symptoms. Does not smoke cigarettes, drinks occasionally and uses marijuana daily.      Lab and imaging obtained and reviewed.  CBC completed and shows RBCs 3.48 H/H 10.0/32.3 MCHC 31.0 RDW is 15.9.  Chemistry profile shows potassium 5.7 Ag 5 BUN 41  creatinine 2.3 GFR 31.7 calcium 8.6 ALP 35 ALT 50.  BNP within normal range.  Initial high sensitive troponin 17.2. EKG shows NSR with incomplete R-BBB. CXR without acute cardiopulmonary findings.  On admit, afebrile HR 94 RR 20 /91 with O2 sats 97% on room air.        Ultrasound bilateral lower extremity without evidence of deep venous thrombosis.      Renal US  IMPRESSION: Asymmetric small right kidney. Otherwise normal sonographic appearance of the bilateral kidneys..     Angiogram 07/2023  Summary     The estimated blood loss was <50 mL.    The pre-procedure left ventricular end diastolic pressure was 16.    Nonobstructive coronaries with mild luminal irregularities in RCA and left circumflex and focal moderate stenosis of mid to distal LAD for which medical management is recommended.       Echo 07/2023  Summary  The left ventricle is normal in size with mild concentric hypertrophy and normal systolic function.  The estimated ejection fraction is 65%.  Normal left ventricular diastolic function.  Normal right ventricular size with normal right ventricular systolic function.  Mild mitral regurgitation.  Mild tricuspid regurgitation.  Normal central venous pressure (3 mmHg).  The estimated PA systolic pressure is 27 mmHg.    Per ED provider patient presents for evaluation of chest pain with lower extremity edema for 1 week. Labs reviewed and showed mildly elevated troponin without EKG changes. Also noted to have elevated BP and MINI, given ASA IV diuretics with NTG paste applied. US BLE negative for DVT. Will admit for ACS r/o.        Overview/Hospital Course:  Abel Gibbs is a 58 y.o. male with  has a past medical history of Depression, Diabetes mellitus, Hypertension, Obesity, and Osteomyelitis. who presented to the ED with a Chest Pain and Shortness of Breath (+ edema)    Patient presented for evaluation of 3 day history of CP and increased SOB after being treated for antibiotics for sinus infection  for 1-2 weeks - as well as he endorsed not taking his medication for about a week due to leg cramps. He was found to be hyperkalemic 6.0, with acute on chronic kidney failure with BUN/cr/GFR 46/2.5/28.7. His BP was also elevated 180/91 at the time of presentation. His home BP medications were restarted amlodipine, metoprolol and hydralazine added 2/2 renal. BP improved. Nephrology consulted for management of kidney impairment and hyperkalemia. Renal US showed showed no sonographic evidence of renal artery stenosis. Started on lokelma for hyperkalemia - slow to improve. Lasix IV added, fluid restriction 1.5L. CXR on 01/09/23 did not demonstrate evidence of effusion or increased congestion and 2D echo 01/10/23 showed hypertrophy, EF 55-60% with normal DF. Has right foot ulcer present on admission followed by wound care outpatient and by wound care and Dr. Serrato inpatient. XR right foot ulcer showed severe degenerative changes with no acute osseous abnormality.     Assumed care of this patient on 1/13/24.  Patient with history of morbid obesity with BMI 47, suspected undiagnosed untreated AMBREEN, CKD stage 3, diabetes mellitus with HbA1c 6.5%, chronic right/great toe diabetic ulcer, followed by wound care, hypertension, heart failure with preserved ejection fraction, anemia, BPH, depression.  He presented from outpatient wound care due to concerns for volume overload.  Hypertensive urgency in the ED with hyperkalemia, he was admitted with Nephrology consultation and started on IV diuresis.  Renal ultrasound negative with no evidence of renal artery stenosis.  Echo with EF of 55-60%.  Ongoing hyperkalemia and started on scheduled low chemo.  Still volume overloaded on on 1/12 transfer to telemetry floor and initiated on Lasix infusion.      Interval History:  See hospital course.  Patient does report shortness of breath with minimal exertion.  States last bowel movement was 2 days ago.  States he has not been tested for  AMBREEN.  Telemetry sinus rhythm, on room air, afebrile, BP trend better.  Labs with hemoglobin 9, potassium 5.8, BUN/creatinine 64/3.2, HbA1c 6.5%.  Echo with EF of 55-60%.  Documented urine output last 24 hours 3375 cc.  Discussed with patient.  Discussed with nursing.    Review of Systems   Constitutional:  Negative for fever.   Respiratory:  Positive for shortness of breath.    Cardiovascular:  Positive for leg swelling.   Skin:  Positive for wound.     Objective:     Vital Signs (Most Recent):  Temp: 98.1 °F (36.7 °C) (01/13/24 0708)  Pulse: 68 (01/13/24 0744)  Resp: 15 (01/13/24 0744)  BP: 111/78 (01/13/24 0708)  SpO2: 95 % (01/13/24 0744) Vital Signs (24h Range):  Temp:  [97.4 °F (36.3 °C)-98.7 °F (37.1 °C)] 98.1 °F (36.7 °C)  Pulse:  [58-72] 68  Resp:  [15-20] 15  SpO2:  [92 %-99 %] 95 %  BP: (111-166)/(66-88) 111/78     Weight: (!) 170.5 kg (375 lb 14.2 oz)  Body mass index is 46.98 kg/m².    Intake/Output Summary (Last 24 hours) at 1/13/2024 1047  Last data filed at 1/13/2024 1038  Gross per 24 hour   Intake 835 ml   Output 2975 ml   Net -2140 ml         Physical Exam  Vitals and nursing note reviewed.   Constitutional:       Appearance: He is obese.      Comments: Lying in bed   HENT:      Head: Normocephalic and atraumatic.      Mouth/Throat:      Mouth: Mucous membranes are moist.   Cardiovascular:      Rate and Rhythm: Normal rate and regular rhythm.      Comments: 1+ lower extremity pitting edema  Pulmonary:      Comments: On room air, distant breath sounds, no wheeze or accessory muscle use  Abdominal:      Palpations: Abdomen is soft.      Tenderness: There is no abdominal tenderness.   Skin:     Comments: Dressing to the right foot.  Wearing Evert compression stockings.  Area of erythema/excoriation bilateral upper extremities   Neurological:      Mental Status: He is alert and oriented to person, place, and time.   Psychiatric:         Mood and Affect: Mood normal.             Significant Labs: BMP:  "  Recent Labs   Lab 01/13/24 0527   *      K 5.8*      CO2 29   BUN 64*   CREATININE 3.2*   CALCIUM 8.1*   MG 2.2     CBC:   Recent Labs   Lab 01/12/24 0628 01/13/24 0527   WBC 6.89 6.26   HGB 9.1* 9.0*   HCT 28.6* 29.2*    180     CMP:   Recent Labs   Lab 01/12/24 0628 01/13/24 0527   * 136   K 5.8* 5.8*    103   CO2 26 29   * 153*   BUN 54* 64*   CREATININE 2.8* 3.2*   CALCIUM 8.7 8.1*   PROT 6.9 6.7   ALBUMIN 3.9 3.7   BILITOT 0.6 0.4   ALKPHOS 32* 30*   AST 28 23   ALT 28 23   ANIONGAP 5* 4*     Cardiac Markers: No results for input(s): "CKMB", "MYOGLOBIN", "BNP", "TROPISTAT" in the last 48 hours.  Lactic Acid: No results for input(s): "LACTATE" in the last 48 hours.  Magnesium:   Recent Labs   Lab 01/12/24 0628 01/13/24 0527   MG 2.0 2.2     POCT Glucose: No results for input(s): "POCTGLUCOSE" in the last 48 hours.    Significant Imaging: I have reviewed all pertinent imaging results/findings within the past 24 hours.    Assessment/Plan:      Active Hospital Problems    Diagnosis  POA    *Hypervolemia [E87.70]  Yes    Hyperkalemia [E87.5]  Yes     Priority: 1 - High    Acute kidney injury superimposed on chronic kidney disease [N17.9, N18.9]  Yes     Priority: 1 - High    CKD (chronic kidney disease), stage III [N18.30]  Yes    Anemia [D64.9]  Yes    THC use [F12.20]  Yes     Chronic    Acute on chronic diastolic heart failure [I50.33]  Yes    BPH (benign prostatic hyperplasia) [N40.0]  Yes    HLD (hyperlipidemia) [E78.5]  Yes    Severe obesity (BMI >= 40) [E66.01]  Yes    Non compliance with medical treatment [Z91.199]  Not Applicable    Diabetes mellitus with HbA1c 6.5% [E11.9]  Yes     Chronic    Diabetic ulcer of right great toe and right foot, POA [E11.621, L97.514]  Yes     Chronic    Hypertension [I10]  Yes     Chronic      Resolved Hospital Problems    Diagnosis Date Resolved POA    Hypertensive urgency [I16.0] 01/13/2024 Yes     Plan:  Continue care " on telemetry floor with continuous cardiac monitoring  Currently on Lasix drip 5 milligrams/hour with p.r.n. metolazone   Strict I/O, daily weights, oral fluid and sodium restriction  Echo this admission with EF of 55-60%   Continue scheduled Lokelma 10 mg b.i.d., low-potassium diet, IV diuresis and trending  Avoid Ace/Arb with hyperkalemia, BP better on amlodipine, hydralazine and metoprolol, monitoring  Suspect patient has undiagnosed untreated AMBREEN, recommend outpatient sleep study   Needs lifestyle modification for weight loss  Check anemia labs including B12, folic acid, iron studies  Continue local wound care to right foot as outlined   Continue basal bolus insulin with Accu-Cheks, as needed hypoglycemic measures, HbA1c 6.5%  Renally dose all medications and avoid nephrotoxic drugs   Increase activity, out of bed to chair, ambulation  A.m. labs ordered  Appreciate nephrology input   Further plan as per hospital course    VTE Risk Mitigation (From admission, onward)           Ordered     heparin (porcine) injection 5,000 Units  Every 8 hours         01/09/24 2040     IP VTE HIGH RISK PATIENT  Once         01/09/24 2040     Place sequential compression device  Until discontinued         01/09/24 2040     Place MARCELINO hose  Until discontinued         01/09/24 1653     Place sequential compression device  Until discontinued         01/09/24 1653                    Discharge Planning   JONATHAN: 1/15/2024     Code Status: Full Code   Is the patient medically ready for discharge?:     Reason for patient still in hospital (select all that apply): Patient trending condition  Discharge Plan A: Home                  Eli Stacy MD  Department of Hospital Medicine   [facility]

## 2024-01-13 NOTE — PLAN OF CARE
Problem: Adult Inpatient Plan of Care  Goal: Plan of Care Review  1/13/2024 0142 by Tracie Whitehead RN  Outcome: Ongoing, Progressing  1/13/2024 0142 by Tracie Whitehead RN  Outcome: Ongoing, Progressing  Goal: Optimal Comfort and Wellbeing  1/13/2024 0142 by Tracie Whitehead RN  Outcome: Ongoing, Progressing  1/13/2024 0142 by Tracie Whitehead RN  Outcome: Ongoing, Progressing     Problem: Bariatric Environmental Safety  Goal: Safety Maintained with Care  Outcome: Ongoing, Progressing     Problem: Diabetes Comorbidity  Goal: Blood Glucose Level Within Targeted Range  1/13/2024 0142 by Tracie Whitehead RN  Outcome: Ongoing, Progressing  1/13/2024 0142 by Tracie Whitehead RN  Outcome: Ongoing, Progressing     Problem: Impaired Wound Healing  Goal: Optimal Wound Healing  Outcome: Ongoing, Progressing     Problem: Pain Acute  Goal: Acceptable Pain Control and Functional Ability  Outcome: Ongoing, Progressing

## 2024-01-13 NOTE — CARE UPDATE
01/13/24 0744   Patient Assessment/Suction   Level of Consciousness (AVPU) alert   Respiratory Effort Unlabored   All Lung Fields Breath Sounds wheezes, expiratory   PRE-TX-O2   Device (Oxygen Therapy) room air   SpO2 95 %   Pulse Oximetry Type Intermittent   $ Pulse Oximetry - Multiple Charge Pulse Oximetry - Multiple   Pulse 68   Resp 15   Aerosol Therapy   $ Aerosol Therapy Charges Aerosol Treatment   Daily Review of Necessity (SVN) completed   Respiratory Treatment Status (SVN) given   Treatment Route (SVN) mask   Patient Position (SVN) semi-Patel's   Post Treatment Assessment (SVN) breath sounds unchanged   Signs of Intolerance (SVN) none   Breath Sounds Post-Respiratory Treatment   Throughout All Fields Post-Treatment no change   Post-treatment Heart Rate (beats/min) 69   Post-treatment Resp Rate (breaths/min) 15   Education   $ Education Bronchodilator;15 min   Respiratory Evaluation   $ Care Plan Tech Time 15 min

## 2024-01-14 LAB
ALBUMIN SERPL BCP-MCNC: 3.6 G/DL (ref 3.5–5.2)
ALP SERPL-CCNC: 29 U/L (ref 55–135)
ALT SERPL W/O P-5'-P-CCNC: 20 U/L (ref 10–44)
ANION GAP SERPL CALC-SCNC: 7 MMOL/L (ref 8–16)
AST SERPL-CCNC: 21 U/L (ref 10–40)
BASOPHILS # BLD AUTO: 0.03 K/UL (ref 0–0.2)
BASOPHILS NFR BLD: 0.5 % (ref 0–1.9)
BILIRUB SERPL-MCNC: 0.3 MG/DL (ref 0.1–1)
BUN SERPL-MCNC: 71 MG/DL (ref 6–20)
CALCIUM SERPL-MCNC: 8.3 MG/DL (ref 8.7–10.5)
CHLORIDE SERPL-SCNC: 100 MMOL/L (ref 95–110)
CO2 SERPL-SCNC: 28 MMOL/L (ref 23–29)
CREAT SERPL-MCNC: 3.2 MG/DL (ref 0.5–1.4)
DIFFERENTIAL METHOD BLD: ABNORMAL
EOSINOPHIL # BLD AUTO: 0.3 K/UL (ref 0–0.5)
EOSINOPHIL NFR BLD: 4.8 % (ref 0–8)
ERYTHROCYTE [DISTWIDTH] IN BLOOD BY AUTOMATED COUNT: 15.4 % (ref 11.5–14.5)
EST. GFR  (NO RACE VARIABLE): 21.3 ML/MIN/1.73 M^2
GLUCOSE SERPL-MCNC: 137 MG/DL (ref 70–110)
GLUCOSE SERPL-MCNC: 152 MG/DL (ref 70–110)
GLUCOSE SERPL-MCNC: 165 MG/DL (ref 70–110)
GLUCOSE SERPL-MCNC: 167 MG/DL (ref 70–110)
GLUCOSE SERPL-MCNC: 188 MG/DL (ref 70–110)
GLUCOSE SERPL-MCNC: 255 MG/DL (ref 70–110)
HCT VFR BLD AUTO: 27.2 % (ref 40–54)
HGB BLD-MCNC: 8.6 G/DL (ref 14–18)
IMM GRANULOCYTES # BLD AUTO: 0.02 K/UL (ref 0–0.04)
IMM GRANULOCYTES NFR BLD AUTO: 0.3 % (ref 0–0.5)
LYMPHOCYTES # BLD AUTO: 1.5 K/UL (ref 1–4.8)
LYMPHOCYTES NFR BLD: 24.9 % (ref 18–48)
MAGNESIUM SERPL-MCNC: 2.1 MG/DL (ref 1.6–2.6)
MCH RBC QN AUTO: 28.9 PG (ref 27–31)
MCHC RBC AUTO-ENTMCNC: 31.6 G/DL (ref 32–36)
MCV RBC AUTO: 91 FL (ref 82–98)
MONOCYTES # BLD AUTO: 0.8 K/UL (ref 0.3–1)
MONOCYTES NFR BLD: 12.8 % (ref 4–15)
NEUTROPHILS # BLD AUTO: 3.5 K/UL (ref 1.8–7.7)
NEUTROPHILS NFR BLD: 56.7 % (ref 38–73)
NRBC BLD-RTO: 0 /100 WBC
PHOSPHATE SERPL-MCNC: 5.9 MG/DL (ref 2.7–4.5)
PLATELET # BLD AUTO: 177 K/UL (ref 150–450)
PMV BLD AUTO: 11.7 FL (ref 9.2–12.9)
POTASSIUM SERPL-SCNC: 5.3 MMOL/L (ref 3.5–5.1)
PROT SERPL-MCNC: 6.5 G/DL (ref 6–8.4)
RBC # BLD AUTO: 2.98 M/UL (ref 4.6–6.2)
SODIUM SERPL-SCNC: 135 MMOL/L (ref 136–145)
WBC # BLD AUTO: 6.1 K/UL (ref 3.9–12.7)

## 2024-01-14 PROCEDURE — 25000003 PHARM REV CODE 250: Performed by: INTERNAL MEDICINE

## 2024-01-14 PROCEDURE — 80053 COMPREHEN METABOLIC PANEL: CPT | Performed by: NURSE PRACTITIONER

## 2024-01-14 PROCEDURE — 36415 COLL VENOUS BLD VENIPUNCTURE: CPT | Performed by: NURSE PRACTITIONER

## 2024-01-14 PROCEDURE — 21400001 HC TELEMETRY ROOM

## 2024-01-14 PROCEDURE — 84100 ASSAY OF PHOSPHORUS: CPT | Performed by: NURSE PRACTITIONER

## 2024-01-14 PROCEDURE — 85025 COMPLETE CBC W/AUTO DIFF WBC: CPT | Performed by: NURSE PRACTITIONER

## 2024-01-14 PROCEDURE — 25000003 PHARM REV CODE 250: Performed by: NURSE PRACTITIONER

## 2024-01-14 PROCEDURE — 83735 ASSAY OF MAGNESIUM: CPT | Performed by: NURSE PRACTITIONER

## 2024-01-14 PROCEDURE — 94761 N-INVAS EAR/PLS OXIMETRY MLT: CPT

## 2024-01-14 PROCEDURE — 99900035 HC TECH TIME PER 15 MIN (STAT)

## 2024-01-14 PROCEDURE — 63600175 PHARM REV CODE 636 W HCPCS: Performed by: NURSE PRACTITIONER

## 2024-01-14 PROCEDURE — 99900031 HC PATIENT EDUCATION (STAT)

## 2024-01-14 RX ORDER — METOLAZONE 2.5 MG/1
5 TABLET ORAL DAILY
Status: DISCONTINUED | OUTPATIENT
Start: 2024-01-14 | End: 2024-01-16

## 2024-01-14 RX ADMIN — INSULIN DETEMIR 15 UNITS: 100 INJECTION, SOLUTION SUBCUTANEOUS at 09:01

## 2024-01-14 RX ADMIN — INSULIN ASPART 2 UNITS: 100 INJECTION, SOLUTION INTRAVENOUS; SUBCUTANEOUS at 09:01

## 2024-01-14 RX ADMIN — POLYETHYLENE GLYCOL 3350 17 G: 17 POWDER, FOR SOLUTION ORAL at 08:01

## 2024-01-14 RX ADMIN — HEPARIN SODIUM 5000 UNITS: 5000 INJECTION INTRAVENOUS; SUBCUTANEOUS at 02:01

## 2024-01-14 RX ADMIN — FINASTERIDE 5 MG: 5 TABLET, FILM COATED ORAL at 08:01

## 2024-01-14 RX ADMIN — CETIRIZINE HYDROCHLORIDE 10 MG: 10 TABLET, FILM COATED ORAL at 08:01

## 2024-01-14 RX ADMIN — GABAPENTIN 800 MG: 300 CAPSULE ORAL at 09:01

## 2024-01-14 RX ADMIN — HYDRALAZINE HYDROCHLORIDE 25 MG: 25 TABLET ORAL at 02:01

## 2024-01-14 RX ADMIN — INSULIN ASPART 2 UNITS: 100 INJECTION, SOLUTION INTRAVENOUS; SUBCUTANEOUS at 12:01

## 2024-01-14 RX ADMIN — HEPARIN SODIUM 5000 UNITS: 5000 INJECTION INTRAVENOUS; SUBCUTANEOUS at 05:01

## 2024-01-14 RX ADMIN — GABAPENTIN 800 MG: 300 CAPSULE ORAL at 08:01

## 2024-01-14 RX ADMIN — FLUTICASONE PROPIONATE 100 MCG: 50 SPRAY, METERED NASAL at 09:01

## 2024-01-14 RX ADMIN — GABAPENTIN 800 MG: 300 CAPSULE ORAL at 02:01

## 2024-01-14 RX ADMIN — ATORVASTATIN CALCIUM 80 MG: 40 TABLET, FILM COATED ORAL at 09:01

## 2024-01-14 RX ADMIN — FERROUS SULFATE TAB 325 MG (65 MG ELEMENTAL FE) 1 EACH: 325 (65 FE) TAB at 08:01

## 2024-01-14 RX ADMIN — OXYCODONE HYDROCHLORIDE AND ACETAMINOPHEN 1 TABLET: 10; 325 TABLET ORAL at 09:01

## 2024-01-14 RX ADMIN — HYDRALAZINE HYDROCHLORIDE 25 MG: 25 TABLET ORAL at 09:01

## 2024-01-14 RX ADMIN — TRAZODONE HYDROCHLORIDE 50 MG: 50 TABLET ORAL at 12:01

## 2024-01-14 RX ADMIN — METOLAZONE 5 MG: 2.5 TABLET ORAL at 10:01

## 2024-01-14 RX ADMIN — SODIUM ZIRCONIUM CYCLOSILICATE 10 G: 10 POWDER, FOR SUSPENSION ORAL at 09:01

## 2024-01-14 RX ADMIN — OXYCODONE HYDROCHLORIDE AND ACETAMINOPHEN 1 TABLET: 10; 325 TABLET ORAL at 05:01

## 2024-01-14 RX ADMIN — METOPROLOL SUCCINATE 25 MG: 25 TABLET, EXTENDED RELEASE ORAL at 08:01

## 2024-01-14 RX ADMIN — ASPIRIN 81 MG: 81 TABLET, COATED ORAL at 08:01

## 2024-01-14 RX ADMIN — HEPARIN SODIUM 5000 UNITS: 5000 INJECTION INTRAVENOUS; SUBCUTANEOUS at 09:01

## 2024-01-14 RX ADMIN — SENNOSIDES AND DOCUSATE SODIUM 2 TABLET: 8.6; 5 TABLET ORAL at 09:01

## 2024-01-14 RX ADMIN — HYDRALAZINE HYDROCHLORIDE 25 MG: 25 TABLET ORAL at 05:01

## 2024-01-14 RX ADMIN — TRAZODONE HYDROCHLORIDE 50 MG: 50 TABLET ORAL at 10:01

## 2024-01-14 RX ADMIN — SENNOSIDES AND DOCUSATE SODIUM 2 TABLET: 8.6; 5 TABLET ORAL at 08:01

## 2024-01-14 RX ADMIN — AMLODIPINE BESYLATE 10 MG: 5 TABLET ORAL at 08:01

## 2024-01-14 RX ADMIN — SODIUM ZIRCONIUM CYCLOSILICATE 10 G: 10 POWDER, FOR SUSPENSION ORAL at 10:01

## 2024-01-14 RX ADMIN — OXYCODONE HYDROCHLORIDE AND ACETAMINOPHEN 1 TABLET: 10; 325 TABLET ORAL at 03:01

## 2024-01-14 NOTE — PROGRESS NOTES
INPATIENT NEPHROLOGY Progress Note  [facility]    Patient Name: Abel Gibbs  Date: 01/14/2024    Reason for consultation: MINI    Chief Complaint:   Chief Complaint   Patient presents with    Chest Pain    Shortness of Breath     + edema       History of Present Illness:  Abel Gibbs is a 58 y.o. male with a history as  has a past medical history of Depression, Diabetes mellitus, Hypertension, Obesity, Osteomyelitis and CKD III who presented to the ED with a Chest Pain and Shortness of Breath (+ edema). Patient presents to the emergency room with a complaint of midsternal chest pain radiating into left chest wall that started approximately 3 days ago. He further reports shortness for breath when attempting to lie flat with chest pain worsening. He was seen by wound care for right foot ulcer who felt as though his face appeared a little swollen and recommended that he go to the emergency. Patient does report having a sinus infection about 1-2 weeks ago completed course of antibiotics.  He further states after completion of antibiotics he started with congestion and seems as though symptoms have gotten worse. Additionally, he reports BLE edema that chronic but appears a little worse. Patient unclear of medications as he reports the nurse fills his medication container weekly. He does tell me that he is on furosemide but has not taken it for last 3-4 days d/t leg cramps. Patient also prescribed spirolactone but does not know if he has been taking. Also on mounjaro. Denies fever, chills, diaphoresis, dizziness, HA, palpitations, NVD, recent trauma or any other associated symptoms. Does not smoke cigarettes, drinks occasionally and uses marijuana daily. Consulted for MINI/CKD.    Renal u/s:  The right kidney measures 8.9 cm in length, with normal cortical thickness and parenchymal echotexture, and no echogenic calculi or hydronephrosis. The left kidney measures 10.4 cm in length and has normal  cortical thickness and parenchymal echotexture, without echogenic calculi or hydronephrosis.     Notable home meds:  Norvasc, metoprolol, hydralazine, diovan, lasix, aldactone, farxiga, finasteride, meloxicam    Interval History:  1/10- highest /97, on RA, UOP 1L, CXR clear, no DVT, BNP normal, trop stable, echo pending  1/11- BP improved, on RA, voiding, echo with LVH, renal duplex pending, c/o dyspnea, cough- edema unchanged  1/12- VSS, on RA, UOP 3.1L, no change in weight, still short of breath with edema- move to cardiology for lasix gtt at 5mg/hr- ordered metolazone 5mg x 1- if cannot remove adequate fluid will need RRT- patient not keen on RRT but understands plan, renal imaging with normal sized kidneys and no KAREN  1/13  VSS, UOP 3.375L.  hyperkalemic w/bump in BUN/Scr.  No complaints.  1/14  UOP 3.5L.  VSS.  Renal function about the same.  Sleeping, NAD noted.    Plan of Care:    Assessment:  MINI/CKD III  HTN urgency/Edema- driven by renal disease   Hyponatremia  Hyperkalemia  SHPT  MARBELLA/Anemia of CKD  BPH    Plan:    - suspect MINI on CKD due to elevated BP with ischemic ATN and CRS both driven by underlying renal disease rather than cardiac dysfuncion  - BP improved but remains edematous and hyperkalemic- switch to lasix gtt- add metolazone 1/12 (unfortunately both norvasc and hydralazine will contribute to edema but options are limited due to renal dysfunction)  - echo noted- renal duplex noted- UA with 1+ protein- 400mg proteinuria - c/w HTN  - in setting of MINI and hyperK, hold ARB, MRA, farxiga and meloxicam  - make lokelma 10g BID- continue renal diet- continue fluid restriction 1.5L  - phos borderline- PTH c/w CKD III  - continue iron supplement- no acute RL needs- prior paraprotein w/u negative  - continue finasteride     Thank you for allowing us to participate in this patient's care. We will continue to follow.    Vital Signs:  Temp Readings from Last 3 Encounters:   01/14/24 98.3 °F (36.8  °C) (Oral)   07/28/23 98.2 °F (36.8 °C) (Oral)   06/04/23 98.5 °F (36.9 °C) (Oral)       Pulse Readings from Last 3 Encounters:   01/14/24 65   07/28/23 (!) 56   06/04/23 (!) 53       BP Readings from Last 3 Encounters:   01/14/24 (!) 140/63   07/28/23 (!) 167/80   06/04/23 133/62       Weight:  Wt Readings from Last 3 Encounters:   01/14/24 (!) 171.6 kg (378 lb 5 oz)   01/10/24 (!) 169.6 kg (374 lb)   07/26/23 (!) 158.8 kg (350 lb 3.2 oz)       Medications:  Scheduled Meds:   amLODIPine  10 mg Oral Daily    aspirin  81 mg Oral Daily    atorvastatin  80 mg Oral QHS    cetirizine  10 mg Oral Daily    ferrous sulfate  1 tablet Oral Daily    finasteride  5 mg Oral Daily    fluticasone propionate  2 spray Each Nostril QHS    gabapentin  800 mg Oral TID    heparin (porcine)  5,000 Units Subcutaneous Q8H    hydrALAZINE  25 mg Oral Q8H    insulin detemir U-100 (Levemir)  15 Units Subcutaneous QHS    metoprolol succinate  25 mg Oral Daily    polyethylene glycol  17 g Oral Daily    senna-docusate 8.6-50 mg  2 tablet Oral BID    sodium zirconium cyclosilicate  10 g Oral BID     Continuous Infusions:   furosemide (LASIX) 2 mg/mL continuous infusion (non-titrating) 5 mg/hr (01/13/24 1647)     PRN Meds:.acetaminophen, albuterol-ipratropium, dextrose 50%, dextrose 50%, glucagon (human recombinant), glucose, glucose, hydrALAZINE, insulin aspart U-100, labetalol, magnesium oxide, magnesium oxide, melatonin, naloxone, ondansetron, oxyCODONE-acetaminophen, potassium bicarbonate, potassium bicarbonate, potassium bicarbonate, potassium, sodium phosphates, potassium, sodium phosphates, potassium, sodium phosphates, sodium chloride 0.9%, traZODone  No current facility-administered medications on file prior to encounter.     Current Outpatient Medications on File Prior to Encounter   Medication Sig Dispense Refill    amLODIPine (NORVASC) 10 MG tablet Take 1 tablet (10 mg total) by mouth once daily. 30 tablet 2    aspirin (ECOTRIN) 81 MG  EC tablet Take 1 tablet (81 mg total) by mouth once daily. 30 tablet 2    atorvastatin (LIPITOR) 80 MG tablet Take 1 tablet (80 mg total) by mouth once daily. 30 tablet 5    FARXIGA 5 mg Tab tablet Take 5 mg by mouth once daily.      finasteride (PROSCAR) 5 mg tablet Take 5 mg by mouth once daily.      furosemide (LASIX) 20 MG tablet Take 20 mg by mouth once daily.      gabapentin (NEURONTIN) 800 MG tablet Take 800 mg by mouth 4 (four) times daily.      hydrALAZINE (APRESOLINE) 25 MG tablet Take 1 tablet (25 mg total) by mouth every 8 (eight) hours. 90 tablet 2    ketoconazole (NIZORAL) 2 % cream Apply 1 Application topically once daily.      LEVEMIR FLEXPEN 100 unit/mL (3 mL) InPn pen Inject 30 Units into the skin once daily.      LIDOcaine (LIDODERM) 5 % Place 1 patch onto the skin once daily.      meloxicam (MOBIC) 15 MG tablet Take 15 mg by mouth once daily.      metFORMIN (GLUCOPHAGE) 1000 MG tablet Take 1,000 mg by mouth 2 (two) times daily with meals.      metoprolol succinate (TOPROL-XL) 25 MG 24 hr tablet Take 25 mg by mouth once daily.      MOUNJARO 7.5 mg/0.5 mL PnIj Inject 7.5 mg into the skin every 7 days.      oxyCODONE-acetaminophen (PERCOCET)  mg per tablet Take 1 tablet by mouth 3 (three) times daily as needed for Pain.      spironolactone (ALDACTONE) 50 MG tablet Take 1 tablet (50 mg total) by mouth once daily. 30 tablet 2    traZODone (DESYREL) 50 MG tablet Take 50 mg by mouth nightly as needed.      valsartan (DIOVAN) 160 MG tablet Take 160 mg by mouth once daily.      albuterol (PROVENTIL/VENTOLIN HFA) 90 mcg/actuation inhaler Inhale 1-2 puffs into the lungs every 6 (six) hours as needed for Wheezing. Rescue 18 g 5    blood sugar diagnostic Strp To check BG 4 times daily, to use with insurance preferred meter 200 each 0    blood-glucose meter kit To check BG 4 times daily, to use with insurance preferred meter 1 each 0    fluticasone propionate (FLONASE) 50 mcg/actuation nasal spray 1  spray (50 mcg total) by Each Nostril route daily as needed for Rhinitis.  0    lactobacillus acidophilus & bulgar (LACTINEX) 100 million cell packet Take 1 tablet by mouth 3 (three) times daily with meals.      lancets Misc To check BG 4 times daily, to use with insurance preferred meter 200 each 0    polyethylene glycol (MIRALAX) 17 gram/dose powder Take 17 g by mouth once daily. For constipation 850 g 2       Review of Systems:  Neg    Physical Exam:  General Appearance:    NAD, AAO x 3, cooperative, appears stated age   Head:    Normocephalic, atraumatic   Eyes:    PER, EOMI, and conjunctiva/sclera clear bilaterally       Mouth:   Moist mucus membranes, no thrush or oral lesions,       normal dentition   Back:     No CVA tenderness   Lungs:     Crackles   Chest wall:    No tenderness or deformity   Heart:    Regular rate and rhythm, S1 and S2 normal, no murmur, rub   or gallop   Abdomen:     Soft, non-tender, non-distended, bowel sounds active all four   quadrants, no RT or guarding, no masses, no organomegaly   Extremities:   Warm and well perfused, distal pulses are intact, no cyanosis, + edema   MSK:   No joint or muscle swelling, tenderness or deformity   Skin:   Skin color, texture, turgor normal, no rashes or lesions   Neurologic/Psychiatric:   CNII-XII intact, normal strength and sensation       throughout, no asterixis; normal affect, memory, judgement     and insight      Results:  Lab Results   Component Value Date     (L) 01/14/2024    K 5.3 (H) 01/14/2024     01/14/2024    CO2 28 01/14/2024    BUN 71 (H) 01/14/2024    CREATININE 3.2 (H) 01/14/2024    CALCIUM 8.3 (L) 01/14/2024    ANIONGAP 7 (L) 01/14/2024    ESTGFRAFRICA >60 03/11/2022    EGFRNONAA >60 03/11/2022       Lab Results   Component Value Date    CALCIUM 8.3 (L) 01/14/2024    PHOS 5.9 (H) 01/14/2024       Recent Labs   Lab 01/14/24  0505   WBC 6.10   RBC 2.98*   HGB 8.6*   HCT 27.2*      MCV 91   MCH 28.9   MCHC 31.6*          I have personally reviewed pertinent radiological imaging and reports.    I have spent > 35 minutes providing care for this patient for the above diagnoses. These services have included chart/data/imaging review, evaluation, exam, formulation of plan, , note preparation, and discussions with staff involved in this patient's care.    Marlen Velazquez MD MPH  [facility] [address] [address]  [phone] (p)  [phone] (f)

## 2024-01-14 NOTE — PLAN OF CARE
Problem: Adult Inpatient Plan of Care  Goal: Plan of Care Review  Outcome: Ongoing, Progressing  Goal: Absence of Hospital-Acquired Illness or Injury  Outcome: Ongoing, Progressing  Goal: Readiness for Transition of Care  Outcome: Ongoing, Progressing     Problem: Diabetes Comorbidity  Goal: Blood Glucose Level Within Targeted Range  Outcome: Ongoing, Progressing     Problem: Fluid and Electrolyte Imbalance (Acute Kidney Injury/Impairment)  Goal: Fluid and Electrolyte Balance  Outcome: Ongoing, Progressing     Problem: Renal Function Impairment (Acute Kidney Injury/Impairment)  Goal: Effective Renal Function  Outcome: Ongoing, Progressing     Problem: Impaired Wound Healing  Goal: Optimal Wound Healing  Outcome: Ongoing, Progressing

## 2024-01-14 NOTE — SUBJECTIVE & OBJECTIVE
Interval History:  Still reports shortness of breath with exertion/activity.  States he has been urinating more over the last 24 hours.  Afebrile, on room air, telemetry with sinus rhythm.  Labs with hemoglobin 8.6, potassium 5.3, BUN/creatinine 71/3.2.  Documented urine output last 24 hours 3500 cc.  Discussed with patient.  No visitors at bedside.    Review of Systems   Constitutional:  Negative for fever.   Respiratory:  Positive for shortness of breath.    Genitourinary:  Negative for difficulty urinating.   Psychiatric/Behavioral:  Negative for confusion.      Objective:     Vital Signs (Most Recent):  Temp: 98.3 °F (36.8 °C) (01/14/24 0711)  Pulse: 65 (01/14/24 0711)  Resp: 20 (01/14/24 0711)  BP: (!) 140/63 (01/14/24 0711)  SpO2: 95 % (01/14/24 0853) Vital Signs (24h Range):  Temp:  [97.8 °F (36.6 °C)-99 °F (37.2 °C)] 98.3 °F (36.8 °C)  Pulse:  [59-70] 65  Resp:  [16-20] 20  SpO2:  [95 %-97 %] 95 %  BP: (110-178)/(54-98) 140/63     Weight: (!) 171.6 kg (378 lb 5 oz)  Body mass index is 47.29 kg/m².    Intake/Output Summary (Last 24 hours) at 1/14/2024 1037  Last data filed at 1/14/2024 0600  Gross per 24 hour   Intake 780 ml   Output 3500 ml   Net -2720 ml         Physical Exam  Vitals and nursing note reviewed.   Constitutional:       Appearance: He is obese.      Comments: Lying in bed   HENT:      Head: Normocephalic and atraumatic.      Mouth/Throat:      Mouth: Mucous membranes are moist.   Cardiovascular:      Rate and Rhythm: Normal rate and regular rhythm.      Comments: 1+ lower extremity pitting edema, wearing Evert compression  Pulmonary:      Comments: On room air, distant breath sounds, no wheeze or accessory muscle use  Abdominal:      Palpations: Abdomen is soft.      Tenderness: There is no abdominal tenderness.   Skin:     Comments: Dressing to the right foot.  Wearing Evert compression stockings.  Area of erythema/excoriation bilateral upper extremities   Neurological:      Mental Status: He is  "alert and oriented to person, place, and time.   Psychiatric:         Mood and Affect: Mood normal.             Significant Labs: BMP:   Recent Labs   Lab 01/14/24  0504   *   *   K 5.3*      CO2 28   BUN 71*   CREATININE 3.2*   CALCIUM 8.3*   MG 2.1     CBC:   Recent Labs   Lab 01/13/24  0527 01/14/24  0505   WBC 6.26 6.10   HGB 9.0* 8.6*   HCT 29.2* 27.2*    177     CMP:   Recent Labs   Lab 01/13/24  0527 01/14/24  0504    135*   K 5.8* 5.3*    100   CO2 29 28   * 165*   BUN 64* 71*   CREATININE 3.2* 3.2*   CALCIUM 8.1* 8.3*   PROT 6.7 6.5   ALBUMIN 3.7 3.6   BILITOT 0.4 0.3   ALKPHOS 30* 29*   AST 23 21   ALT 23 20   ANIONGAP 4* 7*     Cardiac Markers: No results for input(s): "CKMB", "MYOGLOBIN", "BNP", "TROPISTAT" in the last 48 hours.  Lactic Acid: No results for input(s): "LACTATE" in the last 48 hours.  Magnesium:   Recent Labs   Lab 01/13/24 0527 01/14/24  0504   MG 2.2 2.1       Significant Imaging: I have reviewed all pertinent imaging results/findings within the past 24 hours.  "

## 2024-01-14 NOTE — PLAN OF CARE
Problem: Adult Inpatient Plan of Care  Goal: Plan of Care Review  Outcome: Ongoing, Progressing  Goal: Patient-Specific Goal (Individualized)  Outcome: Ongoing, Progressing  Goal: Absence of Hospital-Acquired Illness or Injury  Outcome: Ongoing, Progressing  Goal: Optimal Comfort and Wellbeing  Outcome: Ongoing, Progressing  Goal: Readiness for Transition of Care  Outcome: Ongoing, Progressing     Problem: Bariatric Environmental Safety  Goal: Safety Maintained with Care  Outcome: Ongoing, Progressing     Problem: Diabetes Comorbidity  Goal: Blood Glucose Level Within Targeted Range  Outcome: Ongoing, Progressing     Problem: Fluid and Electrolyte Imbalance (Acute Kidney Injury/Impairment)  Goal: Fluid and Electrolyte Balance  Outcome: Ongoing, Progressing     Problem: Oral Intake Inadequate (Acute Kidney Injury/Impairment)  Goal: Optimal Nutrition Intake  Outcome: Ongoing, Progressing     Problem: Renal Function Impairment (Acute Kidney Injury/Impairment)  Goal: Effective Renal Function  Outcome: Ongoing, Progressing     Problem: Impaired Wound Healing  Goal: Optimal Wound Healing  Outcome: Ongoing, Progressing     Problem: Pain Acute  Goal: Acceptable Pain Control and Functional Ability  Outcome: Ongoing, Progressing     Problem: Fall Injury Risk  Goal: Absence of Fall and Fall-Related Injury  Outcome: Ongoing, Progressing     Problem: Skin Injury Risk Increased  Goal: Skin Health and Integrity  Outcome: Ongoing, Progressing

## 2024-01-14 NOTE — PROGRESS NOTES
[facility] Medicine  Progress Note    Patient Name: Abel Gibbs  MRN: [id number]  Patient Class: IP- Inpatient   Admission Date: 1/9/2024  Length of Stay: 4 days  Attending Physician: Eli Stacy MD  Primary Care Provider: Rupinder Pagan MD        Subjective:     Principal Problem:Hypervolemia        HPI:  Abel Gibbs is a 58 y.o. male with a history as  has a past medical history of Depression, Diabetes mellitus, Hypertension, Obesity, and Osteomyelitis. who presented to the ED with a Chest Pain and Shortness of Breath (+ edema)    Patient presents to the emergency room with a complaint of midsternal chest pain radiating into left chest wall that started approximately 3 days ago.  He further reports shortness for breath when attempting to lye flat with chest pain worsening. He tell me he was seen by wound care for right foot ulcer who felt as though his face appeared a little swollen and recommended that he go to the emergency.     Patient does report having a sinus infection about 1-2 weeks ago completed course of antibiotics.  He further states after completion of antibiotics he started with congestion and seems as though symptoms have gotten worse. Additionally, he reports BLE edema that chronic but appears a little worse.    Patient unclear of medications as he reports the nurse fills his medication container weekly. He does tell me that he is on furosemide but has not taken it for last 3-4 days d/t leg cramps. Patient also prescribed spirolactone but does not know if he has been taking. Also on mounjaro.     Denies fever, chills, diaphoresis, dizziness, HA, palpitations, NVD, recent trauma or any other associated symptoms. Does not smoke cigarettes, drinks occasionally and uses marijuana daily.      Lab and imaging obtained and reviewed.  CBC completed and shows RBCs 3.48 H/H 10.0/32.3 MCHC 31.0 RDW is 15.9.  Chemistry profile shows potassium 5.7 Ag 5 BUN 41  creatinine 2.3 GFR 31.7 calcium 8.6 ALP 35 ALT 50.  BNP within normal range.  Initial high sensitive troponin 17.2. EKG shows NSR with incomplete R-BBB. CXR without acute cardiopulmonary findings.  On admit, afebrile HR 94 RR 20 /91 with O2 sats 97% on room air.        Ultrasound bilateral lower extremity without evidence of deep venous thrombosis.      Renal US  IMPRESSION: Asymmetric small right kidney. Otherwise normal sonographic appearance of the bilateral kidneys..     Angiogram 07/2023  Summary     The estimated blood loss was <50 mL.    The pre-procedure left ventricular end diastolic pressure was 16.    Nonobstructive coronaries with mild luminal irregularities in RCA and left circumflex and focal moderate stenosis of mid to distal LAD for which medical management is recommended.       Echo 07/2023  Summary  The left ventricle is normal in size with mild concentric hypertrophy and normal systolic function.  The estimated ejection fraction is 65%.  Normal left ventricular diastolic function.  Normal right ventricular size with normal right ventricular systolic function.  Mild mitral regurgitation.  Mild tricuspid regurgitation.  Normal central venous pressure (3 mmHg).  The estimated PA systolic pressure is 27 mmHg.    Per ED provider patient presents for evaluation of chest pain with lower extremity edema for 1 week. Labs reviewed and showed mildly elevated troponin without EKG changes. Also noted to have elevated BP and MINI, given ASA IV diuretics with NTG paste applied. US BLE negative for DVT. Will admit for ACS r/o.        Overview/Hospital Course:  Abel Gibbs is a 58 y.o. male with  has a past medical history of Depression, Diabetes mellitus, Hypertension, Obesity, and Osteomyelitis. who presented to the ED with a Chest Pain and Shortness of Breath (+ edema)    Patient presented for evaluation of 3 day history of CP and increased SOB after being treated for antibiotics for sinus infection  for 1-2 weeks - as well as he endorsed not taking his medication for about a week due to leg cramps. He was found to be hyperkalemic 6.0, with acute on chronic kidney failure with BUN/cr/GFR 46/2.5/28.7. His BP was also elevated 180/91 at the time of presentation. His home BP medications were restarted amlodipine, metoprolol and hydralazine added 2/2 renal. BP improved. Nephrology consulted for management of kidney impairment and hyperkalemia. Renal US showed showed no sonographic evidence of renal artery stenosis. Started on lokelma for hyperkalemia - slow to improve. Lasix IV added, fluid restriction 1.5L. CXR on 01/09/23 did not demonstrate evidence of effusion or increased congestion and 2D echo 01/10/23 showed hypertrophy, EF 55-60% with normal DF. Has right foot ulcer present on admission followed by wound care outpatient and by wound care and Dr. Serrato inpatient. XR right foot ulcer showed severe degenerative changes with no acute osseous abnormality.     Assumed care of this patient on 1/13/24.  Patient with history of morbid obesity with BMI 47, suspected undiagnosed untreated AMBREEN, CKD stage 3, diabetes mellitus with HbA1c 6.5%, chronic right/great toe diabetic ulcer, followed by wound care, hypertension, heart failure with preserved ejection fraction, anemia, BPH, depression.  He presented from outpatient wound care due to concerns for volume overload.  Hypertensive urgency in the ED with hyperkalemia, he was admitted with Nephrology consultation and started on IV diuresis.  Renal ultrasound negative with no evidence of renal artery stenosis.  Echo with EF of 55-60%.  Ongoing hyperkalemia and started on scheduled low chemo.  Still volume overloaded on on 1/12 transfer to telemetry floor and initiated on Lasix infusion.      Interval History:  Still reports shortness of breath with exertion/activity.  States he has been urinating more over the last 24 hours.  Afebrile, on room air, telemetry with sinus  rhythm.  Labs with hemoglobin 8.6, potassium 5.3, BUN/creatinine 71/3.2.  Documented urine output last 24 hours 3500 cc.  Discussed with patient.  No visitors at bedside.    Review of Systems   Constitutional:  Negative for fever.   Respiratory:  Positive for shortness of breath.    Genitourinary:  Negative for difficulty urinating.   Psychiatric/Behavioral:  Negative for confusion.      Objective:     Vital Signs (Most Recent):  Temp: 98.3 °F (36.8 °C) (01/14/24 0711)  Pulse: 65 (01/14/24 0711)  Resp: 20 (01/14/24 0711)  BP: (!) 140/63 (01/14/24 0711)  SpO2: 95 % (01/14/24 0853) Vital Signs (24h Range):  Temp:  [97.8 °F (36.6 °C)-99 °F (37.2 °C)] 98.3 °F (36.8 °C)  Pulse:  [59-70] 65  Resp:  [16-20] 20  SpO2:  [95 %-97 %] 95 %  BP: (110-178)/(54-98) 140/63     Weight: (!) 171.6 kg (378 lb 5 oz)  Body mass index is 47.29 kg/m².    Intake/Output Summary (Last 24 hours) at 1/14/2024 1037  Last data filed at 1/14/2024 0600  Gross per 24 hour   Intake 780 ml   Output 3500 ml   Net -2720 ml         Physical Exam  Vitals and nursing note reviewed.   Constitutional:       Appearance: He is obese.      Comments: Lying in bed   HENT:      Head: Normocephalic and atraumatic.      Mouth/Throat:      Mouth: Mucous membranes are moist.   Cardiovascular:      Rate and Rhythm: Normal rate and regular rhythm.      Comments: 1+ lower extremity pitting edema, wearing Evert compression  Pulmonary:      Comments: On room air, distant breath sounds, no wheeze or accessory muscle use  Abdominal:      Palpations: Abdomen is soft.      Tenderness: There is no abdominal tenderness.   Skin:     Comments: Dressing to the right foot.  Wearing Evert compression stockings.  Area of erythema/excoriation bilateral upper extremities   Neurological:      Mental Status: He is alert and oriented to person, place, and time.   Psychiatric:         Mood and Affect: Mood normal.             Significant Labs: BMP:   Recent Labs   Lab 01/14/24  0504   *  "  *   K 5.3*      CO2 28   BUN 71*   CREATININE 3.2*   CALCIUM 8.3*   MG 2.1     CBC:   Recent Labs   Lab 01/13/24  0527 01/14/24  0505   WBC 6.26 6.10   HGB 9.0* 8.6*   HCT 29.2* 27.2*    177     CMP:   Recent Labs   Lab 01/13/24  0527 01/14/24  0504    135*   K 5.8* 5.3*    100   CO2 29 28   * 165*   BUN 64* 71*   CREATININE 3.2* 3.2*   CALCIUM 8.1* 8.3*   PROT 6.7 6.5   ALBUMIN 3.7 3.6   BILITOT 0.4 0.3   ALKPHOS 30* 29*   AST 23 21   ALT 23 20   ANIONGAP 4* 7*     Cardiac Markers: No results for input(s): "CKMB", "MYOGLOBIN", "BNP", "TROPISTAT" in the last 48 hours.  Lactic Acid: No results for input(s): "LACTATE" in the last 48 hours.  Magnesium:   Recent Labs   Lab 01/13/24  0527 01/14/24  0504   MG 2.2 2.1       Significant Imaging: I have reviewed all pertinent imaging results/findings within the past 24 hours.    Assessment/Plan:      Active Hospital Problems    Diagnosis  POA    *Hypervolemia [E87.70]  Yes    Hyperkalemia [E87.5]  Yes     Priority: 1 - High    Acute kidney injury superimposed on chronic kidney disease [N17.9, N18.9]  Yes     Priority: 1 - High    CKD (chronic kidney disease), stage III [N18.30]  Yes    Anemia [D64.9]  Yes    THC use [F12.20]  Yes     Chronic    Acute on chronic diastolic heart failure [I50.33]  Yes    BPH (benign prostatic hyperplasia) [N40.0]  Yes    HLD (hyperlipidemia) [E78.5]  Yes    Severe obesity (BMI >= 40) [E66.01]  Yes    Non compliance with medical treatment [Z91.199]  Not Applicable    Diabetes mellitus with HbA1c 6.5% [E11.9]  Yes     Chronic    Diabetic ulcer of right great toe and right foot, POA [E11.621, L97.514]  Yes     Chronic    Hypertension [I10]  Yes     Chronic      Resolved Hospital Problems    Diagnosis Date Resolved POA    Hypertensive urgency [I16.0] 01/13/2024 Yes     Plan:  Continue care on telemetry floor with continuous cardiac monitoring  Currently on Lasix drip 5 milligrams/hour with metolazone 5 mg " daily  Strict I/O, daily weights, oral fluid and sodium restriction  Echo this admission with EF of 55-60%   Continue scheduled Lokelma 10 mg b.i.d., low-potassium diet, IV diuresis and trending  Avoid Ace/Arb with hyperkalemia, BP better on amlodipine, hydralazine and metoprolol, monitoring  Suspect patient has undiagnosed untreated AMBREEN, recommend outpatient sleep study   Needs lifestyle modification for weight loss  Continue local wound care to right foot as outlined   Continue basal bolus insulin with Accu-Cheks, as needed hypoglycemic measures, HbA1c 6.5%  Renally dose all medications and avoid nephrotoxic drugs   Increase activity, out of bed to chair, ambulation  A.m. labs ordered  Appreciate nephrology input   Further plan as per hospital course    Wound care recommendations   Diagnoses:    1. Right great toe DM ulcer  2. Right foot DM ulcer     Plan:  1. Calcium alginate + dry dressing to the right great toe and foot ulcer  2. Xray right great toe     VTE Risk Mitigation (From admission, onward)           Ordered     heparin (porcine) injection 5,000 Units  Every 8 hours         01/09/24 2040     IP VTE HIGH RISK PATIENT  Once         01/09/24 2040     Place sequential compression device  Until discontinued         01/09/24 2040     Place MARCELINO hose  Until discontinued         01/09/24 1653     Place sequential compression device  Until discontinued         01/09/24 1653                    Discharge Planning   JONATHAN: 1/15/2024     Code Status: Full Code   Is the patient medically ready for discharge?:     Reason for patient still in hospital (select all that apply): Patient trending condition  Discharge Plan A: Home Health                  Eli Stacy MD  Department of Hospital Medicine   [facility]

## 2024-01-15 LAB
ANION GAP SERPL CALC-SCNC: 8 MMOL/L (ref 8–16)
BUN SERPL-MCNC: 75 MG/DL (ref 6–20)
CALCIUM SERPL-MCNC: 8.4 MG/DL (ref 8.7–10.5)
CHLORIDE SERPL-SCNC: 100 MMOL/L (ref 95–110)
CO2 SERPL-SCNC: 28 MMOL/L (ref 23–29)
CREAT SERPL-MCNC: 3.8 MG/DL (ref 0.5–1.4)
EST. GFR  (NO RACE VARIABLE): 17.4 ML/MIN/1.73 M^2
GLUCOSE SERPL-MCNC: 128 MG/DL (ref 70–110)
GLUCOSE SERPL-MCNC: 138 MG/DL (ref 70–110)
GLUCOSE SERPL-MCNC: 149 MG/DL (ref 70–110)
GLUCOSE SERPL-MCNC: 201 MG/DL (ref 70–110)
GLUCOSE SERPL-MCNC: 231 MG/DL (ref 70–110)
MAGNESIUM SERPL-MCNC: 2.1 MG/DL (ref 1.6–2.6)
PHOSPHATE SERPL-MCNC: 6.2 MG/DL (ref 2.7–4.5)
POTASSIUM SERPL-SCNC: 5.3 MMOL/L (ref 3.5–5.1)
SODIUM SERPL-SCNC: 136 MMOL/L (ref 136–145)

## 2024-01-15 PROCEDURE — 25000003 PHARM REV CODE 250: Performed by: INTERNAL MEDICINE

## 2024-01-15 PROCEDURE — 25000003 PHARM REV CODE 250: Performed by: NURSE PRACTITIONER

## 2024-01-15 PROCEDURE — 80048 BASIC METABOLIC PNL TOTAL CA: CPT | Performed by: INTERNAL MEDICINE

## 2024-01-15 PROCEDURE — 63600175 PHARM REV CODE 636 W HCPCS: Performed by: NURSE PRACTITIONER

## 2024-01-15 PROCEDURE — 25000242 PHARM REV CODE 250 ALT 637 W/ HCPCS: Performed by: NURSE PRACTITIONER

## 2024-01-15 PROCEDURE — 99900035 HC TECH TIME PER 15 MIN (STAT)

## 2024-01-15 PROCEDURE — 36415 COLL VENOUS BLD VENIPUNCTURE: CPT | Performed by: NURSE PRACTITIONER

## 2024-01-15 PROCEDURE — 36556 INSERT NON-TUNNEL CV CATH: CPT | Mod: LT,,, | Performed by: SURGERY

## 2024-01-15 PROCEDURE — 99900031 HC PATIENT EDUCATION (STAT)

## 2024-01-15 PROCEDURE — 84100 ASSAY OF PHOSPHORUS: CPT | Performed by: NURSE PRACTITIONER

## 2024-01-15 PROCEDURE — 94760 N-INVAS EAR/PLS OXIMETRY 1: CPT

## 2024-01-15 PROCEDURE — 94761 N-INVAS EAR/PLS OXIMETRY MLT: CPT

## 2024-01-15 PROCEDURE — 94640 AIRWAY INHALATION TREATMENT: CPT

## 2024-01-15 PROCEDURE — 21400001 HC TELEMETRY ROOM

## 2024-01-15 PROCEDURE — 05HN33Z INSERTION OF INFUSION DEVICE INTO LEFT INTERNAL JUGULAR VEIN, PERCUTANEOUS APPROACH: ICD-10-PCS | Performed by: SURGERY

## 2024-01-15 PROCEDURE — 5A1D70Z PERFORMANCE OF URINARY FILTRATION, INTERMITTENT, LESS THAN 6 HOURS PER DAY: ICD-10-PCS | Performed by: INTERNAL MEDICINE

## 2024-01-15 PROCEDURE — 83735 ASSAY OF MAGNESIUM: CPT | Performed by: NURSE PRACTITIONER

## 2024-01-15 RX ORDER — MUPIROCIN 20 MG/G
OINTMENT TOPICAL 2 TIMES DAILY
Status: DISCONTINUED | OUTPATIENT
Start: 2024-01-15 | End: 2024-01-19 | Stop reason: HOSPADM

## 2024-01-15 RX ADMIN — INSULIN DETEMIR 15 UNITS: 100 INJECTION, SOLUTION SUBCUTANEOUS at 08:01

## 2024-01-15 RX ADMIN — HYDRALAZINE HYDROCHLORIDE 25 MG: 25 TABLET ORAL at 04:01

## 2024-01-15 RX ADMIN — ASPIRIN 81 MG: 81 TABLET, COATED ORAL at 08:01

## 2024-01-15 RX ADMIN — OXYCODONE HYDROCHLORIDE AND ACETAMINOPHEN 1 TABLET: 10; 325 TABLET ORAL at 09:01

## 2024-01-15 RX ADMIN — FINASTERIDE 5 MG: 5 TABLET, FILM COATED ORAL at 08:01

## 2024-01-15 RX ADMIN — METOLAZONE 5 MG: 2.5 TABLET ORAL at 08:01

## 2024-01-15 RX ADMIN — MUPIROCIN 1 G: 20 OINTMENT TOPICAL at 08:01

## 2024-01-15 RX ADMIN — SODIUM ZIRCONIUM CYCLOSILICATE 10 G: 10 POWDER, FOR SUSPENSION ORAL at 09:01

## 2024-01-15 RX ADMIN — HEPARIN SODIUM 5000 UNITS: 5000 INJECTION INTRAVENOUS; SUBCUTANEOUS at 05:01

## 2024-01-15 RX ADMIN — SENNOSIDES AND DOCUSATE SODIUM 2 TABLET: 8.6; 5 TABLET ORAL at 08:01

## 2024-01-15 RX ADMIN — CETIRIZINE HYDROCHLORIDE 10 MG: 10 TABLET, FILM COATED ORAL at 08:01

## 2024-01-15 RX ADMIN — OXYCODONE HYDROCHLORIDE AND ACETAMINOPHEN 1 TABLET: 10; 325 TABLET ORAL at 03:01

## 2024-01-15 RX ADMIN — METOPROLOL SUCCINATE 25 MG: 25 TABLET, EXTENDED RELEASE ORAL at 08:01

## 2024-01-15 RX ADMIN — FLUTICASONE PROPIONATE 100 MCG: 50 SPRAY, METERED NASAL at 08:01

## 2024-01-15 RX ADMIN — AMLODIPINE BESYLATE 10 MG: 5 TABLET ORAL at 08:01

## 2024-01-15 RX ADMIN — GABAPENTIN 400 MG: 300 CAPSULE ORAL at 08:01

## 2024-01-15 RX ADMIN — ATORVASTATIN CALCIUM 80 MG: 40 TABLET, FILM COATED ORAL at 08:01

## 2024-01-15 RX ADMIN — IPRATROPIUM BROMIDE AND ALBUTEROL SULFATE 3 ML: 2.5; .5 SOLUTION RESPIRATORY (INHALATION) at 02:01

## 2024-01-15 RX ADMIN — HEPARIN SODIUM 5000 UNITS: 5000 INJECTION INTRAVENOUS; SUBCUTANEOUS at 04:01

## 2024-01-15 RX ADMIN — IPRATROPIUM BROMIDE AND ALBUTEROL SULFATE 3 ML: 2.5; .5 SOLUTION RESPIRATORY (INHALATION) at 08:01

## 2024-01-15 RX ADMIN — OXYCODONE HYDROCHLORIDE AND ACETAMINOPHEN 1 TABLET: 10; 325 TABLET ORAL at 04:01

## 2024-01-15 RX ADMIN — HYDRALAZINE HYDROCHLORIDE 25 MG: 25 TABLET ORAL at 08:01

## 2024-01-15 RX ADMIN — HYDRALAZINE HYDROCHLORIDE 25 MG: 25 TABLET ORAL at 05:01

## 2024-01-15 RX ADMIN — FERROUS SULFATE TAB 325 MG (65 MG ELEMENTAL FE) 1 EACH: 325 (65 FE) TAB at 08:01

## 2024-01-15 RX ADMIN — GABAPENTIN 400 MG: 300 CAPSULE ORAL at 04:01

## 2024-01-15 RX ADMIN — SODIUM ZIRCONIUM CYCLOSILICATE 10 G: 10 POWDER, FOR SUSPENSION ORAL at 08:01

## 2024-01-15 NOTE — PROGRESS NOTES
INPATIENT NEPHROLOGY Progress Note  [facility]    Patient Name: Abel Gibbs  Date: 01/15/2024    Reason for consultation: MINI    Chief Complaint:   Chief Complaint   Patient presents with    Chest Pain    Shortness of Breath     + edema       History of Present Illness:  Abel Gibbs is a 58 y.o. male with a history as  has a past medical history of Depression, Diabetes mellitus, Hypertension, Obesity, Osteomyelitis and CKD III who presented to the ED with a Chest Pain and Shortness of Breath (+ edema). Patient presents to the emergency room with a complaint of midsternal chest pain radiating into left chest wall that started approximately 3 days ago. He further reports shortness for breath when attempting to lie flat with chest pain worsening. He was seen by wound care for right foot ulcer who felt as though his face appeared a little swollen and recommended that he go to the emergency. Patient does report having a sinus infection about 1-2 weeks ago completed course of antibiotics.  He further states after completion of antibiotics he started with congestion and seems as though symptoms have gotten worse. Additionally, he reports BLE edema that chronic but appears a little worse. Patient unclear of medications as he reports the nurse fills his medication container weekly. He does tell me that he is on furosemide but has not taken it for last 3-4 days d/t leg cramps. Patient also prescribed spirolactone but does not know if he has been taking. Also on mounjaro. Denies fever, chills, diaphoresis, dizziness, HA, palpitations, NVD, recent trauma or any other associated symptoms. Does not smoke cigarettes, drinks occasionally and uses marijuana daily. Consulted for MINI/CKD.    Renal u/s:  The right kidney measures 8.9 cm in length, with normal cortical thickness and parenchymal echotexture, and no echogenic calculi or hydronephrosis. The left kidney measures 10.4 cm in length and has normal  cortical thickness and parenchymal echotexture, without echogenic calculi or hydronephrosis.     Notable home meds:  Norvasc, metoprolol, hydralazine, diovan, lasix, aldactone, farxiga, finasteride, meloxicam    Interval History:  1/10- highest /97, on RA, UOP 1L, CXR clear, no DVT, BNP normal, trop stable, echo pending  1/11- BP improved, on RA, voiding, echo with LVH, renal duplex pending, c/o dyspnea, cough- edema unchanged  1/12- VSS, on RA, UOP 3.1L, no change in weight, still short of breath with edema- move to cardiology for lasix gtt at 5mg/hr- ordered metolazone 5mg x 1- if cannot remove adequate fluid will need RRT- patient not keen on RRT but understands plan, renal imaging with normal sized kidneys and no KAREN  1/13  VSS, UOP 3.375L.  hyperkalemic w/bump in BUN/Scr.  No complaints.  1/14  UOP 3.5L.  VSS.  Renal function about the same.  Sleeping, NAD noted.  1/15  2.1 liter UOP.  AF VSS.  Remains hyperkalemic and Creatinine rising    Plan of Care:    Assessment:  MINI/CKD III  HTN urgency/Edema- driven by renal disease   Hyponatremia  Hyperkalemia  SHPT  MARBELLA/Anemia of CKD  BPH    Plan:    - suspect MINI on CKD due to elevated BP with ischemic ATN and CRS both driven by underlying renal disease rather than cardiac dysfuncion  - BP improved but remains edematous and hyperkalemic- switch to lasix gtt- add metolazone 1/12 (unfortunately both norvasc and hydralazine will contribute to edema but options are limited due to renal dysfunction)  - echo noted- renal duplex noted- UA with 1+ protein- 400mg proteinuria - c/w HTN  - in setting of MINI and hyperK, hold ARB, MRA, farxiga and meloxicam     - phos borderline- PTH c/w CKD III  - continue iron supplement- no acute RL needs- prior paraprotein w/u negative  - continue finasteride   --given worsening renal function and persistent hyperkalemia .  High dose Lokelma comes with consequent increased sodium load which contributes to volume overload.    I have  discussed the risks and benefits of hemodialysis with the patient.  All of their questions were answered.  Risks include low blood pressure, stroke, heart attack, seizure, infection, nausea, delirium, and death.  I feel that a trial of hd with uf is indicated.  I updated his son on the phone    Thank you for allowing us to participate in this patient's care. We will continue to follow.    Vital Signs:  Temp Readings from Last 3 Encounters:   01/15/24 97.3 °F (36.3 °C) (Oral)   07/28/23 98.2 °F (36.8 °C) (Oral)   06/04/23 98.5 °F (36.9 °C) (Oral)       Pulse Readings from Last 3 Encounters:   01/15/24 67   07/28/23 (!) 56   06/04/23 (!) 53       BP Readings from Last 3 Encounters:   01/15/24 (!) 150/74   07/28/23 (!) 167/80   06/04/23 133/62       Weight:  Wt Readings from Last 3 Encounters:   01/15/24 (!) 172 kg (379 lb 3.1 oz)   01/10/24 (!) 169.6 kg (374 lb)   07/26/23 (!) 158.8 kg (350 lb 3.2 oz)       Medications:  Scheduled Meds:   amLODIPine  10 mg Oral Daily    aspirin  81 mg Oral Daily    atorvastatin  80 mg Oral QHS    cetirizine  10 mg Oral Daily    ferrous sulfate  1 tablet Oral Daily    finasteride  5 mg Oral Daily    fluticasone propionate  2 spray Each Nostril QHS    gabapentin  400 mg Oral TID    heparin (porcine)  5,000 Units Subcutaneous Q8H    hydrALAZINE  25 mg Oral Q8H    insulin detemir U-100 (Levemir)  15 Units Subcutaneous QHS    metOLazone  5 mg Oral Daily    metoprolol succinate  25 mg Oral Daily    polyethylene glycol  17 g Oral Daily    senna-docusate 8.6-50 mg  2 tablet Oral BID    sodium zirconium cyclosilicate  10 g Oral BID     Continuous Infusions:   furosemide (LASIX) 2 mg/mL continuous infusion (non-titrating) 5 mg/hr (01/13/24 1647)     PRN Meds:.acetaminophen, albuterol-ipratropium, dextrose 50%, dextrose 50%, glucagon (human recombinant), glucose, glucose, hydrALAZINE, insulin aspart U-100, labetalol, magnesium oxide, magnesium oxide, melatonin, naloxone, ondansetron,  oxyCODONE-acetaminophen, potassium bicarbonate, potassium bicarbonate, potassium bicarbonate, potassium, sodium phosphates, potassium, sodium phosphates, potassium, sodium phosphates, sodium chloride 0.9%, traZODone  No current facility-administered medications on file prior to encounter.     Current Outpatient Medications on File Prior to Encounter   Medication Sig Dispense Refill    amLODIPine (NORVASC) 10 MG tablet Take 1 tablet (10 mg total) by mouth once daily. 30 tablet 2    aspirin (ECOTRIN) 81 MG EC tablet Take 1 tablet (81 mg total) by mouth once daily. 30 tablet 2    atorvastatin (LIPITOR) 80 MG tablet Take 1 tablet (80 mg total) by mouth once daily. 30 tablet 5    FARXIGA 5 mg Tab tablet Take 5 mg by mouth once daily.      finasteride (PROSCAR) 5 mg tablet Take 5 mg by mouth once daily.      furosemide (LASIX) 20 MG tablet Take 20 mg by mouth once daily.      gabapentin (NEURONTIN) 800 MG tablet Take 800 mg by mouth 4 (four) times daily.      hydrALAZINE (APRESOLINE) 25 MG tablet Take 1 tablet (25 mg total) by mouth every 8 (eight) hours. 90 tablet 2    ketoconazole (NIZORAL) 2 % cream Apply 1 Application topically once daily.      LEVEMIR FLEXPEN 100 unit/mL (3 mL) InPn pen Inject 30 Units into the skin once daily.      LIDOcaine (LIDODERM) 5 % Place 1 patch onto the skin once daily.      meloxicam (MOBIC) 15 MG tablet Take 15 mg by mouth once daily.      metFORMIN (GLUCOPHAGE) 1000 MG tablet Take 1,000 mg by mouth 2 (two) times daily with meals.      metoprolol succinate (TOPROL-XL) 25 MG 24 hr tablet Take 25 mg by mouth once daily.      MOUNJARO 7.5 mg/0.5 mL PnIj Inject 7.5 mg into the skin every 7 days.      oxyCODONE-acetaminophen (PERCOCET)  mg per tablet Take 1 tablet by mouth 3 (three) times daily as needed for Pain.      spironolactone (ALDACTONE) 50 MG tablet Take 1 tablet (50 mg total) by mouth once daily. 30 tablet 2    traZODone (DESYREL) 50 MG tablet Take 50 mg by mouth nightly as  needed.      valsartan (DIOVAN) 160 MG tablet Take 160 mg by mouth once daily.      albuterol (PROVENTIL/VENTOLIN HFA) 90 mcg/actuation inhaler Inhale 1-2 puffs into the lungs every 6 (six) hours as needed for Wheezing. Rescue 18 g 5    blood sugar diagnostic Strp To check BG 4 times daily, to use with insurance preferred meter 200 each 0    blood-glucose meter kit To check BG 4 times daily, to use with insurance preferred meter 1 each 0    fluticasone propionate (FLONASE) 50 mcg/actuation nasal spray 1 spray (50 mcg total) by Each Nostril route daily as needed for Rhinitis.  0    lactobacillus acidophilus & bulgar (LACTINEX) 100 million cell packet Take 1 tablet by mouth 3 (three) times daily with meals.      lancets Misc To check BG 4 times daily, to use with insurance preferred meter 200 each 0    polyethylene glycol (MIRALAX) 17 gram/dose powder Take 17 g by mouth once daily. For constipation 850 g 2       Review of Systems:  Neg    Physical Exam:  General Appearance:    NAD, AAO x 3, cooperative, appears stated age   Head:    Normocephalic, atraumatic   Eyes:    PER, EOMI, and conjunctiva/sclera clear bilaterally       Mouth:   Moist mucus membranes, no thrush or oral lesions,       normal dentition   Back:     No CVA tenderness   Lungs:     Crackles   Chest wall:    No tenderness or deformity   Heart:    Regular rate and rhythm, S1 and S2 normal, no murmur, rub   or gallop   Abdomen:     Soft, non-tender, non-distended, bowel sounds active all four   quadrants, no RT or guarding, no masses, no organomegaly   Extremities:   Warm and well perfused, distal pulses are intact, no cyanosis, + edema   MSK:   No joint or muscle swelling, tenderness or deformity   Skin:   Skin color, texture, turgor normal, no rashes or lesions   Neurologic/Psychiatric:   CNII-XII intact, normal strength and sensation       throughout, no asterixis; normal affect, memory, judgement     and insight      Results:  Lab Results   Component  "Value Date     01/15/2024    K 5.3 (H) 01/15/2024     01/15/2024    CO2 28 01/15/2024    BUN 75 (H) 01/15/2024    CREATININE 3.8 (H) 01/15/2024    CALCIUM 8.4 (L) 01/15/2024    ANIONGAP 8 01/15/2024    ESTGFRAFRICA >60 03/11/2022    EGFRNONAA >60 03/11/2022       Lab Results   Component Value Date    CALCIUM 8.4 (L) 01/15/2024    PHOS 6.2 (H) 01/15/2024       No results for input(s): "WBC", "RBC", "HGB", "HCT", "PLT", "MCV", "MCH", "MCHC" in the last 24 hours.      I have personally reviewed pertinent radiological imaging and reports.    I have spent > 35 minutes providing care for this patient for the above diagnoses. These services have included chart/data/imaging review, evaluation, exam, formulation of plan, , note preparation, and discussions with staff involved in this patient's care.      Jose Cantor MD  Nephrology  [facility] [place]  [phone]    "

## 2024-01-15 NOTE — SUBJECTIVE & OBJECTIVE
Interval History:  Patient states his breathing does feel improved.  Had interval bowel movement this morning.  States he continues to make good urine.  He is asking about discharge.  Noted to have mild intermittent tremors upper extremity.  Telemetry with sinus rhythm, on room air, afebrile with T-max 99.3°.  Labs with potassium 5.3, BUN/creatinine 75/3.8.  Documented urine output 2100 cc.  Discussed with patient.  No visitors at bedside.    Review of Systems   Constitutional:  Negative for fever.   Gastrointestinal:  Negative for constipation.   Genitourinary:  Negative for difficulty urinating.   Psychiatric/Behavioral:  Negative for confusion.      Objective:     Vital Signs (Most Recent):  Temp: 97.3 °F (36.3 °C) (01/15/24 0750)  Pulse: 67 (01/15/24 0810)  Resp: 16 (01/15/24 0909)  BP: (!) 150/74 (01/15/24 0750)  SpO2: 95 % (01/15/24 0810) Vital Signs (24h Range):  Temp:  [97.3 °F (36.3 °C)-99.3 °F (37.4 °C)] 97.3 °F (36.3 °C)  Pulse:  [61-71] 67  Resp:  [15-20] 16  SpO2:  [94 %-96 %] 95 %  BP: (107-154)/(58-76) 150/74     Weight: (!) 172 kg (379 lb 3.1 oz)  Body mass index is 47.4 kg/m².    Intake/Output Summary (Last 24 hours) at 1/15/2024 1032  Last data filed at 1/15/2024 1026  Gross per 24 hour   Intake 1442.58 ml   Output 2600 ml   Net -1157.42 ml         Physical Exam  Vitals and nursing note reviewed.   Constitutional:       Appearance: He is obese.      Comments: Sitting side of bed   HENT:      Head: Normocephalic and atraumatic.      Mouth/Throat:      Mouth: Mucous membranes are moist.   Cardiovascular:      Rate and Rhythm: Normal rate and regular rhythm.      Comments: 1+ lower extremity pitting edema, wearing Evert compression  Pulmonary:      Comments: On room air, distant breath sounds, no wheeze or accessory muscle use  Abdominal:      Palpations: Abdomen is soft.      Tenderness: There is no abdominal tenderness.   Skin:     Comments: Dressing to the right foot.  Wearing Evert compression  "stockings.     Neurological:      Mental Status: He is alert and oriented to person, place, and time.      Comments: Mild intermittent shakes upper extremity   Psychiatric:         Mood and Affect: Mood normal.             Significant Labs: BMP:   Recent Labs   Lab 01/15/24  0407   *      K 5.3*      CO2 28   BUN 75*   CREATININE 3.8*   CALCIUM 8.4*   MG 2.1     CBC:   Recent Labs   Lab 01/14/24  0505   WBC 6.10   HGB 8.6*   HCT 27.2*        CMP:   Recent Labs   Lab 01/14/24  0504 01/15/24  0407   * 136   K 5.3* 5.3*    100   CO2 28 28   * 138*   BUN 71* 75*   CREATININE 3.2* 3.8*   CALCIUM 8.3* 8.4*   PROT 6.5  --    ALBUMIN 3.6  --    BILITOT 0.3  --    ALKPHOS 29*  --    AST 21  --    ALT 20  --    ANIONGAP 7* 8     Magnesium:   Recent Labs   Lab 01/14/24  0504 01/15/24  0407   MG 2.1 2.1     POCT Glucose: No results for input(s): "POCTGLUCOSE" in the last 48 hours.    Significant Imaging: I have reviewed all pertinent imaging results/findings within the past 24 hours.  "

## 2024-01-15 NOTE — NURSING
Communicated with Dr. Penaloza about trialysis procedure. Will perform procedure around 1900 today.

## 2024-01-15 NOTE — PLAN OF CARE
Problem: Adult Inpatient Plan of Care  Goal: Plan of Care Review  Outcome: Ongoing, Progressing     Problem: Diabetes Comorbidity  Goal: Blood Glucose Level Within Targeted Range  Outcome: Ongoing, Progressing     Problem: Fluid and Electrolyte Imbalance (Acute Kidney Injury/Impairment)  Goal: Fluid and Electrolyte Balance  Outcome: Ongoing, Progressing     Problem: Renal Function Impairment (Acute Kidney Injury/Impairment)  Goal: Effective Renal Function  Outcome: Ongoing, Progressing     Problem: Impaired Wound Healing  Goal: Optimal Wound Healing  Outcome: Ongoing, Progressing     Problem: Fall Injury Risk  Goal: Absence of Fall and Fall-Related Injury  Outcome: Ongoing, Progressing

## 2024-01-15 NOTE — CARE UPDATE
01/14/24 2129   Patient Assessment/Suction   Level of Consciousness (AVPU) alert   Respiratory Effort Normal   Expansion/Accessory Muscles/Retractions no use of accessory muscles   All Lung Fields Breath Sounds diminished   PRE-TX-O2   Device (Oxygen Therapy) room air   SpO2 (!) 94 %   Pulse Oximetry Type Intermittent   $ Pulse Oximetry - Multiple Charge Pulse Oximetry - Multiple   Pulse 70   Resp 19   Aerosol Therapy   $ Aerosol Therapy Charges PRN treatment not required   Education   $ Education 15 min   Respiratory Evaluation   $ Care Plan Tech Time 15 min

## 2024-01-15 NOTE — PROGRESS NOTES
[facility] Medicine  Progress Note    Patient Name: Abel Gibbs  MRN: [id number]  Patient Class: IP- Inpatient   Admission Date: 1/9/2024  Length of Stay: 5 days  Attending Physician: Eli Stacy MD  Primary Care Provider: Rupinder Pagan MD        Subjective:     Principal Problem:Hypervolemia        HPI:  Abel Gibbs is a 58 y.o. male with a history as  has a past medical history of Depression, Diabetes mellitus, Hypertension, Obesity, and Osteomyelitis. who presented to the ED with a Chest Pain and Shortness of Breath (+ edema)    Patient presents to the emergency room with a complaint of midsternal chest pain radiating into left chest wall that started approximately 3 days ago.  He further reports shortness for breath when attempting to lye flat with chest pain worsening. He tell me he was seen by wound care for right foot ulcer who felt as though his face appeared a little swollen and recommended that he go to the emergency.     Patient does report having a sinus infection about 1-2 weeks ago completed course of antibiotics.  He further states after completion of antibiotics he started with congestion and seems as though symptoms have gotten worse. Additionally, he reports BLE edema that chronic but appears a little worse.    Patient unclear of medications as he reports the nurse fills his medication container weekly. He does tell me that he is on furosemide but has not taken it for last 3-4 days d/t leg cramps. Patient also prescribed spirolactone but does not know if he has been taking. Also on mounjaro.     Denies fever, chills, diaphoresis, dizziness, HA, palpitations, NVD, recent trauma or any other associated symptoms. Does not smoke cigarettes, drinks occasionally and uses marijuana daily.      Lab and imaging obtained and reviewed.  CBC completed and shows RBCs 3.48 H/H 10.0/32.3 MCHC 31.0 RDW is 15.9.  Chemistry profile shows potassium 5.7 Ag 5 BUN 41  creatinine 2.3 GFR 31.7 calcium 8.6 ALP 35 ALT 50.  BNP within normal range.  Initial high sensitive troponin 17.2. EKG shows NSR with incomplete R-BBB. CXR without acute cardiopulmonary findings.  On admit, afebrile HR 94 RR 20 /91 with O2 sats 97% on room air.        Ultrasound bilateral lower extremity without evidence of deep venous thrombosis.      Renal US  IMPRESSION: Asymmetric small right kidney. Otherwise normal sonographic appearance of the bilateral kidneys..     Angiogram 07/2023  Summary     The estimated blood loss was <50 mL.    The pre-procedure left ventricular end diastolic pressure was 16.    Nonobstructive coronaries with mild luminal irregularities in RCA and left circumflex and focal moderate stenosis of mid to distal LAD for which medical management is recommended.       Echo 07/2023  Summary  The left ventricle is normal in size with mild concentric hypertrophy and normal systolic function.  The estimated ejection fraction is 65%.  Normal left ventricular diastolic function.  Normal right ventricular size with normal right ventricular systolic function.  Mild mitral regurgitation.  Mild tricuspid regurgitation.  Normal central venous pressure (3 mmHg).  The estimated PA systolic pressure is 27 mmHg.    Per ED provider patient presents for evaluation of chest pain with lower extremity edema for 1 week. Labs reviewed and showed mildly elevated troponin without EKG changes. Also noted to have elevated BP and MINI, given ASA IV diuretics with NTG paste applied. US BLE negative for DVT. Will admit for ACS r/o.        Overview/Hospital Course:  Abel Gibbs is a 58 y.o. male with  has a past medical history of Depression, Diabetes mellitus, Hypertension, Obesity, and Osteomyelitis. who presented to the ED with a Chest Pain and Shortness of Breath (+ edema)    Patient presented for evaluation of 3 day history of CP and increased SOB after being treated for antibiotics for sinus infection  for 1-2 weeks - as well as he endorsed not taking his medication for about a week due to leg cramps. He was found to be hyperkalemic 6.0, with acute on chronic kidney failure with BUN/cr/GFR 46/2.5/28.7. His BP was also elevated 180/91 at the time of presentation. His home BP medications were restarted amlodipine, metoprolol and hydralazine added 2/2 renal. BP improved. Nephrology consulted for management of kidney impairment and hyperkalemia. Renal US showed showed no sonographic evidence of renal artery stenosis. Started on lokelma for hyperkalemia - slow to improve. Lasix IV added, fluid restriction 1.5L. CXR on 01/09/23 did not demonstrate evidence of effusion or increased congestion and 2D echo 01/10/23 showed hypertrophy, EF 55-60% with normal DF. Has right foot ulcer present on admission followed by wound care outpatient and by wound care and Dr. Serrato inpatient. XR right foot ulcer showed severe degenerative changes with no acute osseous abnormality.     Assumed care of this patient on 1/13/24.  Patient with history of morbid obesity with BMI 47, suspected undiagnosed untreated AMBREEN, CKD stage 3, diabetes mellitus with HbA1c 6.5%, chronic right/great toe diabetic ulcer, followed by wound care, hypertension, heart failure with preserved ejection fraction, anemia, BPH, depression.  He presented from outpatient wound care due to concerns for volume overload.  Hypertensive urgency in the ED with hyperkalemia, he was admitted with Nephrology consultation and started on IV diuresis.  Renal ultrasound negative with no evidence of renal artery stenosis.  Echo with EF of 55-60%.  Ongoing hyperkalemia and started on scheduled low chemo.  Still volume overloaded on on 1/12 transfer to telemetry floor and initiated on Lasix infusion.      Interval History:  Patient states his breathing does feel improved.  Had interval bowel movement this morning.  States he continues to make good urine.  He is asking about discharge.   Noted to have mild intermittent tremors upper extremity.  Telemetry with sinus rhythm, on room air, afebrile with T-max 99.3°.  Labs with potassium 5.3, BUN/creatinine 75/3.8.  Documented urine output 2100 cc.  Discussed with patient.  No visitors at bedside.    Review of Systems   Constitutional:  Negative for fever.   Gastrointestinal:  Negative for constipation.   Genitourinary:  Negative for difficulty urinating.   Psychiatric/Behavioral:  Negative for confusion.      Objective:     Vital Signs (Most Recent):  Temp: 97.3 °F (36.3 °C) (01/15/24 0750)  Pulse: 67 (01/15/24 0810)  Resp: 16 (01/15/24 0909)  BP: (!) 150/74 (01/15/24 0750)  SpO2: 95 % (01/15/24 0810) Vital Signs (24h Range):  Temp:  [97.3 °F (36.3 °C)-99.3 °F (37.4 °C)] 97.3 °F (36.3 °C)  Pulse:  [61-71] 67  Resp:  [15-20] 16  SpO2:  [94 %-96 %] 95 %  BP: (107-154)/(58-76) 150/74     Weight: (!) 172 kg (379 lb 3.1 oz)  Body mass index is 47.4 kg/m².    Intake/Output Summary (Last 24 hours) at 1/15/2024 1032  Last data filed at 1/15/2024 1026  Gross per 24 hour   Intake 1442.58 ml   Output 2600 ml   Net -1157.42 ml         Physical Exam  Vitals and nursing note reviewed.   Constitutional:       Appearance: He is obese.      Comments: Sitting side of bed   HENT:      Head: Normocephalic and atraumatic.      Mouth/Throat:      Mouth: Mucous membranes are moist.   Cardiovascular:      Rate and Rhythm: Normal rate and regular rhythm.      Comments: 1+ lower extremity pitting edema, wearing Evert compression  Pulmonary:      Comments: On room air, distant breath sounds, no wheeze or accessory muscle use  Abdominal:      Palpations: Abdomen is soft.      Tenderness: There is no abdominal tenderness.   Skin:     Comments: Dressing to the right foot.  Wearing Evert compression stockings.     Neurological:      Mental Status: He is alert and oriented to person, place, and time.      Comments: Mild intermittent shakes upper extremity   Psychiatric:         Mood and  "Affect: Mood normal.             Significant Labs: BMP:   Recent Labs   Lab 01/15/24  0407   *      K 5.3*      CO2 28   BUN 75*   CREATININE 3.8*   CALCIUM 8.4*   MG 2.1     CBC:   Recent Labs   Lab 01/14/24  0505   WBC 6.10   HGB 8.6*   HCT 27.2*        CMP:   Recent Labs   Lab 01/14/24  0504 01/15/24  0407   * 136   K 5.3* 5.3*    100   CO2 28 28   * 138*   BUN 71* 75*   CREATININE 3.2* 3.8*   CALCIUM 8.3* 8.4*   PROT 6.5  --    ALBUMIN 3.6  --    BILITOT 0.3  --    ALKPHOS 29*  --    AST 21  --    ALT 20  --    ANIONGAP 7* 8     Magnesium:   Recent Labs   Lab 01/14/24  0504 01/15/24  0407   MG 2.1 2.1     POCT Glucose: No results for input(s): "POCTGLUCOSE" in the last 48 hours.    Significant Imaging: I have reviewed all pertinent imaging results/findings within the past 24 hours.    Assessment/Plan:      Active Hospital Problems    Diagnosis  POA    *Hypervolemia [E87.70]  Yes    Hyperkalemia [E87.5]  Yes     Priority: 1 - High    Acute kidney injury superimposed on chronic kidney disease with uremia [N17.9, N18.9]  Yes     Priority: 1 - High    CKD (chronic kidney disease), stage III [N18.30]  Yes    Anemia [D64.9]  Yes    THC use [F12.20]  Yes     Chronic    Acute on chronic diastolic heart failure [I50.33]  Yes    BPH (benign prostatic hyperplasia) [N40.0]  Yes    HLD (hyperlipidemia) [E78.5]  Yes    Severe obesity (BMI >= 40) [E66.01]  Yes    Non compliance with medical treatment [Z91.199]  Not Applicable    Diabetes mellitus with HbA1c 6.5% [E11.9]  Yes     Chronic    Diabetic ulcer of right great toe and right foot, POA [E11.621, L97.514]  Yes     Chronic    Hypertension [I10]  Yes     Chronic      Resolved Hospital Problems    Diagnosis Date Resolved POA    Hypertensive urgency [I16.0] 01/13/2024 Yes     Plan:  Continue care on telemetry floor with continuous cardiac monitoring  Currently on Lasix drip 5 milligrams/hour with metolazone 5 mg daily  Nephrology " making decision about possible need for renal replacement therapy  Strict I/O, daily weights, oral fluid and sodium restriction  Echo this admission with EF of 55-60%   Continue scheduled Lokelma 10 mg b.i.d., low-potassium diet, IV diuresis and trending  Avoid Ace/Arb with hyperkalemia, BP better on amlodipine, hydralazine and metoprolol, monitoring  Suspect patient has undiagnosed untreated AMBREEN, recommend outpatient sleep study   Needs lifestyle modification for weight loss  Continue local wound care to right foot as outlined   Continue basal bolus insulin with Accu-Cheks, as needed hypoglycemic measures, HbA1c 6.5%  Renally dose all medications and avoid nephrotoxic drugs   Increase activity, out of bed to chair, ambulation  A.m. labs ordered  Appreciate nephrology input   Further plan as per hospital course     Wound care recommendations   Diagnoses:    1. Right great toe DM ulcer  2. Right foot DM ulcer     Plan:  1. Calcium alginate + dry dressing to the right great toe and foot ulcer  2. Xray right great toe     VTE Risk Mitigation (From admission, onward)           Ordered     heparin (porcine) injection 5,000 Units  Every 8 hours         01/09/24 2040     IP VTE HIGH RISK PATIENT  Once         01/09/24 2040     Place sequential compression device  Until discontinued         01/09/24 2040     Place MARCELINO hose  Until discontinued         01/09/24 1653     Place sequential compression device  Until discontinued         01/09/24 1653                    Discharge Planning   JONATHAN: 1/15/2024     Code Status: Full Code   Is the patient medically ready for discharge?:     Reason for patient still in hospital (select all that apply): Patient trending condition  Discharge Plan A: Home Health                  Eli Stacy MD  Department of Hospital Medicine   [facility]

## 2024-01-16 PROBLEM — E83.39 HYPERPHOSPHATEMIA: Status: ACTIVE | Noted: 2024-01-16

## 2024-01-16 LAB
ALBUMIN SERPL BCP-MCNC: 3.7 G/DL (ref 3.5–5.2)
ALBUMIN SERPL BCP-MCNC: 3.7 G/DL (ref 3.5–5.2)
ANION GAP SERPL CALC-SCNC: 10 MMOL/L (ref 8–16)
ANION GAP SERPL CALC-SCNC: 10 MMOL/L (ref 8–16)
BASOPHILS # BLD AUTO: 0.05 K/UL (ref 0–0.2)
BASOPHILS # BLD AUTO: 0.05 K/UL (ref 0–0.2)
BASOPHILS NFR BLD: 1 % (ref 0–1.9)
BASOPHILS NFR BLD: 1 % (ref 0–1.9)
BUN SERPL-MCNC: 80 MG/DL (ref 6–20)
BUN SERPL-MCNC: 80 MG/DL (ref 6–20)
CALCIUM SERPL-MCNC: 8.3 MG/DL (ref 8.7–10.5)
CALCIUM SERPL-MCNC: 8.3 MG/DL (ref 8.7–10.5)
CHLORIDE SERPL-SCNC: 99 MMOL/L (ref 95–110)
CHLORIDE SERPL-SCNC: 99 MMOL/L (ref 95–110)
CO2 SERPL-SCNC: 26 MMOL/L (ref 23–29)
CO2 SERPL-SCNC: 26 MMOL/L (ref 23–29)
CREAT SERPL-MCNC: 3.7 MG/DL (ref 0.5–1.4)
CREAT SERPL-MCNC: 3.7 MG/DL (ref 0.5–1.4)
DIFFERENTIAL METHOD BLD: ABNORMAL
DIFFERENTIAL METHOD BLD: ABNORMAL
EOSINOPHIL # BLD AUTO: 0.3 K/UL (ref 0–0.5)
EOSINOPHIL # BLD AUTO: 0.3 K/UL (ref 0–0.5)
EOSINOPHIL NFR BLD: 6.5 % (ref 0–8)
EOSINOPHIL NFR BLD: 6.5 % (ref 0–8)
ERYTHROCYTE [DISTWIDTH] IN BLOOD BY AUTOMATED COUNT: 15.2 % (ref 11.5–14.5)
ERYTHROCYTE [DISTWIDTH] IN BLOOD BY AUTOMATED COUNT: 15.2 % (ref 11.5–14.5)
EST. GFR  (NO RACE VARIABLE): 17.9 ML/MIN/1.73 M^2
EST. GFR  (NO RACE VARIABLE): 17.9 ML/MIN/1.73 M^2
GLUCOSE SERPL-MCNC: 177 MG/DL (ref 70–110)
GLUCOSE SERPL-MCNC: 206 MG/DL (ref 70–110)
GLUCOSE SERPL-MCNC: 237 MG/DL (ref 70–110)
HCT VFR BLD AUTO: 26.9 % (ref 40–54)
HCT VFR BLD AUTO: 26.9 % (ref 40–54)
HGB BLD-MCNC: 8.8 G/DL (ref 14–18)
HGB BLD-MCNC: 8.8 G/DL (ref 14–18)
IMM GRANULOCYTES # BLD AUTO: 0.01 K/UL (ref 0–0.04)
IMM GRANULOCYTES # BLD AUTO: 0.01 K/UL (ref 0–0.04)
IMM GRANULOCYTES NFR BLD AUTO: 0.2 % (ref 0–0.5)
IMM GRANULOCYTES NFR BLD AUTO: 0.2 % (ref 0–0.5)
LYMPHOCYTES # BLD AUTO: 1.6 K/UL (ref 1–4.8)
LYMPHOCYTES # BLD AUTO: 1.6 K/UL (ref 1–4.8)
LYMPHOCYTES NFR BLD: 33.1 % (ref 18–48)
LYMPHOCYTES NFR BLD: 33.1 % (ref 18–48)
MAGNESIUM SERPL-MCNC: 2.2 MG/DL (ref 1.6–2.6)
MCH RBC QN AUTO: 29.9 PG (ref 27–31)
MCH RBC QN AUTO: 29.9 PG (ref 27–31)
MCHC RBC AUTO-ENTMCNC: 32.7 G/DL (ref 32–36)
MCHC RBC AUTO-ENTMCNC: 32.7 G/DL (ref 32–36)
MCV RBC AUTO: 92 FL (ref 82–98)
MCV RBC AUTO: 92 FL (ref 82–98)
MONOCYTES # BLD AUTO: 0.7 K/UL (ref 0.3–1)
MONOCYTES # BLD AUTO: 0.7 K/UL (ref 0.3–1)
MONOCYTES NFR BLD: 13.1 % (ref 4–15)
MONOCYTES NFR BLD: 13.1 % (ref 4–15)
NEUTROPHILS # BLD AUTO: 2.3 K/UL (ref 1.8–7.7)
NEUTROPHILS # BLD AUTO: 2.3 K/UL (ref 1.8–7.7)
NEUTROPHILS NFR BLD: 46.1 % (ref 38–73)
NEUTROPHILS NFR BLD: 46.1 % (ref 38–73)
NRBC BLD-RTO: 0 /100 WBC
NRBC BLD-RTO: 0 /100 WBC
PHOSPHATE SERPL-MCNC: 6.7 MG/DL (ref 2.7–4.5)
PLATELET # BLD AUTO: 174 K/UL (ref 150–450)
PLATELET # BLD AUTO: 174 K/UL (ref 150–450)
PMV BLD AUTO: 10.5 FL (ref 9.2–12.9)
PMV BLD AUTO: 10.5 FL (ref 9.2–12.9)
POTASSIUM SERPL-SCNC: 5.2 MMOL/L (ref 3.5–5.1)
POTASSIUM SERPL-SCNC: 5.2 MMOL/L (ref 3.5–5.1)
RBC # BLD AUTO: 2.94 M/UL (ref 4.6–6.2)
RBC # BLD AUTO: 2.94 M/UL (ref 4.6–6.2)
SODIUM SERPL-SCNC: 135 MMOL/L (ref 136–145)
SODIUM SERPL-SCNC: 135 MMOL/L (ref 136–145)
WBC # BLD AUTO: 4.95 K/UL (ref 3.9–12.7)
WBC # BLD AUTO: 4.95 K/UL (ref 3.9–12.7)

## 2024-01-16 PROCEDURE — 83735 ASSAY OF MAGNESIUM: CPT | Performed by: NURSE PRACTITIONER

## 2024-01-16 PROCEDURE — 25000003 PHARM REV CODE 250: Performed by: INTERNAL MEDICINE

## 2024-01-16 PROCEDURE — 86706 HEP B SURFACE ANTIBODY: CPT | Performed by: INTERNAL MEDICINE

## 2024-01-16 PROCEDURE — 63600175 PHARM REV CODE 636 W HCPCS: Performed by: NURSE PRACTITIONER

## 2024-01-16 PROCEDURE — 90935 HEMODIALYSIS ONE EVALUATION: CPT

## 2024-01-16 PROCEDURE — 86704 HEP B CORE ANTIBODY TOTAL: CPT | Performed by: INTERNAL MEDICINE

## 2024-01-16 PROCEDURE — 21400001 HC TELEMETRY ROOM

## 2024-01-16 PROCEDURE — 99231 SBSQ HOSP IP/OBS SF/LOW 25: CPT | Mod: ,,, | Performed by: FAMILY MEDICINE

## 2024-01-16 PROCEDURE — 99900031 HC PATIENT EDUCATION (STAT)

## 2024-01-16 PROCEDURE — 94640 AIRWAY INHALATION TREATMENT: CPT

## 2024-01-16 PROCEDURE — 63600175 PHARM REV CODE 636 W HCPCS: Performed by: INTERNAL MEDICINE

## 2024-01-16 PROCEDURE — 94760 N-INVAS EAR/PLS OXIMETRY 1: CPT

## 2024-01-16 PROCEDURE — 25000242 PHARM REV CODE 250 ALT 637 W/ HCPCS: Performed by: NURSE PRACTITIONER

## 2024-01-16 PROCEDURE — 85025 COMPLETE CBC W/AUTO DIFF WBC: CPT | Performed by: INTERNAL MEDICINE

## 2024-01-16 PROCEDURE — 25000003 PHARM REV CODE 250: Performed by: NURSE PRACTITIONER

## 2024-01-16 PROCEDURE — 80069 RENAL FUNCTION PANEL: CPT | Performed by: INTERNAL MEDICINE

## 2024-01-16 PROCEDURE — 36415 COLL VENOUS BLD VENIPUNCTURE: CPT | Performed by: INTERNAL MEDICINE

## 2024-01-16 PROCEDURE — 87340 HEPATITIS B SURFACE AG IA: CPT | Performed by: INTERNAL MEDICINE

## 2024-01-16 PROCEDURE — 94761 N-INVAS EAR/PLS OXIMETRY MLT: CPT

## 2024-01-16 RX ORDER — HEPARIN SODIUM 5000 [USP'U]/ML
5000 INJECTION, SOLUTION INTRAVENOUS; SUBCUTANEOUS ONCE
Status: DISCONTINUED | OUTPATIENT
Start: 2024-01-16 | End: 2024-01-19 | Stop reason: HOSPADM

## 2024-01-16 RX ORDER — SODIUM CHLORIDE 9 MG/ML
INJECTION, SOLUTION INTRAVENOUS ONCE
Status: DISCONTINUED | OUTPATIENT
Start: 2024-01-16 | End: 2024-01-19 | Stop reason: HOSPADM

## 2024-01-16 RX ORDER — SODIUM CHLORIDE 9 MG/ML
INJECTION, SOLUTION INTRAVENOUS
Status: DISCONTINUED | OUTPATIENT
Start: 2024-01-16 | End: 2024-01-19 | Stop reason: HOSPADM

## 2024-01-16 RX ORDER — HEPARIN SODIUM 1000 [USP'U]/ML
5000 INJECTION, SOLUTION INTRAVENOUS; SUBCUTANEOUS
Status: DISCONTINUED | OUTPATIENT
Start: 2024-01-16 | End: 2024-01-19 | Stop reason: HOSPADM

## 2024-01-16 RX ADMIN — HEPARIN SODIUM 5000 UNITS: 5000 INJECTION INTRAVENOUS; SUBCUTANEOUS at 08:01

## 2024-01-16 RX ADMIN — OXYCODONE HYDROCHLORIDE AND ACETAMINOPHEN 1 TABLET: 10; 325 TABLET ORAL at 09:01

## 2024-01-16 RX ADMIN — ATORVASTATIN CALCIUM 80 MG: 40 TABLET, FILM COATED ORAL at 08:01

## 2024-01-16 RX ADMIN — GABAPENTIN 400 MG: 300 CAPSULE ORAL at 02:01

## 2024-01-16 RX ADMIN — HEPARIN SODIUM 5000 UNITS: 5000 INJECTION INTRAVENOUS; SUBCUTANEOUS at 02:01

## 2024-01-16 RX ADMIN — SENNOSIDES AND DOCUSATE SODIUM 2 TABLET: 8.6; 5 TABLET ORAL at 09:01

## 2024-01-16 RX ADMIN — OXYCODONE HYDROCHLORIDE AND ACETAMINOPHEN 1 TABLET: 10; 325 TABLET ORAL at 10:01

## 2024-01-16 RX ADMIN — MUPIROCIN 1 G: 20 OINTMENT TOPICAL at 09:01

## 2024-01-16 RX ADMIN — ASPIRIN 81 MG: 81 TABLET, COATED ORAL at 09:01

## 2024-01-16 RX ADMIN — SODIUM ZIRCONIUM CYCLOSILICATE 10 G: 10 POWDER, FOR SUSPENSION ORAL at 09:01

## 2024-01-16 RX ADMIN — GABAPENTIN 400 MG: 300 CAPSULE ORAL at 09:01

## 2024-01-16 RX ADMIN — GABAPENTIN 400 MG: 300 CAPSULE ORAL at 08:01

## 2024-01-16 RX ADMIN — SENNOSIDES AND DOCUSATE SODIUM 2 TABLET: 8.6; 5 TABLET ORAL at 08:01

## 2024-01-16 RX ADMIN — AMLODIPINE BESYLATE 10 MG: 5 TABLET ORAL at 09:01

## 2024-01-16 RX ADMIN — Medication 6 MG: at 08:01

## 2024-01-16 RX ADMIN — HEPARIN SODIUM 5000 UNITS: 5000 INJECTION INTRAVENOUS; SUBCUTANEOUS at 05:01

## 2024-01-16 RX ADMIN — CETIRIZINE HYDROCHLORIDE 10 MG: 10 TABLET, FILM COATED ORAL at 09:01

## 2024-01-16 RX ADMIN — HYDRALAZINE HYDROCHLORIDE 25 MG: 25 TABLET ORAL at 08:01

## 2024-01-16 RX ADMIN — INSULIN ASPART 2 UNITS: 100 INJECTION, SOLUTION INTRAVENOUS; SUBCUTANEOUS at 09:01

## 2024-01-16 RX ADMIN — MUPIROCIN 1 G: 20 OINTMENT TOPICAL at 08:01

## 2024-01-16 RX ADMIN — OXYCODONE HYDROCHLORIDE AND ACETAMINOPHEN 1 TABLET: 10; 325 TABLET ORAL at 03:01

## 2024-01-16 RX ADMIN — FERROUS SULFATE TAB 325 MG (65 MG ELEMENTAL FE) 1 EACH: 325 (65 FE) TAB at 09:01

## 2024-01-16 RX ADMIN — FLUTICASONE PROPIONATE 100 MCG: 50 SPRAY, METERED NASAL at 08:01

## 2024-01-16 RX ADMIN — HYDRALAZINE HYDROCHLORIDE 25 MG: 25 TABLET ORAL at 02:01

## 2024-01-16 RX ADMIN — IPRATROPIUM BROMIDE AND ALBUTEROL SULFATE 3 ML: 2.5; .5 SOLUTION RESPIRATORY (INHALATION) at 08:01

## 2024-01-16 RX ADMIN — HEPARIN SODIUM 5000 UNITS: 1000 INJECTION, SOLUTION INTRAVENOUS; SUBCUTANEOUS at 11:01

## 2024-01-16 RX ADMIN — POLYETHYLENE GLYCOL 3350 17 G: 17 POWDER, FOR SOLUTION ORAL at 09:01

## 2024-01-16 RX ADMIN — ACETAMINOPHEN 650 MG: 325 TABLET ORAL at 02:01

## 2024-01-16 RX ADMIN — INSULIN DETEMIR 15 UNITS: 100 INJECTION, SOLUTION SUBCUTANEOUS at 08:01

## 2024-01-16 NOTE — CARE UPDATE
01/15/24 2031   Patient Assessment/Suction   Level of Consciousness (AVPU) alert   Respiratory Effort Normal;Unlabored   Expansion/Accessory Muscles/Retractions no use of accessory muscles   All Lung Fields Breath Sounds diminished  (slight wheeze)   Rhythm/Pattern, Respiratory unlabored   Cough Frequency no cough   PRE-TX-O2   Device (Oxygen Therapy) room air   SpO2 98 %   Pulse Oximetry Type Intermittent   $ Pulse Oximetry - Single Charge Pulse Oximetry - Single   $ Pulse Oximetry - Multiple Charge Pulse Oximetry - Multiple   Pulse 78   Resp 20   Positioning Sitting on edge of bed (dangling)   Aerosol Therapy   $ Aerosol Therapy Charges Aerosol Treatment   Daily Review of Necessity (SVN) completed   Respiratory Treatment Status (SVN) given   Treatment Route (SVN) mask;oxygen   Patient Position (SVN) sitting on edge of bed   Post Treatment Assessment (SVN) patient reports breathing improved   Signs of Intolerance (SVN) none   Education   $ Education Bronchodilator;15 min   Respiratory Evaluation   $ Care Plan Tech Time 15 min

## 2024-01-16 NOTE — PROGRESS NOTES
Chief complaint:  Chest Pain and Shortness of Breath (+ edema)      HPI:  Abel Gibbs is a 58 y.o. male presenting with a DM ulcer of the plantar right foot and right great toe. Pt has been seeing [facility] for the wounds, and has had them for months. Pt has no other complaints today.    1/16/2024  Pt seen today in dialysis for a FU for a right great toe DM ulcer. Wound is stable with some improvement. Pt will continue with [facility] on DC. No new complaints today.    PMH:  As per HPI and below:  Past Medical History:   Diagnosis Date    Depression     Diabetes mellitus     Hypertension     Obesity     Osteomyelitis        Social History     Socioeconomic History    Marital status: Single   Tobacco Use    Smoking status: Never    Smokeless tobacco: Never   Substance and Sexual Activity    Alcohol use: Yes     Comment: occas    Drug use: Yes     Frequency: 1.0 times per week     Types: Marijuana     Comment: last use 2 days ago     Social Determinants of Health     Financial Resource Strain: Patient Declined (2/8/2023)    Overall Financial Resource Strain (CARDIA)     Difficulty of Paying Living Expenses: Patient declined   Food Insecurity: Patient Declined (2/8/2023)    Hunger Vital Sign     Worried About Running Out of Food in the Last Year: Patient declined     Ran Out of Food in the Last Year: Patient declined   Transportation Needs: Patient Declined (2/8/2023)    PRAPARE - Transportation     Lack of Transportation (Medical): Patient declined     Lack of Transportation (Non-Medical): Patient declined   Physical Activity: Patient Declined (2/8/2023)    Exercise Vital Sign     Days of Exercise per Week: Patient declined     Minutes of Exercise per Session: Patient declined   Stress: Patient Declined (2/8/2023)    [employer] - Occupational Stress Questionnaire     Feeling of Stress : Patient declined   Social Connections: Patient Declined (2/8/2023)    Social Connection and  Isolation Panel [NHANES]     Frequency of Communication with Friends and Family: Patient declined     Frequency of Social Gatherings with Friends and Family: Patient declined     Attends Spiritism Services: Patient declined     Active Member of Clubs or Organizations: Patient declined     Attends Club or Organization Meetings: Patient declined     Marital Status: Patient declined   Housing Stability: Unknown (2/8/2023)    Housing Stability Vital Sign     Unable to Pay for Housing in the Last Year: Patient refused     Unstable Housing in the Last Year: Patient refused       Past Surgical History:   Procedure Laterality Date    ANGIOGRAM, CORONARY, WITH LEFT HEART CATHETERIZATION N/A 7/28/2023    Procedure: Angiogram, Coronary, with Left Heart Cath;  Surgeon: Alek Greenwood MD;  Location: [facility] CATH/EP LAB;  Service: Cardiology;  Laterality: N/A;       No family history on file.    Review of patient's allergies indicates:   Allergen Reactions    Adhesive Itching       No current facility-administered medications on file prior to encounter.     Current Outpatient Medications on File Prior to Encounter   Medication Sig Dispense Refill    amLODIPine (NORVASC) 10 MG tablet Take 1 tablet (10 mg total) by mouth once daily. 30 tablet 2    aspirin (ECOTRIN) 81 MG EC tablet Take 1 tablet (81 mg total) by mouth once daily. 30 tablet 2    atorvastatin (LIPITOR) 80 MG tablet Take 1 tablet (80 mg total) by mouth once daily. 30 tablet 5    FARXIGA 5 mg Tab tablet Take 5 mg by mouth once daily.      finasteride (PROSCAR) 5 mg tablet Take 5 mg by mouth once daily.      furosemide (LASIX) 20 MG tablet Take 20 mg by mouth once daily.      gabapentin (NEURONTIN) 800 MG tablet Take 800 mg by mouth 4 (four) times daily.      hydrALAZINE (APRESOLINE) 25 MG tablet Take 1 tablet (25 mg total) by mouth every 8 (eight) hours. 90 tablet 2    ketoconazole (NIZORAL) 2 % cream Apply 1 Application topically once daily.      LEVEMIR FLEXPEN 100  unit/mL (3 mL) InPn pen Inject 30 Units into the skin once daily.      LIDOcaine (LIDODERM) 5 % Place 1 patch onto the skin once daily.      meloxicam (MOBIC) 15 MG tablet Take 15 mg by mouth once daily.      metFORMIN (GLUCOPHAGE) 1000 MG tablet Take 1,000 mg by mouth 2 (two) times daily with meals.      metoprolol succinate (TOPROL-XL) 25 MG 24 hr tablet Take 25 mg by mouth once daily.      MOUNJARO 7.5 mg/0.5 mL PnIj Inject 7.5 mg into the skin every 7 days.      oxyCODONE-acetaminophen (PERCOCET)  mg per tablet Take 1 tablet by mouth 3 (three) times daily as needed for Pain.      spironolactone (ALDACTONE) 50 MG tablet Take 1 tablet (50 mg total) by mouth once daily. 30 tablet 2    traZODone (DESYREL) 50 MG tablet Take 50 mg by mouth nightly as needed.      valsartan (DIOVAN) 160 MG tablet Take 160 mg by mouth once daily.      albuterol (PROVENTIL/VENTOLIN HFA) 90 mcg/actuation inhaler Inhale 1-2 puffs into the lungs every 6 (six) hours as needed for Wheezing. Rescue 18 g 5    blood sugar diagnostic Strp To check BG 4 times daily, to use with insurance preferred meter 200 each 0    blood-glucose meter kit To check BG 4 times daily, to use with insurance preferred meter 1 each 0    fluticasone propionate (FLONASE) 50 mcg/actuation nasal spray 1 spray (50 mcg total) by Each Nostril route daily as needed for Rhinitis.  0    lactobacillus acidophilus & bulgar (LACTINEX) 100 million cell packet Take 1 tablet by mouth 3 (three) times daily with meals.      lancets Misc To check BG 4 times daily, to use with insurance preferred meter 200 each 0    polyethylene glycol (MIRALAX) 17 gram/dose powder Take 17 g by mouth once daily. For constipation 850 g 2       ROS: As per HPI and below:  Pertinent items are noted in HPI.      Physical Exam:     Vitals:    01/16/24 1000 01/16/24 1030 01/16/24 1100 01/16/24 1130   BP: (!) 142/63 126/68 (!) 155/87 134/67   BP Location:       Patient Position:       Pulse: 68 64 60 (!)  "59   Resp:       Temp:       TempSrc:       SpO2:       Weight:       Height:           BP  Min: 107/69  Max: 208/97  Temp  Av.1 °F (36.7 °C)  Min: 93.8 °F (34.3 °C)  Max: 99.3 °F (37.4 °C)  Pulse  Av.9  Min: 50  Max: 94  Resp  Av.4  Min: 8  Max: 23  SpO2  Av %  Min: 92 %  Max: 99 %  Height  Av' 3" (190.5 cm)  Min: 6' 3" (190.5 cm)  Max: 6' 3" (190.5 cm)  Weight  Av.3 kg (373 lb 2.5 oz)  Min: 161.5 kg (356 lb)  Max: 172 kg (379 lb 3.1 oz)    Body mass index is 47.4 kg/m².          General:             Well developed, well nourished, no apparent distress  HEENT:              NCAT, no JVD, mucous membranes moist, EOM intact  Cardiovascular:  Regular rate and rhythm, normal S1, normal S2, No murmurs, rubs, or gallops  Respiratory:        Normal breath sounds, no wheezes, no rales, no rhonchi  Abdomen:           Bowel sounds present, non tender, non distended, no masses, no hepatojugular reflux  Extremities:        No clubbing, no cyanosis, no edema  Vascular:            2+ b/l radial.  Peripheral pulses intact.  No carotid bruits.  Neurological:      No focal deficits  Skin:                   No obvious rashes or erythema, right great toe and foot DM ulcer    Lab Results   Component Value Date    WBC 4.95 2024    WBC 4.95 2024    HGB 8.8 (L) 2024    HGB 8.8 (L) 2024    HCT 26.9 (L) 2024    HCT 26.9 (L) 2024    MCV 92 2024    MCV 92 2024     2024     2024     Lab Results   Component Value Date    CHOL 132 01/10/2024    CHOL 220 (H) 2017     Lab Results   Component Value Date    HDL 51 01/10/2024    HDL 48 2017     Lab Results   Component Value Date    LDLCALC 57.8 (L) 01/10/2024    LDLCALC 155.0 2017     Lab Results   Component Value Date    TRIG 116 01/10/2024    TRIG 85 2017     Lab Results   Component Value Date    CHOLHDL 38.6 01/10/2024    CHOLHDL 21.8 2017     CMP  Recent Labs   Lab " 01/16/24  0434   *  206*   CALCIUM 8.3*  8.3*   ALBUMIN 3.7  3.7   *  135*   K 5.2*  5.2*   CO2 26  26   CL 99  99   BUN 80*  80*   CREATININE 3.7*  3.7*        Lab Results   Component Value Date    TSH 2.550 07/26/2023     X-Ray Foot 2 View Right  Order: [id number]  Status: Final result       Visible to patient: Yes (not seen)       Next appt: None       Dx: Diabetic ulcer of toe of right foot a...    0 Result Notes  Details    Reading Physician Reading Date Result Priority   Edmond Ty MD  [phone] 1/11/2024      Narrative & Impression  Reason: Ulcer of the great toe.     FINDINGS:     AP and lateral radiographs of the right foot. No comparison radiograph. No acute fracture, dislocation or destructive osseous lesion observed. Mild hallux valgus deformity. There is joint space narrowing and osteophytosis of the first MTP joint. Partial ankylosis of the first interphalangeal joint. Severe dorsal mid foot osteophytosis with large loose body at the talonavicular joint measuring 1.8 cm. Achilles and plantar calcaneal enthesopathy noted. Vascular atherosclerosis observed.     IMPRESSION:     Severe degenerative changes of the right foot with no acute osseous abnormality.     Electronically signed by:  Edmond Ty DO  01/11/2024 09:11 AM CST Workstation: [id number]           Specimen Collected: 01/10/24 19:31 CST Last Resulted: 01/11/24 09:11 CST           .      Assessment and Recommendations       Diagnoses:    1. Right great toe DM ulcer  2. Right foot DM ulcer  3. Xray negative for osteomyelitis    Plan:  1. Calcium alginate + dry dressing to the right great toe and foot ulcer  2. Xray right great toe    Complexity:  moderate

## 2024-01-16 NOTE — PLAN OF CARE
Met with pt to discuss discharge planning. Pt up and walking about room when met with him. Pt reports plan to discharge home with [facility] following him and they have accepted and are willing to resume services.  Pt states that he has difficulty living alone and that he needs assistance reading his mail and cleaning his home.  Attempted to call in for waiver program but due to weather they ask that they be called back tomorrow.  CM will continue to follow.       01/16/24 1520   Post-Acute Status   Post-Acute Authorization Home Health   Home Health Status Set-up Complete/Auth obtained   Discharge Delays None known at this time   Discharge Plan   Discharge Plan A Home Health   Discharge Plan B Home Health

## 2024-01-16 NOTE — SUBJECTIVE & OBJECTIVE
Interval History:  Patient seen and examined during dialysis today.  States not significantly short of breath on lying down but with activity does become short of breath.  States he still produce significant amount of urine this morning.  Mild discomfort over left-sided Trialysis catheter.  Plans to UF 3 L today if can tolerate.  Afebrile, on room air, telemetry with sinus rhythm.  Labs with hemoglobin 8.8, potassium 5.2, BUN/creatinine 80/3.7.  Documented urine output 1700 cc.  Discussed with patient.  Discussed with dialysis nurse.    Review of Systems   Constitutional:  Negative for fever.   Respiratory:  Positive for shortness of breath.    Cardiovascular:  Positive for leg swelling.   Psychiatric/Behavioral:  Negative for confusion.      Objective:     Vital Signs (Most Recent):  Temp: 97.8 °F (36.6 °C) (01/16/24 0950)  Pulse: 64 (01/16/24 1030)  Resp: 18 (01/16/24 0950)  BP: 126/68 (01/16/24 1030)  SpO2: 99 % (01/16/24 0950) Vital Signs (24h Range):  Temp:  [97.7 °F (36.5 °C)-98.7 °F (37.1 °C)] 97.8 °F (36.6 °C)  Pulse:  [64-78] 64  Resp:  [16-20] 18  SpO2:  [93 %-99 %] 99 %  BP: (107-165)/(63-89) 126/68     Weight: (!) 172 kg (379 lb 3.1 oz)  Body mass index is 47.4 kg/m².    Intake/Output Summary (Last 24 hours) at 1/16/2024 1051  Last data filed at 1/16/2024 0831  Gross per 24 hour   Intake 1472 ml   Output 1200 ml   Net 272 ml         Physical Exam  Vitals and nursing note reviewed.   Constitutional:       Appearance: He is obese.      Comments: Lying in bed, currently receiving dialysis   HENT:      Head: Normocephalic and atraumatic.      Mouth/Throat:      Mouth: Mucous membranes are moist.   Neck:      Comments: Left-sided Trialysis line in place, currently connected  Cardiovascular:      Rate and Rhythm: Normal rate and regular rhythm.      Comments: 2+ lower extremity pitting edema  Pulmonary:      Comments: On room air, distant breath sounds, no wheeze or accessory muscle use  Abdominal:       "Palpations: Abdomen is soft.      Tenderness: There is no abdominal tenderness.   Neurological:      Mental Status: He is alert and oriented to person, place, and time.   Psychiatric:         Mood and Affect: Mood normal.             Significant Labs: BMP:   Recent Labs   Lab 01/16/24 0434   *  206*   *  135*   K 5.2*  5.2*   CL 99  99   CO2 26  26   BUN 80*  80*   CREATININE 3.7*  3.7*   CALCIUM 8.3*  8.3*   MG 2.2     CBC:   Recent Labs   Lab 01/16/24 0434   WBC 4.95  4.95   HGB 8.8*  8.8*   HCT 26.9*  26.9*     174     CMP:   Recent Labs   Lab 01/15/24  0407 01/16/24  0434    135*  135*   K 5.3* 5.2*  5.2*    99  99   CO2 28 26  26   * 206*  206*   BUN 75* 80*  80*   CREATININE 3.8* 3.7*  3.7*   CALCIUM 8.4* 8.3*  8.3*   ALBUMIN  --  3.7  3.7   ANIONGAP 8 10  10     Cardiac Markers: No results for input(s): "CKMB", "MYOGLOBIN", "BNP", "TROPISTAT" in the last 48 hours.  Magnesium:   Recent Labs   Lab 01/15/24  0407 01/16/24  0434   MG 2.1 2.2       Significant Imaging: I have reviewed all pertinent imaging results/findings within the past 24 hours.  "

## 2024-01-16 NOTE — NURSING
states that the items were not available.  Evening  came to the unit to speak with us about missing items. Notified security department also.

## 2024-01-16 NOTE — PROGRESS NOTES
INPATIENT NEPHROLOGY Progress Note  [facility]    Patient Name: Abel Gibbs  Date: 01/16/2024    Reason for consultation: MINI    Chief Complaint:   Chief Complaint   Patient presents with    Chest Pain    Shortness of Breath     + edema       History of Present Illness:  Abel Gibbs is a 58 y.o. male with a history as  has a past medical history of Depression, Diabetes mellitus, Hypertension, Obesity, Osteomyelitis and CKD III who presented to the ED with a Chest Pain and Shortness of Breath (+ edema). Patient presents to the emergency room with a complaint of midsternal chest pain radiating into left chest wall that started approximately 3 days ago. He further reports shortness for breath when attempting to lie flat with chest pain worsening. He was seen by wound care for right foot ulcer who felt as though his face appeared a little swollen and recommended that he go to the emergency. Patient does report having a sinus infection about 1-2 weeks ago completed course of antibiotics.  He further states after completion of antibiotics he started with congestion and seems as though symptoms have gotten worse. Additionally, he reports BLE edema that chronic but appears a little worse. Patient unclear of medications as he reports the nurse fills his medication container weekly. He does tell me that he is on furosemide but has not taken it for last 3-4 days d/t leg cramps. Patient also prescribed spirolactone but does not know if he has been taking. Also on mounjaro. Denies fever, chills, diaphoresis, dizziness, HA, palpitations, NVD, recent trauma or any other associated symptoms. Does not smoke cigarettes, drinks occasionally and uses marijuana daily. Consulted for MINI/CKD.    Renal u/s:  The right kidney measures 8.9 cm in length, with normal cortical thickness and parenchymal echotexture, and no echogenic calculi or hydronephrosis. The left kidney measures 10.4 cm in length and has normal  cortical thickness and parenchymal echotexture, without echogenic calculi or hydronephrosis.     Notable home meds:  Norvasc, metoprolol, hydralazine, diovan, lasix, aldactone, farxiga, finasteride, meloxicam    Interval History:  1/10- highest /97, on RA, UOP 1L, CXR clear, no DVT, BNP normal, trop stable, echo pending  1/11- BP improved, on RA, voiding, echo with LVH, renal duplex pending, c/o dyspnea, cough- edema unchanged  1/12- VSS, on RA, UOP 3.1L, no change in weight, still short of breath with edema- move to cardiology for lasix gtt at 5mg/hr- ordered metolazone 5mg x 1- if cannot remove adequate fluid will need RRT- patient not keen on RRT but understands plan, renal imaging with normal sized kidneys and no KAREN  1/13  VSS, UOP 3.375L.  hyperkalemic w/bump in BUN/Scr.  No complaints.  1/14  UOP 3.5L.  VSS.  Renal function about the same.  Sleeping, NAD noted.  1/15  2.1 liter UOP.  AF VSS.  Remains hyperkalemic and Creatinine rising  1/16  Patient seen on hemodialysis for uremic clearance and ultrafiltration.        Plan of Care:    Assessment:  MINI/CKD III  HTN urgency/Edema- driven by renal disease   Hyponatremia  Hyperkalemia  SHPT  MARBELLA/Anemia of CKD  BPH    Plan:    - suspect MINI on CKD due to elevated BP with ischemic ATN and CRS both driven by underlying renal disease rather than cardiac dysfuncion  - BP improved but remains edematous and hyperkalemic   - echo noted- renal duplex noted- UA with 1+ protein- 400mg proteinuria - c/w HTN  - in setting of MINI and hyperK, hold ARB, MRA, farxiga and meloxicam   --started hd due to persistent hyperkalemia and refractory hypervolemia  - phos borderline- PTH c/w CKD III  - continue iron supplement- no acute RL needs- prior paraprotein w/u negative  - continue finasteride   --given worsening renal function and persistent hyperkalemia .  High dose Lokelma comes with consequent increased sodium load which contributes to hypertension and volume overload.   Will hold today.      I have discussed the risks and benefits of hemodialysis with the patient.  All of their questions were answered.  Risks include low blood pressure, stroke, heart attack, seizure, infection, nausea, delirium, and death.       Thank you for allowing us to participate in this patient's care. We will continue to follow.    Vital Signs:  Temp Readings from Last 3 Encounters:   01/16/24 97.8 °F (36.6 °C)   07/28/23 98.2 °F (36.8 °C) (Oral)   06/04/23 98.5 °F (36.9 °C) (Oral)       Pulse Readings from Last 3 Encounters:   01/16/24 (!) 59   07/28/23 (!) 56   06/04/23 (!) 53       BP Readings from Last 3 Encounters:   01/16/24 129/84   07/28/23 (!) 167/80   06/04/23 133/62       Weight:  Wt Readings from Last 3 Encounters:   01/15/24 (!) 172 kg (379 lb 3.1 oz)   01/10/24 (!) 169.6 kg (374 lb)   07/26/23 (!) 158.8 kg (350 lb 3.2 oz)       Medications:  Scheduled Meds:   sodium chloride 0.9%   Intravenous Once    sodium chloride 0.9%   Intravenous Once    amLODIPine  10 mg Oral Daily    aspirin  81 mg Oral Daily    atorvastatin  80 mg Oral QHS    cetirizine  10 mg Oral Daily    ferrous sulfate  1 tablet Oral Daily    finasteride  5 mg Oral Daily    fluticasone propionate  2 spray Each Nostril QHS    gabapentin  400 mg Oral TID    heparin (porcine)  5,000 Units Subcutaneous Q8H    heparin (porcine)  5,000 Units Intravenous Once    hydrALAZINE  25 mg Oral Q8H    insulin detemir U-100 (Levemir)  15 Units Subcutaneous QHS    metoprolol succinate  25 mg Oral Daily    mupirocin   Nasal BID    polyethylene glycol  17 g Oral Daily    senna-docusate 8.6-50 mg  2 tablet Oral BID     Continuous Infusions:    PRN Meds:.sodium chloride 0.9%, sodium chloride 0.9%, acetaminophen, albuterol-ipratropium, dextrose 50%, dextrose 50%, glucagon (human recombinant), glucose, glucose, heparin (porcine), hydrALAZINE, insulin aspart U-100, labetalol, magnesium oxide, magnesium oxide, melatonin, naloxone, ondansetron,  oxyCODONE-acetaminophen, potassium bicarbonate, potassium bicarbonate, potassium bicarbonate, potassium, sodium phosphates, potassium, sodium phosphates, potassium, sodium phosphates, sodium chloride 0.9%, sodium chloride 0.9%, sodium chloride 0.9%, traZODone  No current facility-administered medications on file prior to encounter.     Current Outpatient Medications on File Prior to Encounter   Medication Sig Dispense Refill    amLODIPine (NORVASC) 10 MG tablet Take 1 tablet (10 mg total) by mouth once daily. 30 tablet 2    aspirin (ECOTRIN) 81 MG EC tablet Take 1 tablet (81 mg total) by mouth once daily. 30 tablet 2    atorvastatin (LIPITOR) 80 MG tablet Take 1 tablet (80 mg total) by mouth once daily. 30 tablet 5    FARXIGA 5 mg Tab tablet Take 5 mg by mouth once daily.      finasteride (PROSCAR) 5 mg tablet Take 5 mg by mouth once daily.      furosemide (LASIX) 20 MG tablet Take 20 mg by mouth once daily.      gabapentin (NEURONTIN) 800 MG tablet Take 800 mg by mouth 4 (four) times daily.      hydrALAZINE (APRESOLINE) 25 MG tablet Take 1 tablet (25 mg total) by mouth every 8 (eight) hours. 90 tablet 2    ketoconazole (NIZORAL) 2 % cream Apply 1 Application topically once daily.      LEVEMIR FLEXPEN 100 unit/mL (3 mL) InPn pen Inject 30 Units into the skin once daily.      LIDOcaine (LIDODERM) 5 % Place 1 patch onto the skin once daily.      meloxicam (MOBIC) 15 MG tablet Take 15 mg by mouth once daily.      metFORMIN (GLUCOPHAGE) 1000 MG tablet Take 1,000 mg by mouth 2 (two) times daily with meals.      metoprolol succinate (TOPROL-XL) 25 MG 24 hr tablet Take 25 mg by mouth once daily.      MOUNJARO 7.5 mg/0.5 mL PnIj Inject 7.5 mg into the skin every 7 days.      oxyCODONE-acetaminophen (PERCOCET)  mg per tablet Take 1 tablet by mouth 3 (three) times daily as needed for Pain.      spironolactone (ALDACTONE) 50 MG tablet Take 1 tablet (50 mg total) by mouth once daily. 30 tablet 2    traZODone (DESYREL) 50  MG tablet Take 50 mg by mouth nightly as needed.      valsartan (DIOVAN) 160 MG tablet Take 160 mg by mouth once daily.      albuterol (PROVENTIL/VENTOLIN HFA) 90 mcg/actuation inhaler Inhale 1-2 puffs into the lungs every 6 (six) hours as needed for Wheezing. Rescue 18 g 5    blood sugar diagnostic Strp To check BG 4 times daily, to use with insurance preferred meter 200 each 0    blood-glucose meter kit To check BG 4 times daily, to use with insurance preferred meter 1 each 0    fluticasone propionate (FLONASE) 50 mcg/actuation nasal spray 1 spray (50 mcg total) by Each Nostril route daily as needed for Rhinitis.  0    lactobacillus acidophilus & bulgar (LACTINEX) 100 million cell packet Take 1 tablet by mouth 3 (three) times daily with meals.      lancets Misc To check BG 4 times daily, to use with insurance preferred meter 200 each 0    polyethylene glycol (MIRALAX) 17 gram/dose powder Take 17 g by mouth once daily. For constipation 850 g 2       Review of Systems:  Neg    Physical Exam:  General Appearance:    NAD, AAO x 3, cooperative, appears stated age   Head:    Normocephalic, atraumatic   Eyes:    PER, EOMI, and conjunctiva/sclera clear bilaterally       Mouth:   Moist mucus membranes, no thrush or oral lesions,       normal dentition   Back:     No CVA tenderness   Lungs:     Crackles   Chest wall:    No tenderness or deformity   Heart:    Regular rate and rhythm, S1 and S2 normal, no murmur, rub   or gallop   Abdomen:     Soft, non-tender, non-distended, bowel sounds active all four   quadrants, no RT or guarding, no masses, no organomegaly   Extremities:   Warm and well perfused, distal pulses are intact, no cyanosis, + edema   MSK:   No joint or muscle swelling, tenderness or deformity   Skin:   Skin color, texture, turgor normal, no rashes or lesions   Neurologic/Psychiatric:   CNII-XII intact, normal strength and sensation       throughout, no asterixis; normal affect, memory, judgement     and  insight      Results:  Lab Results   Component Value Date     (L) 01/16/2024     (L) 01/16/2024    K 5.2 (H) 01/16/2024    K 5.2 (H) 01/16/2024    CL 99 01/16/2024    CL 99 01/16/2024    CO2 26 01/16/2024    CO2 26 01/16/2024    BUN 80 (H) 01/16/2024    BUN 80 (H) 01/16/2024    CREATININE 3.7 (H) 01/16/2024    CREATININE 3.7 (H) 01/16/2024    CALCIUM 8.3 (L) 01/16/2024    CALCIUM 8.3 (L) 01/16/2024    ANIONGAP 10 01/16/2024    ANIONGAP 10 01/16/2024    ESTGFRAFRICA >60 03/11/2022    EGFRNONAA >60 03/11/2022       Lab Results   Component Value Date    CALCIUM 8.3 (L) 01/16/2024    CALCIUM 8.3 (L) 01/16/2024    PHOS 6.7 (H) 01/16/2024    PHOS 6.7 (H) 01/16/2024    PHOS 6.7 (H) 01/16/2024       Recent Labs   Lab 01/16/24  0434   WBC 4.95  4.95   RBC 2.94*  2.94*   HGB 8.8*  8.8*   HCT 26.9*  26.9*     174   MCV 92  92   MCH 29.9  29.9   MCHC 32.7  32.7         I have personally reviewed pertinent radiological imaging and reports.    I have spent > 35 minutes providing care for this patient for the above diagnoses. These services have included chart/data/imaging review, evaluation, exam, formulation of plan, , note preparation, and discussions with staff involved in this patient's care.    Jose Cantor MD  Nephrology  [facility]  [phone]

## 2024-01-16 NOTE — PROCEDURES
01/15/2024    Abel Gibbs  [id number]    PROCEDURE PERFORMED: Left IJ Trialysis catheter    PERFORMING SURGEON: Huang Penaloza Jr    CONSENT:  The risks benefits and alternatives of the procedure were discussed with the patient. The patient was queried for questions and all concerns were addressed.  The patient provided written consent for the procedure.    ANESTHESIA: Lidocaine 1%    INDICATION: Access for hemodialysis    FINDINGS: Left IJ widely patent, wire easily passed the met resistance at approximately 40 cm, catheter easily passed, all ports aspirated and flushed with ease    PROCEDURE IN DETAIL: Written consent obtained. Patient was laid flat with his head turned to the right.  His left neck was prepped and draped typical sterile fashion. Ultrasound was used to identify by the jugular vein.  A skin wheal was made over this and a stab incision made through the skin and dermis.  An 18 gauge needle under negative pressure was inserted into the jugular vein under ultrasound guidance on a single stick. We had return of dark red nonpulsatile blood. I then passed the wire.  It went easily until proximally 40 cm where we met resistance. The needle was removed.  The tract was dilated over the wire. A Trialysis catheter was then positioned over the wire a proximally 15 cm at the skin.  The wire was removed. All ports aspirated and flushed with ease. The catheter was secured to the skin with nylon sutures.  Occlusive bandage was applied.    EBL: Minimal    COMPLICATIONS: none

## 2024-01-16 NOTE — PROGRESS NOTES
[facility]  Progress Note    Patient Name: Abel Gibbs  MRN: [id number]  Patient Class: IP- Inpatient   Admission Date: 1/9/2024  Length of Stay: 6 days  Attending Physician: lEi Stacy MD  Primary Care Provider: Rupinder Pagan MD        Subjective:     Principal Problem:Hypervolemia        HPI:  Abel Gibbs is a 58 y.o. male with a history as  has a past medical history of Depression, Diabetes mellitus, Hypertension, Obesity, and Osteomyelitis. who presented to the ED with a Chest Pain and Shortness of Breath (+ edema)    Patient presents to the emergency room with a complaint of midsternal chest pain radiating into left chest wall that started approximately 3 days ago.  He further reports shortness for breath when attempting to lye flat with chest pain worsening. He tell me he was seen by wound care for right foot ulcer who felt as though his face appeared a little swollen and recommended that he go to the emergency.     Patient does report having a sinus infection about 1-2 weeks ago completed course of antibiotics.  He further states after completion of antibiotics he started with congestion and seems as though symptoms have gotten worse. Additionally, he reports BLE edema that chronic but appears a little worse.    Patient unclear of medications as he reports the nurse fills his medication container weekly. He does tell me that he is on furosemide but has not taken it for last 3-4 days d/t leg cramps. Patient also prescribed spirolactone but does not know if he has been taking. Also on mounjaro.     Denies fever, chills, diaphoresis, dizziness, HA, palpitations, NVD, recent trauma or any other associated symptoms. Does not smoke cigarettes, drinks occasionally and uses marijuana daily.      Lab and imaging obtained and reviewed.  CBC completed and shows RBCs 3.48 H/H 10.0/32.3 MCHC 31.0 RDW is 15.9.  Chemistry profile shows potassium 5.7 Ag 5 BUN 41  creatinine 2.3 GFR 31.7 calcium 8.6 ALP 35 ALT 50.  BNP within normal range.  Initial high sensitive troponin 17.2. EKG shows NSR with incomplete R-BBB. CXR without acute cardiopulmonary findings.  On admit, afebrile HR 94 RR 20 /91 with O2 sats 97% on room air.        Ultrasound bilateral lower extremity without evidence of deep venous thrombosis.      Renal US  IMPRESSION: Asymmetric small right kidney. Otherwise normal sonographic appearance of the bilateral kidneys..     Angiogram 07/2023  Summary     The estimated blood loss was <50 mL.    The pre-procedure left ventricular end diastolic pressure was 16.    Nonobstructive coronaries with mild luminal irregularities in RCA and left circumflex and focal moderate stenosis of mid to distal LAD for which medical management is recommended.       Echo 07/2023  Summary  The left ventricle is normal in size with mild concentric hypertrophy and normal systolic function.  The estimated ejection fraction is 65%.  Normal left ventricular diastolic function.  Normal right ventricular size with normal right ventricular systolic function.  Mild mitral regurgitation.  Mild tricuspid regurgitation.  Normal central venous pressure (3 mmHg).  The estimated PA systolic pressure is 27 mmHg.    Per ED provider patient presents for evaluation of chest pain with lower extremity edema for 1 week. Labs reviewed and showed mildly elevated troponin without EKG changes. Also noted to have elevated BP and MINI, given ASA IV diuretics with NTG paste applied. US BLE negative for DVT. Will admit for ACS r/o.        Overview/Hospital Course:  Abel Gibbs is a 58 y.o. male with  has a past medical history of Depression, Diabetes mellitus, Hypertension, Obesity, and Osteomyelitis. who presented to the ED with a Chest Pain and Shortness of Breath (+ edema)    Patient presented for evaluation of 3 day history of CP and increased SOB after being treated for antibiotics for sinus infection  for 1-2 weeks - as well as he endorsed not taking his medication for about a week due to leg cramps. He was found to be hyperkalemic 6.0, with acute on chronic kidney failure with BUN/cr/GFR 46/2.5/28.7. His BP was also elevated 180/91 at the time of presentation. His home BP medications were restarted amlodipine, metoprolol and hydralazine added 2/2 renal. BP improved. Nephrology consulted for management of kidney impairment and hyperkalemia. Renal US showed showed no sonographic evidence of renal artery stenosis. Started on lokelma for hyperkalemia - slow to improve. Lasix IV added, fluid restriction 1.5L. CXR on 01/09/23 did not demonstrate evidence of effusion or increased congestion and 2D echo 01/10/23 showed hypertrophy, EF 55-60% with normal DF. Has right foot ulcer present on admission followed by wound care outpatient and by wound care and Dr. Serrato inpatient. XR right foot ulcer showed severe degenerative changes with no acute osseous abnormality.     Assumed care of this patient on 1/13/24.  Patient with history of morbid obesity with BMI 47, suspected undiagnosed untreated AMBREEN, CKD stage 3, diabetes mellitus with HbA1c 6.5%, chronic right/great toe diabetic ulcer, followed by wound care, hypertension, heart failure with preserved ejection fraction, anemia, BPH, depression.  He presented from outpatient wound care due to concerns for volume overload.  Hypertensive urgency in the ED with hyperkalemia, he was admitted with Nephrology consultation and started on IV diuresis.  Renal ultrasound negative with no evidence of renal artery stenosis.  Echo with EF of 55-60%.  Ongoing hyperkalemia and started on scheduled lokelma  Still volume overloaded and on 1/12 transfer to telemetry floor and initiated on Lasix infusion at 5 milligrams/hour with 5 mg daily metolazone.  Unfortunately despite aggressive IV diuresis, still significantly volume overloaded with up trending renal function.  On 01/15, general  surgery placed left-sided Trialysis catheter and on 01/16 patient began on renal replacement therapy for volume removal.      Interval History:  Patient seen and examined during dialysis today.  States not significantly short of breath on lying down but with activity does become short of breath.  States he still produce significant amount of urine this morning.  Mild discomfort over left-sided Trialysis catheter.  Plans to UF 3 L today if can tolerate.  Afebrile, on room air, telemetry with sinus rhythm.  Labs with hemoglobin 8.8, potassium 5.2, BUN/creatinine 80/3.7.  Documented urine output 1700 cc.  Discussed with patient.  Discussed with dialysis nurse.    Review of Systems   Constitutional:  Negative for fever.   Respiratory:  Positive for shortness of breath.    Cardiovascular:  Positive for leg swelling.   Psychiatric/Behavioral:  Negative for confusion.      Objective:     Vital Signs (Most Recent):  Temp: 97.8 °F (36.6 °C) (01/16/24 0950)  Pulse: 64 (01/16/24 1030)  Resp: 18 (01/16/24 0950)  BP: 126/68 (01/16/24 1030)  SpO2: 99 % (01/16/24 0950) Vital Signs (24h Range):  Temp:  [97.7 °F (36.5 °C)-98.7 °F (37.1 °C)] 97.8 °F (36.6 °C)  Pulse:  [64-78] 64  Resp:  [16-20] 18  SpO2:  [93 %-99 %] 99 %  BP: (107-165)/(63-89) 126/68     Weight: (!) 172 kg (379 lb 3.1 oz)  Body mass index is 47.4 kg/m².    Intake/Output Summary (Last 24 hours) at 1/16/2024 1051  Last data filed at 1/16/2024 0831  Gross per 24 hour   Intake 1472 ml   Output 1200 ml   Net 272 ml         Physical Exam  Vitals and nursing note reviewed.   Constitutional:       Appearance: He is obese.      Comments: Lying in bed, currently receiving dialysis   HENT:      Head: Normocephalic and atraumatic.      Mouth/Throat:      Mouth: Mucous membranes are moist.   Neck:      Comments: Left-sided Trialysis line in place, currently connected  Cardiovascular:      Rate and Rhythm: Normal rate and regular rhythm.      Comments: 2+ lower extremity pitting  "edema  Pulmonary:      Comments: On room air, distant breath sounds, no wheeze or accessory muscle use  Abdominal:      Palpations: Abdomen is soft.      Tenderness: There is no abdominal tenderness.   Neurological:      Mental Status: He is alert and oriented to person, place, and time.   Psychiatric:         Mood and Affect: Mood normal.             Significant Labs: BMP:   Recent Labs   Lab 01/16/24  0434   *  206*   *  135*   K 5.2*  5.2*   CL 99  99   CO2 26  26   BUN 80*  80*   CREATININE 3.7*  3.7*   CALCIUM 8.3*  8.3*   MG 2.2     CBC:   Recent Labs   Lab 01/16/24  0434   WBC 4.95  4.95   HGB 8.8*  8.8*   HCT 26.9*  26.9*     174     CMP:   Recent Labs   Lab 01/15/24  0407 01/16/24  0434    135*  135*   K 5.3* 5.2*  5.2*    99  99   CO2 28 26  26   * 206*  206*   BUN 75* 80*  80*   CREATININE 3.8* 3.7*  3.7*   CALCIUM 8.4* 8.3*  8.3*   ALBUMIN  --  3.7  3.7   ANIONGAP 8 10  10     Cardiac Markers: No results for input(s): "CKMB", "MYOGLOBIN", "BNP", "TROPISTAT" in the last 48 hours.  Magnesium:   Recent Labs   Lab 01/15/24  0407 01/16/24  0434   MG 2.1 2.2       Significant Imaging: I have reviewed all pertinent imaging results/findings within the past 24 hours.    Assessment/Plan:      Active Hospital Problems    Diagnosis  POA    *Hypervolemia [E87.70]  Yes    Hyperkalemia [E87.5]  Yes     Priority: 1 - High    Acute kidney injury superimposed on chronic kidney disease with uremia [N17.9, N18.9]  Yes     Priority: 1 - High    Acute on chronic diastolic heart failure [I50.33]  Yes     Priority: 3     Hyperphosphatemia [E83.39]  No    CKD (chronic kidney disease), stage III [N18.30]  Yes    Anemia [D64.9]  Yes    THC use [F12.20]  Yes     Chronic    BPH (benign prostatic hyperplasia) [N40.0]  Yes    HLD (hyperlipidemia) [E78.5]  Yes    Severe obesity (BMI >= 40) [E66.01]  Yes    Non compliance with medical treatment [Z91.199]  Not Applicable    " Diabetes mellitus with HbA1c 6.5% [E11.9]  Yes     Chronic    Diabetic ulcer of right great toe and right foot, POA [E11.621, L97.514]  Yes     Chronic    Hypertension [I10]  Yes     Chronic      Resolved Hospital Problems    Diagnosis Date Resolved POA    Hypertensive urgency [I16.0] 01/13/2024 Yes     Plan:  Continue care on telemetry floor with continuous cardiac monitoring  On 01/15 left-sided Trialysis catheter placed by Dr. Penaloza, and initiated on renal replacement therapy 1/16  Strict I/O, daily weights, oral fluid and sodium restriction  Echo this admission with EF of 55-60%   Currently on Lokelma but suspect may be able to discontinue now initiated on renal replacement  Avoid Ace/Arb with hyperkalemia, BP better on amlodipine, hydralazine and metoprolol, monitoring  Suspect patient has undiagnosed untreated AMBREEN, recommend outpatient sleep study   Needs lifestyle modification for weight loss  Continue local wound care to right foot as outlined   Continue basal bolus insulin with Accu-Cheks, as needed hypoglycemic measures, HbA1c 6.5%  Renally dose all medications and avoid nephrotoxic drugs   Increase activity, out of bed to chair, ambulation  A.m. labs ordered  Appreciate all consultant's input  Further plan as per hospital course     Wound care recommendations   Diagnoses:    1. Right great toe DM ulcer  2. Right foot DM ulcer     Plan:  1. Calcium alginate + dry dressing to the right great toe and foot ulcer  2. Xray right great toe    VTE Risk Mitigation (From admission, onward)           Ordered     heparin (porcine) injection 5,000 Units  Once         01/16/24 0100     heparin (porcine) injection 5,000 Units  As needed (PRN)         01/16/24 0100     heparin (porcine) injection 5,000 Units  Every 8 hours         01/09/24 2040     IP VTE HIGH RISK PATIENT  Once         01/09/24 2040     Place sequential compression device  Until discontinued         01/09/24 2040     Place MARCELINO hose  Until  discontinued         01/09/24 1653     Place sequential compression device  Until discontinued         01/09/24 1653                    Discharge Planning   JONATHAN: 1/20/2024     Code Status: Full Code   Is the patient medically ready for discharge?:     Reason for patient still in hospital (select all that apply): Patient trending condition  Discharge Plan A: Home Health                  Eli Stacy MD  Department of Hospital Medicine   [facility]

## 2024-01-16 NOTE — NURSING
Patient is looking for his scissors and his personal items-items that were placed inside a standard grey wash basin earlier today prior to the nurse and tech transferring the patient to dialysis room on 3 rd floor via the bed. After looking items are not in the room. [place] phoned the  to call the  that cleaned the room this am.

## 2024-01-17 PROBLEM — E87.5 HYPERKALEMIA: Status: RESOLVED | Noted: 2024-01-09 | Resolved: 2024-01-17

## 2024-01-17 LAB
ANION GAP SERPL CALC-SCNC: 7 MMOL/L (ref 8–16)
BUN SERPL-MCNC: 75 MG/DL (ref 6–20)
CALCIUM SERPL-MCNC: 8.2 MG/DL (ref 8.7–10.5)
CHLORIDE SERPL-SCNC: 100 MMOL/L (ref 95–110)
CO2 SERPL-SCNC: 30 MMOL/L (ref 23–29)
CREAT SERPL-MCNC: 3.7 MG/DL (ref 0.5–1.4)
ERYTHROCYTE [DISTWIDTH] IN BLOOD BY AUTOMATED COUNT: 14.8 % (ref 11.5–14.5)
EST. GFR  (NO RACE VARIABLE): 17.9 ML/MIN/1.73 M^2
GLUCOSE SERPL-MCNC: 110 MG/DL (ref 70–110)
GLUCOSE SERPL-MCNC: 137 MG/DL (ref 70–110)
GLUCOSE SERPL-MCNC: 161 MG/DL (ref 70–110)
GLUCOSE SERPL-MCNC: 172 MG/DL (ref 70–110)
GLUCOSE SERPL-MCNC: 251 MG/DL (ref 70–110)
HBV CORE AB SERPL QL IA: NEGATIVE
HBV SURFACE AB SER QL: NON REACTIVE
HBV SURFACE AG SERPL QL IA: NEGATIVE
HCT VFR BLD AUTO: 27 % (ref 40–54)
HGB BLD-MCNC: 8.5 G/DL (ref 14–18)
MAGNESIUM SERPL-MCNC: 2.2 MG/DL (ref 1.6–2.6)
MCH RBC QN AUTO: 28.7 PG (ref 27–31)
MCHC RBC AUTO-ENTMCNC: 31.5 G/DL (ref 32–36)
MCV RBC AUTO: 91 FL (ref 82–98)
PHOSPHATE SERPL-MCNC: 5.1 MG/DL (ref 2.7–4.5)
PLATELET # BLD AUTO: 147 K/UL (ref 150–450)
PMV BLD AUTO: 11.9 FL (ref 9.2–12.9)
POTASSIUM SERPL-SCNC: 4.8 MMOL/L (ref 3.5–5.1)
RBC # BLD AUTO: 2.96 M/UL (ref 4.6–6.2)
SODIUM SERPL-SCNC: 137 MMOL/L (ref 136–145)
WBC # BLD AUTO: 4.78 K/UL (ref 3.9–12.7)

## 2024-01-17 PROCEDURE — 25000003 PHARM REV CODE 250: Performed by: INTERNAL MEDICINE

## 2024-01-17 PROCEDURE — 25000242 PHARM REV CODE 250 ALT 637 W/ HCPCS: Performed by: NURSE PRACTITIONER

## 2024-01-17 PROCEDURE — 99900035 HC TECH TIME PER 15 MIN (STAT)

## 2024-01-17 PROCEDURE — 99900031 HC PATIENT EDUCATION (STAT)

## 2024-01-17 PROCEDURE — 21400001 HC TELEMETRY ROOM

## 2024-01-17 PROCEDURE — 80048 BASIC METABOLIC PNL TOTAL CA: CPT | Performed by: INTERNAL MEDICINE

## 2024-01-17 PROCEDURE — 25000003 PHARM REV CODE 250: Performed by: NURSE PRACTITIONER

## 2024-01-17 PROCEDURE — 90935 HEMODIALYSIS ONE EVALUATION: CPT

## 2024-01-17 PROCEDURE — 63600175 PHARM REV CODE 636 W HCPCS: Performed by: INTERNAL MEDICINE

## 2024-01-17 PROCEDURE — 83735 ASSAY OF MAGNESIUM: CPT | Performed by: INTERNAL MEDICINE

## 2024-01-17 PROCEDURE — 63600175 PHARM REV CODE 636 W HCPCS: Performed by: NURSE PRACTITIONER

## 2024-01-17 PROCEDURE — 36415 COLL VENOUS BLD VENIPUNCTURE: CPT | Performed by: INTERNAL MEDICINE

## 2024-01-17 PROCEDURE — 84100 ASSAY OF PHOSPHORUS: CPT | Performed by: INTERNAL MEDICINE

## 2024-01-17 PROCEDURE — 85027 COMPLETE CBC AUTOMATED: CPT | Performed by: INTERNAL MEDICINE

## 2024-01-17 PROCEDURE — 94761 N-INVAS EAR/PLS OXIMETRY MLT: CPT

## 2024-01-17 PROCEDURE — 94640 AIRWAY INHALATION TREATMENT: CPT

## 2024-01-17 RX ORDER — SODIUM CHLORIDE 9 MG/ML
INJECTION, SOLUTION INTRAVENOUS
Status: CANCELLED | OUTPATIENT
Start: 2024-01-17

## 2024-01-17 RX ORDER — HYDROXYZINE PAMOATE 25 MG/1
25 CAPSULE ORAL EVERY 8 HOURS PRN
Status: COMPLETED | OUTPATIENT
Start: 2024-01-17 | End: 2024-01-17

## 2024-01-17 RX ORDER — HEPARIN SODIUM 5000 [USP'U]/ML
5000 INJECTION, SOLUTION INTRAVENOUS; SUBCUTANEOUS
Status: CANCELLED | OUTPATIENT
Start: 2024-01-17

## 2024-01-17 RX ORDER — SODIUM CHLORIDE 9 MG/ML
INJECTION, SOLUTION INTRAVENOUS ONCE
Status: CANCELLED | OUTPATIENT
Start: 2024-01-17 | End: 2024-01-17

## 2024-01-17 RX ADMIN — MUPIROCIN 1 G: 20 OINTMENT TOPICAL at 08:01

## 2024-01-17 RX ADMIN — INSULIN DETEMIR 15 UNITS: 100 INJECTION, SOLUTION SUBCUTANEOUS at 09:01

## 2024-01-17 RX ADMIN — HEPARIN SODIUM 5000 UNITS: 5000 INJECTION INTRAVENOUS; SUBCUTANEOUS at 05:01

## 2024-01-17 RX ADMIN — GABAPENTIN 400 MG: 300 CAPSULE ORAL at 08:01

## 2024-01-17 RX ADMIN — FINASTERIDE 5 MG: 5 TABLET, FILM COATED ORAL at 08:01

## 2024-01-17 RX ADMIN — OXYCODONE HYDROCHLORIDE AND ACETAMINOPHEN 1 TABLET: 10; 325 TABLET ORAL at 03:01

## 2024-01-17 RX ADMIN — HEPARIN SODIUM 5000 UNITS: 5000 INJECTION INTRAVENOUS; SUBCUTANEOUS at 03:01

## 2024-01-17 RX ADMIN — HYDRALAZINE HYDROCHLORIDE 25 MG: 25 TABLET ORAL at 05:01

## 2024-01-17 RX ADMIN — FERROUS SULFATE TAB 325 MG (65 MG ELEMENTAL FE) 1 EACH: 325 (65 FE) TAB at 08:01

## 2024-01-17 RX ADMIN — FLUTICASONE PROPIONATE 100 MCG: 50 SPRAY, METERED NASAL at 09:01

## 2024-01-17 RX ADMIN — SENNOSIDES AND DOCUSATE SODIUM 2 TABLET: 8.6; 5 TABLET ORAL at 08:01

## 2024-01-17 RX ADMIN — INSULIN ASPART 3 UNITS: 100 INJECTION, SOLUTION INTRAVENOUS; SUBCUTANEOUS at 01:01

## 2024-01-17 RX ADMIN — POLYETHYLENE GLYCOL 3350 17 G: 17 POWDER, FOR SOLUTION ORAL at 08:01

## 2024-01-17 RX ADMIN — ATORVASTATIN CALCIUM 80 MG: 40 TABLET, FILM COATED ORAL at 09:01

## 2024-01-17 RX ADMIN — TRAZODONE HYDROCHLORIDE 50 MG: 50 TABLET ORAL at 01:01

## 2024-01-17 RX ADMIN — METOPROLOL SUCCINATE 25 MG: 25 TABLET, EXTENDED RELEASE ORAL at 08:01

## 2024-01-17 RX ADMIN — Medication: at 01:01

## 2024-01-17 RX ADMIN — IPRATROPIUM BROMIDE AND ALBUTEROL SULFATE 3 ML: 2.5; .5 SOLUTION RESPIRATORY (INHALATION) at 08:01

## 2024-01-17 RX ADMIN — OXYCODONE HYDROCHLORIDE AND ACETAMINOPHEN 1 TABLET: 10; 325 TABLET ORAL at 10:01

## 2024-01-17 RX ADMIN — OXYCODONE HYDROCHLORIDE AND ACETAMINOPHEN 1 TABLET: 10; 325 TABLET ORAL at 05:01

## 2024-01-17 RX ADMIN — HYDROXYZINE PAMOATE 25 MG: 25 CAPSULE ORAL at 10:01

## 2024-01-17 RX ADMIN — HEPARIN SODIUM 5000 UNITS: 5000 INJECTION INTRAVENOUS; SUBCUTANEOUS at 09:01

## 2024-01-17 RX ADMIN — MUPIROCIN 1 G: 20 OINTMENT TOPICAL at 09:01

## 2024-01-17 RX ADMIN — SENNOSIDES AND DOCUSATE SODIUM 2 TABLET: 8.6; 5 TABLET ORAL at 09:01

## 2024-01-17 RX ADMIN — GABAPENTIN 400 MG: 300 CAPSULE ORAL at 03:01

## 2024-01-17 RX ADMIN — GABAPENTIN 400 MG: 300 CAPSULE ORAL at 09:01

## 2024-01-17 RX ADMIN — INSULIN ASPART 1 UNITS: 100 INJECTION, SOLUTION INTRAVENOUS; SUBCUTANEOUS at 09:01

## 2024-01-17 RX ADMIN — CETIRIZINE HYDROCHLORIDE 10 MG: 10 TABLET, FILM COATED ORAL at 08:01

## 2024-01-17 RX ADMIN — HEPARIN SODIUM 5000 UNITS: 1000 INJECTION, SOLUTION INTRAVENOUS; SUBCUTANEOUS at 11:01

## 2024-01-17 RX ADMIN — Medication 6 MG: at 09:01

## 2024-01-17 RX ADMIN — ASPIRIN 81 MG: 81 TABLET, COATED ORAL at 08:01

## 2024-01-17 NOTE — SUBJECTIVE & OBJECTIVE
Interval History:  Patient seen after completion of second hemodialysis session today, blood pressure running on lower side, 3 L UF.  He has allergic localized reaction to tape/telemetry pads, removed.  States he is still producing urine.  Labs with hemoglobin 8.5, BUN/creatinine 75/3.5, potassium 4.8.  Discussed with patient.    Review of Systems   Constitutional:  Negative for fever.   HENT:  Negative for congestion.    Cardiovascular:  Positive for leg swelling.   Skin:  Positive for rash.     Objective:     Vital Signs (Most Recent):  Temp: 98 °F (36.7 °C) (01/17/24 1315)  Pulse: 70 (01/17/24 1315)  Resp: 18 (01/17/24 1315)  BP: 108/64 (01/17/24 1315)  SpO2: 97 % (01/17/24 1315) Vital Signs (24h Range):  Temp:  [97.6 °F (36.4 °C)-98.8 °F (37.1 °C)] 98 °F (36.7 °C)  Pulse:  [60-77] 70  Resp:  [16-22] 18  SpO2:  [94 %-99 %] 97 %  BP: ()/(57-89) 108/64     Weight: (!) 172 kg (379 lb 3.1 oz)  Body mass index is 47.4 kg/m².    Intake/Output Summary (Last 24 hours) at 1/17/2024 1418  Last data filed at 1/17/2024 1315  Gross per 24 hour   Intake 1220 ml   Output 3800 ml   Net -2580 ml         Physical Exam  Vitals and nursing note reviewed.   Constitutional:       Appearance: He is obese.      Comments: Lying in bed   HENT:      Head: Normocephalic and atraumatic.      Mouth/Throat:      Mouth: Mucous membranes are moist.   Neck:      Comments: Left-sided Trialysis line in place  Cardiovascular:      Rate and Rhythm: Normal rate and regular rhythm.      Comments: 2+ lower extremity pitting edema  Pulmonary:      Comments: On room air, distant breath sounds, no wheeze or accessory muscle use  Abdominal:      Palpations: Abdomen is soft.      Tenderness: There is no abdominal tenderness.   Neurological:      Mental Status: He is alert and oriented to person, place, and time.   Psychiatric:         Mood and Affect: Mood normal.             Significant Labs: BMP:   Recent Labs   Lab 01/17/24  0440        "  K 4.8      CO2 30*   BUN 75*   CREATININE 3.7*   CALCIUM 8.2*   MG 2.2     CBC:   Recent Labs   Lab 01/16/24 0434 01/17/24  0440   WBC 4.95  4.95 4.78   HGB 8.8*  8.8* 8.5*   HCT 26.9*  26.9* 27.0*     174 147*     CMP:   Recent Labs   Lab 01/16/24 0434 01/17/24 0440   *  135* 137   K 5.2*  5.2* 4.8   CL 99  99 100   CO2 26  26 30*   *  206* 110   BUN 80*  80* 75*   CREATININE 3.7*  3.7* 3.7*   CALCIUM 8.3*  8.3* 8.2*   ALBUMIN 3.7  3.7  --    ANIONGAP 10  10 7*     Cardiac Markers: No results for input(s): "CKMB", "MYOGLOBIN", "BNP", "TROPISTAT" in the last 48 hours.  Magnesium:   Recent Labs   Lab 01/16/24 0434 01/17/24 0440   MG 2.2 2.2       Significant Imaging: I have reviewed all pertinent imaging results/findings within the past 24 hours.  "

## 2024-01-17 NOTE — PROGRESS NOTES
INPATIENT NEPHROLOGY Progress Note  [facility]    Patient Name: Abel Gibbs  Date: 01/17/2024    Reason for consultation: MINI    Chief Complaint:   Chief Complaint   Patient presents with    Chest Pain    Shortness of Breath     + edema       History of Present Illness:  Abel Gibbs is a 58 y.o. male with a history as  has a past medical history of Depression, Diabetes mellitus, Hypertension, Obesity, Osteomyelitis and CKD III who presented to the ED with a Chest Pain and Shortness of Breath (+ edema). Patient presents to the emergency room with a complaint of midsternal chest pain radiating into left chest wall that started approximately 3 days ago. He further reports shortness for breath when attempting to lie flat with chest pain worsening. He was seen by wound care for right foot ulcer who felt as though his face appeared a little swollen and recommended that he go to the emergency. Patient does report having a sinus infection about 1-2 weeks ago completed course of antibiotics.  He further states after completion of antibiotics he started with congestion and seems as though symptoms have gotten worse. Additionally, he reports BLE edema that chronic but appears a little worse. Patient unclear of medications as he reports the nurse fills his medication container weekly. He does tell me that he is on furosemide but has not taken it for last 3-4 days d/t leg cramps. Patient also prescribed spirolactone but does not know if he has been taking. Also on mounjaro. Denies fever, chills, diaphoresis, dizziness, HA, palpitations, NVD, recent trauma or any other associated symptoms. Does not smoke cigarettes, drinks occasionally and uses marijuana daily. Consulted for MINI/CKD.    Renal u/s:  The right kidney measures 8.9 cm in length, with normal cortical thickness and parenchymal echotexture, and no echogenic calculi or hydronephrosis. The left kidney measures 10.4 cm in length and has normal  cortical thickness and parenchymal echotexture, without echogenic calculi or hydronephrosis.     Notable home meds:  Norvasc, metoprolol, hydralazine, diovan, lasix, aldactone, farxiga, finasteride, meloxicam    Interval History:  1/10- highest /97, on RA, UOP 1L, CXR clear, no DVT, BNP normal, trop stable, echo pending  1/11- BP improved, on RA, voiding, echo with LVH, renal duplex pending, c/o dyspnea, cough- edema unchanged  1/12- VSS, on RA, UOP 3.1L, no change in weight, still short of breath with edema- move to cardiology for lasix gtt at 5mg/hr- ordered metolazone 5mg x 1- if cannot remove adequate fluid will need RRT- patient not keen on RRT but understands plan, renal imaging with normal sized kidneys and no KAREN  1/13  VSS, UOP 3.375L.  hyperkalemic w/bump in BUN/Scr.  No complaints.  1/14  UOP 3.5L.  VSS.  Renal function about the same.  Sleeping, NAD noted.  1/15  2.1 liter UOP.  AF VSS.  Remains hyperkalemic and Creatinine rising  1/16  Patient seen on hemodialysis for uremic clearance and ultrafiltration.    1/17  Patient seen on hemodialysis for uremic clearance and ultrafiltration.     No nausea, chest pain, sob, fever, urinary or bowel complaint, new neurologic symptoms, new joint pain        Plan of Care:    Assessment:  MINI/CKD III  HTN urgency/Edema- driven by renal disease   Hyponatremia  Hyperkalemia  SHPT  MARBELLA/Anemia of CKD  BPH  hyperphosphatemia    Plan:    - suspect MINI on CKD due to elevated BP with ischemic ATN and CRS both driven by underlying renal disease rather than cardiac dysfuncion  - BP improved but remains edematous and hyperkalemic   - echo noted- renal duplex noted- UA with 1+ protein- 400mg proteinuria - c/w HTN  - in setting of MINI and hyperK, hold ARB, MRA, farxiga and meloxicam   --started hd due to persistent hyperkalemia and refractory hypervolemia  - phos borderline- PTH c/w CKD III  - continue iron supplement- no acute RL needs- prior paraprotein w/u negative  -  continue finasteride   --s/p HD times two.  Hold tomorrow and reassess for dialytic need.     --check clearance       Thank you for allowing us to participate in this patient's care. We will continue to follow.    Vital Signs:  Temp Readings from Last 3 Encounters:   01/17/24 98 °F (36.7 °C)   07/28/23 98.2 °F (36.8 °C) (Oral)   06/04/23 98.5 °F (36.9 °C) (Oral)       Pulse Readings from Last 3 Encounters:   01/17/24 70   07/28/23 (!) 56   06/04/23 (!) 53       BP Readings from Last 3 Encounters:   01/17/24 108/64   07/28/23 (!) 167/80   06/04/23 133/62       Weight:  Wt Readings from Last 3 Encounters:   01/15/24 (!) 172 kg (379 lb 3.1 oz)   01/10/24 (!) 169.6 kg (374 lb)   07/26/23 (!) 158.8 kg (350 lb 3.2 oz)       Medications:  Scheduled Meds:   sodium chloride 0.9%   Intravenous Once    sodium chloride 0.9%   Intravenous Once    aspirin  81 mg Oral Daily    atorvastatin  80 mg Oral QHS    cetirizine  10 mg Oral Daily    ferrous sulfate  1 tablet Oral Daily    finasteride  5 mg Oral Daily    fluticasone propionate  2 spray Each Nostril QHS    gabapentin  400 mg Oral TID    heparin (porcine)  5,000 Units Subcutaneous Q8H    heparin (porcine)  5,000 Units Intravenous Once    insulin detemir U-100 (Levemir)  15 Units Subcutaneous QHS    metoprolol succinate  25 mg Oral Daily    mupirocin   Nasal BID    polyethylene glycol  17 g Oral Daily    senna-docusate 8.6-50 mg  2 tablet Oral BID     Continuous Infusions:    PRN Meds:.sodium chloride 0.9%, sodium chloride 0.9%, acetaminophen, albuterol-ipratropium, dextrose 50%, dextrose 50%, diphenhydrAMINE-zinc acetate 1-0.1%, glucagon (human recombinant), glucose, glucose, heparin (porcine), hydrALAZINE, insulin aspart U-100, labetalol, magnesium oxide, magnesium oxide, melatonin, naloxone, ondansetron, oxyCODONE-acetaminophen, potassium bicarbonate, potassium bicarbonate, potassium bicarbonate, potassium, sodium phosphates, potassium, sodium phosphates, potassium, sodium  phosphates, sodium chloride 0.9%, sodium chloride 0.9%, sodium chloride 0.9%, traZODone  No current facility-administered medications on file prior to encounter.     Current Outpatient Medications on File Prior to Encounter   Medication Sig Dispense Refill    amLODIPine (NORVASC) 10 MG tablet Take 1 tablet (10 mg total) by mouth once daily. 30 tablet 2    aspirin (ECOTRIN) 81 MG EC tablet Take 1 tablet (81 mg total) by mouth once daily. 30 tablet 2    atorvastatin (LIPITOR) 80 MG tablet Take 1 tablet (80 mg total) by mouth once daily. 30 tablet 5    FARXIGA 5 mg Tab tablet Take 5 mg by mouth once daily.      finasteride (PROSCAR) 5 mg tablet Take 5 mg by mouth once daily.      furosemide (LASIX) 20 MG tablet Take 20 mg by mouth once daily.      gabapentin (NEURONTIN) 800 MG tablet Take 800 mg by mouth 4 (four) times daily.      hydrALAZINE (APRESOLINE) 25 MG tablet Take 1 tablet (25 mg total) by mouth every 8 (eight) hours. 90 tablet 2    ketoconazole (NIZORAL) 2 % cream Apply 1 Application topically once daily.      LEVEMIR FLEXPEN 100 unit/mL (3 mL) InPn pen Inject 30 Units into the skin once daily.      LIDOcaine (LIDODERM) 5 % Place 1 patch onto the skin once daily.      meloxicam (MOBIC) 15 MG tablet Take 15 mg by mouth once daily.      metFORMIN (GLUCOPHAGE) 1000 MG tablet Take 1,000 mg by mouth 2 (two) times daily with meals.      metoprolol succinate (TOPROL-XL) 25 MG 24 hr tablet Take 25 mg by mouth once daily.      MOUNJARO 7.5 mg/0.5 mL PnIj Inject 7.5 mg into the skin every 7 days.      oxyCODONE-acetaminophen (PERCOCET)  mg per tablet Take 1 tablet by mouth 3 (three) times daily as needed for Pain.      spironolactone (ALDACTONE) 50 MG tablet Take 1 tablet (50 mg total) by mouth once daily. 30 tablet 2    traZODone (DESYREL) 50 MG tablet Take 50 mg by mouth nightly as needed.      valsartan (DIOVAN) 160 MG tablet Take 160 mg by mouth once daily.      albuterol (PROVENTIL/VENTOLIN HFA) 90  mcg/actuation inhaler Inhale 1-2 puffs into the lungs every 6 (six) hours as needed for Wheezing. Rescue 18 g 5    blood sugar diagnostic Strp To check BG 4 times daily, to use with insurance preferred meter 200 each 0    blood-glucose meter kit To check BG 4 times daily, to use with insurance preferred meter 1 each 0    fluticasone propionate (FLONASE) 50 mcg/actuation nasal spray 1 spray (50 mcg total) by Each Nostril route daily as needed for Rhinitis.  0    lactobacillus acidophilus & bulgar (LACTINEX) 100 million cell packet Take 1 tablet by mouth 3 (three) times daily with meals.      lancets Misc To check BG 4 times daily, to use with insurance preferred meter 200 each 0    polyethylene glycol (MIRALAX) 17 gram/dose powder Take 17 g by mouth once daily. For constipation 850 g 2       Review of Systems:  Neg    Physical Exam:  General Appearance:    NAD, AAO x 3, cooperative, appears stated age   Head:    Normocephalic, atraumatic   Eyes:    PER, EOMI, and conjunctiva/sclera clear bilaterally       Mouth:   Moist mucus membranes, no thrush or oral lesions,       normal dentition   Back:     No CVA tenderness   Lungs:     Crackles   Chest wall:    No tenderness or deformity   Heart:    Regular rate and rhythm, S1 and S2 normal, no murmur, rub   or gallop   Abdomen:     Soft, non-tender, non-distended, bowel sounds active all four   quadrants, no RT or guarding, no masses, no organomegaly   Extremities:   Warm and well perfused, distal pulses are intact, no cyanosis, + edema   MSK:   No joint or muscle swelling, tenderness or deformity   Skin:   Skin color, texture, turgor normal, no rashes or lesions   Neurologic/Psychiatric:   CNII-XII intact, normal strength and sensation       throughout, no asterixis; normal affect, memory, judgement     and insight      Results:  Lab Results   Component Value Date     01/17/2024    K 4.8 01/17/2024     01/17/2024    CO2 30 (H) 01/17/2024    BUN 75 (H)  01/17/2024    CREATININE 3.7 (H) 01/17/2024    CALCIUM 8.2 (L) 01/17/2024    ANIONGAP 7 (L) 01/17/2024    ESTGFRAFRICA >60 03/11/2022    EGFRNONAA >60 03/11/2022       Lab Results   Component Value Date    CALCIUM 8.2 (L) 01/17/2024    PHOS 5.1 (H) 01/17/2024       Recent Labs   Lab 01/17/24  0440   WBC 4.78   RBC 2.96*   HGB 8.5*   HCT 27.0*   *   MCV 91   MCH 28.7   MCHC 31.5*         I have personally reviewed pertinent radiological imaging and reports.    I have spent > 35 minutes providing care for this patient for the above diagnoses. These services have included chart/data/imaging review, evaluation, exam, formulation of plan, , note preparation, and discussions with staff involved in this patient's care.    Jose Cantor MD  Nephrology  [facility]  [phone]

## 2024-01-17 NOTE — CARE UPDATE
01/17/24 0808   Patient Assessment/Suction   Level of Consciousness (AVPU) alert   Respiratory Effort Unlabored   Expansion/Accessory Muscles/Retractions no retractions   All Lung Fields Breath Sounds wheezes, expiratory   Rhythm/Pattern, Respiratory shortness of breath;unlabored   Cough Frequency infrequent   Cough Type congested;nonproductive   PRE-TX-O2   Device (Oxygen Therapy) room air   SpO2 (!) 94 %   Pulse Oximetry Type Intermittent   $ Pulse Oximetry - Multiple Charge Pulse Oximetry - Multiple   Pulse 74   Resp (!) 22   Aerosol Therapy   $ Aerosol Therapy Charges Aerosol Treatment   Daily Review of Necessity (SVN) completed   Respiratory Treatment Status (SVN) given   Treatment Route (SVN) oxygen;mask   Patient Position (SVN) HOB elevated   Post Treatment Assessment (SVN) breath sounds unchanged   Signs of Intolerance (SVN) none   Breath Sounds Post-Respiratory Treatment   Throughout All Fields Post-Treatment All Fields   Throughout All Fields Post-Treatment no change   Post-treatment Heart Rate (beats/min) 73   Post-treatment Resp Rate (breaths/min) 22   Education   $ Education Bronchodilator;15 min

## 2024-01-17 NOTE — NURSING
1/17/2024, 0030.  Mr Gibbs removed his telemetry monitor because he states he is allergic to the pads.  He does have a rash where the monitor pads were.  Dr Whitney notified and he ordered Benadryl cream TID PRN to his rash.  Benadryl cream applied to rash.

## 2024-01-17 NOTE — CARE UPDATE
01/16/24 2010   Patient Assessment/Suction   Level of Consciousness (AVPU) alert   Respiratory Effort Normal;Unlabored   Expansion/Accessory Muscles/Retractions no use of accessory muscles   All Lung Fields Breath Sounds diminished   Rhythm/Pattern, Respiratory unlabored;pattern regular   Cough Frequency infrequent   Cough Type nonproductive   PRE-TX-O2   Device (Oxygen Therapy) room air   SpO2 99 %   Pulse Oximetry Type Intermittent   $ Pulse Oximetry - Single Charge Pulse Oximetry - Single   $ Pulse Oximetry - Multiple Charge Pulse Oximetry - Multiple   Pulse 65   Resp 18   Positioning HOB elevated 45 degrees   Positioning   Body Position position changed independently   Head of Bed (HOB) Positioning HOB at 45 degrees   Aerosol Therapy   $ Aerosol Therapy Charges Aerosol Treatment   Daily Review of Necessity (SVN) completed   Respiratory Treatment Status (SVN) given   Treatment Route (SVN) oxygen;mask   Patient Position (SVN) HOB elevated   Post Treatment Assessment (SVN) breath sounds improved   Signs of Intolerance (SVN) none   Education   $ Education Bronchodilator;15 min

## 2024-01-17 NOTE — PLAN OF CARE
Spoke with Yasmin with [place] access [facility]. Put in request for in home care services for pt and gave pt name and CM contact information. Someone to call back to complete information.       01/17/24 1430   Post-Acute Status   Post-Acute Authorization Home Health   Home Health Status Set-up Complete/Auth obtained   Discharge Delays None known at this time   Discharge Plan   Discharge Plan A Home Health   Discharge Plan B Home Health

## 2024-01-17 NOTE — CONSULTS
Consulted for pt today for education on proper diet to help manage his diagnosis and disease state for diabetes, HTN, CKD 3 and HLD. Pt has been educated several times PTA but will conduct follow up for him along with giving him my contact information if needed.                   [facility]  Adult Nutrition   Consult Note (Nutrition Education)      SUMMARY      Dietitian Rounds Brief  Pt is a 59 y/o male with hx of Type 2 Diabetes, CKD stage 3, HTN, CHF, and diabetic foot ulcer, seen today for nutrition education on dietary habits to improve his health conditions, specifically T2DM.     Provided Mr. Gibbs with the Diabetes Education Packet and discussed blood sugar logs, use of the plate method, and moderation portion sizes. Additionally discussed strategies to prevent overeating at night, specifically eating consistent meals throughout the day.      Mr. Gibbs appears to understand that he needs to make dietary and lifestyle changes to improve his health outcomes. Provided outpatient contact info.        Leonides Joseph Provisional Licensed Dietitian 07/27/2023 3:29 PM         Electronically signed by Leonides Joseph Provisional Licensed Dietitian at 7/27/2023  3:39 PM    Nutrition Education provided with handouts: yes        Comments: Patient admitted with MINI and CHF exacerbation s/p med/diet non-compliance and no medical follow up for his HTN and diabetes in the last 6 months. Pt ran out of medications including his insulin, his glucometer no longer works, he has no transportation to appointments and he disliked his last PCP. Case discussed with pt, DM, and case management. PMH DM2, CKD3, obesity, HTN, foot OA, depression, marijuana for pain.      In the past pt has tries to carb count ( says he sticks to ~ 1 handful of carb foods on a plate) and eats whole grains such as brown rice. He used to check his blood sugars in the morning and with meals and give sliding scale insulin. He has never  followed a low sodium/fluid restricted diet or started reading labels.      I educated pt on low sodium/cardiac diet and label reading for sodium and saturated/trans fat as well as carbs. Educated pt on correct portion sizes using the plate method and discussed all foods including dessert that have carbs. Encouraged pt to choose sugar free foods. Educated pt on his 1500 ml fluid restriction. Teach back method utilized successfully. Motivational interviewing used, pt would like to take better care of himself to have more time with his new grand child. Willing to use the plate method and start reading labels for sodium.      Assessment:  NFPE WDL 2/8/23. Good appetite PTA.  All questions and concerns answered. Dietitian's contact information provided.         Follow-Up: yes     Please Re-consult as needed           Thanks!                Electronically signed by Abbie Hansen RD at 2/8/2023 11:18 AM

## 2024-01-17 NOTE — PROGRESS NOTES
[facility] Medicine  Progress Note    Patient Name: Abel Gibbs  MRN: [id number]  Patient Class: IP- Inpatient   Admission Date: 1/9/2024  Length of Stay: 7 days  Attending Physician: Eli Stacy MD  Primary Care Provider: Rupinder Pagan MD        Subjective:     Principal Problem:Hypervolemia        HPI:  Abel Gibbs is a 58 y.o. male with a history as  has a past medical history of Depression, Diabetes mellitus, Hypertension, Obesity, and Osteomyelitis. who presented to the ED with a Chest Pain and Shortness of Breath (+ edema)    Patient presents to the emergency room with a complaint of midsternal chest pain radiating into left chest wall that started approximately 3 days ago.  He further reports shortness for breath when attempting to lye flat with chest pain worsening. He tell me he was seen by wound care for right foot ulcer who felt as though his face appeared a little swollen and recommended that he go to the emergency.     Patient does report having a sinus infection about 1-2 weeks ago completed course of antibiotics.  He further states after completion of antibiotics he started with congestion and seems as though symptoms have gotten worse. Additionally, he reports BLE edema that chronic but appears a little worse.    Patient unclear of medications as he reports the nurse fills his medication container weekly. He does tell me that he is on furosemide but has not taken it for last 3-4 days d/t leg cramps. Patient also prescribed spirolactone but does not know if he has been taking. Also on mounjaro.     Denies fever, chills, diaphoresis, dizziness, HA, palpitations, NVD, recent trauma or any other associated symptoms. Does not smoke cigarettes, drinks occasionally and uses marijuana daily.      Lab and imaging obtained and reviewed.  CBC completed and shows RBCs 3.48 H/H 10.0/32.3 MCHC 31.0 RDW is 15.9.  Chemistry profile shows potassium 5.7 Ag 5 BUN 41  creatinine 2.3 GFR 31.7 calcium 8.6 ALP 35 ALT 50.  BNP within normal range.  Initial high sensitive troponin 17.2. EKG shows NSR with incomplete R-BBB. CXR without acute cardiopulmonary findings.  On admit, afebrile HR 94 RR 20 /91 with O2 sats 97% on room air.        Ultrasound bilateral lower extremity without evidence of deep venous thrombosis.      Renal US  IMPRESSION: Asymmetric small right kidney. Otherwise normal sonographic appearance of the bilateral kidneys..     Angiogram 07/2023  Summary     The estimated blood loss was <50 mL.    The pre-procedure left ventricular end diastolic pressure was 16.    Nonobstructive coronaries with mild luminal irregularities in RCA and left circumflex and focal moderate stenosis of mid to distal LAD for which medical management is recommended.       Echo 07/2023  Summary  The left ventricle is normal in size with mild concentric hypertrophy and normal systolic function.  The estimated ejection fraction is 65%.  Normal left ventricular diastolic function.  Normal right ventricular size with normal right ventricular systolic function.  Mild mitral regurgitation.  Mild tricuspid regurgitation.  Normal central venous pressure (3 mmHg).  The estimated PA systolic pressure is 27 mmHg.    Per ED provider patient presents for evaluation of chest pain with lower extremity edema for 1 week. Labs reviewed and showed mildly elevated troponin without EKG changes. Also noted to have elevated BP and MINI, given ASA IV diuretics with NTG paste applied. US BLE negative for DVT. Will admit for ACS r/o.        Overview/Hospital Course:  Abel Gibbs is a 58 y.o. male with  has a past medical history of Depression, Diabetes mellitus, Hypertension, Obesity, and Osteomyelitis. who presented to the ED with a Chest Pain and Shortness of Breath (+ edema)    Patient presented for evaluation of 3 day history of CP and increased SOB after being treated for antibiotics for sinus infection  for 1-2 weeks - as well as he endorsed not taking his medication for about a week due to leg cramps. He was found to be hyperkalemic 6.0, with acute on chronic kidney failure with BUN/cr/GFR 46/2.5/28.7. His BP was also elevated 180/91 at the time of presentation. His home BP medications were restarted amlodipine, metoprolol and hydralazine added 2/2 renal. BP improved. Nephrology consulted for management of kidney impairment and hyperkalemia. Renal US showed showed no sonographic evidence of renal artery stenosis. Started on lokelma for hyperkalemia - slow to improve. Lasix IV added, fluid restriction 1.5L. CXR on 01/09/23 did not demonstrate evidence of effusion or increased congestion and 2D echo 01/10/23 showed hypertrophy, EF 55-60% with normal DF. Has right foot ulcer present on admission followed by wound care outpatient and by wound care and Dr. Serrato inpatient. XR right foot ulcer showed severe degenerative changes with no acute osseous abnormality.       Patient with history of morbid obesity with BMI 47, suspected undiagnosed untreated AMBREEN, CKD stage 3, diabetes mellitus with HbA1c 6.5%, chronic right/great toe diabetic ulcer, followed by wound care, hypertension, heart failure with preserved ejection fraction, anemia, BPH, depression.  He presented from outpatient wound care due to concerns for volume overload.  Hypertensive urgency in the ED with hyperkalemia, he was admitted with Nephrology consultation and started on IV diuresis.  Renal ultrasound negative with no evidence of renal artery stenosis.  Echo with EF of 55-60%.  Ongoing hyperkalemia and started on scheduled lokelma  Still volume overloaded and on 1/12 transfer to telemetry floor and initiated on Lasix infusion at 5 milligrams/hour with 5 mg daily metolazone.  Unfortunately despite aggressive IV diuresis, still significantly volume overloaded with up trending renal function.  On 01/15, general surgery placed left-sided Trialysis catheter  and on 01/16 patient began on renal replacement therapy for volume removal.      Interval History:  Patient seen after completion of second hemodialysis session today, blood pressure running on lower side, 3 L UF.  He has allergic localized reaction to tape/telemetry pads, removed.  States he is still producing urine.  Labs with hemoglobin 8.5, BUN/creatinine 75/3.5, potassium 4.8.  Discussed with patient.    Review of Systems   Constitutional:  Negative for fever.   HENT:  Negative for congestion.    Cardiovascular:  Positive for leg swelling.   Skin:  Positive for rash.     Objective:     Vital Signs (Most Recent):  Temp: 98 °F (36.7 °C) (01/17/24 1315)  Pulse: 70 (01/17/24 1315)  Resp: 18 (01/17/24 1315)  BP: 108/64 (01/17/24 1315)  SpO2: 97 % (01/17/24 1315) Vital Signs (24h Range):  Temp:  [97.6 °F (36.4 °C)-98.8 °F (37.1 °C)] 98 °F (36.7 °C)  Pulse:  [60-77] 70  Resp:  [16-22] 18  SpO2:  [94 %-99 %] 97 %  BP: ()/(57-89) 108/64     Weight: (!) 172 kg (379 lb 3.1 oz)  Body mass index is 47.4 kg/m².    Intake/Output Summary (Last 24 hours) at 1/17/2024 1418  Last data filed at 1/17/2024 1315  Gross per 24 hour   Intake 1220 ml   Output 3800 ml   Net -2580 ml         Physical Exam  Vitals and nursing note reviewed.   Constitutional:       Appearance: He is obese.      Comments: Lying in bed   HENT:      Head: Normocephalic and atraumatic.      Mouth/Throat:      Mouth: Mucous membranes are moist.   Neck:      Comments: Left-sided Trialysis line in place  Cardiovascular:      Rate and Rhythm: Normal rate and regular rhythm.      Comments: 2+ lower extremity pitting edema  Pulmonary:      Comments: On room air, distant breath sounds, no wheeze or accessory muscle use  Abdominal:      Palpations: Abdomen is soft.      Tenderness: There is no abdominal tenderness.   Neurological:      Mental Status: He is alert and oriented to person, place, and time.   Psychiatric:         Mood and Affect: Mood normal.          "    Significant Labs: BMP:   Recent Labs   Lab 01/17/24  0440         K 4.8      CO2 30*   BUN 75*   CREATININE 3.7*   CALCIUM 8.2*   MG 2.2     CBC:   Recent Labs   Lab 01/16/24  0434 01/17/24  0440   WBC 4.95  4.95 4.78   HGB 8.8*  8.8* 8.5*   HCT 26.9*  26.9* 27.0*     174 147*     CMP:   Recent Labs   Lab 01/16/24  0434 01/17/24  0440   *  135* 137   K 5.2*  5.2* 4.8   CL 99  99 100   CO2 26  26 30*   *  206* 110   BUN 80*  80* 75*   CREATININE 3.7*  3.7* 3.7*   CALCIUM 8.3*  8.3* 8.2*   ALBUMIN 3.7  3.7  --    ANIONGAP 10  10 7*     Cardiac Markers: No results for input(s): "CKMB", "MYOGLOBIN", "BNP", "TROPISTAT" in the last 48 hours.  Magnesium:   Recent Labs   Lab 01/16/24 0434 01/17/24  0440   MG 2.2 2.2       Significant Imaging: I have reviewed all pertinent imaging results/findings within the past 24 hours.    Assessment/Plan:      Active Hospital Problems    Diagnosis  POA    *Hypervolemia [E87.70]  Yes    Acute kidney injury superimposed on chronic kidney disease with uremia [N17.9, N18.9]  Yes     Priority: 1 - High    Acute on chronic diastolic heart failure [I50.33]  Yes     Priority: 3     Hyperphosphatemia [E83.39]  No    CKD (chronic kidney disease), stage III [N18.30]  Yes    Anemia [D64.9]  Yes    THC use [F12.20]  Yes     Chronic    BPH (benign prostatic hyperplasia) [N40.0]  Yes    HLD (hyperlipidemia) [E78.5]  Yes    Severe obesity (BMI >= 40) [E66.01]  Yes    Non compliance with medical treatment [Z91.199]  Not Applicable    Diabetes mellitus with HbA1c 6.5% [E11.9]  Yes     Chronic    Diabetic ulcer of right great toe and right foot, POA [E11.621, L97.514]  Yes     Chronic    Hypertension [I10]  Yes     Chronic      Resolved Hospital Problems    Diagnosis Date Resolved POA    Hyperkalemia [E87.5] 01/17/2024 Yes     Priority: 1 - High    Hypertensive urgency [I16.0] 01/13/2024 Yes     Plan:  Continue care on medical floor  On 01/15 " left-sided Trialysis catheter placed by Dr. Penaloza, and initiated on renal replacement therapy 1/16, had second session today  Discontinue hydralazine and amlodipine with now lower blood pressure after dialysis started, monitoring  Discontinue scheduled leukemia as potassium has normalized with initiation of dialysis  Strict I/O, daily weights, oral fluid and sodium restriction  Echo this admission with EF of 55-60%   Suspect patient has undiagnosed untreated AMBREEN, recommend outpatient sleep study   Needs lifestyle modification for weight loss  Continue local wound care to right foot as outlined   Continue basal bolus insulin with Accu-Cheks, as needed hypoglycemic measures, HbA1c 6.5%  Renally dose all medications and avoid nephrotoxic drugs   Increase activity, out of bed to chair, ambulation  A.m. labs ordered  Appreciate all consultant's input  Further plan as per hospital course     VTE Risk Mitigation (From admission, onward)           Ordered     heparin (porcine) injection 5,000 Units  Once         01/16/24 0100     heparin (porcine) injection 5,000 Units  As needed (PRN)         01/16/24 0100     heparin (porcine) injection 5,000 Units  Every 8 hours         01/09/24 2040     IP VTE HIGH RISK PATIENT  Once         01/09/24 2040     Place sequential compression device  Until discontinued         01/09/24 2040     Place MARCELINO hose  Until discontinued         01/09/24 1653     Place sequential compression device  Until discontinued         01/09/24 1653                    Discharge Planning   JONATHAN: 1/22/2024     Code Status: Full Code   Is the patient medically ready for discharge?:     Reason for patient still in hospital (select all that apply): Patient trending condition  Discharge Plan A: Home Health   Discharge Delays: None known at this time              Eli Stacy MD  Department of Hospital Medicine   Formerly [facility], later [facility]

## 2024-01-18 PROBLEM — Z91.048 ALLERGY TO ADHESIVE TAPE: Status: ACTIVE | Noted: 2024-01-18

## 2024-01-18 LAB
ANION GAP SERPL CALC-SCNC: 7 MMOL/L (ref 8–16)
BUN SERPL-MCNC: 60 MG/DL (ref 6–20)
CALCIUM SERPL-MCNC: 8.7 MG/DL (ref 8.7–10.5)
CHLORIDE SERPL-SCNC: 101 MMOL/L (ref 95–110)
CO2 SERPL-SCNC: 29 MMOL/L (ref 23–29)
CREAT CL/1.73 SQ M 12H UR+SERPL-ARVRAT: 54 ML/MIN (ref 70–110)
CREAT SERPL-MCNC: 3.6 MG/DL (ref 0.5–1.4)
CREAT SERPL-MCNC: 3.6 MG/DL (ref 0.5–1.4)
CREAT UR-MCNC: 147 MG/DL (ref 23–375)
CREATININE, URINE (MG/SPEC): 2793 MG/SPEC
ERYTHROCYTE [DISTWIDTH] IN BLOOD BY AUTOMATED COUNT: 14.8 % (ref 11.5–14.5)
EST. GFR  (NO RACE VARIABLE): 18.5 ML/MIN/1.73 M^2
GLUCOSE SERPL-MCNC: 111 MG/DL (ref 70–110)
GLUCOSE SERPL-MCNC: 127 MG/DL (ref 70–110)
GLUCOSE SERPL-MCNC: 149 MG/DL (ref 70–110)
GLUCOSE SERPL-MCNC: 158 MG/DL (ref 70–110)
GLUCOSE SERPL-MCNC: 169 MG/DL (ref 70–110)
GLUCOSE SERPL-MCNC: 209 MG/DL (ref 70–110)
GLUCOSE SERPL-MCNC: 242 MG/DL (ref 70–110)
GLUCOSE SERPL-MCNC: 342 MG/DL (ref 70–110)
HCT VFR BLD AUTO: 27 % (ref 40–54)
HGB BLD-MCNC: 8.5 G/DL (ref 14–18)
MAGNESIUM SERPL-MCNC: 2.1 MG/DL (ref 1.6–2.6)
MCH RBC QN AUTO: 28.3 PG (ref 27–31)
MCHC RBC AUTO-ENTMCNC: 31.5 G/DL (ref 32–36)
MCV RBC AUTO: 90 FL (ref 82–98)
PHOSPHATE SERPL-MCNC: 5.2 MG/DL (ref 2.7–4.5)
PLATELET # BLD AUTO: 138 K/UL (ref 150–450)
PMV BLD AUTO: 11.4 FL (ref 9.2–12.9)
POTASSIUM SERPL-SCNC: 5 MMOL/L (ref 3.5–5.1)
RBC # BLD AUTO: 3 M/UL (ref 4.6–6.2)
SODIUM SERPL-SCNC: 137 MMOL/L (ref 136–145)
URINE COLLECTION DURATION: 24 HR
URINE VOLUME: 1900 ML
WBC # BLD AUTO: 5.23 K/UL (ref 3.9–12.7)

## 2024-01-18 PROCEDURE — 80048 BASIC METABOLIC PNL TOTAL CA: CPT | Performed by: INTERNAL MEDICINE

## 2024-01-18 PROCEDURE — 63600175 PHARM REV CODE 636 W HCPCS: Performed by: INTERNAL MEDICINE

## 2024-01-18 PROCEDURE — 25000003 PHARM REV CODE 250: Performed by: INTERNAL MEDICINE

## 2024-01-18 PROCEDURE — 25000242 PHARM REV CODE 250 ALT 637 W/ HCPCS: Performed by: NURSE PRACTITIONER

## 2024-01-18 PROCEDURE — 99900035 HC TECH TIME PER 15 MIN (STAT)

## 2024-01-18 PROCEDURE — 25000003 PHARM REV CODE 250: Performed by: NURSE PRACTITIONER

## 2024-01-18 PROCEDURE — 84100 ASSAY OF PHOSPHORUS: CPT | Performed by: INTERNAL MEDICINE

## 2024-01-18 PROCEDURE — 85027 COMPLETE CBC AUTOMATED: CPT | Performed by: INTERNAL MEDICINE

## 2024-01-18 PROCEDURE — 94761 N-INVAS EAR/PLS OXIMETRY MLT: CPT

## 2024-01-18 PROCEDURE — 82575 CREATININE CLEARANCE TEST: CPT | Performed by: INTERNAL MEDICINE

## 2024-01-18 PROCEDURE — 83735 ASSAY OF MAGNESIUM: CPT | Performed by: INTERNAL MEDICINE

## 2024-01-18 PROCEDURE — 63600175 PHARM REV CODE 636 W HCPCS: Performed by: NURSE PRACTITIONER

## 2024-01-18 PROCEDURE — 36415 COLL VENOUS BLD VENIPUNCTURE: CPT | Performed by: INTERNAL MEDICINE

## 2024-01-18 PROCEDURE — 12000002 HC ACUTE/MED SURGE SEMI-PRIVATE ROOM

## 2024-01-18 RX ORDER — CLONIDINE HYDROCHLORIDE 0.1 MG/1
0.1 TABLET ORAL EVERY 8 HOURS PRN
Status: DISCONTINUED | OUTPATIENT
Start: 2024-01-18 | End: 2024-01-19 | Stop reason: HOSPADM

## 2024-01-18 RX ORDER — DIPHENHYDRAMINE HCL 25 MG
25 CAPSULE ORAL EVERY 6 HOURS PRN
Status: DISCONTINUED | OUTPATIENT
Start: 2024-01-18 | End: 2024-01-19 | Stop reason: HOSPADM

## 2024-01-18 RX ADMIN — INSULIN DETEMIR 15 UNITS: 100 INJECTION, SOLUTION SUBCUTANEOUS at 11:01

## 2024-01-18 RX ADMIN — Medication: at 11:01

## 2024-01-18 RX ADMIN — GABAPENTIN 400 MG: 300 CAPSULE ORAL at 02:01

## 2024-01-18 RX ADMIN — DIPHENHYDRAMINE HYDROCHLORIDE 25 MG: 25 CAPSULE ORAL at 05:01

## 2024-01-18 RX ADMIN — MUPIROCIN 1 G: 20 OINTMENT TOPICAL at 09:01

## 2024-01-18 RX ADMIN — INSULIN ASPART 4 UNITS: 100 INJECTION, SOLUTION INTRAVENOUS; SUBCUTANEOUS at 09:01

## 2024-01-18 RX ADMIN — FINASTERIDE 5 MG: 5 TABLET, FILM COATED ORAL at 09:01

## 2024-01-18 RX ADMIN — SENNOSIDES AND DOCUSATE SODIUM 2 TABLET: 8.6; 5 TABLET ORAL at 09:01

## 2024-01-18 RX ADMIN — Medication: at 05:01

## 2024-01-18 RX ADMIN — ATORVASTATIN CALCIUM 80 MG: 40 TABLET, FILM COATED ORAL at 09:01

## 2024-01-18 RX ADMIN — OXYCODONE HYDROCHLORIDE AND ACETAMINOPHEN 1 TABLET: 10; 325 TABLET ORAL at 09:01

## 2024-01-18 RX ADMIN — FLUTICASONE PROPIONATE 100 MCG: 50 SPRAY, METERED NASAL at 11:01

## 2024-01-18 RX ADMIN — METOPROLOL SUCCINATE 25 MG: 25 TABLET, EXTENDED RELEASE ORAL at 09:01

## 2024-01-18 RX ADMIN — OXYCODONE HYDROCHLORIDE AND ACETAMINOPHEN 1 TABLET: 10; 325 TABLET ORAL at 03:01

## 2024-01-18 RX ADMIN — CETIRIZINE HYDROCHLORIDE 10 MG: 10 TABLET, FILM COATED ORAL at 09:01

## 2024-01-18 RX ADMIN — FERROUS SULFATE TAB 325 MG (65 MG ELEMENTAL FE) 1 EACH: 325 (65 FE) TAB at 09:01

## 2024-01-18 RX ADMIN — DIPHENHYDRAMINE HYDROCHLORIDE 25 MG: 25 CAPSULE ORAL at 09:01

## 2024-01-18 RX ADMIN — HEPARIN SODIUM 5000 UNITS: 5000 INJECTION INTRAVENOUS; SUBCUTANEOUS at 09:01

## 2024-01-18 RX ADMIN — ASPIRIN 81 MG: 81 TABLET, COATED ORAL at 09:01

## 2024-01-18 RX ADMIN — OXYCODONE HYDROCHLORIDE AND ACETAMINOPHEN 1 TABLET: 10; 325 TABLET ORAL at 11:01

## 2024-01-18 RX ADMIN — CLONIDINE HYDROCHLORIDE 0.1 MG: 0.1 TABLET ORAL at 09:01

## 2024-01-18 RX ADMIN — GABAPENTIN 400 MG: 300 CAPSULE ORAL at 09:01

## 2024-01-18 RX ADMIN — POLYETHYLENE GLYCOL 3350 17 G: 17 POWDER, FOR SOLUTION ORAL at 09:01

## 2024-01-18 NOTE — PROGRESS NOTES
[facility] Medicine  Progress Note    Patient Name: Abel Gibbs  MRN: [id number]  Patient Class: IP- Inpatient   Admission Date: 1/9/2024  Length of Stay: 8 days  Attending Physician: Jono Hancock DO  Primary Care Provider: Rupinder Pagan MD        Subjective:     Principal Problem:Hypervolemia        HPI:  Abel Gibbs is a 58 y.o. male with a history as  has a past medical history of Depression, Diabetes mellitus, Hypertension, Obesity, and Osteomyelitis. who presented to the ED with a Chest Pain and Shortness of Breath (+ edema)    Patient presents to the emergency room with a complaint of midsternal chest pain radiating into left chest wall that started approximately 3 days ago.  He further reports shortness for breath when attempting to lye flat with chest pain worsening. He tell me he was seen by wound care for right foot ulcer who felt as though his face appeared a little swollen and recommended that he go to the emergency.     Patient does report having a sinus infection about 1-2 weeks ago completed course of antibiotics.  He further states after completion of antibiotics he started with congestion and seems as though symptoms have gotten worse. Additionally, he reports BLE edema that chronic but appears a little worse.    Patient unclear of medications as he reports the nurse fills his medication container weekly. He does tell me that he is on furosemide but has not taken it for last 3-4 days d/t leg cramps. Patient also prescribed spirolactone but does not know if he has been taking. Also on mounjaro.     Denies fever, chills, diaphoresis, dizziness, HA, palpitations, NVD, recent trauma or any other associated symptoms. Does not smoke cigarettes, drinks occasionally and uses marijuana daily.      Lab and imaging obtained and reviewed.  CBC completed and shows RBCs 3.48 H/H 10.0/32.3 MCHC 31.0 RDW is 15.9.  Chemistry profile shows potassium 5.7 Ag 5 BUN 41 creatinine  2.3 GFR 31.7 calcium 8.6 ALP 35 ALT 50.  BNP within normal range.  Initial high sensitive troponin 17.2. EKG shows NSR with incomplete R-BBB. CXR without acute cardiopulmonary findings.  On admit, afebrile HR 94 RR 20 /91 with O2 sats 97% on room air.        Ultrasound bilateral lower extremity without evidence of deep venous thrombosis.      Renal US  IMPRESSION: Asymmetric small right kidney. Otherwise normal sonographic appearance of the bilateral kidneys..     Angiogram 07/2023  Summary     The estimated blood loss was <50 mL.    The pre-procedure left ventricular end diastolic pressure was 16.    Nonobstructive coronaries with mild luminal irregularities in RCA and left circumflex and focal moderate stenosis of mid to distal LAD for which medical management is recommended.       Echo 07/2023  Summary  The left ventricle is normal in size with mild concentric hypertrophy and normal systolic function.  The estimated ejection fraction is 65%.  Normal left ventricular diastolic function.  Normal right ventricular size with normal right ventricular systolic function.  Mild mitral regurgitation.  Mild tricuspid regurgitation.  Normal central venous pressure (3 mmHg).  The estimated PA systolic pressure is 27 mmHg.    Per ED provider patient presents for evaluation of chest pain with lower extremity edema for 1 week. Labs reviewed and showed mildly elevated troponin without EKG changes. Also noted to have elevated BP and MINI, given ASA IV diuretics with NTG paste applied. US BLE negative for DVT. Will admit for ACS r/o.        Overview/Hospital Course:  Abel Gibbs is a 58 y.o. male with  has a past medical history of Depression, Diabetes mellitus, Hypertension, Obesity, and Osteomyelitis. who presented to the ED with a Chest Pain and Shortness of Breath (+ edema)    Patient presented for evaluation of 3 day history of CP and increased SOB after being treated for antibiotics for sinus infection for 1-2 weeks  - as well as he endorsed not taking his medication for about a week due to leg cramps. He was found to be hyperkalemic 6.0, with acute on chronic kidney failure with BUN/cr/GFR 46/2.5/28.7. His BP was also elevated 180/91 at the time of presentation. His home BP medications were restarted amlodipine, metoprolol and hydralazine added 2/2 renal. BP improved. Nephrology consulted for management of kidney impairment and hyperkalemia. Renal US showed showed no sonographic evidence of renal artery stenosis. Started on lokelma for hyperkalemia - slow to improve. Lasix IV added, fluid restriction 1.5L. CXR on 01/09/23 did not demonstrate evidence of effusion or increased congestion and 2D echo 01/10/23 showed hypertrophy, EF 55-60% with normal DF. Has right foot ulcer present on admission followed by wound care outpatient and by wound care and Dr. Serrato inpatient. XR right foot ulcer showed severe degenerative changes with no acute osseous abnormality.       Patient with history of morbid obesity with BMI 47, suspected undiagnosed untreated AMBREEN, CKD stage 3, diabetes mellitus with HbA1c 6.5%, chronic right/great toe diabetic ulcer, followed by wound care, hypertension, heart failure with preserved ejection fraction, anemia, BPH, depression.  He presented from outpatient wound care due to concerns for volume overload.  Hypertensive urgency in the ED with hyperkalemia, he was admitted with Nephrology consultation and started on IV diuresis.  Renal ultrasound negative with no evidence of renal artery stenosis.  Echo with EF of 55-60%.  Ongoing hyperkalemia and started on scheduled lokelma  Still volume overloaded and on 1/12 transfer to telemetry floor and initiated on Lasix infusion at 5 milligrams/hour with 5 mg daily metolazone.  Unfortunately despite aggressive IV diuresis, still significantly volume overloaded with up trending renal function.  On 01/15, general surgery placed left-sided Trialysis catheter and on 01/16  patient began on renal replacement therapy for volume removal.  Received dialysis on 01/16/2024 and 01/17/2024.          Interval History:  Patient completed 2nd hemodialysis session yesterday.  Family members present at bedside.  He complains of rash on his chest in left arm and attributed to allergic reaction to tape/telemetry pads.  He continues to produce urine.  Currently collecting 24 hour urine for nephrology.  Potassium today 5.0.  No hemodialysis scheduled for today.  We will check labs and Nephrology we will determine need for dialysis tomorrow.      Review of Systems   Constitutional:  Negative for fever.   HENT:  Negative for congestion.    Cardiovascular:  Positive for leg swelling.   Skin:  Positive for rash.     Objective:     Vital Signs (Most Recent):  Temp: 98.2 °F (36.8 °C) (01/18/24 1150)  Pulse: 67 (01/18/24 1150)  Resp: 18 (01/18/24 1150)  BP: (!) 191/96 ([place] notified) (01/18/24 1150)  SpO2: 96 % (01/18/24 1150) Vital Signs (24h Range):  Temp:  [97.5 °F (36.4 °C)-98.6 °F (37 °C)] 98.2 °F (36.8 °C)  Pulse:  [60-73] 67  Resp:  [16-18] 18  SpO2:  [95 %-98 %] 96 %  BP: (142-191)/(73-96) 191/96     Weight: (!) 172 kg (379 lb 3.1 oz)  Body mass index is 47.4 kg/m².  No intake or output data in the 24 hours ending 01/18/24 1453        Physical Exam  Vitals and nursing note reviewed.   Constitutional:       Appearance: He is obese.      Comments: Lying in bed   HENT:      Head: Normocephalic and atraumatic.      Mouth/Throat:      Mouth: Mucous membranes are moist.   Neck:      Comments: Left-sided Trialysis line in place  Cardiovascular:      Rate and Rhythm: Normal rate and regular rhythm.      Comments: 2+ lower extremity pitting edema  Pulmonary:      Comments: On room air, distant breath sounds, no wheeze or accessory muscle use  Abdominal:      Palpations: Abdomen is soft.      Tenderness: There is no abdominal tenderness.   Neurological:      Mental Status: He is alert and oriented to  "person, place, and time.   Psychiatric:         Mood and Affect: Mood normal.             Significant Labs: BMP:   Recent Labs   Lab 01/18/24  0456   *      K 5.0      CO2 29   BUN 60*   CREATININE 3.6*   CALCIUM 8.7   MG 2.1       CBC:   Recent Labs   Lab 01/17/24  0440 01/18/24  0456   WBC 4.78 5.23   HGB 8.5* 8.5*   HCT 27.0* 27.0*   * 138*       CMP:   Recent Labs   Lab 01/17/24  0440 01/18/24  0456    137   K 4.8 5.0    101   CO2 30* 29    111*   BUN 75* 60*   CREATININE 3.7* 3.6*   CALCIUM 8.2* 8.7   ANIONGAP 7* 7*       Cardiac Markers: No results for input(s): "CKMB", "MYOGLOBIN", "BNP", "TROPISTAT" in the last 48 hours.  Magnesium:   Recent Labs   Lab 01/17/24 0440 01/18/24  0456   MG 2.2 2.1         Significant Imaging: I have reviewed all pertinent imaging results/findings within the past 24 hours.    Assessment/Plan:      * Hypervolemia  Continue hemodialysis per Nephrology      Allergy to adhesive tape  P.r.n. diphenhydramine and cream.    Hyperphosphatemia  Phos binders per dietary and nephrology.      Acute on chronic diastolic heart failure  Fluid overloaded in the setting of MINI on CKD.    Continue dialysis per Nephrology.      THC use        Anemia    Current CBC reviewed-   Lab Results   Component Value Date    HGB 8.5 (L) 01/18/2024    HCT 27.0 (L) 01/18/2024     Monitor serial CBC and transfuse if patient becomes hemodynamically unstable, symptomatic or H/H drops below 7/21.    Hypochromic anemia  Patient's anemia is currently controlled. Has not received any PRBCs to date. Etiology likely d/t chronic disease due to Chronic Kidney Disease  Current CBC reviewed-   Lab Results   Component Value Date    HGB 10.0 (L) 01/09/2024    HCT 32.3 (L) 01/09/2024     Monitor serial CBC and transfuse if patient becomes hemodynamically unstable, symptomatic or H/H drops below 7/21.    BPH (benign prostatic hyperplasia)  Continue finasteride as prescribed      HLD " "(hyperlipidemia)  Continue ASA/Statin      Severe obesity (BMI >= 40)  Body mass index is 47.4 kg/m². Morbid obesity complicates all aspects of disease management from diagnostic modalities to treatment. Weight loss encouraged and health benefits explained to patient.         Non compliance with medical treatment  Not taking BP meds at home  BP improved with restarting home meds        Diabetes mellitus with HbA1c 6.5%  Patient's FSGs are controlled on current medication regimen.  Last A1c reviewed-   Lab Results   Component Value Date    HGBA1C 6.5 (H) 01/10/2024     Most recent fingerstick glucose reviewed- No results for input(s): "POCTGLUCOSE" in the last 24 hours.  Current correctional scale  Low  Maintain anti-hyperglycemic dose as follows-   Antihyperglycemics (From admission, onward)      Start     Stop Route Frequency Ordered    01/13/24 1108  insulin aspart U-100 pen 0-10 Units         -- SubQ Before meals & nightly PRN 01/13/24 1008    01/09/24 2100  insulin detemir U-100 (Levemir) pen 15 Units         -- SubQ Nightly 01/09/24 1947          Hold Oral hypoglycemics and mounjaro while patient is in the hospital.    Hypertension  Chronic, controlled. Latest blood pressure and vitals reviewed-     Temp:  [97.5 °F (36.4 °C)-98.6 °F (37 °C)]   Pulse:  [60-73]   Resp:  [16-18]   BP: (142-191)/(73-96)   SpO2:  [95 %-98 %] .   Home meds for hypertension were reviewed and noted below.   Hypertension Medications               amLODIPine (NORVASC) 10 MG tablet Take 1 tablet (10 mg total) by mouth once daily.    furosemide (LASIX) 20 MG tablet Take 20 mg by mouth once daily.    hydrALAZINE (APRESOLINE) 25 MG tablet Take 1 tablet (25 mg total) by mouth every 8 (eight) hours.    metoprolol succinate (TOPROL-XL) 25 MG 24 hr tablet Take 25 mg by mouth once daily.    spironolactone (ALDACTONE) 50 MG tablet Take 1 tablet (50 mg total) by mouth once daily.    valsartan (DIOVAN) 160 MG tablet Take 160 mg by mouth once daily. " "         Holding Arb, MRA, Farxiga, meloxicam and diuretics    Acute kidney injury superimposed on chronic kidney disease with uremia  Nephrology following.  Suspect secondary to hypertension.    Holding Arb, first-degree, meloxicam, and any nephrotoxic agents.    Received hemodialysis on 01/17 and 1/16  No plans for dialysis today.  We will re-evaluate tomorrow.    Continue to monitor potassium with daily CMP.    Diabetic ulcer of right great toe and right foot, POA  Patient's FSGs are controlled on current medication regimen.  Last A1c reviewed-   Lab Results   Component Value Date    HGBA1C 6.5 (H) 01/10/2024     Most recent fingerstick glucose reviewed- No results for input(s): "POCTGLUCOSE" in the last 24 hours.  Current correctional scale  Low  Maintain anti-hyperglycemic dose as follows-   Antihyperglycemics (From admission, onward)      Start     Stop Route Frequency Ordered    01/13/24 1108  insulin aspart U-100 pen 0-10 Units         -- SubQ Before meals & nightly PRN 01/13/24 1008    01/09/24 2100  insulin detemir U-100 (Levemir) pen 15 Units         -- SubQ Nightly 01/09/24 1947          Hold Oral hypoglycemics and mounjaro while patient is in the hospital.  Wound care consult appreciated.  Foot XR without osseous process      VTE Risk Mitigation (From admission, onward)           Ordered     heparin (porcine) injection 5,000 Units  Once         01/16/24 0100     heparin (porcine) injection 5,000 Units  As needed (PRN)         01/16/24 0100     heparin (porcine) injection 5,000 Units  Every 8 hours         01/09/24 2040     IP VTE HIGH RISK PATIENT  Once         01/09/24 2040     Place sequential compression device  Until discontinued         01/09/24 2040     Place MARCELINO hose  Until discontinued         01/09/24 1653     Place sequential compression device  Until discontinued         01/09/24 1653                    Discharge Planning   JONATHAN: 1/22/2024     Code Status: Full Code   Is the patient medically " ready for discharge?:     Reason for patient still in hospital (select all that apply): Laboratory test and Treatment  Discharge Plan A: Home Health   Discharge Delays: None known at this time              Jono Hancock DO  Department of Hospital Medicine   Formerly [facility], [facility]

## 2024-01-18 NOTE — PROGRESS NOTES
INPATIENT NEPHROLOGY Progress Note  [facility]    Patient Name: Abel Gibbs  Date: 01/18/2024    Reason for consultation: MINI    Chief Complaint:   Chief Complaint   Patient presents with    Chest Pain    Shortness of Breath     + edema       History of Present Illness:  Abel Gibbs is a 58 y.o. male with a history as  has a past medical history of Depression, Diabetes mellitus, Hypertension, Obesity, Osteomyelitis and CKD III who presented to the ED with a Chest Pain and Shortness of Breath (+ edema). Patient presents to the emergency room with a complaint of midsternal chest pain radiating into left chest wall that started approximately 3 days ago. He further reports shortness for breath when attempting to lie flat with chest pain worsening. He was seen by wound care for right foot ulcer who felt as though his face appeared a little swollen and recommended that he go to the emergency. Patient does report having a sinus infection about 1-2 weeks ago completed course of antibiotics.  He further states after completion of antibiotics he started with congestion and seems as though symptoms have gotten worse. Additionally, he reports BLE edema that chronic but appears a little worse. Patient unclear of medications as he reports the nurse fills his medication container weekly. He does tell me that he is on furosemide but has not taken it for last 3-4 days d/t leg cramps. Patient also prescribed spirolactone but does not know if he has been taking. Also on mounjaro. Denies fever, chills, diaphoresis, dizziness, HA, palpitations, NVD, recent trauma or any other associated symptoms. Does not smoke cigarettes, drinks occasionally and uses marijuana daily. Consulted for MINI/CKD.    Renal u/s:  The right kidney measures 8.9 cm in length, with normal cortical thickness and parenchymal echotexture, and no echogenic calculi or hydronephrosis. The left kidney measures 10.4 cm in length and has normal  cortical thickness and parenchymal echotexture, without echogenic calculi or hydronephrosis.     Notable home meds:  Norvasc, metoprolol, hydralazine, diovan, lasix, aldactone, farxiga, finasteride, meloxicam    Interval History:  1/10- highest /97, on RA, UOP 1L, CXR clear, no DVT, BNP normal, trop stable, echo pending  1/11- BP improved, on RA, voiding, echo with LVH, renal duplex pending, c/o dyspnea, cough- edema unchanged  1/12- VSS, on RA, UOP 3.1L, no change in weight, still short of breath with edema- move to cardiology for lasix gtt at 5mg/hr- ordered metolazone 5mg x 1- if cannot remove adequate fluid will need RRT- patient not keen on RRT but understands plan, renal imaging with normal sized kidneys and no KAREN  1/13  VSS, UOP 3.375L.  hyperkalemic w/bump in BUN/Scr.  No complaints.  1/14  UOP 3.5L.  VSS.  Renal function about the same.  Sleeping, NAD noted.  1/15  2.1 liter UOP.  AF VSS.  Remains hyperkalemic and Creatinine rising  1/16  Patient seen on hemodialysis for uremic clearance and ultrafiltration.    1/17  Patient seen on hemodialysis for uremic clearance and ultrafiltration.     No nausea, chest pain, sob, fever, urinary or bowel complaint, new neurologic symptoms, new joint pain  1/18  holding dialysis today.  Checking 24 hour urine crcl.  AFVSS        Plan of Care:    Assessment:  MINI/CKD III  HTN urgency/Edema- driven by renal disease   Hyponatremia  Hyperkalemia  SHPT  MARBELLA/Anemia of CKD  BPH  hyperphosphatemia    Plan:    - suspect MINI on CKD due to elevated BP with ischemic ATN and CRS both driven by underlying renal disease rather than cardiac dysfuncion  - BP improved but remains edematous and hyperkalemic   - echo noted- renal duplex noted- UA with 1+ protein- 400mg proteinuria - c/w HTN  - in setting of MINI and hyperK, hold ARB, MRA, farxiga and meloxicam   --started hd due to persistent hyperkalemia and refractory hypervolemia  - phos borderline- PTH c/w CKD III  - continue iron  supplement- no acute RL needs- prior paraprotein w/u negative  - continue finasteride   --s/p HD times two.  Hold dialysis today and reassess daily for dialytic need     --checking clearance       Thank you for allowing us to participate in this patient's care. We will continue to follow.    Vital Signs:  Temp Readings from Last 3 Encounters:   01/18/24 97.5 °F (36.4 °C) (Oral)   07/28/23 98.2 °F (36.8 °C) (Oral)   06/04/23 98.5 °F (36.9 °C) (Oral)       Pulse Readings from Last 3 Encounters:   01/18/24 61   07/28/23 (!) 56   06/04/23 (!) 53       BP Readings from Last 3 Encounters:   01/18/24 (!) 156/82   07/28/23 (!) 167/80   06/04/23 133/62       Weight:  Wt Readings from Last 3 Encounters:   01/15/24 (!) 172 kg (379 lb 3.1 oz)   01/10/24 (!) 169.6 kg (374 lb)   07/26/23 (!) 158.8 kg (350 lb 3.2 oz)       Medications:  Scheduled Meds:   sodium chloride 0.9%   Intravenous Once    sodium chloride 0.9%   Intravenous Once    aspirin  81 mg Oral Daily    atorvastatin  80 mg Oral QHS    cetirizine  10 mg Oral Daily    ferrous sulfate  1 tablet Oral Daily    finasteride  5 mg Oral Daily    fluticasone propionate  2 spray Each Nostril QHS    gabapentin  400 mg Oral TID    heparin (porcine)  5,000 Units Subcutaneous Q8H    heparin (porcine)  5,000 Units Intravenous Once    insulin detemir U-100 (Levemir)  15 Units Subcutaneous QHS    metoprolol succinate  25 mg Oral Daily    mupirocin   Nasal BID    polyethylene glycol  17 g Oral Daily    senna-docusate 8.6-50 mg  2 tablet Oral BID     Continuous Infusions:    PRN Meds:.sodium chloride 0.9%, sodium chloride 0.9%, acetaminophen, albuterol-ipratropium, dextrose 50%, dextrose 50%, diphenhydrAMINE-zinc acetate 1-0.1%, glucagon (human recombinant), glucose, glucose, heparin (porcine), hydrALAZINE, insulin aspart U-100, labetalol, magnesium oxide, magnesium oxide, melatonin, naloxone, ondansetron, oxyCODONE-acetaminophen, potassium bicarbonate, potassium bicarbonate, potassium  bicarbonate, potassium, sodium phosphates, potassium, sodium phosphates, potassium, sodium phosphates, sodium chloride 0.9%, sodium chloride 0.9%, sodium chloride 0.9%, traZODone  No current facility-administered medications on file prior to encounter.     Current Outpatient Medications on File Prior to Encounter   Medication Sig Dispense Refill    amLODIPine (NORVASC) 10 MG tablet Take 1 tablet (10 mg total) by mouth once daily. 30 tablet 2    aspirin (ECOTRIN) 81 MG EC tablet Take 1 tablet (81 mg total) by mouth once daily. 30 tablet 2    atorvastatin (LIPITOR) 80 MG tablet Take 1 tablet (80 mg total) by mouth once daily. 30 tablet 5    FARXIGA 5 mg Tab tablet Take 5 mg by mouth once daily.      finasteride (PROSCAR) 5 mg tablet Take 5 mg by mouth once daily.      furosemide (LASIX) 20 MG tablet Take 20 mg by mouth once daily.      gabapentin (NEURONTIN) 800 MG tablet Take 800 mg by mouth 4 (four) times daily.      hydrALAZINE (APRESOLINE) 25 MG tablet Take 1 tablet (25 mg total) by mouth every 8 (eight) hours. 90 tablet 2    ketoconazole (NIZORAL) 2 % cream Apply 1 Application topically once daily.      LEVEMIR FLEXPEN 100 unit/mL (3 mL) InPn pen Inject 30 Units into the skin once daily.      LIDOcaine (LIDODERM) 5 % Place 1 patch onto the skin once daily.      meloxicam (MOBIC) 15 MG tablet Take 15 mg by mouth once daily.      metFORMIN (GLUCOPHAGE) 1000 MG tablet Take 1,000 mg by mouth 2 (two) times daily with meals.      metoprolol succinate (TOPROL-XL) 25 MG 24 hr tablet Take 25 mg by mouth once daily.      MOUNJARO 7.5 mg/0.5 mL PnIj Inject 7.5 mg into the skin every 7 days.      oxyCODONE-acetaminophen (PERCOCET)  mg per tablet Take 1 tablet by mouth 3 (three) times daily as needed for Pain.      spironolactone (ALDACTONE) 50 MG tablet Take 1 tablet (50 mg total) by mouth once daily. 30 tablet 2    traZODone (DESYREL) 50 MG tablet Take 50 mg by mouth nightly as needed.      valsartan (DIOVAN) 160 MG  tablet Take 160 mg by mouth once daily.      albuterol (PROVENTIL/VENTOLIN HFA) 90 mcg/actuation inhaler Inhale 1-2 puffs into the lungs every 6 (six) hours as needed for Wheezing. Rescue 18 g 5    blood sugar diagnostic Strp To check BG 4 times daily, to use with insurance preferred meter 200 each 0    blood-glucose meter kit To check BG 4 times daily, to use with insurance preferred meter 1 each 0    fluticasone propionate (FLONASE) 50 mcg/actuation nasal spray 1 spray (50 mcg total) by Each Nostril route daily as needed for Rhinitis.  0    lactobacillus acidophilus & bulgar (LACTINEX) 100 million cell packet Take 1 tablet by mouth 3 (three) times daily with meals.      lancets Misc To check BG 4 times daily, to use with insurance preferred meter 200 each 0    polyethylene glycol (MIRALAX) 17 gram/dose powder Take 17 g by mouth once daily. For constipation 850 g 2       Review of Systems:  Neg    Physical Exam:  General Appearance:    NAD, AAO x 3, cooperative, appears stated age   Head:    Normocephalic, atraumatic   Eyes:    PER, EOMI, and conjunctiva/sclera clear bilaterally       Mouth:   Moist mucus membranes, no thrush or oral lesions,       normal dentition   Back:     No CVA tenderness   Lungs:     Crackles   Chest wall:    No tenderness or deformity   Heart:    Regular rate and rhythm, S1 and S2 normal, no murmur, rub   or gallop   Abdomen:     Soft, non-tender, non-distended, bowel sounds active all four   quadrants, no RT or guarding, no masses, no organomegaly   Extremities:   Warm and well perfused, distal pulses are intact, no cyanosis, + edema   MSK:   No joint or muscle swelling, tenderness or deformity   Skin:   Skin color, texture, turgor normal, no rashes or lesions   Neurologic/Psychiatric:   CNII-XII intact, normal strength and sensation       throughout, no asterixis; normal affect, memory, judgement     and insight      Results:  Lab Results   Component Value Date     01/18/2024    K  5.0 01/18/2024     01/18/2024    CO2 29 01/18/2024    BUN 60 (H) 01/18/2024    CREATININE 3.6 (H) 01/18/2024    CALCIUM 8.7 01/18/2024    ANIONGAP 7 (L) 01/18/2024    ESTGFRAFRICA >60 03/11/2022    EGFRNONAA >60 03/11/2022       Lab Results   Component Value Date    CALCIUM 8.7 01/18/2024    PHOS 5.2 (H) 01/18/2024       Recent Labs   Lab 01/18/24  0456   WBC 5.23   RBC 3.00*   HGB 8.5*   HCT 27.0*   *   MCV 90   MCH 28.3   MCHC 31.5*         I have personally reviewed pertinent radiological imaging and reports.    I have spent > 35 minutes providing care for this patient for the above diagnoses. These services have included chart/data/imaging review, evaluation, exam, formulation of plan, , note preparation, and discussions with staff involved in this patient's care.    Jose Cantor MD  Nephrology  [facility]  [phone]

## 2024-01-18 NOTE — ASSESSMENT & PLAN NOTE
"Patient's FSGs are controlled on current medication regimen.  Last A1c reviewed-   Lab Results   Component Value Date    HGBA1C 6.5 (H) 01/10/2024     Most recent fingerstick glucose reviewed- No results for input(s): "POCTGLUCOSE" in the last 24 hours.  Current correctional scale  Low  Maintain anti-hyperglycemic dose as follows-   Antihyperglycemics (From admission, onward)    Start     Stop Route Frequency Ordered    01/13/24 1108  insulin aspart U-100 pen 0-10 Units         -- SubQ Before meals & nightly PRN 01/13/24 1008    01/09/24 2100  insulin detemir U-100 (Levemir) pen 15 Units         -- SubQ Nightly 01/09/24 1947        Hold Oral hypoglycemics and mounjaro while patient is in the hospital.  Wound care consult appreciated.  Foot XR without osseous process  "

## 2024-01-18 NOTE — ASSESSMENT & PLAN NOTE
"Patient's FSGs are controlled on current medication regimen.  Last A1c reviewed-   Lab Results   Component Value Date    HGBA1C 6.5 (H) 01/10/2024     Most recent fingerstick glucose reviewed- No results for input(s): "POCTGLUCOSE" in the last 24 hours.  Current correctional scale  Low  Maintain anti-hyperglycemic dose as follows-   Antihyperglycemics (From admission, onward)    Start     Stop Route Frequency Ordered    01/13/24 1108  insulin aspart U-100 pen 0-10 Units         -- SubQ Before meals & nightly PRN 01/13/24 1008    01/09/24 2100  insulin detemir U-100 (Levemir) pen 15 Units         -- SubQ Nightly 01/09/24 1947        Hold Oral hypoglycemics and mounjaro while patient is in the hospital.  "

## 2024-01-18 NOTE — ASSESSMENT & PLAN NOTE
Chronic, controlled. Latest blood pressure and vitals reviewed-     Temp:  [97.5 °F (36.4 °C)-98.6 °F (37 °C)]   Pulse:  [60-73]   Resp:  [16-18]   BP: (142-191)/(73-96)   SpO2:  [95 %-98 %] .   Home meds for hypertension were reviewed and noted below.   Hypertension Medications               amLODIPine (NORVASC) 10 MG tablet Take 1 tablet (10 mg total) by mouth once daily.    furosemide (LASIX) 20 MG tablet Take 20 mg by mouth once daily.    hydrALAZINE (APRESOLINE) 25 MG tablet Take 1 tablet (25 mg total) by mouth every 8 (eight) hours.    metoprolol succinate (TOPROL-XL) 25 MG 24 hr tablet Take 25 mg by mouth once daily.    spironolactone (ALDACTONE) 50 MG tablet Take 1 tablet (50 mg total) by mouth once daily.    valsartan (DIOVAN) 160 MG tablet Take 160 mg by mouth once daily.          Holding Arb, MRA, Farxiga, meloxicam and diuretics

## 2024-01-18 NOTE — NURSING
Nurses Note -- 4 Eyes      1/18/2024   4:38 PM      Skin assessed during: Transfer      [] No Altered Skin Integrity Present    []Prevention Measures Documented      [x] Yes- Altered Skin Integrity Present or Discovered   [] LDA Added if Not in Epic (Describe Wound)   [] New Altered Skin Integrity was Present on Admit and Documented in LDA   [x] Wound Image Taken    Wound Care Consulted? Yes    Attending Nurse:  DRISS Antony     Second RN/Staff Member:   RADHIKA,RN

## 2024-01-18 NOTE — SUBJECTIVE & OBJECTIVE
Interval History:  Patient completed 2nd hemodialysis session yesterday.  Family members present at bedside.  He complains of rash on his chest in left arm and attributed to allergic reaction to tape/telemetry pads.  He continues to produce urine.  Currently collecting 24 hour urine for nephrology.  Potassium today 5.0.  No hemodialysis scheduled for today.  We will check labs and Nephrology we will determine need for dialysis tomorrow.      Review of Systems   Constitutional:  Negative for fever.   HENT:  Negative for congestion.    Cardiovascular:  Positive for leg swelling.   Skin:  Positive for rash.     Objective:     Vital Signs (Most Recent):  Temp: 98.2 °F (36.8 °C) (01/18/24 1150)  Pulse: 67 (01/18/24 1150)  Resp: 18 (01/18/24 1150)  BP: (!) 191/96 ([place] notified) (01/18/24 1150)  SpO2: 96 % (01/18/24 1150) Vital Signs (24h Range):  Temp:  [97.5 °F (36.4 °C)-98.6 °F (37 °C)] 98.2 °F (36.8 °C)  Pulse:  [60-73] 67  Resp:  [16-18] 18  SpO2:  [95 %-98 %] 96 %  BP: (142-191)/(73-96) 191/96     Weight: (!) 172 kg (379 lb 3.1 oz)  Body mass index is 47.4 kg/m².  No intake or output data in the 24 hours ending 01/18/24 1453        Physical Exam  Vitals and nursing note reviewed.   Constitutional:       Appearance: He is obese.      Comments: Lying in bed   HENT:      Head: Normocephalic and atraumatic.      Mouth/Throat:      Mouth: Mucous membranes are moist.   Neck:      Comments: Left-sided Trialysis line in place  Cardiovascular:      Rate and Rhythm: Normal rate and regular rhythm.      Comments: 2+ lower extremity pitting edema  Pulmonary:      Comments: On room air, distant breath sounds, no wheeze or accessory muscle use  Abdominal:      Palpations: Abdomen is soft.      Tenderness: There is no abdominal tenderness.   Neurological:      Mental Status: He is alert and oriented to person, place, and time.   Psychiatric:         Mood and Affect: Mood normal.             Significant Labs: BMP:   Recent Labs  "  Lab 01/18/24 0456   *      K 5.0      CO2 29   BUN 60*   CREATININE 3.6*   CALCIUM 8.7   MG 2.1       CBC:   Recent Labs   Lab 01/17/24 0440 01/18/24 0456   WBC 4.78 5.23   HGB 8.5* 8.5*   HCT 27.0* 27.0*   * 138*       CMP:   Recent Labs   Lab 01/17/24 0440 01/18/24 0456    137   K 4.8 5.0    101   CO2 30* 29    111*   BUN 75* 60*   CREATININE 3.7* 3.6*   CALCIUM 8.2* 8.7   ANIONGAP 7* 7*       Cardiac Markers: No results for input(s): "CKMB", "MYOGLOBIN", "BNP", "TROPISTAT" in the last 48 hours.  Magnesium:   Recent Labs   Lab 01/17/24 0440 01/18/24 0456   MG 2.2 2.1         Significant Imaging: I have reviewed all pertinent imaging results/findings within the past 24 hours.  "

## 2024-01-18 NOTE — ASSESSMENT & PLAN NOTE
Current CBC reviewed-   Lab Results   Component Value Date    HGB 8.5 (L) 01/18/2024    HCT 27.0 (L) 01/18/2024     Monitor serial CBC and transfuse if patient becomes hemodynamically unstable, symptomatic or H/H drops below 7/21.

## 2024-01-18 NOTE — PLAN OF CARE
Problem: Adult Inpatient Plan of Care  Goal: Plan of Care Review  Outcome: Ongoing, Progressing  Goal: Patient-Specific Goal (Individualized)  Outcome: Ongoing, Progressing  Goal: Absence of Hospital-Acquired Illness or Injury  Outcome: Ongoing, Progressing  Goal: Optimal Comfort and Wellbeing  Outcome: Ongoing, Progressing  Goal: Readiness for Transition of Care  Outcome: Ongoing, Progressing     Problem: Bariatric Environmental Safety  Goal: Safety Maintained with Care  Outcome: Ongoing, Progressing     Problem: Diabetes Comorbidity  Goal: Blood Glucose Level Within Targeted Range  Outcome: Ongoing, Progressing     Problem: Fluid and Electrolyte Imbalance (Acute Kidney Injury/Impairment)  Goal: Fluid and Electrolyte Balance  Outcome: Ongoing, Progressing     Problem: Oral Intake Inadequate (Acute Kidney Injury/Impairment)  Goal: Optimal Nutrition Intake  Outcome: Ongoing, Progressing     Problem: Renal Function Impairment (Acute Kidney Injury/Impairment)  Goal: Effective Renal Function  Outcome: Ongoing, Progressing     Problem: Impaired Wound Healing  Goal: Optimal Wound Healing  Outcome: Ongoing, Progressing     Problem: Pain Acute  Goal: Acceptable Pain Control and Functional Ability  Outcome: Ongoing, Progressing     Problem: Fall Injury Risk  Goal: Absence of Fall and Fall-Related Injury  Outcome: Ongoing, Progressing     Problem: Skin Injury Risk Increased  Goal: Skin Health and Integrity  Outcome: Ongoing, Progressing     Problem: Device-Related Complication Risk (Hemodialysis)  Goal: Safe, Effective Therapy Delivery  Outcome: Ongoing, Progressing     Problem: Hemodynamic Instability (Hemodialysis)  Goal: Effective Tissue Perfusion  Outcome: Ongoing, Progressing     Problem: Infection (Hemodialysis)  Goal: Absence of Infection Signs and Symptoms  Outcome: Ongoing, Progressing     Problem: Infection  Goal: Absence of Infection Signs and Symptoms  Outcome: Ongoing, Progressing

## 2024-01-18 NOTE — ASSESSMENT & PLAN NOTE
Nephrology following.  Suspect secondary to hypertension.    Holding Arb, first-degree, meloxicam, and any nephrotoxic agents.    Received hemodialysis on 01/17 and 1/16  No plans for dialysis today.  We will re-evaluate tomorrow.    Continue to monitor potassium with daily CMP.

## 2024-01-18 NOTE — PROGRESS NOTES
[facility]  Adult Nutrition   Progress Note (Initial Assessment)     SUMMARY     Recommendations  Recommendation/Intervention: 1. Recommend Phos binder. Phos 5.2. Continue renal diet restrictions.   2. Added silverio/sausage to breakfast foods allowed per physician approval.  Goals: 1. Phos to trend towards target range. 2. Pt to continue to meet at least 75% EEN/EPN. 3. Cafeteria to send additional approved foods per patient request.  Nutrition Goal Status: new  Communication of RD Recs: reviewed with physician (reviewed with RN)    Nutritional Diagnosis (PES Statement)   Excessive phosphorus intake related to acute on chronic CKD requiring dialysis as evidence by Phosphorus 5.2     Dietitian Rounds Brief  Pt assessed 2' requesting to speak with RD. Pt c/o not getting meats with breakfast.. RD explained reasoning, pt states his doctor and nurse yesterday approved for him to receive meats. RD messaged physician, current physician approved. Pt voices no other questions about diet and no need for diet education at this time.   Nephrology currently assessing if patient will require long term HD per convo with nephrologist and per progress notes. Pt may need addition diet education, nutrition therapy for HD.   LBM: 01/18/24    Reason for Assessment  Reason For Assessment: length of stay (LOS 9 days and patient requesting to speak with dietitian)  Diagnosis: other (see comments) (Hypervolemia)  Relevant Medical History: Depression, Diabetes mellitus, Hypertension, Obesity, and Osteomyelitis  Nutrition Discharge Planning: Diabetic, Low Sodium Diet. Fluid restriction and renal restrictions pending if patient will continue with HD after discharge.    Nutrition Risk Screen  Nutrition Risk Screen: no indicators present     MST Score: 0  Have you recently lost weight without trying?: No  Weight loss score: 0  Have you been eating poorly because of a decreased appetite?: No  Appetite score: 0       Nutrition/Diet  "History  Patient Reported Diet/Restrictions/Preferences: diabetic diet  Food Allergies: NKFA  Factors Affecting Nutritional Intake: other (see comments) (requesting foods not compliant with current diet order)    Anthropometrics  Temp: 98.2 °F (36.8 °C)  Height Method: Stated  Height: 6' 3" (190.5 cm)  Height (inches): 75 in  Weight Method: Standard Scale  Weight: (!) 172 kg (379 lb 3.1 oz)  Weight (lb): (!) 379.19 lb  Ideal Body Weight (IBW), Male: 196 lb  % Ideal Body Weight, Male (lb): 191.78 %  BMI (Calculated): 47.4  BMI Grade: greater than 40 - morbid obesity       Weight History:  Wt Readings from Last 10 Encounters:   01/15/24 (!) 172 kg (379 lb 3.1 oz)   01/10/24 (!) 169.6 kg (374 lb)   07/26/23 (!) 158.8 kg (350 lb 3.2 oz)   07/26/23 (!) 158.8 kg (350 lb)   06/04/23 (!) 158.8 kg (350 lb)   02/15/23 (!) 153.9 kg (339 lb 4.6 oz)   02/10/23 (!) 151.1 kg (333 lb 1.8 oz)   03/09/22 (!) 146.7 kg (323 lb 6.6 oz)   04/26/21 (!) 152.9 kg (337 lb)   01/03/21 (!) 160.1 kg (353 lb)       Lab/Procedures/Meds: Pertinent Labs Reviewed    Clinical Chemistry:  Recent Labs   Lab 01/12/24  0628 01/13/24  0527 01/14/24  0504 01/15/24  0407 01/16/24  0434 01/17/24  0440 01/18/24  0456   * 136 135*   < > 135*  135* 137 137   K 5.8* 5.8* 5.3*   < > 5.2*  5.2* 4.8 5.0    103 100   < > 99  99 100 101   CO2 26 29 28   < > 26  26 30* 29   * 153* 165*   < > 206*  206* 110 111*   BUN 54* 64* 71*   < > 80*  80* 75* 60*   CREATININE 2.8* 3.2* 3.2*   < > 3.7*  3.7* 3.7* 3.6*   CALCIUM 8.7 8.1* 8.3*   < > 8.3*  8.3* 8.2* 8.7   PROT 6.9 6.7 6.5  --   --   --   --    ALBUMIN 3.9 3.7 3.6  --  3.7  3.7  --   --    BILITOT 0.6 0.4 0.3  --   --   --   --    ALKPHOS 32* 30* 29*  --   --   --   --    AST 28 23 21  --   --   --   --    ALT 28 23 20  --   --   --   --    ANIONGAP 5* 4* 7*   < > 10  10 7* 7*   MG 2.0 2.2 2.1   < > 2.2 2.2 2.1   PHOS 4.1 5.7* 5.9*   < > 6.7*  6.7*  6.7* 5.1* 5.2*    < > = values in this " interval not displayed.       CBC:   Recent Labs   Lab 01/18/24  0456   WBC 5.23   RBC 3.00*   HGB 8.5*   HCT 27.0*   *   MCV 90   MCH 28.3   MCHC 31.5*       Vitamins:  Recent Labs   Lab 01/13/24  0524   [id number]       Medications: Pertinent Medications reviewed    Scheduled Meds:   sodium chloride 0.9%   Intravenous Once    sodium chloride 0.9%   Intravenous Once    aspirin  81 mg Oral Daily    atorvastatin  80 mg Oral QHS    cetirizine  10 mg Oral Daily    ferrous sulfate  1 tablet Oral Daily    finasteride  5 mg Oral Daily    fluticasone propionate  2 spray Each Nostril QHS    gabapentin  400 mg Oral TID    heparin (porcine)  5,000 Units Subcutaneous Q8H    heparin (porcine)  5,000 Units Intravenous Once    insulin detemir U-100 (Levemir)  15 Units Subcutaneous QHS    metoprolol succinate  25 mg Oral Daily    mupirocin   Nasal BID    polyethylene glycol  17 g Oral Daily    senna-docusate 8.6-50 mg  2 tablet Oral BID       Continuous Infusions:    PRN Meds:.sodium chloride 0.9%, sodium chloride 0.9%, acetaminophen, albuterol-ipratropium, dextrose 50%, dextrose 50%, diphenhydrAMINE-zinc acetate 1-0.1%, glucagon (human recombinant), glucose, glucose, heparin (porcine), hydrALAZINE, insulin aspart U-100, labetalol, magnesium oxide, magnesium oxide, melatonin, naloxone, ondansetron, oxyCODONE-acetaminophen, potassium bicarbonate, potassium bicarbonate, potassium bicarbonate, potassium, sodium phosphates, potassium, sodium phosphates, potassium, sodium phosphates, sodium chloride 0.9%, sodium chloride 0.9%, sodium chloride 0.9%, traZODone    Estimated/Assessed Needs  Weight Used For Calorie Calculations: (!) 172 kg (379 lb 3.1 oz)  Energy Calorie Requirements (kcal): 2116 (MSJ - 500 kcal/day)  Energy Need Method: [place]- [place]  Protein Requirements: 107 - 134 (1.2 - 1.5 kcal/kg IBW - HD)  Weight Used For Protein Calculations: 89 kg (196 lb 3.4 oz) (IBW)  Fluid Requirements (mL): UOP + 1000 ml/day while  on dialysis, or per MD  Estimated Fluid Requirement Method: other (see comments)  RDA Method (mL): 2116  CHO Requirement: 238 - 344 g/day (45- 65% EEN)    Nutrition Prescription Ordered  Current Diet Order: Diabetic 2000 calorie; Cardiac; Renal  Nutrition Order Comments: Double Portions; 1500 ml/day fluid restriction    Evaluation of Received Nutrient/Fluid Intake  Energy Calories Required: meeting needs  Protein Required: meeting needs  Fluid Required: meeting needs  Tolerance: tolerating     Intake/Output Summary (Last 24 hours) at 1/18/2024 1240  Last data filed at 1/17/2024 1450  Gross per 24 hour   Intake 860 ml   Output 4500 ml   Net -3640 ml      % Intake of Estimated Energy Needs: 75 - 100 %  % Meal Intake: 75 - 100 %    Nutrition Risk  Level of Risk/Frequency of Follow-up:  (1x/week)     Monitor and Evaluation  Food and Nutrient Intake: energy intake, food and beverage intake  Food and Nutrient Adminstration: diet order  Knowledge/Beliefs/Attitudes: beliefs and attitudes, food and nutrition knowledge/skill  Physical Activity and Function: factors affecting access to physical activity, nutrition-related ADLs and IADLs  Anthropometric Measurements: weight, weight change, body mass index  Biochemical Data, Medical Tests and Procedures: electrolyte and renal panel, lipid profile, gastrointestinal profile, glucose/endocrine profile, inflammatory profile     Nutrition Follow-Up  RD Follow-up?: Yes    Nidhi Kaufman RD 01/18/2024 12:40 PM

## 2024-01-18 NOTE — RESPIRATORY THERAPY
01/17/24 1910   Patient Assessment/Suction   Level of Consciousness (AVPU) alert   Respiratory Effort Normal;Unlabored   Expansion/Accessory Muscles/Retractions no retractions;no use of accessory muscles   All Lung Fields Breath Sounds clear;diminished   PRE-TX-O2   Device (Oxygen Therapy) room air   SpO2 97 %   Pulse Oximetry Type Intermittent   $ Pulse Oximetry - Multiple Charge Pulse Oximetry - Multiple   Aerosol Therapy   $ Aerosol Therapy Charges PRN treatment not required   Respiratory Treatment Status (SVN) PRN treatment not required   Education   $ Education Bronchodilator;15 min

## 2024-01-19 VITALS
DIASTOLIC BLOOD PRESSURE: 71 MMHG | HEART RATE: 60 BPM | TEMPERATURE: 98 F | BODY MASS INDEX: 39.17 KG/M2 | WEIGHT: 315 LBS | HEIGHT: 75 IN | OXYGEN SATURATION: 100 % | RESPIRATION RATE: 19 BRPM | SYSTOLIC BLOOD PRESSURE: 160 MMHG

## 2024-01-19 PROBLEM — E87.70 HYPERVOLEMIA: Status: RESOLVED | Noted: 2024-01-13 | Resolved: 2024-01-19

## 2024-01-19 LAB
ALBUMIN SERPL BCP-MCNC: 3.8 G/DL (ref 3.5–5.2)
ALP SERPL-CCNC: 27 U/L (ref 55–135)
ALT SERPL W/O P-5'-P-CCNC: 36 U/L (ref 10–44)
ANION GAP SERPL CALC-SCNC: 7 MMOL/L (ref 8–16)
AST SERPL-CCNC: 39 U/L (ref 10–40)
BASOPHILS # BLD AUTO: 0.04 K/UL (ref 0–0.2)
BASOPHILS NFR BLD: 0.7 % (ref 0–1.9)
BILIRUB SERPL-MCNC: 0.6 MG/DL (ref 0.1–1)
BUN SERPL-MCNC: 72 MG/DL (ref 6–20)
CALCIUM SERPL-MCNC: 8.8 MG/DL (ref 8.7–10.5)
CHLORIDE SERPL-SCNC: 100 MMOL/L (ref 95–110)
CO2 SERPL-SCNC: 28 MMOL/L (ref 23–29)
CREAT SERPL-MCNC: 3.6 MG/DL (ref 0.5–1.4)
DIFFERENTIAL METHOD BLD: ABNORMAL
EOSINOPHIL # BLD AUTO: 0.5 K/UL (ref 0–0.5)
EOSINOPHIL NFR BLD: 7.9 % (ref 0–8)
ERYTHROCYTE [DISTWIDTH] IN BLOOD BY AUTOMATED COUNT: 14.5 % (ref 11.5–14.5)
EST. GFR  (NO RACE VARIABLE): 18.5 ML/MIN/1.73 M^2
GLUCOSE SERPL-MCNC: 113 MG/DL (ref 70–110)
GLUCOSE SERPL-MCNC: 126 MG/DL (ref 70–110)
GLUCOSE SERPL-MCNC: 181 MG/DL (ref 70–110)
HCT VFR BLD AUTO: 27.4 % (ref 40–54)
HGB BLD-MCNC: 8.6 G/DL (ref 14–18)
IMM GRANULOCYTES # BLD AUTO: 0.01 K/UL (ref 0–0.04)
IMM GRANULOCYTES NFR BLD AUTO: 0.2 % (ref 0–0.5)
LYMPHOCYTES # BLD AUTO: 2.1 K/UL (ref 1–4.8)
LYMPHOCYTES NFR BLD: 35.9 % (ref 18–48)
MAGNESIUM SERPL-MCNC: 2.1 MG/DL (ref 1.6–2.6)
MCH RBC QN AUTO: 28.2 PG (ref 27–31)
MCHC RBC AUTO-ENTMCNC: 31.4 G/DL (ref 32–36)
MCV RBC AUTO: 90 FL (ref 82–98)
MONOCYTES # BLD AUTO: 0.7 K/UL (ref 0.3–1)
MONOCYTES NFR BLD: 12.4 % (ref 4–15)
NEUTROPHILS # BLD AUTO: 2.5 K/UL (ref 1.8–7.7)
NEUTROPHILS NFR BLD: 42.9 % (ref 38–73)
NRBC BLD-RTO: 0 /100 WBC
PHOSPHATE SERPL-MCNC: 5.3 MG/DL (ref 2.7–4.5)
PLATELET # BLD AUTO: 138 K/UL (ref 150–450)
PMV BLD AUTO: 11.4 FL (ref 9.2–12.9)
POTASSIUM SERPL-SCNC: 5.2 MMOL/L (ref 3.5–5.1)
PROT SERPL-MCNC: 6.7 G/DL (ref 6–8.4)
RBC # BLD AUTO: 3.05 M/UL (ref 4.6–6.2)
SODIUM SERPL-SCNC: 135 MMOL/L (ref 136–145)
WBC # BLD AUTO: 5.8 K/UL (ref 3.9–12.7)

## 2024-01-19 PROCEDURE — 25000003 PHARM REV CODE 250: Performed by: INTERNAL MEDICINE

## 2024-01-19 PROCEDURE — 25000003 PHARM REV CODE 250: Performed by: NURSE PRACTITIONER

## 2024-01-19 PROCEDURE — 36415 COLL VENOUS BLD VENIPUNCTURE: CPT | Performed by: INTERNAL MEDICINE

## 2024-01-19 PROCEDURE — 99900035 HC TECH TIME PER 15 MIN (STAT)

## 2024-01-19 PROCEDURE — 85025 COMPLETE CBC W/AUTO DIFF WBC: CPT | Performed by: INTERNAL MEDICINE

## 2024-01-19 PROCEDURE — 94761 N-INVAS EAR/PLS OXIMETRY MLT: CPT

## 2024-01-19 PROCEDURE — 63600175 PHARM REV CODE 636 W HCPCS: Performed by: NURSE PRACTITIONER

## 2024-01-19 PROCEDURE — 80053 COMPREHEN METABOLIC PANEL: CPT | Performed by: INTERNAL MEDICINE

## 2024-01-19 PROCEDURE — 83735 ASSAY OF MAGNESIUM: CPT | Performed by: INTERNAL MEDICINE

## 2024-01-19 PROCEDURE — 84100 ASSAY OF PHOSPHORUS: CPT | Performed by: INTERNAL MEDICINE

## 2024-01-19 RX ORDER — AMOXICILLIN 250 MG
2 CAPSULE ORAL 2 TIMES DAILY
Qty: 120 TABLET | Refills: 0 | Status: SHIPPED | OUTPATIENT
Start: 2024-01-19 | End: 2024-02-18

## 2024-01-19 RX ORDER — FERROUS SULFATE 325(65) MG
325 TABLET, DELAYED RELEASE (ENTERIC COATED) ORAL DAILY
Qty: 30 TABLET | Refills: 0 | Status: SHIPPED | OUTPATIENT
Start: 2024-01-19 | End: 2024-02-18

## 2024-01-19 RX ORDER — FUROSEMIDE 20 MG/1
40 TABLET ORAL 2 TIMES DAILY
Qty: 120 TABLET | Refills: 0 | Status: SHIPPED | OUTPATIENT
Start: 2024-01-19 | End: 2024-02-18

## 2024-01-19 RX ADMIN — HEPARIN SODIUM 5000 UNITS: 5000 INJECTION INTRAVENOUS; SUBCUTANEOUS at 05:01

## 2024-01-19 RX ADMIN — GABAPENTIN 400 MG: 300 CAPSULE ORAL at 03:01

## 2024-01-19 RX ADMIN — GABAPENTIN 400 MG: 300 CAPSULE ORAL at 09:01

## 2024-01-19 RX ADMIN — CETIRIZINE HYDROCHLORIDE 10 MG: 10 TABLET, FILM COATED ORAL at 09:01

## 2024-01-19 RX ADMIN — OXYCODONE HYDROCHLORIDE AND ACETAMINOPHEN 1 TABLET: 10; 325 TABLET ORAL at 05:01

## 2024-01-19 RX ADMIN — OXYCODONE HYDROCHLORIDE AND ACETAMINOPHEN 1 TABLET: 10; 325 TABLET ORAL at 11:01

## 2024-01-19 RX ADMIN — METOPROLOL SUCCINATE 25 MG: 25 TABLET, EXTENDED RELEASE ORAL at 09:01

## 2024-01-19 RX ADMIN — Medication: at 11:01

## 2024-01-19 RX ADMIN — POLYETHYLENE GLYCOL 3350 17 G: 17 POWDER, FOR SOLUTION ORAL at 09:01

## 2024-01-19 RX ADMIN — ASPIRIN 81 MG: 81 TABLET, COATED ORAL at 09:01

## 2024-01-19 RX ADMIN — SENNOSIDES AND DOCUSATE SODIUM 2 TABLET: 8.6; 5 TABLET ORAL at 09:01

## 2024-01-19 RX ADMIN — FERROUS SULFATE TAB 325 MG (65 MG ELEMENTAL FE) 1 EACH: 325 (65 FE) TAB at 09:01

## 2024-01-19 RX ADMIN — FINASTERIDE 5 MG: 5 TABLET, FILM COATED ORAL at 09:01

## 2024-01-19 RX ADMIN — MUPIROCIN 1 G: 20 OINTMENT TOPICAL at 09:01

## 2024-01-19 NOTE — NURSING
01/19/2024      Assumed care of patient.   Assessment and vital signs assessed.   Labs and meds reviewed, see flowsheets for more details.  Will continue to monitor this shift.    Pt awake in the bed, looking at tv, call bell in reach, bed in lowest position, no pain or concerns at this time.

## 2024-01-19 NOTE — ASSESSMENT & PLAN NOTE
Body mass index is 47.12 kg/m². Morbid obesity complicates all aspects of disease management from diagnostic modalities to treatment. Weight loss encouraged and health benefits explained to patient.

## 2024-01-19 NOTE — PLAN OF CARE
Spoke with Radha with Dr Pagan's office and TCC with PCP 1/25/24 1120 and they will arrange transportation. Spoke with Kirstie with [facility] and they will see pt 1/23/24 1100.       01/19/24 1447   Post-Acute Status   Hospital Resources/Appts/Education Provided Appointments scheduled and added to AVS

## 2024-01-19 NOTE — DISCHARGE SUMMARY
[facility] Medicine  Discharge Summary      Patient Name: Abel Gibbs  MRN: [id number]  [facility]: [id number]  Patient Class: IP- Inpatient  Admission Date: 1/9/2024  Hospital Length of Stay: 9 days  Discharge Date and Time:  01/19/2024 3:44 PM  Attending Physician: Jono Hancock DO   Discharging Provider: Jono Hancock DO  Primary Care Provider: Rupinder Pagan MD    Primary Care Team: Networked reference to record PCT     HPI:   Abel Gibbs is a 58 y.o. male with a history as  has a past medical history of Depression, Diabetes mellitus, Hypertension, Obesity, and Osteomyelitis. who presented to the ED with a Chest Pain and Shortness of Breath (+ edema)    Patient presents to the emergency room with a complaint of midsternal chest pain radiating into left chest wall that started approximately 3 days ago.  He further reports shortness for breath when attempting to lye flat with chest pain worsening. He tell me he was seen by wound care for right foot ulcer who felt as though his face appeared a little swollen and recommended that he go to the emergency.     Patient does report having a sinus infection about 1-2 weeks ago completed course of antibiotics.  He further states after completion of antibiotics he started with congestion and seems as though symptoms have gotten worse. Additionally, he reports BLE edema that chronic but appears a little worse.    Patient unclear of medications as he reports the nurse fills his medication container weekly. He does tell me that he is on furosemide but has not taken it for last 3-4 days d/t leg cramps. Patient also prescribed spirolactone but does not know if he has been taking. Also on mounjaro.     Denies fever, chills, diaphoresis, dizziness, HA, palpitations, NVD, recent trauma or any other associated symptoms. Does not smoke cigarettes, drinks occasionally and uses marijuana daily.      Lab and imaging obtained and reviewed.  CBC  completed and shows RBCs 3.48 H/H 10.0/32.3 MCHC 31.0 RDW is 15.9.  Chemistry profile shows potassium 5.7 Ag 5 BUN 41 creatinine 2.3 GFR 31.7 calcium 8.6 ALP 35 ALT 50.  BNP within normal range.  Initial high sensitive troponin 17.2. EKG shows NSR with incomplete R-BBB. CXR without acute cardiopulmonary findings.  On admit, afebrile HR 94 RR 20 /91 with O2 sats 97% on room air.        Ultrasound bilateral lower extremity without evidence of deep venous thrombosis.      Renal US  IMPRESSION: Asymmetric small right kidney. Otherwise normal sonographic appearance of the bilateral kidneys..     Angiogram 07/2023  Summary     The estimated blood loss was <50 mL.    The pre-procedure left ventricular end diastolic pressure was 16.    Nonobstructive coronaries with mild luminal irregularities in RCA and left circumflex and focal moderate stenosis of mid to distal LAD for which medical management is recommended.       Echo 07/2023  Summary  The left ventricle is normal in size with mild concentric hypertrophy and normal systolic function.  The estimated ejection fraction is 65%.  Normal left ventricular diastolic function.  Normal right ventricular size with normal right ventricular systolic function.  Mild mitral regurgitation.  Mild tricuspid regurgitation.  Normal central venous pressure (3 mmHg).  The estimated PA systolic pressure is 27 mmHg.    Per ED provider patient presents for evaluation of chest pain with lower extremity edema for 1 week. Labs reviewed and showed mildly elevated troponin without EKG changes. Also noted to have elevated BP and MINI, given ASA IV diuretics with NTG paste applied. US BLE negative for DVT. Will admit for ACS r/o.        * No surgery found *      Hospital Course:   Abel Gibbs is a 58 y.o. male with  has a past medical history of Depression, Diabetes mellitus, Hypertension, Obesity, and Osteomyelitis. who presented to the ED with a Chest Pain and Shortness of Breath (+  edema)    Patient presented for evaluation of 3 day history of CP and increased SOB after being treated for antibiotics for sinus infection for 1-2 weeks - as well as he endorsed not taking his medication for about a week due to leg cramps. He was found to be hyperkalemic 6.0, with acute on chronic kidney failure with BUN/cr/GFR 46/2.5/28.7. His BP was also elevated 180/91 at the time of presentation. His home BP medications were restarted amlodipine, metoprolol and hydralazine added 2/2 renal. BP improved. Nephrology consulted for management of kidney impairment and hyperkalemia. Renal US showed showed no sonographic evidence of renal artery stenosis. Started on lokelma for hyperkalemia - slow to improve. Lasix IV added, fluid restriction 1.5L. CXR on 01/09/23 did not demonstrate evidence of effusion or increased congestion and 2D echo 01/10/23 showed hypertrophy, EF 55-60% with normal DF. Has right foot ulcer present on admission followed by wound care outpatient and by wound care and Dr. Serrato inpatient. XR right foot ulcer showed severe degenerative changes with no acute osseous abnormality.       Patient with history of morbid obesity with BMI 47, suspected undiagnosed untreated AMBREEN, CKD stage 3, diabetes mellitus with HbA1c 6.5%, chronic right/great toe diabetic ulcer, followed by wound care, hypertension, heart failure with preserved ejection fraction, anemia, BPH, depression.  He presented from outpatient wound care due to concerns for volume overload.  Hypertensive urgency in the ED with hyperkalemia, he was admitted with Nephrology consultation and started on IV diuresis.  Renal ultrasound negative with no evidence of renal artery stenosis.  Echo with EF of 55-60%.  Ongoing hyperkalemia and started on scheduled lokelma  Still volume overloaded and on 1/12 transfer to telemetry floor and initiated on Lasix infusion at 5 milligrams/hour with 5 mg daily metolazone.  Unfortunately despite aggressive IV  diuresis, still significantly volume overloaded with up trending renal function.  On 01/15, general surgery placed left-sided Trialysis catheter and on 01/16 patient began on renal replacement therapy for volume removal.  Received dialysis on 01/16/2024 and 01/17/2024.    24 hour urine was collected and shows appropriate renal clearance.  Patient will likely not need any further hemodialysis.  Nephrology recommends removing Trialysis catheter and discharged home on Lasix 40 mg oral b.i.d..  Continue to hold Arb and Aldactone.    Follow up with PCP, Cardiology, Wound Care Clinic, and Nephrology.  Recommend repeating CMP at follow-up appointment.    Physical Exam  Vitals and nursing note reviewed.   Constitutional:       Appearance: He is obese.      Comments: Lying in bed   HENT:      Head: Normocephalic and atraumatic.      Mouth/Throat:      Mouth: Mucous membranes are moist.   Neck:      Comments: Left-sided Trialysis line in place  Cardiovascular:      Rate and Rhythm: Normal rate and regular rhythm.      Comments: 2+ lower extremity pitting edema  Pulmonary:      Comments: On room air, distant breath sounds, no wheeze or accessory muscle use  Abdominal:      Palpations: Abdomen is soft.      Tenderness: There is no abdominal tenderness.   Skin:     Comments: Rash in upper extremity and torso, improved from yesterday   Neurological:      Mental Status: He is alert and oriented to person, place, and time.   Psychiatric:         Mood and Affect: Mood normal.    Imaging Results              US Retroperitoneal Complete (Final result)  Result time 01/09/24 17:29:05      Final result by Fede Tavares Jr., MD (01/09/24 17:29:05)                   Narrative:    HISTORY: Acute renal disease..    FINDINGS: The right kidney measures 8.9 cm in length, with normal cortical thickness and parenchymal echotexture, and no echogenic calculi or hydronephrosis. The left kidney measures 10.4 cm in length and has normal cortical  thickness and parenchymal echotexture, without echogenic calculi or hydronephrosis.    The urinary bladder is normal, with no wall thickening or intraluminal mass. The abdominal aorta, aortic bifurcation and IVC are unremarkable.    IMPRESSION: Asymmetric small right kidney. Otherwise normal sonographic appearance of the bilateral kidneys..    Electronically signed by:  Fede Tavares MD  01/09/2024 05:29 PM CST Workstation: [id number]                                     US Lower Extremity Veins Bilateral (Final result)  Result time 01/09/24 16:37:13      Final result by Sinai Moreno IV, MD (01/09/24 16:37:13)                   Narrative:    Bilateral lower extremity venous Doppler evaluation    HISTORY: Leg pain and swelling.    Real-time, duplex and color Doppler interrogation are performed. This demonstrates patency of the common and superficial femoral veins, popliteal veins, major calf veins and greater saphenous veins bilaterally. There are no findings of deep vein thrombosis.    IMPRESSION:    No evidence of deep venous thrombosis.    Electronically signed by:  Sinai Moreno MD  01/09/2024 04:37 PM CST Workstation: [id number]                                     X-Ray Chest AP (Final result)  Result time 01/09/24 14:51:18      Final result by Sinai Moreno IV, MD (01/09/24 14:51:18)                   Narrative:    Chest, single view    HISTORY: Shortness of breath and chest pain.    Comparison 7/26/2023.    The heart size is within normal limits. The central pulmonary vasculature is not acutely engorged.    The lungs are appropriately expanded. There are no confluent areas of airspace disease or significant volume loss. No effusion is demonstrated. Monitoring electrodes overlie the chest.    IMPRESSION:    No acute cardiopulmonary disease.    Electronically signed by:  Sinai Moreno MD  01/09/2024 02:51 PM CST Workstation: [id number]                                               Goals of Care Treatment  Preferences:  Code Status: Full Code      Consults:   Consults (From admission, onward)          Status Ordering Provider     Inpatient consult to Registered Dietitian/Nutritionist  Once        Provider:  (Not yet assigned)    Completed JO TERESA     Inpatient consult to Nephrology  Once        Provider:  Jose Cantor MD    Completed DAMIAN GUSTAFSON            No new Assessment & Plan notes have been filed under this hospital service since the last note was generated.  Service: Hospital Medicine    Final Active Diagnoses:    Diagnosis Date Noted POA    PRINCIPAL PROBLEM:  Acute kidney injury superimposed on chronic kidney disease with uremia [N17.9, N18.9] 12/30/2020 Yes    Allergy to adhesive tape [Z91.048] 01/18/2024 No    Hyperphosphatemia [E83.39] 01/16/2024 No    Anemia [D64.9] 01/13/2024 Yes    THC use [F12.20] 01/13/2024 Yes     Chronic    Acute on chronic diastolic heart failure [I50.33] 01/13/2024 Yes    BPH (benign prostatic hyperplasia) [N40.0] 07/26/2023 Yes    HLD (hyperlipidemia) [E78.5] 07/26/2023 Yes    Severe obesity (BMI >= 40) [E66.01] 02/08/2023 Yes    Non compliance with medical treatment [Z91.199] 02/08/2023 Not Applicable    Diabetes mellitus with HbA1c 6.5% [E11.9] 03/09/2022 Yes     Chronic    Diabetic ulcer of right great toe and right foot, POA [E11.621, L97.514] 12/30/2020 Yes     Chronic    Hypertension [I10] 12/30/2020 Yes     Chronic      Problems Resolved During this Admission:    Diagnosis Date Noted Date Resolved POA    Hypervolemia [E87.70] 01/13/2024 01/19/2024 Yes    Hypertensive urgency [I16.0] 01/13/2024 01/13/2024 Yes    Hyperkalemia [E87.5] 01/09/2024 01/17/2024 Yes       Discharged Condition: stable    Disposition: Home or Self Care    Follow Up:   Follow-up Information       Alek Greenwood MD Follow up.    Specialties: Interventional Cardiology, Cardiology  Contact information:  [address] [place] [address]  [address]  [facility] [address]  [phone]               [facility] Follow up on 1/23/2024.    Why: Go for wound care 1/23/24 1100.  If that time does not work they will see you when you arrive that day.  Contact information:  [phone]             Rupinder Pagan MD Follow up on 1/25/2024.    Specialty: Internal Medicine  Why: 1/25/24 at 1120. They will arrange transportation for you  Contact information:  [address] [facility]  [address]  [facility] [address]  [phone]               Jose Cantor MD. Call on 2/12/2024.    Specialty: Nephrology  Contact information:  [address] [facility] [place]  [place] [address]  [phone]                           Patient Instructions:      Ambulatory referral/consult to Home Health   Standing Status: Future   Referral Priority: Routine Referral Type: Home Health   Referral Reason: Specialty Services Required   Requested Specialty: Home Health Services   Number of Visits Requested: 1       Significant Diagnostic Studies: Labs: CMP   Recent Labs   Lab 01/18/24  0456 01/18/24  1933 01/19/24  0549     --  135*   K 5.0  --  5.2*     --  100   CO2 29  --  28   *  --  126*   BUN 60*  --  72*   CREATININE 3.6* 3.6* 3.6*   CALCIUM 8.7  --  8.8   PROT  --   --  6.7   ALBUMIN  --   --  3.8   BILITOT  --   --  0.6   ALKPHOS  --   --  27*   AST  --   --  39   ALT  --   --  36   ANIONGAP 7*  --  7*    and CBC   Recent Labs   Lab 01/18/24  0456 01/19/24  0549   WBC 5.23 5.80   HGB 8.5* 8.6*   HCT 27.0* 27.4*   * 138*       Pending Diagnostic Studies:       None           Medications:  Reconciled Home Medications:      Medication List        START taking these medications      ferrous sulfate 325 (65 FE) MG EC tablet  Take 1 tablet (325 mg total) by mouth once daily.     senna-docusate 8.6-50 mg 8.6-50 mg per tablet  Commonly known as: PERICOLACE  Take 2 tablets by mouth 2 (two) times daily.            CHANGE how you take these medications      furosemide 20 MG tablet  Commonly known as: LASIX  Take  2 tablets (40 mg total) by mouth 2 (two) times a day.  What changed:   how much to take  when to take this            CONTINUE taking these medications      albuterol 90 mcg/actuation inhaler  Commonly known as: PROVENTIL/VENTOLIN HFA  Inhale 1-2 puffs into the lungs every 6 (six) hours as needed for Wheezing. Rescue     amLODIPine 10 MG tablet  Commonly known as: NORVASC  Take 1 tablet (10 mg total) by mouth once daily.     aspirin 81 MG EC tablet  Commonly known as: ECOTRIN  Take 1 tablet (81 mg total) by mouth once daily.     atorvastatin 80 MG tablet  Commonly known as: LIPITOR  Take 1 tablet (80 mg total) by mouth once daily.     blood sugar diagnostic Strp  To check BG 4 times daily, to use with insurance preferred meter     blood-glucose meter kit  To check BG 4 times daily, to use with insurance preferred meter     finasteride 5 mg tablet  Commonly known as: PROSCAR  Take 5 mg by mouth once daily.     fluticasone propionate 50 mcg/actuation nasal spray  Commonly known as: FLONASE  1 spray (50 mcg total) by Each Nostril route daily as needed for Rhinitis.     gabapentin 800 MG tablet  Commonly known as: NEURONTIN  Take 800 mg by mouth 4 (four) times daily.     hydrALAZINE 25 MG tablet  Commonly known as: APRESOLINE  Take 1 tablet (25 mg total) by mouth every 8 (eight) hours.     ketoconazole 2 % cream  Commonly known as: NIZORAL  Apply 1 Application topically once daily.     lactobacillus acidophilus & bulgar 100 million cell packet  Commonly known as: LACTINEX  Take 1 tablet by mouth 3 (three) times daily with meals.     lancets Misc  To check BG 4 times daily, to use with insurance preferred meter     LEVEMIR FLEXPEN 100 unit/mL (3 mL) Inpn pen  Generic drug: insulin detemir U-100 (Levemir)  Inject 30 Units into the skin once daily.     LIDOcaine 5 %  Commonly known as: LIDODERM  Place 1 patch onto the skin once daily.     metFORMIN 1000 MG tablet  Commonly known as: GLUCOPHAGE  Take 1,000 mg by mouth 2  (two) times daily with meals.     metoprolol succinate 25 MG 24 hr tablet  Commonly known as: TOPROL-XL  Take 25 mg by mouth once daily.     MOUNJARO 7.5 mg/0.5 mL Pnij  Generic drug: tirzepatide  Inject 7.5 mg into the skin every 7 days.     oxyCODONE-acetaminophen  mg per tablet  Commonly known as: PERCOCET  Take 1 tablet by mouth 3 (three) times daily as needed for Pain.     polyethylene glycol 17 gram/dose powder  Commonly known as: MIRALAX  Take 17 g by mouth once daily. For constipation     traZODone 50 MG tablet  Commonly known as: DESYREL  Take 50 mg by mouth nightly as needed.            STOP taking these medications      FARXIGA 5 mg Tab tablet  Generic drug: dapagliflozin propanediol     meloxicam 15 MG tablet  Commonly known as: MOBIC     spironolactone 50 MG tablet  Commonly known as: ALDACTONE     valsartan 160 MG tablet  Commonly known as: DIOVAN              Indwelling Lines/Drains at time of discharge:   Lines/Drains/Airways       Central Venous Catheter Line  Duration             Trialysis (Dialysis) Catheter 01/15/24 6239 left internal jugular 3 days                    Time spent on the discharge of patient: 40 minutes         Jono Hancock DO  Department of Hospital Medicine  [facility]

## 2024-01-19 NOTE — ASSESSMENT & PLAN NOTE
Current CBC reviewed-   Lab Results   Component Value Date    HGB 8.6 (L) 01/19/2024    HCT 27.4 (L) 01/19/2024     Monitor serial CBC and transfuse if patient becomes hemodynamically unstable, symptomatic or H/H drops below 7/21.

## 2024-01-19 NOTE — PROGRESS NOTES
[facility]  Progress Note    Patient Name: Abel Gibbs  MRN: [id number]  Patient Class: IP- Inpatient   Admission Date: 1/9/2024  Length of Stay: 9 days  Attending Physician: Jono Hancock DO  Primary Care Provider: Rupinder Pagan MD        Subjective:     Principal Problem:Acute kidney injury superimposed on chronic kidney disease        HPI:  Abel Gibbs is a 58 y.o. male with a history as  has a past medical history of Depression, Diabetes mellitus, Hypertension, Obesity, and Osteomyelitis. who presented to the ED with a Chest Pain and Shortness of Breath (+ edema)    Patient presents to the emergency room with a complaint of midsternal chest pain radiating into left chest wall that started approximately 3 days ago.  He further reports shortness for breath when attempting to lye flat with chest pain worsening. He tell me he was seen by wound care for right foot ulcer who felt as though his face appeared a little swollen and recommended that he go to the emergency.     Patient does report having a sinus infection about 1-2 weeks ago completed course of antibiotics.  He further states after completion of antibiotics he started with congestion and seems as though symptoms have gotten worse. Additionally, he reports BLE edema that chronic but appears a little worse.    Patient unclear of medications as he reports the nurse fills his medication container weekly. He does tell me that he is on furosemide but has not taken it for last 3-4 days d/t leg cramps. Patient also prescribed spirolactone but does not know if he has been taking. Also on mounjaro.     Denies fever, chills, diaphoresis, dizziness, HA, palpitations, NVD, recent trauma or any other associated symptoms. Does not smoke cigarettes, drinks occasionally and uses marijuana daily.      Lab and imaging obtained and reviewed.  CBC completed and shows RBCs 3.48 H/H 10.0/32.3 MCHC 31.0 RDW is 15.9.  Chemistry  profile shows potassium 5.7 Ag 5 BUN 41 creatinine 2.3 GFR 31.7 calcium 8.6 ALP 35 ALT 50.  BNP within normal range.  Initial high sensitive troponin 17.2. EKG shows NSR with incomplete R-BBB. CXR without acute cardiopulmonary findings.  On admit, afebrile HR 94 RR 20 /91 with O2 sats 97% on room air.        Ultrasound bilateral lower extremity without evidence of deep venous thrombosis.      Renal US  IMPRESSION: Asymmetric small right kidney. Otherwise normal sonographic appearance of the bilateral kidneys..     Angiogram 07/2023  Summary     The estimated blood loss was <50 mL.    The pre-procedure left ventricular end diastolic pressure was 16.    Nonobstructive coronaries with mild luminal irregularities in RCA and left circumflex and focal moderate stenosis of mid to distal LAD for which medical management is recommended.       Echo 07/2023  Summary  The left ventricle is normal in size with mild concentric hypertrophy and normal systolic function.  The estimated ejection fraction is 65%.  Normal left ventricular diastolic function.  Normal right ventricular size with normal right ventricular systolic function.  Mild mitral regurgitation.  Mild tricuspid regurgitation.  Normal central venous pressure (3 mmHg).  The estimated PA systolic pressure is 27 mmHg.    Per ED provider patient presents for evaluation of chest pain with lower extremity edema for 1 week. Labs reviewed and showed mildly elevated troponin without EKG changes. Also noted to have elevated BP and MINI, given ASA IV diuretics with NTG paste applied. US BLE negative for DVT. Will admit for ACS r/o.        Overview/Hospital Course:  Abel Gibbs is a 58 y.o. male with  has a past medical history of Depression, Diabetes mellitus, Hypertension, Obesity, and Osteomyelitis. who presented to the ED with a Chest Pain and Shortness of Breath (+ edema)    Patient presented for evaluation of 3 day history of CP and increased SOB after being  treated for antibiotics for sinus infection for 1-2 weeks - as well as he endorsed not taking his medication for about a week due to leg cramps. He was found to be hyperkalemic 6.0, with acute on chronic kidney failure with BUN/cr/GFR 46/2.5/28.7. His BP was also elevated 180/91 at the time of presentation. His home BP medications were restarted amlodipine, metoprolol and hydralazine added 2/2 renal. BP improved. Nephrology consulted for management of kidney impairment and hyperkalemia. Renal US showed showed no sonographic evidence of renal artery stenosis. Started on lokelma for hyperkalemia - slow to improve. Lasix IV added, fluid restriction 1.5L. CXR on 01/09/23 did not demonstrate evidence of effusion or increased congestion and 2D echo 01/10/23 showed hypertrophy, EF 55-60% with normal DF. Has right foot ulcer present on admission followed by wound care outpatient and by wound care and Dr. Serrato inpatient. XR right foot ulcer showed severe degenerative changes with no acute osseous abnormality.       Patient with history of morbid obesity with BMI 47, suspected undiagnosed untreated AMBREEN, CKD stage 3, diabetes mellitus with HbA1c 6.5%, chronic right/great toe diabetic ulcer, followed by wound care, hypertension, heart failure with preserved ejection fraction, anemia, BPH, depression.  He presented from outpatient wound care due to concerns for volume overload.  Hypertensive urgency in the ED with hyperkalemia, he was admitted with Nephrology consultation and started on IV diuresis.  Renal ultrasound negative with no evidence of renal artery stenosis.  Echo with EF of 55-60%.  Ongoing hyperkalemia and started on scheduled lokelma  Still volume overloaded and on 1/12 transfer to telemetry floor and initiated on Lasix infusion at 5 milligrams/hour with 5 mg daily metolazone.  Unfortunately despite aggressive IV diuresis, still significantly volume overloaded with up trending renal function.  On 01/15, general  surgery placed left-sided Trialysis catheter and on 01/16 patient began on renal replacement therapy for volume removal.  Received dialysis on 01/16/2024 and 01/17/2024.          Interval History:  Patient has had 2 sessions of hemodialysis.  Did not have hemodialysis yesterday.  Nephrology will evaluate if patient requires hemodialysis today.  Creatinine 3.6 and potassium 5.2.  He has no new acute complaints.  His rash is getting a little bit better with the Benadryl cream.      Review of Systems   Constitutional:  Negative for fever.   HENT:  Negative for congestion.    Cardiovascular:  Positive for leg swelling.   Skin:  Positive for rash.     Objective:     Vital Signs (Most Recent):  Temp: 98 °F (36.7 °C) (01/19/24 0716)  Pulse: 60 (01/19/24 0742)  Resp: 16 (01/19/24 0742)  BP: 129/66 (01/19/24 0716)  SpO2: 99 % (01/19/24 0742) Vital Signs (24h Range):  Temp:  [97.5 °F (36.4 °C)-98.5 °F (36.9 °C)] 98 °F (36.7 °C)  Pulse:  [58-81] 60  Resp:  [16-20] 16  SpO2:  [96 %-99 %] 99 %  BP: (125-210)/(62-96) 129/66     Weight: (!) 171 kg (376 lb 15.8 oz)  Body mass index is 47.12 kg/m².    Intake/Output Summary (Last 24 hours) at 1/19/2024 0834  Last data filed at 1/19/2024 0716  Gross per 24 hour   Intake 1360 ml   Output 600 ml   Net 760 ml           Physical Exam  Vitals and nursing note reviewed.   Constitutional:       Appearance: He is obese.      Comments: Lying in bed   HENT:      Head: Normocephalic and atraumatic.      Mouth/Throat:      Mouth: Mucous membranes are moist.   Neck:      Comments: Left-sided Trialysis line in place  Cardiovascular:      Rate and Rhythm: Normal rate and regular rhythm.      Comments: 2+ lower extremity pitting edema  Pulmonary:      Comments: On room air, distant breath sounds, no wheeze or accessory muscle use  Abdominal:      Palpations: Abdomen is soft.      Tenderness: There is no abdominal tenderness.   Skin:     Comments: Rash in upper extremity and torso, improved from  "yesterday   Neurological:      Mental Status: He is alert and oriented to person, place, and time.   Psychiatric:         Mood and Affect: Mood normal.             Significant Labs: BMP:   Recent Labs   Lab 01/19/24  0549   *   *   K 5.2*      CO2 28   BUN 72*   CREATININE 3.6*   CALCIUM 8.8   MG 2.1       CBC:   Recent Labs   Lab 01/18/24  0456 01/19/24  0549   WBC 5.23 5.80   HGB 8.5* 8.6*   HCT 27.0* 27.4*   * 138*       CMP:   Recent Labs   Lab 01/18/24 0456 01/18/24  1933 01/19/24  0549     --  135*   K 5.0  --  5.2*     --  100   CO2 29  --  28   *  --  126*   BUN 60*  --  72*   CREATININE 3.6* 3.6* 3.6*   CALCIUM 8.7  --  8.8   PROT  --   --  6.7   ALBUMIN  --   --  3.8   BILITOT  --   --  0.6   ALKPHOS  --   --  27*   AST  --   --  39   ALT  --   --  36   ANIONGAP 7*  --  7*       Cardiac Markers: No results for input(s): "CKMB", "MYOGLOBIN", "BNP", "TROPISTAT" in the last 48 hours.  Magnesium:   Recent Labs   Lab 01/18/24 0456 01/19/24  0549   MG 2.1 2.1         Significant Imaging: I have reviewed all pertinent imaging results/findings within the past 24 hours.    Assessment/Plan:      * Acute kidney injury superimposed on chronic kidney disease with uremia  Nephrology following.  Suspect secondary to hypertension.    Holding Arb, first-degree, meloxicam, and any nephrotoxic agents.    Received hemodialysis on 01/17 and 1/16  No dialysis yesterday.   Nephrology will re-evaluate today for dialysis needs.    Continue to monitor potassium with daily CMP.    Allergy to adhesive tape  P.r.n. diphenhydramine and cream.    Hyperphosphatemia  Phos binders per dietary and nephrology.      Acute on chronic diastolic heart failure  Fluid overloaded in the setting of MINI on CKD.    Continue dialysis per Nephrology.  Resolved      THC use        Anemia    Current CBC reviewed-   Lab Results   Component Value Date    HGB 8.6 (L) 01/19/2024    HCT 27.4 (L) 01/19/2024 " "    Monitor serial CBC and transfuse if patient becomes hemodynamically unstable, symptomatic or H/H drops below 7/21.    Hypervolemia  Continue hemodialysis per Nephrology      Hypochromic anemia  Patient's anemia is currently controlled. Has not received any PRBCs to date. Etiology likely d/t chronic disease due to Chronic Kidney Disease  Current CBC reviewed-   Lab Results   Component Value Date    HGB 10.0 (L) 01/09/2024    HCT 32.3 (L) 01/09/2024     Monitor serial CBC and transfuse if patient becomes hemodynamically unstable, symptomatic or H/H drops below 7/21.    BPH (benign prostatic hyperplasia)  Continue finasteride as prescribed      HLD (hyperlipidemia)  Continue ASA/Statin      Severe obesity (BMI >= 40)  Body mass index is 47.12 kg/m². Morbid obesity complicates all aspects of disease management from diagnostic modalities to treatment. Weight loss encouraged and health benefits explained to patient.         Non compliance with medical treatment  Not taking BP meds at home  BP improved with restarting home meds        Diabetes mellitus with HbA1c 6.5%  Patient's FSGs are controlled on current medication regimen.  Last A1c reviewed-   Lab Results   Component Value Date    HGBA1C 6.5 (H) 01/10/2024     Most recent fingerstick glucose reviewed- No results for input(s): "POCTGLUCOSE" in the last 24 hours.  Current correctional scale  Low  Maintain anti-hyperglycemic dose as follows-   Antihyperglycemics (From admission, onward)      Start     Stop Route Frequency Ordered    01/13/24 1108  insulin aspart U-100 pen 0-10 Units         -- SubQ Before meals & nightly PRN 01/13/24 1008    01/09/24 2100  insulin detemir U-100 (Levemir) pen 15 Units         -- SubQ Nightly 01/09/24 1947          Hold Oral hypoglycemics and mounjaro while patient is in the hospital.    Hypertension  Chronic, controlled. Latest blood pressure and vitals reviewed-     Temp:  [97.5 °F (36.4 °C)-98.5 °F (36.9 °C)]   Pulse:  [58-81] " "  Resp:  [16-20]   BP: (125-210)/(62-96)   SpO2:  [96 %-99 %] .   Home meds for hypertension were reviewed and noted below.   Hypertension Medications               amLODIPine (NORVASC) 10 MG tablet Take 1 tablet (10 mg total) by mouth once daily.    furosemide (LASIX) 20 MG tablet Take 20 mg by mouth once daily.    hydrALAZINE (APRESOLINE) 25 MG tablet Take 1 tablet (25 mg total) by mouth every 8 (eight) hours.    metoprolol succinate (TOPROL-XL) 25 MG 24 hr tablet Take 25 mg by mouth once daily.    spironolactone (ALDACTONE) 50 MG tablet Take 1 tablet (50 mg total) by mouth once daily.    valsartan (DIOVAN) 160 MG tablet Take 160 mg by mouth once daily.          Holding Arb, MRA, Farxiga, meloxicam and diuretics    Diabetic ulcer of right great toe and right foot, POA  Patient's FSGs are controlled on current medication regimen.  Last A1c reviewed-   Lab Results   Component Value Date    HGBA1C 6.5 (H) 01/10/2024     Most recent fingerstick glucose reviewed- No results for input(s): "POCTGLUCOSE" in the last 24 hours.  Current correctional scale  Low  Maintain anti-hyperglycemic dose as follows-   Antihyperglycemics (From admission, onward)      Start     Stop Route Frequency Ordered    01/13/24 1108  insulin aspart U-100 pen 0-10 Units         -- SubQ Before meals & nightly PRN 01/13/24 1008    01/09/24 2100  insulin detemir U-100 (Levemir) pen 15 Units         -- SubQ Nightly 01/09/24 1947          Hold Oral hypoglycemics and mounjaro while patient is in the hospital.  Wound care consult appreciated.  Foot XR without osseous process      VTE Risk Mitigation (From admission, onward)           Ordered     heparin (porcine) injection 5,000 Units  Once         01/16/24 0100     heparin (porcine) injection 5,000 Units  As needed (PRN)         01/16/24 0100     heparin (porcine) injection 5,000 Units  Every 8 hours         01/09/24 2040     IP VTE HIGH RISK PATIENT  Once         01/09/24 2040     Place sequential " compression device  Until discontinued         01/09/24 2040     Place MARCELINO hose  Until discontinued         01/09/24 1653     Place sequential compression device  Until discontinued         01/09/24 1653                    Discharge Planning   JONATHAN: 1/22/2024     Code Status: Full Code   Is the patient medically ready for discharge?:     Reason for patient still in hospital (select all that apply): Treatment and Consult recommendations  Discharge Plan A: Home Health   Discharge Delays: None known at this time              Jono Hancock DO  Department of Hospital Medicine   [facility]

## 2024-01-19 NOTE — PLAN OF CARE
Spoke with [facility] with [facility] 1/18/24 and completed preliminary application for in home services. They will mail form to pt and request further information if needed.  CM met with attending for rounds this am.  Pending nephrology evaluating pt for further HD needs.  Following for possible need to set up outpt HD.  Current discharge plan is home with [facility] and pt plans to return to [facility] for wound care as well.  CM will continue to follow.       01/19/24 1213   Post-Acute Status   Post-Acute Authorization Home Health   Home Health Status Set-up Complete/Auth obtained   Discharge Delays None known at this time   Discharge Plan   Discharge Plan A Home Health   Discharge Plan B Home Health

## 2024-01-19 NOTE — ASSESSMENT & PLAN NOTE
Nephrology following.  Suspect secondary to hypertension.    Holding Arb, first-degree, meloxicam, and any nephrotoxic agents.    Received hemodialysis on 01/17 and 1/16  No dialysis yesterday.   Nephrology will re-evaluate today for dialysis needs.    Continue to monitor potassium with daily CMP.

## 2024-01-19 NOTE — PROGRESS NOTES
INPATIENT NEPHROLOGY Progress Note  [facility]    Patient Name: Abel Gibbs  Date: 01/19/2024    Reason for consultation: MINI    Chief Complaint:   Chief Complaint   Patient presents with    Chest Pain    Shortness of Breath     + edema       History of Present Illness:  Abel Gibbs is a 58 y.o. male with a history as  has a past medical history of Depression, Diabetes mellitus, Hypertension, Obesity, Osteomyelitis and CKD III who presented to the ED with a Chest Pain and Shortness of Breath (+ edema). Patient presents to the emergency room with a complaint of midsternal chest pain radiating into left chest wall that started approximately 3 days ago. He further reports shortness for breath when attempting to lie flat with chest pain worsening. He was seen by wound care for right foot ulcer who felt as though his face appeared a little swollen and recommended that he go to the emergency. Patient does report having a sinus infection about 1-2 weeks ago completed course of antibiotics.  He further states after completion of antibiotics he started with congestion and seems as though symptoms have gotten worse. Additionally, he reports BLE edema that chronic but appears a little worse. Patient unclear of medications as he reports the nurse fills his medication container weekly. He does tell me that he is on furosemide but has not taken it for last 3-4 days d/t leg cramps. Patient also prescribed spirolactone but does not know if he has been taking. Also on mounjaro. Denies fever, chills, diaphoresis, dizziness, HA, palpitations, NVD, recent trauma or any other associated symptoms. Does not smoke cigarettes, drinks occasionally and uses marijuana daily. Consulted for MINI/CKD.    Renal u/s:  The right kidney measures 8.9 cm in length, with normal cortical thickness and parenchymal echotexture, and no echogenic calculi or hydronephrosis. The left kidney measures 10.4 cm in length and has normal  cortical thickness and parenchymal echotexture, without echogenic calculi or hydronephrosis.     Notable home meds:  Norvasc, metoprolol, hydralazine, diovan, lasix, aldactone, farxiga, finasteride, meloxicam    Interval History:  1/10- highest /97, on RA, UOP 1L, CXR clear, no DVT, BNP normal, trop stable, echo pending  1/11- BP improved, on RA, voiding, echo with LVH, renal duplex pending, c/o dyspnea, cough- edema unchanged  1/12- VSS, on RA, UOP 3.1L, no change in weight, still short of breath with edema- move to cardiology for lasix gtt at 5mg/hr- ordered metolazone 5mg x 1- if cannot remove adequate fluid will need RRT- patient not keen on RRT but understands plan, renal imaging with normal sized kidneys and no KAREN  1/13  VSS, UOP 3.375L.  hyperkalemic w/bump in BUN/Scr.  No complaints.  1/14  UOP 3.5L.  VSS.  Renal function about the same.  Sleeping, NAD noted.  1/15  2.1 liter UOP.  AF VSS.  Remains hyperkalemic and Creatinine rising  1/16  Patient seen on hemodialysis for uremic clearance and ultrafiltration.    1/17  Patient seen on hemodialysis for uremic clearance and ultrafiltration.     No nausea, chest pain, sob, fever, urinary or bowel complaint, new neurologic symptoms, new joint pain  1/18  holding dialysis today.  Checking 24 hour urine crcl.  AFVSS  1/19  No nausea, chest pain, sob, fever, urinary or bowel complaint, new neurologic symptoms, new joint pain          Plan of Care:    Assessment:  MINI/CKD III  HTN urgency/Edema- driven by renal disease   Hyponatremia  Hyperkalemia  SHPT  MARBELLA/Anemia of CKD  BPH  hyperphosphatemia    Plan:    - suspect MINI on CKD due to elevated BP with ischemic ATN and CRS both driven by underlying renal disease rather than cardiac dysfuncion  - BP improved but remains edematous and hyperkalemic   - echo noted- renal duplex noted- UA with 1+ protein- 400mg proteinuria - c/w HTN  - in setting of MINI and hyperK, hold ARB, MRA, farxiga and meloxicam   --started hd  due to persistent hyperkalemia and refractory hypervolemia  - phos borderline- PTH c/w CKD III  - continue iron supplement- no acute RL needs- prior paraprotein w/u negative  - continue finasteride   --s/p HD times two.  Hold dialysis today and reassess daily for dialytic need     --24 hour urine crcl with adequate clearance  --ok to d/c home.    --hold aldactone at d/c.   D/c on Lasix 40mg po bid.  My office will call him to set up outpt f/u and lab surveillance       Thank you for allowing us to participate in this patient's care. We will continue to follow.    Vital Signs:  Temp Readings from Last 3 Encounters:   01/19/24 97.5 °F (36.4 °C) (Oral)   07/28/23 98.2 °F (36.8 °C) (Oral)   06/04/23 98.5 °F (36.9 °C) (Oral)       Pulse Readings from Last 3 Encounters:   01/19/24 60   07/28/23 (!) 56   06/04/23 (!) 53       BP Readings from Last 3 Encounters:   01/19/24 (!) 160/71   07/28/23 (!) 167/80   06/04/23 133/62       Weight:  Wt Readings from Last 3 Encounters:   01/19/24 (!) 171 kg (376 lb 15.8 oz)   01/10/24 (!) 169.6 kg (374 lb)   07/26/23 (!) 158.8 kg (350 lb 3.2 oz)       Medications:  Scheduled Meds:   sodium chloride 0.9%   Intravenous Once    sodium chloride 0.9%   Intravenous Once    aspirin  81 mg Oral Daily    atorvastatin  80 mg Oral QHS    cetirizine  10 mg Oral Daily    ferrous sulfate  1 tablet Oral Daily    finasteride  5 mg Oral Daily    fluticasone propionate  2 spray Each Nostril QHS    gabapentin  400 mg Oral TID    heparin (porcine)  5,000 Units Subcutaneous Q8H    heparin (porcine)  5,000 Units Intravenous Once    insulin detemir U-100 (Levemir)  15 Units Subcutaneous QHS    metoprolol succinate  25 mg Oral Daily    mupirocin   Nasal BID    polyethylene glycol  17 g Oral Daily    senna-docusate 8.6-50 mg  2 tablet Oral BID     Continuous Infusions:    PRN Meds:.sodium chloride 0.9%, sodium chloride 0.9%, acetaminophen, albuterol-ipratropium, cloNIDine, dextrose 50%, dextrose 50%,  diphenhydrAMINE, diphenhydrAMINE-zinc acetate 1-0.1%, glucagon (human recombinant), glucose, glucose, heparin (porcine), hydrALAZINE, insulin aspart U-100, labetalol, magnesium oxide, magnesium oxide, melatonin, naloxone, ondansetron, oxyCODONE-acetaminophen, potassium bicarbonate, potassium bicarbonate, potassium bicarbonate, potassium, sodium phosphates, potassium, sodium phosphates, potassium, sodium phosphates, sodium chloride 0.9%, sodium chloride 0.9%, sodium chloride 0.9%, traZODone  No current facility-administered medications on file prior to encounter.     Current Outpatient Medications on File Prior to Encounter   Medication Sig Dispense Refill    amLODIPine (NORVASC) 10 MG tablet Take 1 tablet (10 mg total) by mouth once daily. 30 tablet 2    aspirin (ECOTRIN) 81 MG EC tablet Take 1 tablet (81 mg total) by mouth once daily. 30 tablet 2    atorvastatin (LIPITOR) 80 MG tablet Take 1 tablet (80 mg total) by mouth once daily. 30 tablet 5    FARXIGA 5 mg Tab tablet Take 5 mg by mouth once daily.      finasteride (PROSCAR) 5 mg tablet Take 5 mg by mouth once daily.      furosemide (LASIX) 20 MG tablet Take 20 mg by mouth once daily.      gabapentin (NEURONTIN) 800 MG tablet Take 800 mg by mouth 4 (four) times daily.      hydrALAZINE (APRESOLINE) 25 MG tablet Take 1 tablet (25 mg total) by mouth every 8 (eight) hours. 90 tablet 2    ketoconazole (NIZORAL) 2 % cream Apply 1 Application topically once daily.      LEVEMIR FLEXPEN 100 unit/mL (3 mL) InPn pen Inject 30 Units into the skin once daily.      LIDOcaine (LIDODERM) 5 % Place 1 patch onto the skin once daily.      meloxicam (MOBIC) 15 MG tablet Take 15 mg by mouth once daily.      metFORMIN (GLUCOPHAGE) 1000 MG tablet Take 1,000 mg by mouth 2 (two) times daily with meals.      metoprolol succinate (TOPROL-XL) 25 MG 24 hr tablet Take 25 mg by mouth once daily.      MOUNJARO 7.5 mg/0.5 mL PnIj Inject 7.5 mg into the skin every 7 days.       oxyCODONE-acetaminophen (PERCOCET)  mg per tablet Take 1 tablet by mouth 3 (three) times daily as needed for Pain.      spironolactone (ALDACTONE) 50 MG tablet Take 1 tablet (50 mg total) by mouth once daily. 30 tablet 2    traZODone (DESYREL) 50 MG tablet Take 50 mg by mouth nightly as needed.      valsartan (DIOVAN) 160 MG tablet Take 160 mg by mouth once daily.      albuterol (PROVENTIL/VENTOLIN HFA) 90 mcg/actuation inhaler Inhale 1-2 puffs into the lungs every 6 (six) hours as needed for Wheezing. Rescue 18 g 5    blood sugar diagnostic Strp To check BG 4 times daily, to use with insurance preferred meter 200 each 0    blood-glucose meter kit To check BG 4 times daily, to use with insurance preferred meter 1 each 0    fluticasone propionate (FLONASE) 50 mcg/actuation nasal spray 1 spray (50 mcg total) by Each Nostril route daily as needed for Rhinitis.  0    lactobacillus acidophilus & bulgar (LACTINEX) 100 million cell packet Take 1 tablet by mouth 3 (three) times daily with meals.      lancets Misc To check BG 4 times daily, to use with insurance preferred meter 200 each 0    polyethylene glycol (MIRALAX) 17 gram/dose powder Take 17 g by mouth once daily. For constipation 850 g 2       Review of Systems:  Neg    Physical Exam:  General Appearance:    NAD, AAO x 3, cooperative, appears stated age   Head:    Normocephalic, atraumatic   Eyes:    PER, EOMI, and conjunctiva/sclera clear bilaterally       Mouth:   Moist mucus membranes, no thrush or oral lesions,       normal dentition   Back:     No CVA tenderness   Lungs:     Crackles   Chest wall:    No tenderness or deformity   Heart:    Regular rate and rhythm, S1 and S2 normal, no murmur, rub   or gallop   Abdomen:     Soft, non-tender, non-distended, bowel sounds active all four   quadrants, no RT or guarding, no masses, no organomegaly   Extremities:   Warm and well perfused, distal pulses are intact, no cyanosis, + edema   MSK:   No joint or muscle  swelling, tenderness or deformity   Skin:   Skin color, texture, turgor normal, no rashes or lesions   Neurologic/Psychiatric:   CNII-XII intact, normal strength and sensation       throughout, no asterixis; normal affect, memory, judgement     and insight      Results:  Lab Results   Component Value Date     (L) 01/19/2024    K 5.2 (H) 01/19/2024     01/19/2024    CO2 28 01/19/2024    BUN 72 (H) 01/19/2024    CREATININE 3.6 (H) 01/19/2024    CALCIUM 8.8 01/19/2024    ANIONGAP 7 (L) 01/19/2024    ESTGFRAFRICA >60 03/11/2022    EGFRNONAA >60 03/11/2022       Lab Results   Component Value Date    CALCIUM 8.8 01/19/2024    PHOS 5.3 (H) 01/19/2024       Recent Labs   Lab 01/19/24  0549   WBC 5.80   RBC 3.05*   HGB 8.6*   HCT 27.4*   *   MCV 90   MCH 28.2   MCHC 31.4*         I have personally reviewed pertinent radiological imaging and reports.    I have spent > 35 minutes providing care for this patient for the above diagnoses. These services have included chart/data/imaging review, evaluation, exam, formulation of plan, , note preparation, and discussions with staff involved in this patient's care.    Jose Cantor MD  Nephrology  [facility] [place]  [phone]

## 2024-01-19 NOTE — PLAN OF CARE
TCC with PCP 1/25/24 1120 and they will arrange transportation. Spoke with Kirstie with [facility] and they will see pt 1/23/24 1100. [place] notified of discharge but did not respond with [facility] but did say they will accept pt back under services.  Discussed with pt and he verbalized understanding.  Nursing to call for cab. Discharge orders and chart reviewed with no further post-acute discharge needs identified at this time.  At this time, patient is cleared for discharge from Case Management standpoint.        01/19/24 1609   Final Note   Assessment Type Final Discharge Note   Anticipated Discharge Disposition [facility]   Hospital Resources/Appts/Education Provided Appointments scheduled and added to AVS   Post-Acute Status   Post-Acute Authorization Home [facility]   Home Health Status Set-up Complete/Auth obtained   Discharge Delays None known at this time

## 2024-01-19 NOTE — SUBJECTIVE & OBJECTIVE
Interval History:  Patient has had 2 sessions of hemodialysis.  Did not have hemodialysis yesterday.  Nephrology will evaluate if patient requires hemodialysis today.  Creatinine 3.6 and potassium 5.2.  He has no new acute complaints.  His rash is getting a little bit better with the Benadryl cream.      Review of Systems   Constitutional:  Negative for fever.   HENT:  Negative for congestion.    Cardiovascular:  Positive for leg swelling.   Skin:  Positive for rash.     Objective:     Vital Signs (Most Recent):  Temp: 98 °F (36.7 °C) (01/19/24 0716)  Pulse: 60 (01/19/24 0742)  Resp: 16 (01/19/24 0742)  BP: 129/66 (01/19/24 0716)  SpO2: 99 % (01/19/24 0742) Vital Signs (24h Range):  Temp:  [97.5 °F (36.4 °C)-98.5 °F (36.9 °C)] 98 °F (36.7 °C)  Pulse:  [58-81] 60  Resp:  [16-20] 16  SpO2:  [96 %-99 %] 99 %  BP: (125-210)/(62-96) 129/66     Weight: (!) 171 kg (376 lb 15.8 oz)  Body mass index is 47.12 kg/m².    Intake/Output Summary (Last 24 hours) at 1/19/2024 0834  Last data filed at 1/19/2024 0716  Gross per 24 hour   Intake 1360 ml   Output 600 ml   Net 760 ml           Physical Exam  Vitals and nursing note reviewed.   Constitutional:       Appearance: He is obese.      Comments: Lying in bed   HENT:      Head: Normocephalic and atraumatic.      Mouth/Throat:      Mouth: Mucous membranes are moist.   Neck:      Comments: Left-sided Trialysis line in place  Cardiovascular:      Rate and Rhythm: Normal rate and regular rhythm.      Comments: 2+ lower extremity pitting edema  Pulmonary:      Comments: On room air, distant breath sounds, no wheeze or accessory muscle use  Abdominal:      Palpations: Abdomen is soft.      Tenderness: There is no abdominal tenderness.   Skin:     Comments: Rash in upper extremity and torso, improved from yesterday   Neurological:      Mental Status: He is alert and oriented to person, place, and time.   Psychiatric:         Mood and Affect: Mood normal.             Significant Labs:  "BMP:   Recent Labs   Lab 01/19/24  0549   *   *   K 5.2*      CO2 28   BUN 72*   CREATININE 3.6*   CALCIUM 8.8   MG 2.1       CBC:   Recent Labs   Lab 01/18/24 0456 01/19/24  0549   WBC 5.23 5.80   HGB 8.5* 8.6*   HCT 27.0* 27.4*   * 138*       CMP:   Recent Labs   Lab 01/18/24 0456 01/18/24  1933 01/19/24  0549     --  135*   K 5.0  --  5.2*     --  100   CO2 29  --  28   *  --  126*   BUN 60*  --  72*   CREATININE 3.6* 3.6* 3.6*   CALCIUM 8.7  --  8.8   PROT  --   --  6.7   ALBUMIN  --   --  3.8   BILITOT  --   --  0.6   ALKPHOS  --   --  27*   AST  --   --  39   ALT  --   --  36   ANIONGAP 7*  --  7*       Cardiac Markers: No results for input(s): "CKMB", "MYOGLOBIN", "BNP", "TROPISTAT" in the last 48 hours.  Magnesium:   Recent Labs   Lab 01/18/24 0456 01/19/24  0549   MG 2.1 2.1         Significant Imaging: I have reviewed all pertinent imaging results/findings within the past 24 hours.  "

## 2024-01-19 NOTE — ASSESSMENT & PLAN NOTE
Chronic, controlled. Latest blood pressure and vitals reviewed-     Temp:  [97.5 °F (36.4 °C)-98.5 °F (36.9 °C)]   Pulse:  [58-81]   Resp:  [16-20]   BP: (125-210)/(62-96)   SpO2:  [96 %-99 %] .   Home meds for hypertension were reviewed and noted below.   Hypertension Medications               amLODIPine (NORVASC) 10 MG tablet Take 1 tablet (10 mg total) by mouth once daily.    furosemide (LASIX) 20 MG tablet Take 20 mg by mouth once daily.    hydrALAZINE (APRESOLINE) 25 MG tablet Take 1 tablet (25 mg total) by mouth every 8 (eight) hours.    metoprolol succinate (TOPROL-XL) 25 MG 24 hr tablet Take 25 mg by mouth once daily.    spironolactone (ALDACTONE) 50 MG tablet Take 1 tablet (50 mg total) by mouth once daily.    valsartan (DIOVAN) 160 MG tablet Take 160 mg by mouth once daily.          Holding Arb, MRA, Farxiga, meloxicam and diuretics

## 2024-01-22 ENCOUNTER — PATIENT OUTREACH (OUTPATIENT)
Dept: ADMINISTRATIVE | Facility: CLINIC | Age: 59
End: 2024-01-22
Payer: MEDICARE

## 2024-01-22 NOTE — PROGRESS NOTES
C3 nurse attempted to contact Abel Gibbs for a TCC post hospital discharge follow up call. No answer. Left voicemail with callback information. The patient has a scheduled HOSFU appointment with Rupinder Pagan MD (PCP) on 1/25/24 @ 11:00am. He also has outpatient wound care on 1/23/24 @ 11:00am.

## 2024-01-23 NOTE — PROGRESS NOTES
C3 nurse spoke with Abel Gibbs for a TCC post hospital discharge follow up call. The patient has a scheduled HOSFU appointment with Rupinder Pagan MD (PCP) on 1/25/24 @ 11:00am.       The patient is very upset regarding his experience while at [facility]. He noted that he was not treated well, other than by the doctors (who were amazing). He is allergic to the adhesive in tape, and he noted this several times, but it was put on him anyway and he asked for assistance in removing (was given a tub of water and soap with a rag).     He noted that he was not attended to very often, and when he was the staff was rude and disrespectful. He noted that he was given a tub to put his personal belongings in, which included his wallet, keys, socks, etc and when he returned from dialysis they were no longer in his room. He asked about this and has not been looked into (as far as he has been told). He noted that his wallet included his bank card, food stamp card, ID, and $400-500 in cash. He has checked his card balances since then and they have not been charged for anything new. He needs his money, ID and keys to get around where he needs to go, especially doctor appointments.     He noted that he is waiting on medications from the pharmacy, but the pharmacy tells him that he is still showing to be in the hospital, as opposed to being discharged? He was not given the supplies (creams, ointments, etc) that were used on him during his IP stay and he was under the assumption that those were automatically sent home with the patient?     He also noted that upon being discharged the nurse brought him outside to wait for his ride, but she did not stay with him until he was picked up. He noted that the doors locked at 6:00pm, and by 7:30pm his taxi had not yet arrived. He was left outside in the cold alone, and had no way of getting back inside. He was able to get in behind a staff member without their knowing and  found help to assist him in getting home.     He is now using the Dexcom for sugar checks and this morning, after coffee, his CBG was 114. He is needing refills of Miralax and Stool softeners sent to [facility] pharmacy.     He cannot read or write well and is upset. Wants to switch from all things [facility] (does not want to go back there), and this was his first time going there.

## 2024-02-01 ENCOUNTER — TELEPHONE (OUTPATIENT)
Dept: PHARMACY | Facility: HOSPITAL | Age: 59
End: 2024-02-01

## 2024-03-07 ENCOUNTER — HOSPITAL ENCOUNTER (EMERGENCY)
Facility: HOSPITAL | Age: 59
Discharge: HOME | End: 2024-03-07
Attending: EMERGENCY MEDICINE
Payer: MEDICARE

## 2024-03-07 VITALS
DIASTOLIC BLOOD PRESSURE: 75 MMHG | TEMPERATURE: 99 F | BODY MASS INDEX: 39.17 KG/M2 | WEIGHT: 315 LBS | HEART RATE: 59 BPM | HEIGHT: 75 IN | RESPIRATION RATE: 18 BRPM | SYSTOLIC BLOOD PRESSURE: 169 MMHG | OXYGEN SATURATION: 99 %

## 2024-03-07 DIAGNOSIS — R09.A2 FOREIGN BODY SENSATION IN THROAT: Primary | ICD-10-CM

## 2024-03-07 LAB
ALBUMIN SERPL BCP-MCNC: 4.2 G/DL (ref 3.5–5.2)
ALP SERPL-CCNC: 42 U/L (ref 55–135)
ALT SERPL W/O P-5'-P-CCNC: 22 U/L (ref 10–44)
ANION GAP SERPL CALC-SCNC: 5 MMOL/L (ref 8–16)
AST SERPL-CCNC: 21 U/L (ref 10–40)
BASOPHILS # BLD AUTO: 0.05 K/UL (ref 0–0.2)
BASOPHILS NFR BLD: 1.1 % (ref 0–1.9)
BILIRUB SERPL-MCNC: 0.3 MG/DL (ref 0.1–1)
BNP SERPL-MCNC: 13 PG/ML (ref 0–99)
BUN SERPL-MCNC: 39 MG/DL (ref 6–20)
CALCIUM SERPL-MCNC: 9.3 MG/DL (ref 8.7–10.5)
CHLORIDE SERPL-SCNC: 104 MMOL/L (ref 95–110)
CO2 SERPL-SCNC: 29 MMOL/L (ref 23–29)
CREAT SERPL-MCNC: 2.2 MG/DL (ref 0.5–1.4)
DIFFERENTIAL METHOD BLD: ABNORMAL
EOSINOPHIL # BLD AUTO: 0.2 K/UL (ref 0–0.5)
EOSINOPHIL NFR BLD: 5 % (ref 0–8)
ERYTHROCYTE [DISTWIDTH] IN BLOOD BY AUTOMATED COUNT: 14.2 % (ref 11.5–14.5)
EST. GFR  (NO RACE VARIABLE): 33.5 ML/MIN/1.73 M^2
GLUCOSE SERPL-MCNC: 136 MG/DL (ref 70–110)
GROUP A STREP, MOLECULAR: NEGATIVE
HCT VFR BLD AUTO: 33.6 % (ref 40–54)
HGB BLD-MCNC: 10.6 G/DL (ref 14–18)
IMM GRANULOCYTES # BLD AUTO: 0.01 K/UL (ref 0–0.04)
IMM GRANULOCYTES NFR BLD AUTO: 0.2 % (ref 0–0.5)
LYMPHOCYTES # BLD AUTO: 1.5 K/UL (ref 1–4.8)
LYMPHOCYTES NFR BLD: 34.7 % (ref 18–48)
MAGNESIUM SERPL-MCNC: 2.1 MG/DL (ref 1.6–2.6)
MCH RBC QN AUTO: 28.9 PG (ref 27–31)
MCHC RBC AUTO-ENTMCNC: 31.5 G/DL (ref 32–36)
MCV RBC AUTO: 92 FL (ref 82–98)
MONOCYTES # BLD AUTO: 0.4 K/UL (ref 0.3–1)
MONOCYTES NFR BLD: 9.1 % (ref 4–15)
NEUTROPHILS # BLD AUTO: 2.2 K/UL (ref 1.8–7.7)
NEUTROPHILS NFR BLD: 49.9 % (ref 38–73)
NRBC BLD-RTO: 0 /100 WBC
PLATELET # BLD AUTO: 273 K/UL (ref 150–450)
PMV BLD AUTO: 10.3 FL (ref 9.2–12.9)
POTASSIUM SERPL-SCNC: 5 MMOL/L (ref 3.5–5.1)
PROT SERPL-MCNC: 7.7 G/DL (ref 6–8.4)
RBC # BLD AUTO: 3.67 M/UL (ref 4.6–6.2)
SODIUM SERPL-SCNC: 138 MMOL/L (ref 136–145)
TROPONIN I SERPL HS-MCNC: 5.5 PG/ML (ref 0–14.9)
WBC # BLD AUTO: 4.41 K/UL (ref 3.9–12.7)

## 2024-03-07 PROCEDURE — 84484 ASSAY OF TROPONIN QUANT: CPT

## 2024-03-07 PROCEDURE — 87651 STREP A DNA AMP PROBE: CPT

## 2024-03-07 PROCEDURE — 99285 EMERGENCY DEPT VISIT HI MDM: CPT | Mod: 25

## 2024-03-07 PROCEDURE — 83735 ASSAY OF MAGNESIUM: CPT

## 2024-03-07 PROCEDURE — 83880 ASSAY OF NATRIURETIC PEPTIDE: CPT

## 2024-03-07 PROCEDURE — 85025 COMPLETE CBC W/AUTO DIFF WBC: CPT

## 2024-03-07 PROCEDURE — 80053 COMPREHEN METABOLIC PANEL: CPT

## 2024-03-07 NOTE — ED PROVIDER NOTES
Encounter Date: 3/7/2024       History     Chief Complaint   Patient presents with    Swallowed Foreign Body     States something is stuck in his throat after eating, denies SOB, c/o pain when swallowing, has been able to continue eating meals and did take his morning meds today     Patient is a 58 y.o. male with past medical history of hypertension, diabetes, congestive heart failure, kidney disease, and elevated cholesterol who presents to ED via self for concern for foreign body sensation in throat which began 1 week(s) ago.  Patient reports he feels like something is stuck in the left side of his throat.  Patient denies knowing what it was and denies what he was doing when he was started feeling it.  Patient reports he thinks it was a piece of metal and denies it being food.  Patient reports he was able to swallow without difficulty in his not drooling.  Patient denies difficulty breathing or shortness of breath.  Patient denies chest pain.  Patient reports he had some nausea this morning but that is since resolved.  Patient denies vomiting.  Patient has a history of receiving dialysis with an old scar on the left side of his neck.  Patient denies neck pain or stiffness. Patient is awake and alert in no acute distress.       Review of patient's allergies indicates:   Allergen Reactions    Adhesive Itching     Past Medical History:   Diagnosis Date    Depression     Diabetes mellitus     Hypertension     Obesity     Osteomyelitis      Past Surgical History:   Procedure Laterality Date    ANGIOGRAM, CORONARY, WITH LEFT HEART CATHETERIZATION N/A 7/28/2023    Procedure: Angiogram, Coronary, with Left Heart Cath;  Surgeon: Alek Greenwood MD;  Location: [facility] CATH/EP LAB;  Service: Cardiology;  Laterality: N/A;     No family history on file.  Social History     Tobacco Use    Smoking status: Never    Smokeless tobacco: Never   Substance Use Topics    Alcohol use: Yes     Comment: occas    Drug use: Yes     Frequency:  1.0 times per week     Types: Marijuana     Comment: last use 2 days ago     Review of Systems   Constitutional:  Negative for fever.   HENT:  Positive for sore throat. Negative for drooling, trouble swallowing and voice change.    Respiratory: Negative.  Negative for apnea, cough, choking, chest tightness, shortness of breath, wheezing and stridor.    Cardiovascular: Negative.  Negative for chest pain.   Gastrointestinal:  Positive for nausea. Negative for abdominal distention, abdominal pain and vomiting.   Genitourinary: Negative.    Musculoskeletal: Negative.  Negative for back pain, neck pain and neck stiffness.   Skin:  Negative for color change, pallor and rash.   Neurological: Negative.  Negative for weakness.   Hematological:  Does not bruise/bleed easily.   Psychiatric/Behavioral: Negative.         Physical Exam     Initial Vitals [03/07/24 0753]   BP Pulse Resp Temp SpO2   (!) 162/81 68 18 98.3 °F (36.8 °C) 98 %      MAP       --         Physical Exam    Nursing note and vitals reviewed.  Constitutional: He appears well-developed and well-nourished. He is not diaphoretic. No distress.   HENT:   Head: Normocephalic and atraumatic.   Right Ear: External ear normal.   Left Ear: External ear normal.   Eyes: EOM are normal.   Neck: Trachea normal. Neck supple. No stridor present.       Normal range of motion.   Full passive range of motion without pain.     Cardiovascular:  Normal rate, regular rhythm, normal heart sounds and intact distal pulses.     Exam reveals no gallop and no friction rub.       No murmur heard.  Pulmonary/Chest: Breath sounds normal. No respiratory distress. He has no wheezes. He has no rhonchi. He has no rales. He exhibits no tenderness.   Musculoskeletal:         General: Normal range of motion.      Cervical back: Full passive range of motion without pain, normal range of motion and neck supple. No edema, erythema or rigidity. No spinous process tenderness or muscular tenderness.  Normal range of motion.     Neurological: He is alert and oriented to person, place, and time. He has normal strength. GCS score is 15. GCS eye subscore is 4. GCS verbal subscore is 5. GCS motor subscore is 6.   Skin: Skin is warm and dry. Capillary refill takes less than 2 seconds.   Psychiatric: He has a normal mood and affect. His behavior is normal. Judgment and thought content normal.       ED Course   Procedures  Labs Reviewed   CBC W/ AUTO DIFFERENTIAL - Abnormal; Notable for the following components:       Result Value    RBC 3.67 (*)     Hemoglobin 10.6 (*)     Hematocrit 33.6 (*)     MCHC 31.5 (*)     All other components within normal limits   COMPREHENSIVE METABOLIC PANEL - Abnormal; Notable for the following components:    Glucose 136 (*)     BUN 39 (*)     Creatinine 2.2 (*)     Alkaline Phosphatase 42 (*)     eGFR 33.5 (*)     Anion Gap 5 (*)     All other components within normal limits   GROUP A STREP, MOLECULAR   MAGNESIUM   TROPONIN I HIGH SENSITIVITY   B-TYPE NATRIURETIC PEPTIDE          Imaging Results              US Carotid Bilateral (Final result)  Result time 03/07/24 10:06:47      Final result by Edmond Ty MD (03/07/24 10:06:47)                   Narrative:    CMS MANDATED QUALITY DATA - CAROTID - 195    All measurements and percent stenosis described below were determined using NASCET criteria or criteria similar to NASCET, as defined by the Society of Radiologists in Ultrasound Consensus Conference, Radiology, 2003        Reason: Left neck pain.    COMPARISON: None    FINDINGS:  Color Doppler, spectral Doppler, and grayscale analysis was performed.    Grayscale images show no significant plaque throughout bilateral carotid arteries.    Estimated peak systolic velocity in right internal carotid artery is 43.5 cm/s proximally, 64.7 cm/s at the mid artery, and 69.1 cm/s distally. Antegrade flow is present in the right vertebral artery.    Estimated peak systolic velocity in left  internal carotid artery is 44.4 cm/s proximally, 68.0 cm/s at the mid artery, and 61.4 cm/s distally. Antegrade flow is present in the left vertebral artery.    Incidentally noted is a small 1 cm area of possible complex fluid in the subcutaneous tissues in the region of previous IV, possibly reflecting a small hematoma.    IMPRESSION:    1.  No hemodynamically significant internal carotid artery stenosis.  2.  1 cm possible complex fluid in the subcutaneous tissues in region of previous IV could reflect a small hematoma    Electronically signed by:  Edmond Ty DO  03/07/2024 10:06 AM CST Workstation: [id number]                                     X-Ray Neck Soft Tissue (Final result)  Result time 03/07/24 09:05:40      Final result by Sudarshan Hansen MD (03/07/24 09:05:40)                   Narrative:    Reason: Foreign body sensation in throat    FINDINGS:  2 views of neck soft tissues show no retained metallic fragment or radiopaque foreign body. Visualized airway is patent. Prevertebral soft tissues are unremarkable physiologic calcification occurs posteriorly in the neck at level of C4 vertebral body.    Advanced disc space narrowing occurs at C4-C5. No acute osseous abnormality.    IMPRESSION:  Negative soft tissue neck radiographs.    Electronically signed by:  Sudarshan Hansen MD  03/07/2024 09:05 AM CST Workstation: [id number]                                     Medications - No data to display  Medical Decision Making  Amount and/or Complexity of Data Reviewed  Labs: ordered. Decision-making details documented in ED Course.  Radiology: ordered. Decision-making details documented in ED Course.               ED Course as of 03/07/24 1602   Thu Mar 07, 2024   0942 RBC(!): 3.67 [MP]   0942 Hemoglobin(!): 10.6 [MP]   0942 Hematocrit(!): 33.6 [MP]   0942 MCHC(!): 31.5 [MP]   0952 X-Ray Neck Soft Tissue  IMPRESSION:  Negative soft tissue neck radiographs [MP]   1008 Glucose(!): 136 [MP]   1008 BUN(!): 39 [MP]    1008 Creatinine(!): 2.2 [MP]   1008 ALP(!): 42 [MP]   1008 eGFR(!): 33.5 [MP]   1008 Anion Gap(!): 5 [MP]   1014 US Carotid Bilateral  IMPRESSION:  1.  No hemodynamically significant internal carotid artery stenosis.  2.  1 cm possible complex fluid in the subcutaneous tissues in region of previous IV could reflect a small hematoma [MP]   1047 Group A Strep, Molecular: Negative [MP]   1056 WBC: 4.41 [MP]      ED Course User Index  [MP] Deneen Cameron, NP                           Clinical Impression:  Final diagnoses:  [R09.A2] Foreign body sensation in throat                 Artemio Ricketts MD  03/07/24 7573

## 2024-03-07 NOTE — DISCHARGE INSTRUCTIONS
Please follow up with GI as soon as possible for further evaluation and recheck.  Please return to the ED for drooling, inability to swallow, can not drink liquids or takie your medications, fever, neck swelling, difficulty breathing, shortness of breath, chest pain, or any new or worsening concerns.

## 2024-03-14 ENCOUNTER — ANESTHESIA EVENT (OUTPATIENT)
Dept: SURGERY | Facility: HOSPITAL | Age: 59
End: 2024-03-14
Payer: MEDICARE

## 2024-03-14 ENCOUNTER — ANESTHESIA (OUTPATIENT)
Dept: SURGERY | Facility: HOSPITAL | Age: 59
End: 2024-03-14
Payer: MEDICARE

## 2024-03-14 ENCOUNTER — HOSPITAL ENCOUNTER (OUTPATIENT)
Facility: HOSPITAL | Age: 59
Discharge: HOME | End: 2024-03-14
Attending: INTERNAL MEDICINE | Admitting: INTERNAL MEDICINE
Payer: MEDICARE

## 2024-03-14 VITALS
RESPIRATION RATE: 10 BRPM | TEMPERATURE: 98 F | WEIGHT: 240 LBS | OXYGEN SATURATION: 98 % | HEART RATE: 63 BPM | DIASTOLIC BLOOD PRESSURE: 68 MMHG | BODY MASS INDEX: 29.84 KG/M2 | SYSTOLIC BLOOD PRESSURE: 125 MMHG | HEIGHT: 75 IN

## 2024-03-14 DIAGNOSIS — R13.19 OTHER DYSPHAGIA: ICD-10-CM

## 2024-03-14 PROCEDURE — D9220A PRA ANESTHESIA: Mod: ANES,,, | Performed by: STUDENT IN AN ORGANIZED HEALTH CARE EDUCATION/TRAINING PROGRAM

## 2024-03-14 PROCEDURE — 43235 EGD DIAGNOSTIC BRUSH WASH: CPT | Performed by: INTERNAL MEDICINE

## 2024-03-14 PROCEDURE — D9220A PRA ANESTHESIA: Mod: CRNA,,, | Performed by: NURSE ANESTHETIST, CERTIFIED REGISTERED

## 2024-03-14 PROCEDURE — 37000008 HC ANESTHESIA 1ST 15 MINUTES: Performed by: INTERNAL MEDICINE

## 2024-03-14 PROCEDURE — 63600175 PHARM REV CODE 636 W HCPCS: Performed by: NURSE ANESTHETIST, CERTIFIED REGISTERED

## 2024-03-14 RX ORDER — PROPOFOL 10 MG/ML
VIAL (ML) INTRAVENOUS
Status: DISCONTINUED | OUTPATIENT
Start: 2024-03-14 | End: 2024-03-14

## 2024-03-14 RX ADMIN — SODIUM CHLORIDE, SODIUM LACTATE, POTASSIUM CHLORIDE, AND CALCIUM CHLORIDE: .6; .31; .03; .02 INJECTION, SOLUTION INTRAVENOUS at 10:03

## 2024-03-14 RX ADMIN — PROPOFOL 50 MG: 10 INJECTION, EMULSION INTRAVENOUS at 10:03

## 2024-03-14 RX ADMIN — PROPOFOL 100 MG: 10 INJECTION, EMULSION INTRAVENOUS at 10:03

## 2024-03-14 NOTE — ANESTHESIA PREPROCEDURE EVALUATION
03/14/2024  Abel Gibbs is a 58 y.o., male.      Patient Active Problem List   Diagnosis    Acute hematogenous osteomyelitis of right foot    Diabetic ulcer of right great toe and right foot, POA    Acute kidney injury superimposed on chronic kidney disease with uremia    Peripheral vascular disease in type 2 diabetes mellitus    Hypertension    Diabetes mellitus with HbA1c 6.5%    Non compliance with medical treatment    Severe obesity (BMI >= 40)    Diabetic ulcer of left midfoot associated with type 2 diabetes mellitus    Ulcer of left foot with necrosis of muscle    HLD (hyperlipidemia)    BPH (benign prostatic hyperplasia)    CKD (chronic kidney disease)    Hypochromic anemia    CKD (chronic kidney disease), stage III    Anemia    THC use    Acute on chronic diastolic heart failure    Hyperphosphatemia    Allergy to adhesive tape       Past Surgical History:   Procedure Laterality Date    ANGIOGRAM, CORONARY, WITH LEFT HEART CATHETERIZATION N/A 07/28/2023    Procedure: Angiogram, Coronary, with Left Heart Cath;  Surgeon: Alek Greenwood MD;  Location: [facility] CATH/EP LAB;  Service: Cardiology;  Laterality: N/A;    PLACEMENT, TRIALYSIS CATH  01/2024    remove trialysis cath  01/2024        Tobacco Use:  The patient  reports that he has never smoked. He has never used smokeless tobacco.     Results for orders placed or performed during the hospital encounter of 01/09/24   EKG 12-lead    Collection Time: 01/09/24  2:12 PM    Narrative    Test Reason : R07.9,    Vent. Rate : 062 BPM     Atrial Rate : 062 BPM     P-R Int : 194 ms          QRS Dur : 112 ms      QT Int : 432 ms       P-R-T Axes : 036 024 069 degrees     QTc Int : 438 ms    Normal sinus rhythm  Incomplete right bundle branch block  Borderline Abnormal ECG  When compared with ECG of 09-JAN-2024 13:28,  No significant change was found  Confirmed  by Saul COLLINS, Samuel GUERRERO ([id number]) on 1/13/2024 1:01:33 PM    Referred By: [email or web address]   SELF           Confirmed By:Samuel Hughes MD             Lab Results   Component Value Date    WBC 4.41 03/07/2024    HGB 10.6 (L) 03/07/2024    HCT 33.6 (L) 03/07/2024    MCV 92 03/07/2024     03/07/2024     BMP  Lab Results   Component Value Date     03/07/2024    K 5.0 03/07/2024     03/07/2024    CO2 29 03/07/2024    BUN 39 (H) 03/07/2024    CREATININE 2.2 (H) 03/07/2024    CALCIUM 9.3 03/07/2024    ANIONGAP 5 (L) 03/07/2024     (H) 03/07/2024     (H) 01/30/2024     (H) 01/19/2024       Results for orders placed during the hospital encounter of 01/09/24    Echo    Interpretation Summary    Left Ventricle: The left ventricle is normal in size. Moderately increased wall thickness. There is concentric hypertrophy. Normal wall motion. There is normal systolic function with a visually estimated ejection fraction of 55 - 60%. There is normal diastolic function. E/A ratio is 1.77.    Right Ventricle: Normal right ventricular cavity size. Wall thickness is normal. Right ventricle wall motion  is normal. Systolic function is normal.    Left Atrium: Left atrium is severely dilated.    Mitral Valve: There is mild regurgitation.    Tricuspid Valve: There is trace regurgitation.    IVC/SVC: Normal venous pressure at 3 mmHg.            Pre-op Assessment    I have reviewed the Patient Summary Reports.     I have reviewed the Nursing Notes. I have reviewed the NPO Status.   I have reviewed the Medications.     Review of Systems  Anesthesia Hx:  No problems with previous Anesthesia             Denies Family Hx of Anesthesia complications.    Denies Personal Hx of Anesthesia complications.                    Social:  Non-Smoker       Hematology/Oncology:    Oncology Normal    -- Anemia:                                  EENT/Dental:  EENT/Dental Normal           Cardiovascular:     Hypertension       CHF    PVD hyperlipidemia   ECG has been reviewed.                          Pulmonary:  Pulmonary Normal                       Renal/:  Chronic Renal Disease, CKD  BPH              Hepatic/GI:  Hepatic/GI Normal                 Musculoskeletal:  Musculoskeletal Normal                Neurological:  Neurology Normal                                      Endocrine:  Diabetes         Morbid Obesity / BMI > 40  Psych:    depression                Physical Exam  General: Well nourished, Cooperative, Alert and Oriented    Airway:  Mallampati: II   Mouth Opening: Normal  TM Distance: Normal  Tongue: Normal  Neck ROM: Normal ROM    Dental:  Intact    Chest/Lungs:  Clear to auscultation    Heart:  Rate: Normal  Rhythm: Regular Rhythm  Sounds: Normal        Anesthesia Plan  Type of Anesthesia, risks & benefits discussed:    Anesthesia Type: Gen Natural Airway  Intra-op Monitoring Plan: Standard ASA Monitors  Induction:  IV  Informed Consent: Informed consent signed with the Patient and all parties understand the risks and agree with anesthesia plan.  All questions answered.   ASA Score: 3    Ready For Surgery From Anesthesia Perspective.     .

## 2024-03-14 NOTE — TRANSFER OF CARE
"Anesthesia Transfer of Care Note    Patient: Abel Gibbs    Procedure(s) Performed: Procedure(s) (LRB):  EGD (ESOPHAGOGASTRODUODENOSCOPY) (N/A)    Patient location: GI    Anesthesia Type: general    Transport from OR: Transported from OR on room air with adequate spontaneous ventilation    Post pain: adequate analgesia    Post assessment: no apparent anesthetic complications    Post vital signs: stable    Level of consciousness: awake    Nausea/Vomiting: no nausea/vomiting    Complications: none    Transfer of care protocol was followed    Last vitals: Visit Vitals  /75   Pulse 67   Temp 35.6 °C (96.1 °F)   Resp 17   Ht 6' 3" (1.905 m)   Wt 108.9 kg (240 lb)   SpO2 97%   BMI 30.00 kg/m²     "

## 2024-03-14 NOTE — H&P
GASTROENTEROLOGY PRE-PROCEDURE H&P NOTE  Patient Name: Abel Gibbs  Patient MRN: [id number]  Patient : 1965    Service date: 3/14/2024    PCP: Rupinder Pagan MD    No chief complaint on file.      HPI: Patient is a 58 y.o. male with PMHx as below here for evaluation of odynophagia..     Past Medical History:  Past Medical History:   Diagnosis Date    Chronic kidney disease (CKD), active medical management without dialysis, stage 3 (moderate)     Depression     Diabetes mellitus     Diastolic heart failure 2024    Hypertension     Obesity     Osteomyelitis         Past Surgical History:  Past Surgical History:   Procedure Laterality Date    ANGIOGRAM, CORONARY, WITH LEFT HEART CATHETERIZATION N/A 2023    Procedure: Angiogram, Coronary, with Left Heart Cath;  Surgeon: Alek Greenwood MD;  Location: [facility] CATH/EP LAB;  Service: Cardiology;  Laterality: N/A;    PLACEMENT, TRIALYSIS CATH  2024    remove trialysis cath  2024        Home Medications:  Medications Prior to Admission   Medication Sig Dispense Refill Last Dose    amLODIPine (NORVASC) 10 MG tablet Take 1 tablet (10 mg total) by mouth once daily. 30 tablet 2 3/14/2024    gabapentin (NEURONTIN) 800 MG tablet Take 800 mg by mouth 4 (four) times daily.   3/14/2024    oxyCODONE-acetaminophen (PERCOCET)  mg per tablet Take 1 tablet by mouth 3 (three) times daily as needed for Pain.   3/14/2024    albuterol (PROVENTIL/VENTOLIN HFA) 90 mcg/actuation inhaler Inhale 1-2 puffs into the lungs every 6 (six) hours as needed for Wheezing. Rescue 18 g 5     aspirin (ECOTRIN) 81 MG EC tablet Take 1 tablet (81 mg total) by mouth once daily. 30 tablet 2     atorvastatin (LIPITOR) 80 MG tablet Take 1 tablet (80 mg total) by mouth once daily. 30 tablet 5     blood sugar diagnostic Strp To check BG 4 times daily, to use with insurance preferred meter 200 each 0     blood-glucose meter kit To check BG 4 times daily, to use with insurance preferred  meter 1 each 0     finasteride (PROSCAR) 5 mg tablet Take 5 mg by mouth once daily.       fluticasone propionate (FLONASE) 50 mcg/actuation nasal spray 1 spray (50 mcg total) by Each Nostril route daily as needed for Rhinitis.  0     furosemide (LASIX) 20 MG tablet Take 2 tablets (40 mg total) by mouth 2 (two) times a day. 120 tablet 0     hydrALAZINE (APRESOLINE) 25 MG tablet Take 1 tablet (25 mg total) by mouth every 8 (eight) hours. 90 tablet 2     ketoconazole (NIZORAL) 2 % cream Apply 1 Application topically once daily.       lactobacillus acidophilus & bulgar (LACTINEX) 100 million cell packet Take 1 tablet by mouth 3 (three) times daily with meals.       lancets Misc To check BG 4 times daily, to use with insurance preferred meter 200 each 0     LEVEMIR FLEXPEN 100 unit/mL (3 mL) InPn pen Inject 30 Units into the skin once daily.       LIDOcaine (LIDODERM) 5 % Place 1 patch onto the skin once daily.       metoprolol succinate (TOPROL-XL) 25 MG 24 hr tablet Take 25 mg by mouth once daily.       MOUNJARO 7.5 mg/0.5 mL PnIj Inject 7.5 mg into the skin every 7 days.       polyethylene glycol (MIRALAX) 17 gram/dose powder Take 17 g by mouth once daily. For constipation (Patient not taking: Reported on 1/23/2024) 850 g 2     traZODone (DESYREL) 50 MG tablet Take 50 mg by mouth nightly as needed.                  Review of patient's allergies indicates:   Allergen Reactions    Adhesive Itching       Social History:   Social History     Occupational History    Not on file   Tobacco Use    Smoking status: Never    Smokeless tobacco: Never   Substance and Sexual Activity    Alcohol use: Yes     Comment: occas    Drug use: Yes     Frequency: 1.0 times per week     Types: Marijuana     Comment: last use 2 days ago    Sexual activity: Not on file       Family History:   History reviewed. No pertinent family history.    Review of Systems:  A 10 point review of systems was performed and was normal, except as mentioned in  "the HPI, including constitutional, HEENT, heme, lymph, cardiovascular, respiratory, gastrointestinal, genitourinary, neurologic, endocrine, psychiatric and musculoskeletal.      OBJECTIVE:    Physical Exam:  24 Hour Vital Sign Ranges: Temp:  [96.1 °F (35.6 °C)] 96.1 °F (35.6 °C)  Pulse:  [67] 67  Resp:  [17] 17  SpO2:  [97 %] 97 %  BP: (132)/(75) 132/75  Most recent vitals: /75   Pulse 67   Temp 96.1 °F (35.6 °C)   Resp 17   Ht 6' 3" (1.905 m)   Wt 108.9 kg (240 lb)   SpO2 97%   BMI 30.00 kg/m²    GEN: well-developed, well-nourished, awake and alert, non-toxic appearing adult  HEENT: PERRL, sclera anicteric, oral mucosa pink and moist without lesion  NECK: trachea midline; Good ROM  CV: regular rate and rhythm, no murmurs or gallops  RESP: clear to auscultation bilaterally, no wheezes, rhonci or rales  ABD: soft, non-tender, non-distended, normal bowel sounds  EXT: no swelling or edema, 2+ pulses distally  SKIN: no rashes or jaundice  PSYCH: normal affect    Labs:   No results for input(s): "WBC", "MCV", "PLT" in the last 72 hours.    Invalid input(s): "HGBAU"  No results for input(s): "NA", "K", "CL", "CO2", "BUN", "GLU" in the last 72 hours.    Invalid input(s): "CREA"  No results for input(s): "ALB" in the last 72 hours.    Invalid input(s): "ALKP", "SGOT", "SGPT", "TBIL", "DBIL", "TPRO"  No results for input(s): "PT", "INR", "PTT" in the last 72 hours.      IMPRESSION / RECOMMENDATIONS:  Odynophagia  EGD  with interventions as warranted.   RIsks, benefits, alternatives discussed in detail regarding upcoming procedures and sedation. Some of the more common endoscopic complications include but not limited to immediate or delayed perforation, bleeding, infections, pain, inadvertent injury to surrounding tissue / organs and possible need for surgical evaluation. Patient expressed understanding, all questions answered and will proceed with procedure as planned.     Aniceto Marinelli  3/14/2024  10:41 AM      "

## 2024-03-14 NOTE — [employer] PATIENT INSTRUCTIONS
Discharge Summary/Instructions after an Endoscopic Procedure  Patient Name: Abel Gibbs  Patient MRN: [id number]  Patient YOB: 1965  Thursday, March 14, 2024  Aniceto Marinelli MD  RESTRICTIONS:  During your procedure today, you received medications for sedation.  These   medications may affect your judgment, balance and coordination.  Therefore,   for 24 hours, you have the following restrictions:   - DO NOT drive a car, operate machinery, make legal/financial decisions,   sign important papers or drink alcohol.    ACTIVITY:  Today: no heavy lifting, straining or running due to procedural   sedation/anesthesia.  The following day: return to full activity including work.  DIET:  Eat and drink normally unless instructed otherwise.     TREATMENT FOR COMMON SIDE EFFECTS:  - Mild abdominal pain, nausea, belching, bloating or excessive gas:  rest,   eat lightly and use a heating pad.  - Sore Throat: treat with throat lozenges and/or gargle with warm salt   water.  - Because air was used during the procedure, expelling large amounts of air   from your rectum or belching is normal.  - If a bowel prep was taken, you may not have a bowel movement for 1-3 days.    This is normal.  SYMPTOMS TO WATCH FOR AND REPORT TO YOUR PHYSICIAN:  1. Abdominal pain or bloating, other than gas cramps.  2. Chest pain.  3. Back pain.  4. Signs of infection such as: chills or fever occurring within 24 hours   after the procedure.  5. Rectal bleeding, which would show as bright red, maroon, or black stools.   (A tablespoon of blood from the rectum is not serious, especially if   hemorrhoids are present.)  6. Vomiting.  7. Weakness or dizziness.  GO DIRECTLY TO THE NEAREST EMERGENCY ROOM IF YOU HAVE ANY OF THE FOLLOWING:      Difficulty breathing              Chills and/or fever over 101 F   Persistent vomiting and/or vomiting blood   Severe abdominal pain   Severe chest pain   Black, tarry stools   Bleeding- more than one  tablespoon   Any other symptom or condition that you feel may need urgent attention  Your doctor recommends these additional instructions:  If any biopsies were taken, your doctors clinic will contact you in 1 to 2   weeks with any results.  - Patient has a contact number available for emergencies.  The signs and   symptoms of potential delayed complications were discussed with the   patient.  Return to normal activities tomorrow.  Written discharge   instructions were provided to the patient.   - Resume previous diet.   - Continue present medications.   - Discharge patient to home (with escort).  For questions, problems or results please call your physician - Aniceto Marinelli MD at Work:  [phone].  formerly [facility], EMERGENCY ROOM PHONE NUMBER: [phone]  IF A COMPLICATION OR EMERGENCY SITUATION ARISES AND YOU ARE UNABLE TO REACH   YOUR PHYSICIAN - GO DIRECTLY TO THE EMERGENCY ROOM.  MD Aniceto Kellogg MD  3/14/2024 10:56:09 AM  This report has been verified and signed electronically.  Dear patient,  As a result of recent federal legislation (The Federal Cures Act), you may   receive lab or pathology results from your procedure in your [facility]   account before your physician is able to contact you. Your physician or   their representative will relay the results to you with their   recommendations at their soonest availability.  Thank you,  [employer]

## 2024-03-15 NOTE — ANESTHESIA POSTPROCEDURE EVALUATION
Anesthesia Post Evaluation    Patient: Abel Gibbs    Procedure(s) Performed: Procedure(s) (LRB):  EGD (ESOPHAGOGASTRODUODENOSCOPY) (N/A)    Final Anesthesia Type: general      Patient location during evaluation: [facility]  Patient participation: Yes- Able to Participate  Level of consciousness: awake and alert  Post-procedure vital signs: reviewed and stable  Pain management: adequate  Airway patency: patent    PONV status at discharge: No PONV  Anesthetic complications: no      Cardiovascular status: hemodynamically stable  Respiratory status: unassisted and spontaneous ventilation  Hydration status: euvolemic  Follow-up not needed.                    No case tracking events are documented in the log.      Pain/Summer Score: No data recorded

## 2024-03-25 ENCOUNTER — OFFICE VISIT (OUTPATIENT)
Dept: UROLOGY | Facility: CLINIC | Age: 59
End: 2024-03-25
Payer: MEDICARE

## 2024-03-25 VITALS — WEIGHT: 240.06 LBS | HEIGHT: 75 IN | BODY MASS INDEX: 29.85 KG/M2

## 2024-03-25 DIAGNOSIS — N13.8 BPH WITH OBSTRUCTION/LOWER URINARY TRACT SYMPTOMS: Primary | ICD-10-CM

## 2024-03-25 DIAGNOSIS — Z12.5 SCREENING FOR PROSTATE CANCER: ICD-10-CM

## 2024-03-25 DIAGNOSIS — N52.9 ERECTILE DYSFUNCTION, UNSPECIFIED ERECTILE DYSFUNCTION TYPE: ICD-10-CM

## 2024-03-25 DIAGNOSIS — N40.1 BPH WITH OBSTRUCTION/LOWER URINARY TRACT SYMPTOMS: Primary | ICD-10-CM

## 2024-03-25 LAB
BILIRUBIN, UA POC OHS: NEGATIVE
BLOOD, UA POC OHS: NEGATIVE
CLARITY, UA POC OHS: CLEAR
COLOR, UA POC OHS: YELLOW
GLUCOSE, UA POC OHS: 500
KETONES, UA POC OHS: NEGATIVE
LEUKOCYTES, UA POC OHS: NEGATIVE
NITRITE, UA POC OHS: NEGATIVE
PH, UA POC OHS: 5.5
POC RESIDUAL URINE VOLUME: 67 ML (ref 0–100)
PROTEIN, UA POC OHS: 30
SPECIFIC GRAVITY, UA POC OHS: 1.01
UROBILINOGEN, UA POC OHS: 0.2

## 2024-03-25 PROCEDURE — 3008F BODY MASS INDEX DOCD: CPT | Mod: CPTII,S$GLB,, | Performed by: NURSE PRACTITIONER

## 2024-03-25 PROCEDURE — 99999 PR PBB SHADOW E&M-EST. PATIENT-LVL V: CPT | Mod: PBBFAC,,, | Performed by: NURSE PRACTITIONER

## 2024-03-25 PROCEDURE — 4010F ACE/ARB THERAPY RXD/TAKEN: CPT | Mod: CPTII,S$GLB,, | Performed by: NURSE PRACTITIONER

## 2024-03-25 PROCEDURE — 1159F MED LIST DOCD IN RCRD: CPT | Mod: CPTII,S$GLB,, | Performed by: NURSE PRACTITIONER

## 2024-03-25 PROCEDURE — 1160F RVW MEDS BY RX/DR IN RCRD: CPT | Mod: CPTII,S$GLB,, | Performed by: NURSE PRACTITIONER

## 2024-03-25 PROCEDURE — 99215 OFFICE O/P EST HI 40 MIN: CPT | Mod: S$GLB,,, | Performed by: NURSE PRACTITIONER

## 2024-03-25 PROCEDURE — 51798 US URINE CAPACITY MEASURE: CPT | Mod: S$GLB,,, | Performed by: NURSE PRACTITIONER

## 2024-03-25 PROCEDURE — 81003 URINALYSIS AUTO W/O SCOPE: CPT | Mod: QW,S$GLB,, | Performed by: NURSE PRACTITIONER

## 2024-03-25 PROCEDURE — 3066F NEPHROPATHY DOC TX: CPT | Mod: CPTII,S$GLB,, | Performed by: NURSE PRACTITIONER

## 2024-03-25 PROCEDURE — 3044F HG A1C LEVEL LT 7.0%: CPT | Mod: CPTII,S$GLB,, | Performed by: NURSE PRACTITIONER

## 2024-03-25 RX ORDER — TAMSULOSIN HYDROCHLORIDE 0.4 MG/1
0.4 CAPSULE ORAL NIGHTLY
Qty: 30 CAPSULE | Refills: 11 | Status: SHIPPED | OUTPATIENT
Start: 2024-03-25

## 2024-03-25 RX ORDER — TADALAFIL 10 MG/1
10 TABLET ORAL DAILY PRN
Qty: 15 TABLET | Refills: 2 | Status: SHIPPED | OUTPATIENT
Start: 2024-03-25 | End: 2024-03-25 | Stop reason: SDUPTHER

## 2024-03-25 RX ORDER — TADALAFIL 10 MG/1
10 TABLET ORAL DAILY PRN
Qty: 15 TABLET | Refills: 2 | Status: SHIPPED | OUTPATIENT
Start: 2024-03-25 | End: 2024-03-25

## 2024-03-25 NOTE — PROGRESS NOTES
"[facility]  Group: Conor/Mekhi/Vasile/Jose D  NPs: Raiza Box/Veronica Masters    Note today written by:Veronica Masters  Date of Service: 03/25/2024    CHIEF COMPLAINT: BPH and erectile dysfunction    PRESENTING ILLNESS:    Abel Gibbs is a 59 y.o. male who presents for BPH and ED. This is her initial clinic visit.    3/25/24  Pt presents today for BPH, ED    Pt c/o urinary frequency, urgency, nocturia and weak urinary stream  He is on finasteride but unsure how long he has been on it  Has not tried flomax  Denies dysuria, gross hematuria, flank pain, fever, chills, nausea or vomiting  Does take lasix which worsens frequency    UA today 500 glucose, 30 protein / negative blood, nitrite, leuk  PVR 67    C/o ED for >5 years. He is not able to have any erections on his own. Tried viagra in the past with poor results. Has not tried cialis. Hx of CHF, HTN and diabetes (controlled per pt)  1/10/24 A1C 6.5  Pt states no recent chest pain but after review of chart, had chest pain in January and has not followed up with cardiology    No prior PSA     History of kidney stones: denies  History of recurrent UTI: denies  Personal or family hx of  malignancy: denies  History of  trauma: denies  Smoking history: quit 20-30 years ago    Urine cultures:   No results found for: "LABURIN"      REVIEW OF SYSTEMS: as stated above in hpi    PATIENT HISTORY:    Past Medical History:   Diagnosis Date    Chronic kidney disease (CKD), active medical management without dialysis, stage 3 (moderate)     Depression     Diabetes mellitus     Diastolic heart failure 01/2024    Hypertension     Obesity     Osteomyelitis        Past Surgical History:   Procedure Laterality Date    ANGIOGRAM, CORONARY, WITH LEFT HEART CATHETERIZATION N/A 07/28/2023    Procedure: Angiogram, Coronary, with Left Heart Cath;  Surgeon: Alek Greenwood MD;  Location: [facility] CATH/EP LAB;  Service: " "Cardiology;  Laterality: N/A;    ESOPHAGOGASTRODUODENOSCOPY N/A 3/14/2024    Procedure: EGD (ESOPHAGOGASTRODUODENOSCOPY);  Surgeon: Aniceto Marinelli MD;  Location: [facility];  Service: Endoscopy;  Laterality: N/A;    PLACEMENT, TRIALYSIS CATH  01/2024    remove trialysis cath  01/2024         PHYSICAL EXAMINATION:  Constitutional: He is oriented to person, place, and time. He appears well-developed and well-nourished.  He is in no apparent distress.  Abdominal:  He exhibits no distension.  There is no CVA tenderness.   Neurological: He is alert and oriented to person, place, and time.   Psych: Cooperative with normal affect.    Physical Exam    LABS:    No results found for: "PSA", "PSADIAG", "PSATOTAL", "PSAFREE", "PSAFREEPCT"  Lab Results   Component Value Date    CREATININE 2.2 (H) 03/07/2024       IMPRESSION:    Encounter Diagnoses   Name Primary?    Erectile dysfunction, unspecified erectile dysfunction type Yes    Screening for prostate cancer     BPH with obstruction/lower urinary tract symptoms        PLAN:  -PSA ordered and scheduled. Will call with results  Will need to be adjusted since on finasteride    -Discussed BPH/LUTS  Finasteride used for BPH works best if taken along with flomax  Pt agrees to try flomax in addition to finasteride  Discussed side effects and indications for flomax. Prescription sent to the pharmacy. Pt verbalized understanding.  Pt denies trying flomax in the past     Discussed glucose on UA. Uncontrolled diabetes or glucose on UA can increase risk for UTI, worsening bladder function and LUTS. Encouraged good diabetic control.    Conservative measures to control urgency and frequency including   1. Avoiding/minimizing bladder irritants (see below), especially in afternoon and evening hours  Discussed bladder irritants include coffee (even decaf), tea, alcohol, soda, spicy foods, acidic juices (orange, tomato), vinegar, and artificial sweeteners/sugary beverages.  2. timed voiding - " empty on a schedule (approx ~2-3 hours) in spite of need to urinate, to get ahead of urge  3. dont postponing voiding - dont hold it on purpose   4. bowel regimen as distended bowel has extrinsic compressive effect on bladder.   - any or all of the following in any combination, titrate to soft daily bowel movement without pushing or straining  - colace/stool softener capsule - once to twice daily  - miralax - 1 capful daily to start, can increase to 2x daily (or decrease to 1/2 cap daily)  - increase dietary fibery  - fiber supplements, such as metamucil  - prunes, prune juice  5. INCREASE water intake  6. Stop fluids 2 hours before bed, and urinate just before bed    -We discussed ED and the contributing factors. We reviewed his personal factors that contribute to ED. Patient was educated on ED treatments.   After reviewing chart, pt had chest pain in January and is not established with cardiology  Sent message to PCP regarding clearance to start cialis for ED.  Information regarding cialis given on AVS   Cialis prescription was cancelled and [facility] notified.    -RTC 8 weeks    I encouraged him or any of his family members to call or email me with questions and/or concerns.      45 minutes of total time spent on the encounter, which includes face to face time and non-face to face time preparing to see the patient (eg, review of tests), Obtaining and/or reviewing separately obtained history, Documenting clinical information in the electronic or other health record, Independently interpreting results (not separately reported) and communicating results to the patient/family/caregiver, or Care coordination (not separately reported).

## 2024-03-25 NOTE — PATIENT INSTRUCTIONS
Conservative measures to control urgency and frequency including   1. Avoiding/minimizing bladder irritants (see below), especially in afternoon and evening hours    Discussed bladder irritants include coffee (even decaf), tea, alcohol, soda, spicy foods, acidic juices (orange, tomato), vinegar, and artificial sweeteners/sugary beverages.    2. timed voiding - empty on a schedule (approx ~2-3 hours) in spite of need to urinate, to get ahead of urge    3. dont postponing voiding - dont hold it on purpose     4. bowel regimen as distended bowel has extrinsic compressive effect on bladder.   - any or all of the following in any combination, titrate to soft daily bowel movement without pushing or straining  - colace/stool softener capsule - once to twice daily  - miralax - 1 capful daily to start, can increase to 2x daily (or decrease to 1/2 cap daily)  - increase dietary fibery  - fiber supplements, such as metamucil  - prunes, prune juice    5. INCREASE water intake    6. Stop fluids 2 hours before bed, and urinate just before bed    Erectile Dysfunction:    ORAL THERAPY/PDE 5 INHIBITORS  Instructions for the Tadalafil/Cialis 10mg dose:  You can cut the pill in half if you do not require the 20mg dose.   Do not take more than 1 pill in 36 hours (use as needed)  Side effects include flushing and headache       The most common adverse drug reactions reported include headache, flushing, nasal congestion, nasopharyngitis, and dyspepsia. Rare but serious reports of prolonged erections lasting more than 4 hours and priapism (painful erections lasting more than 6 hours) have been reported with PDE5 inhibitors.      The patient was instructed to not take any nitrites/nitrates after taking an oral ED med due to risk of hypotension and death.     The patient was instructed to check goodrx for cheap options for medications if insurance does not cover.     Oral Medications for Erectile Dysfunction  Prescription oral medications  can be used for ED. They often work well. But, like all medications, they can have side effects. Also, they cant be used if a man has certain health problems or takes certain other medications. Talk with your doctor about oral ED medication. Ask whether it is right for you.  Types of Oral ED Medications  There are three types of prescription oral ED medications. Each one increases blood flow to the penis. When the penis is stimulated, an erection results. The three types are:  Sildenafil citrate (Viagra)  Tadalafil (Cialis)  What Oral ED Medications Dont Do  There are some things ED medications cant do.  They dont cure the cause of your ED. Without the medication, youll still have trouble getting an erection.  They cant produce an erection without sexual stimulation.  They wont increase sexual desire. They also wont solve any other sexual issues. Psychological, emotional, or relationship issues will not be fixed.  Taking Oral ED Medications Safely  Do not combine ED medications with other treatments unless your doctor tells you to.  Dont take ED medications more often or in larger doses than prescribed.  Tell your doctor your health history. Mention all medications you take. This includes over-the-counter drugs, supplements, and herbs.  Ask your doctor about whether you can drink alcohol while taking ED medication.  Possible Side Effects of Oral ED Medications  Headache  Facial flushing  Runny or stuffy nose  Indigestion  Distortion of your color vision for a short time  Sudden vision loss or hearing loss (rare)  Risks of Oral ED Medications   Do not take ED medications if you take medications containing nitrates. The combination may drop your blood pressure to a dangerous level. Nitrates include nitroglycerin (a drug for angina). They are also in poppers, an inhaled recreational drug. If youre not sure whether youre taking nitrates, ask your doctor or pharmacist.  Medications called alpha-blockers can  interact with ED medications. They can cause a sudden drop in blood pressure. Alpha-blockers are a common treatment for prostate problems. They also treat high blood pressure. Be sure your doctor knows if you take these medications.  If youve had a heart attack or have heart disease and you have not had sex for a while, talk to your doctor. Having sex again can put extra strain on your heart. Your doctor can confirm that your heart is healthy enough for sex.  It is rare, but some men taking ED medications have had sudden vision loss. This may be more likely if other health problems are present. These include high blood pressure and diabetes. Ask your doctor if you are at risk for this type of vision loss.  In rare cases, an erection may last too long. This can badly damage the blood vessels in your penis. If an erection lasts longer than 4 hours, go to the emergency room right away.       © 5544-9434 [employer], [address] [place] [address], [place] [address]. All rights reserved. This information is not intended as a substitute for professional medical care. Always follow your healthcare professional's instructions.

## 2024-04-22 PROBLEM — N17.9 ACUTE KIDNEY INJURY SUPERIMPOSED ON CHRONIC KIDNEY DISEASE: Status: RESOLVED | Noted: 2020-12-30 | Resolved: 2024-04-22

## 2024-04-22 PROBLEM — N18.9 ACUTE KIDNEY INJURY SUPERIMPOSED ON CHRONIC KIDNEY DISEASE: Status: RESOLVED | Noted: 2020-12-30 | Resolved: 2024-04-22

## 2024-06-24 ENCOUNTER — HOSPITAL ENCOUNTER (OUTPATIENT)
Dept: PREADMISSION TESTING | Facility: HOSPITAL | Age: 59
Discharge: HOME | End: 2024-06-24
Attending: INTERNAL MEDICINE
Payer: MEDICARE

## 2024-06-24 VITALS — SYSTOLIC BLOOD PRESSURE: 128 MMHG | HEART RATE: 62 BPM | DIASTOLIC BLOOD PRESSURE: 72 MMHG

## 2024-06-24 DIAGNOSIS — Z01.818 PREOP TESTING: Primary | ICD-10-CM

## 2024-06-24 RX ORDER — SPIRONOLACTONE 50 MG/1
50 TABLET, FILM COATED ORAL DAILY
COMMUNITY

## 2024-06-24 RX ORDER — HYDROCODONE BITARTRATE AND ACETAMINOPHEN 10; 325 MG/1; MG/1
1 TABLET ORAL EVERY 8 HOURS PRN
COMMUNITY

## 2024-06-24 RX ORDER — DAPAGLIFLOZIN 10 MG/1
10 TABLET, FILM COATED ORAL DAILY
COMMUNITY

## 2024-06-24 RX ORDER — NORTRIPTYLINE HYDROCHLORIDE 25 MG/1
50 CAPSULE ORAL DAILY
COMMUNITY

## 2024-06-24 RX ORDER — VALSARTAN 160 MG/1
160 TABLET ORAL DAILY
COMMUNITY

## 2024-06-24 NOTE — DISCHARGE INSTRUCTIONS
INSTRUCTIONS  To confirm your doctor has scheduled your surgery for: 06/27    Morning of surgery please check in with registration near [facility] [address] Entrance then proceed to Registration then to endoscopy department    ENDOSCOPY NURSES will call the afternoon prior to procedure with your final arrival time.    TAKE ONLY THESE MEDICATIONS WITH A SMALL SIP OF WATER THE MORNING OF SURGERY: see list    DO NOT TAKE ANY INSULIN OR ORAL DIABETIC MEDICATIONS THE MORNING OF SURGERY UNLESS DIRECTED BY YOUR PHYSICIAN OR PRE ADMIT NURSE.    DO NOT TAKE THESE MEDICATIONS 5-7 DAYS PRIOR to your procedure per your surgeon's request: ASPIRIN, ALEVE, BC powder, JAKE SELTZER, IBUPROFEN, FISH OIL, VITAMIN E, OR HERBALS   (May take Tylenol)    If you are prescribed any types of blood thinners (Aspirin, Coumadin, Plavix, Pradaxa, Xarelto, Aggrenox, Effient, Eliquis, Savasya, Brilinta or any other), please ask your surgeon how many days before scheduled procedure should you stop taking them. You may also need to verify with prescribing physician if it is OK to stop your blood thinners.      INSTRUCTIONS IMPORTANT!!  Do not eat or drink anything between midnight and the time of your procedure- this includes gum, mints, and candy. You may brush your teeth but do not swallow.  ONLY if you are diabetic, check your sugar in the morning before your procedure.  Do not smoke, vape or drink alcoholic beverages 24 hours prior to your procedure.  If you wear contact lenses, dentures, hearing aids or glasses, bring a container to put them in during surgery and give to a family member for safe keeping.    Please leave all jewelry, piercing's and valuables at home.   Wear comfortable loose clothing and rubber soled shoes.  If your condition changes such as fever, cough, etc, please notify your surgeon.   ONLY for patients requiring bowel prep, written instructions will be given by your doctor's office.  Make arrangements in advance for  transportation home by a responsible adult.  You must make arrangements for transportation, TAXI'S, UBER'S OR LYFTS ARE NOT ALLOWED.        If you have any questions about these instructions, call Endoscopy Nurse at [phone]

## 2024-07-30 ENCOUNTER — HOSPITAL ENCOUNTER (OUTPATIENT)
Dept: PREADMISSION TESTING | Facility: HOSPITAL | Age: 59
Discharge: HOME | End: 2024-07-30
Attending: INTERNAL MEDICINE
Payer: MEDICARE

## 2024-07-30 VITALS
DIASTOLIC BLOOD PRESSURE: 91 MMHG | HEIGHT: 75 IN | RESPIRATION RATE: 18 BRPM | TEMPERATURE: 99 F | OXYGEN SATURATION: 99 % | HEART RATE: 55 BPM | WEIGHT: 315 LBS | BODY MASS INDEX: 39.17 KG/M2 | SYSTOLIC BLOOD PRESSURE: 159 MMHG

## 2024-07-30 NOTE — DISCHARGE INSTRUCTIONS
To confirm, Your procedure is scheduled for: Thursday, August 1, 2024    Endoscopy will call the afternoon prior with the final arrival time Wednesday, July 31, 2024    Please report to [facility] via [facility] entrance. Check in at registration desk.    Do not eat or drink anything after midnight the night before procedure.  ONLY if you are diabetic, check your sugar in the morning before your procedure and do not take any diabetic medicines.    TAKE ONLY THESE MEDICATIONS WITH A SMALL SIP OF WATER THE MORNING OF YOUR PROCEDURE:  METOPROLOL / HYDRALAZINE      DO NOT TAKE THESE MEDICATIONS PRIOR to your procedure or per your surgeon's request: ASPIRIN, ALEVE, ADVIL, IBUPROFEN, FISH OIL VITAMIN E, HERBALS  (May take Tylenol)    INSTRUCTIONS IMPORTANT!!    Do not smoke, vape or drink alcoholic beverages 24 hours prior to your procedure.     If you wear contact lenses, dentures, hearing aids or glasses, bring a container to put them in during surgery and give to a family member for safe keeping.      Please leave all jewelry, piercing's and valuables at home.     ONLY for patients requiring bowel prep, written instructions will be given by your doctor's office.    Make arrangements in advance for transportation home by a responsible adult.    You must make arrangements for transportation, TAXI'S, UBER'S OR LYFTS ARE NOT ALLOWED.        If you have any questions about these instructions, call Pre-Op Admit  Nursing at [phone] or the Pre-Op Endoscopy [phone]  CALL [facility] FOR PREP [phone]

## 2024-07-30 NOTE — PRE-PROCEDURE INSTRUCTIONS
Patient arrived ambulatory with friend Toñito at side. History and medicines reviewed. Verbalized understanding of pre op instructions as per AVS. Also instructed patient to call [facility]-telephone number given-for colon prep.

## 2024-08-01 ENCOUNTER — ANESTHESIA (OUTPATIENT)
Dept: SURGERY | Facility: HOSPITAL | Age: 59
End: 2024-08-01
Payer: MEDICARE

## 2024-08-01 ENCOUNTER — HOSPITAL ENCOUNTER (OUTPATIENT)
Facility: HOSPITAL | Age: 59
Discharge: HOME | End: 2024-08-01
Attending: INTERNAL MEDICINE | Admitting: INTERNAL MEDICINE
Payer: MEDICARE

## 2024-08-01 ENCOUNTER — ANESTHESIA EVENT (OUTPATIENT)
Dept: SURGERY | Facility: HOSPITAL | Age: 59
End: 2024-08-01
Payer: MEDICARE

## 2024-08-01 VITALS
OXYGEN SATURATION: 100 % | HEART RATE: 68 BPM | RESPIRATION RATE: 17 BRPM | SYSTOLIC BLOOD PRESSURE: 202 MMHG | DIASTOLIC BLOOD PRESSURE: 91 MMHG

## 2024-08-01 DIAGNOSIS — Z12.11 COLON CANCER SCREENING: ICD-10-CM

## 2024-08-01 PROCEDURE — 63600175 PHARM REV CODE 636 W HCPCS: Performed by: ANESTHESIOLOGY

## 2024-08-01 PROCEDURE — 25000003 PHARM REV CODE 250: Performed by: ANESTHESIOLOGY

## 2024-08-01 PROCEDURE — 88305 TISSUE EXAM BY PATHOLOGIST: CPT | Mod: TC | Performed by: PATHOLOGY

## 2024-08-01 PROCEDURE — 25000003 PHARM REV CODE 250: Performed by: NURSE ANESTHETIST, CERTIFIED REGISTERED

## 2024-08-01 PROCEDURE — 27201089 HC SNARE, DISP (ANY): Performed by: INTERNAL MEDICINE

## 2024-08-01 PROCEDURE — 63600175 PHARM REV CODE 636 W HCPCS: Performed by: NURSE ANESTHETIST, CERTIFIED REGISTERED

## 2024-08-01 PROCEDURE — 27201114 HC TRAP (ANY): Performed by: INTERNAL MEDICINE

## 2024-08-01 PROCEDURE — 37000008 HC ANESTHESIA 1ST 15 MINUTES: Performed by: INTERNAL MEDICINE

## 2024-08-01 PROCEDURE — 37000009 HC ANESTHESIA EA ADD 15 MINS: Performed by: INTERNAL MEDICINE

## 2024-08-01 PROCEDURE — 45385 COLONOSCOPY W/LESION REMOVAL: CPT | Mod: PT | Performed by: INTERNAL MEDICINE

## 2024-08-01 RX ORDER — PROPOFOL 10 MG/ML
VIAL (ML) INTRAVENOUS
Status: DISCONTINUED | OUTPATIENT
Start: 2024-08-01 | End: 2024-08-01

## 2024-08-01 RX ORDER — HYDRALAZINE HYDROCHLORIDE 20 MG/ML
20 INJECTION INTRAMUSCULAR; INTRAVENOUS ONCE
Status: COMPLETED | OUTPATIENT
Start: 2024-08-01 | End: 2024-08-01

## 2024-08-01 RX ORDER — GABAPENTIN 100 MG/1
200 CAPSULE ORAL ONCE
Status: COMPLETED | OUTPATIENT
Start: 2024-08-01 | End: 2024-08-01

## 2024-08-01 RX ORDER — VALSARTAN 80 MG/1
160 TABLET ORAL ONCE
Status: COMPLETED | OUTPATIENT
Start: 2024-08-01 | End: 2024-08-01

## 2024-08-01 RX ORDER — METOPROLOL SUCCINATE 25 MG/1
25 TABLET, EXTENDED RELEASE ORAL ONCE
Status: COMPLETED | OUTPATIENT
Start: 2024-08-01 | End: 2024-08-01

## 2024-08-01 RX ADMIN — METOPROLOL SUCCINATE 25 MG: 25 TABLET, EXTENDED RELEASE ORAL at 11:08

## 2024-08-01 RX ADMIN — SODIUM CHLORIDE: 0.9 INJECTION, SOLUTION INTRAVENOUS at 08:08

## 2024-08-01 RX ADMIN — PROPOFOL 100 MG: 10 INJECTION, EMULSION INTRAVENOUS at 08:08

## 2024-08-01 RX ADMIN — PROPOFOL 50 MG: 10 INJECTION, EMULSION INTRAVENOUS at 08:08

## 2024-08-01 RX ADMIN — VALSARTAN 160 MG: 80 TABLET, FILM COATED ORAL at 11:08

## 2024-08-01 RX ADMIN — HYDRALAZINE HYDROCHLORIDE 20 MG: 20 INJECTION INTRAMUSCULAR; INTRAVENOUS at 10:08

## 2024-08-01 RX ADMIN — GABAPENTIN 200 MG: 100 CAPSULE ORAL at 11:08

## 2024-08-01 NOTE — TRANSFER OF CARE
Anesthesia Transfer of Care Note    Patient: Abel Gibbs    Procedure(s) Performed: Procedure(s) (LRB):  COLONOSCOPY (N/A)    Patient location: GI    Anesthesia Type: general    Transport from OR: Transported from OR on room air with adequate spontaneous ventilation    Post pain: adequate analgesia    Post assessment: no apparent anesthetic complications and tolerated procedure well    Post vital signs: stable    Level of consciousness: awake    Nausea/Vomiting: no nausea/vomiting    Complications: none    Transfer of care protocol was followed      Last vitals: There were no vitals taken for this visit.

## 2024-08-01 NOTE — ANESTHESIA POSTPROCEDURE EVALUATION
Anesthesia Post Evaluation    Patient: Abel Gibbs    Procedure(s) Performed: Procedure(s) (LRB):  COLONOSCOPY (N/A)    Final Anesthesia Type: general      Patient location during evaluation: GI PACU  Patient participation: Yes- Able to Participate  Level of consciousness: awake and alert  Post-procedure vital signs: reviewed and stable  Pain management: adequate  Airway patency: patent    PONV status at discharge: No PONV  Anesthetic complications: no      Cardiovascular status: stable  Respiratory status: unassisted  Hydration status: euvolemic  Follow-up not needed.        Patient wide awake and instructed to take his blood pressure medications as soon as he gets home.      Vitals Value Taken Time   /97 08/01/24 0911   Temp 37.1 °C (98.7 °F) 07/30/24 0833   Pulse 55 08/01/24 0911   Resp 17 08/01/24 0910   SpO2 100 % 08/01/24 0911   Vitals shown include unfiled device data.      No case tracking events are documented in the log.      Pain/Summer Score: No data recorded

## 2024-08-01 NOTE — ANESTHESIA PREPROCEDURE EVALUATION
08/01/2024  Abel Gibbs is a 59 y.o., male.      Patient Active Problem List   Diagnosis    Acute hematogenous osteomyelitis of right foot    Diabetic ulcer of right great toe and right foot, POA    Peripheral vascular disease in type 2 diabetes mellitus    Hypertension    Diabetes mellitus with HbA1c 6.5%    Non compliance with medical treatment    Severe obesity (BMI >= 40)    Diabetic ulcer of left midfoot associated with type 2 diabetes mellitus    Ulcer of left foot with necrosis of muscle    HLD (hyperlipidemia)    BPH (benign prostatic hyperplasia)    CKD (chronic kidney disease)    Hypochromic anemia    CKD (chronic kidney disease), stage III    Anemia    THC use    Acute on chronic diastolic heart failure    Hyperphosphatemia    Allergy to adhesive tape       Past Surgical History:   Procedure Laterality Date    ANGIOGRAM, CORONARY, WITH LEFT HEART CATHETERIZATION N/A 07/28/2023    Procedure: Angiogram, Coronary, with Left Heart Cath;  Surgeon: Alek Greenwood MD;  Location: [facility] CATH/EP LAB;  Service: Cardiology;  Laterality: N/A;    ESOPHAGOGASTRODUODENOSCOPY N/A 3/14/2024    Procedure: EGD (ESOPHAGOGASTRODUODENOSCOPY);  Surgeon: Aniceto Marinelli MD;  Location: [facility] ENDO;  Service: Endoscopy;  Laterality: N/A;    PLACEMENT, TRIALYSIS CATH  01/2024    remove trialysis cath  01/2024        Tobacco Use:  The patient  reports that he has never smoked. He has never used smokeless tobacco.     Results for orders placed or performed during the hospital encounter of 01/09/24   EKG 12-lead    Collection Time: 01/09/24  2:12 PM    Narrative    Test Reason : R07.9,    Vent. Rate : 062 BPM     Atrial Rate : 062 BPM     P-R Int : 194 ms          QRS Dur : 112 ms      QT Int : 432 ms       P-R-T Axes : 036 024 069 degrees     QTc Int : 438 ms    Normal sinus rhythm  Incomplete right bundle branch  block  Borderline Abnormal ECG  When compared with ECG of 09-JAN-2024 13:28,  No significant change was found  Confirmed by Saul COLLINS, Samuel GUERRERO ([id number]) on 1/13/2024 1:01:33 PM    Referred By: [email or web address]   SELF           Confirmed By:Samuel Hughes MD             Lab Results   Component Value Date    WBC 6.26 06/24/2024    HGB 10.5 (L) 06/24/2024    HCT 33.3 (L) 06/24/2024    MCV 89 06/24/2024     06/24/2024     BMP  Lab Results   Component Value Date     06/24/2024    K 4.4 06/24/2024     06/24/2024    CO2 28 06/24/2024    BUN 30 (H) 06/24/2024    CREATININE 1.8 (H) 06/24/2024    CALCIUM 9.0 06/24/2024    ANIONGAP 7 (L) 06/24/2024     (H) 06/24/2024     (H) 03/07/2024     (H) 01/30/2024       Results for orders placed during the hospital encounter of 01/09/24    Echo    Interpretation Summary    Left Ventricle: The left ventricle is normal in size. Moderately increased wall thickness. There is concentric hypertrophy. Normal wall motion. There is normal systolic function with a visually estimated ejection fraction of 55 - 60%. There is normal diastolic function. E/A ratio is 1.77.    Right Ventricle: Normal right ventricular cavity size. Wall thickness is normal. Right ventricle wall motion  is normal. Systolic function is normal.    Left Atrium: Left atrium is severely dilated.    Mitral Valve: There is mild regurgitation.    Tricuspid Valve: There is trace regurgitation.    IVC/SVC: Normal venous pressure at 3 mmHg.            Pre-op Assessment    I have reviewed the Patient Summary Reports.     I have reviewed the Nursing Notes. I have reviewed the NPO Status.   I have reviewed the Medications.     Review of Systems  Anesthesia Hx:  No problems with previous Anesthesia             Denies Family Hx of Anesthesia complications.    Denies Personal Hx of Anesthesia complications.                    Social:  Non-Smoker       Hematology/Oncology:    Oncology Normal    -- Anemia:                                   EENT/Dental:  EENT/Dental Normal           Cardiovascular:  Exercise tolerance: good   Hypertension       CHF (8 months ago due to acute renal failure, which required dialysis for a few days.  CHF completely resolved.)   PVD hyperlipidemia   ECG has been reviewed. Recent EF 60% with normal LV diastolic function                         Pulmonary:  Pulmonary Normal                       Renal/:  Chronic Renal Disease, CKD  BPH              Hepatic/GI:  Hepatic/GI Normal                 Musculoskeletal:         Spine Disorders: (Percocet daily) lumbar Chronic Pain           Neurological:  Neurology Normal                                      Endocrine:  Diabetes   Last Mounjaro about a month ago.  Glucose 114 this morning.      Morbid Obesity / BMI > 40  Psych:    depression                Physical Exam  General: Well nourished, Cooperative, Alert and Oriented    Airway:  Mallampati: I   Mouth Opening: Normal  TM Distance: Normal  Tongue: Normal  Neck ROM: Normal ROM    Dental:  Intact    Chest/Lungs:  Clear to auscultation, Normal Respiratory Rate    Heart:  Rate: Normal  Rhythm: Regular Rhythm  Sounds: Normal        Anesthesia Plan  Type of Anesthesia, risks & benefits discussed:    Anesthesia Type: Gen Natural Airway  Intra-op Monitoring Plan: Standard ASA Monitors  Induction:  IV  Informed Consent: Informed consent signed with the Patient and all parties understand the risks and agree with anesthesia plan.  All questions answered.   ASA Score: 3    Ready For Surgery From Anesthesia Perspective.     .

## 2025-01-05 ENCOUNTER — HOSPITAL ENCOUNTER (EMERGENCY)
Facility: HOSPITAL | Age: 60
Discharge: HOME | End: 2025-01-05
Attending: EMERGENCY MEDICINE
Payer: MEDICARE

## 2025-01-05 VITALS
SYSTOLIC BLOOD PRESSURE: 160 MMHG | BODY MASS INDEX: 41.25 KG/M2 | WEIGHT: 315 LBS | DIASTOLIC BLOOD PRESSURE: 91 MMHG | HEART RATE: 69 BPM | TEMPERATURE: 99 F | RESPIRATION RATE: 21 BRPM | OXYGEN SATURATION: 100 %

## 2025-01-05 DIAGNOSIS — L03.116 CELLULITIS OF LEFT ANKLE: Primary | ICD-10-CM

## 2025-01-05 DIAGNOSIS — R60.9 SWELLING: ICD-10-CM

## 2025-01-05 LAB
ALBUMIN SERPL BCP-MCNC: 3.6 G/DL (ref 3.5–5.2)
ALP SERPL-CCNC: 47 U/L (ref 55–135)
ALT SERPL W/O P-5'-P-CCNC: 12 U/L (ref 10–44)
ANION GAP SERPL CALC-SCNC: 4 MMOL/L (ref 8–16)
AST SERPL-CCNC: 18 U/L (ref 10–40)
BACTERIA #/AREA URNS HPF: NORMAL /HPF
BASOPHILS # BLD AUTO: 0.05 K/UL (ref 0–0.2)
BASOPHILS NFR BLD: 0.6 % (ref 0–1.9)
BILIRUB SERPL-MCNC: 0.6 MG/DL (ref 0.1–1)
BILIRUB UR QL STRIP: NEGATIVE
BUN SERPL-MCNC: 28 MG/DL (ref 8–23)
CALCIUM SERPL-MCNC: 8.6 MG/DL (ref 8.7–10.5)
CHLORIDE SERPL-SCNC: 105 MMOL/L (ref 95–110)
CLARITY UR: CLEAR
CO2 SERPL-SCNC: 28 MMOL/L (ref 23–29)
COLOR UR: YELLOW
CREAT SERPL-MCNC: 2 MG/DL (ref 0.5–1.4)
DIFFERENTIAL METHOD BLD: ABNORMAL
EOSINOPHIL # BLD AUTO: 0.3 K/UL (ref 0–0.5)
EOSINOPHIL NFR BLD: 3.4 % (ref 0–8)
ERYTHROCYTE [DISTWIDTH] IN BLOOD BY AUTOMATED COUNT: 14.2 % (ref 11.5–14.5)
EST. GFR  (NO RACE VARIABLE): 37.3 ML/MIN/1.73 M^2
GLUCOSE SERPL-MCNC: 123 MG/DL (ref 70–110)
GLUCOSE UR QL STRIP: ABNORMAL
HCT VFR BLD AUTO: 30.3 % (ref 40–54)
HGB BLD-MCNC: 9.7 G/DL (ref 14–18)
HGB UR QL STRIP: NEGATIVE
IMM GRANULOCYTES # BLD AUTO: 0.04 K/UL (ref 0–0.04)
IMM GRANULOCYTES NFR BLD AUTO: 0.5 % (ref 0–0.5)
KETONES UR QL STRIP: NEGATIVE
LEUKOCYTE ESTERASE UR QL STRIP: NEGATIVE
LYMPHOCYTES # BLD AUTO: 1.3 K/UL (ref 1–4.8)
LYMPHOCYTES NFR BLD: 16.2 % (ref 18–48)
MCH RBC QN AUTO: 28.7 PG (ref 27–31)
MCHC RBC AUTO-ENTMCNC: 32 G/DL (ref 32–36)
MCV RBC AUTO: 90 FL (ref 82–98)
MICROSCOPIC COMMENT: NORMAL
MONOCYTES # BLD AUTO: 0.9 K/UL (ref 0.3–1)
MONOCYTES NFR BLD: 10.8 % (ref 4–15)
NEUTROPHILS # BLD AUTO: 5.4 K/UL (ref 1.8–7.7)
NEUTROPHILS NFR BLD: 68.5 % (ref 38–73)
NITRITE UR QL STRIP: NEGATIVE
NRBC BLD-RTO: 0 /100 WBC
PH UR STRIP: 6 [PH] (ref 5–8)
PLATELET # BLD AUTO: 270 K/UL (ref 150–450)
PMV BLD AUTO: 9.6 FL (ref 9.2–12.9)
POTASSIUM SERPL-SCNC: 4 MMOL/L (ref 3.5–5.1)
PROT SERPL-MCNC: 7.1 G/DL (ref 6–8.4)
PROT UR QL STRIP: ABNORMAL
RBC # BLD AUTO: 3.38 M/UL (ref 4.6–6.2)
SODIUM SERPL-SCNC: 137 MMOL/L (ref 136–145)
SP GR UR STRIP: 1.01 (ref 1–1.03)
URN SPEC COLLECT METH UR: ABNORMAL
UROBILINOGEN UR STRIP-ACNC: NEGATIVE EU/DL
WBC # BLD AUTO: 7.88 K/UL (ref 3.9–12.7)
YEAST URNS QL MICRO: NORMAL

## 2025-01-05 PROCEDURE — 81001 URINALYSIS AUTO W/SCOPE: CPT | Performed by: EMERGENCY MEDICINE

## 2025-01-05 PROCEDURE — 25000003 PHARM REV CODE 250: Performed by: EMERGENCY MEDICINE

## 2025-01-05 PROCEDURE — 36415 COLL VENOUS BLD VENIPUNCTURE: CPT | Performed by: EMERGENCY MEDICINE

## 2025-01-05 PROCEDURE — 96365 THER/PROPH/DIAG IV INF INIT: CPT

## 2025-01-05 PROCEDURE — 99284 EMERGENCY DEPT VISIT MOD MDM: CPT | Mod: 25

## 2025-01-05 PROCEDURE — 63600175 PHARM REV CODE 636 W HCPCS: Performed by: EMERGENCY MEDICINE

## 2025-01-05 PROCEDURE — 96375 TX/PRO/DX INJ NEW DRUG ADDON: CPT

## 2025-01-05 PROCEDURE — 85025 COMPLETE CBC W/AUTO DIFF WBC: CPT | Performed by: EMERGENCY MEDICINE

## 2025-01-05 PROCEDURE — 87040 BLOOD CULTURE FOR BACTERIA: CPT | Mod: 59 | Performed by: EMERGENCY MEDICINE

## 2025-01-05 PROCEDURE — 80053 COMPREHEN METABOLIC PANEL: CPT | Performed by: EMERGENCY MEDICINE

## 2025-01-05 RX ORDER — OXYCODONE AND ACETAMINOPHEN 5; 325 MG/1; MG/1
1 TABLET ORAL EVERY 6 HOURS PRN
Qty: 12 TABLET | Refills: 0 | Status: SHIPPED | OUTPATIENT
Start: 2025-01-05

## 2025-01-05 RX ORDER — HYDROMORPHONE HYDROCHLORIDE 1 MG/ML
1 INJECTION, SOLUTION INTRAMUSCULAR; INTRAVENOUS; SUBCUTANEOUS
Status: COMPLETED | OUTPATIENT
Start: 2025-01-05 | End: 2025-01-05

## 2025-01-05 RX ADMIN — HYDROMORPHONE HYDROCHLORIDE 1 MG: 1 INJECTION, SOLUTION INTRAMUSCULAR; INTRAVENOUS; SUBCUTANEOUS at 10:01

## 2025-01-05 RX ADMIN — SODIUM CHLORIDE, POTASSIUM CHLORIDE, SODIUM LACTATE AND CALCIUM CHLORIDE 1000 ML: 600; 310; 30; 20 INJECTION, SOLUTION INTRAVENOUS at 10:01

## 2025-01-05 RX ADMIN — DALBAVANCIN 1500 MG: 500 INJECTION, POWDER, FOR SOLUTION INTRAVENOUS at 12:01

## 2025-01-05 NOTE — ED PROVIDER NOTES
Encounter Date: 1/5/2025       History   No chief complaint on file.    59-year-old male presented emergency department with complaints of pain and redness and swelling behind the left ankle.  Patient denies any trauma.  Patient denies fever or chills or nausea vomiting or chest pain or shortness of breath or abdominal pain or weakness or numbness      Review of patient's allergies indicates:   Allergen Reactions    Adhesive Itching     Past Medical History:   Diagnosis Date    Chronic kidney disease (CKD), active medical management without dialysis, stage 3 (moderate)     Depression     Diabetes mellitus     Diastolic heart failure 01/2024    Hypertension     Obesity     Osteomyelitis      Past Surgical History:   Procedure Laterality Date    ANGIOGRAM, CORONARY, WITH LEFT HEART CATHETERIZATION N/A 07/28/2023    Procedure: Angiogram, Coronary, with Left Heart Cath;  Surgeon: Alek Greenwood MD;  Location: [facility] CATH/EP LAB;  Service: Cardiology;  Laterality: N/A;    COLONOSCOPY N/A 8/1/2024    Procedure: COLONOSCOPY;  Surgeon: Aniceto Marinelli MD;  Location: [facility] ENDO;  Service: Endoscopy;  Laterality: N/A;    ESOPHAGOGASTRODUODENOSCOPY N/A 3/14/2024    Procedure: EGD (ESOPHAGOGASTRODUODENOSCOPY);  Surgeon: Aniceto Marinelli MD;  Location: [facility] ENDO;  Service: Endoscopy;  Laterality: N/A;    PLACEMENT, TRIALYSIS CATH  01/2024    remove trialysis cath  01/2024     No family history on file.  Social History     Tobacco Use    Smoking status: Never    Smokeless tobacco: Never   Substance Use Topics    Alcohol use: Yes     Comment: occas    Drug use: Yes     Frequency: 1.0 times per week     Types: Marijuana     Comment: last use 2 days ago     Review of Systems   Constitutional: Negative.    HENT: Negative.     Eyes: Negative.    Respiratory: Negative.     Cardiovascular: Negative.    Gastrointestinal: Negative.    Endocrine: Negative.    Genitourinary: Negative.    Musculoskeletal:  Positive for arthralgias.   Skin:   Positive for rash.   Allergic/Immunologic: Negative.    Neurological: Negative.    Hematological: Negative.    Psychiatric/Behavioral: Negative.     All other systems reviewed and are negative.      Physical Exam     Initial Vitals   BP Pulse Resp Temp SpO2   01/05/25 0934 01/05/25 0935 01/05/25 0937 01/05/25 0937 01/05/25 0935   136/66 65 19 98.5 °F (36.9 °C) 97 %      MAP       --                Physical Exam    Nursing note and vitals reviewed.  Constitutional: He appears well-developed and well-nourished.   HENT:   Head: Normocephalic and atraumatic.   Nose: Nose normal.   Eyes: Conjunctivae and EOM are normal.   Neck: No tracheal deviation present.   Normal range of motion.  Cardiovascular:  Normal rate, regular rhythm, normal heart sounds and intact distal pulses.     Exam reveals no friction rub.       No murmur heard.  Pulmonary/Chest: Breath sounds normal. No respiratory distress. He has no wheezes. He has no rales.   Abdominal: Abdomen is soft. He exhibits no distension. There is no abdominal tenderness.   Musculoskeletal:         General: Tenderness present. Normal range of motion.      Cervical back: Normal range of motion.      Comments: Tenderness and swelling behind the left ankle.  Left ankle joint itself is not involved but redness and tenderness in the area of Achillis tendon.  No calf tenderness noted.  Patient however has a lot of pain in that area which is consistent with cellulitis.  Extremities neurovascularly intact.     Neurological: He is alert and oriented to person, place, and time. He has normal strength. No cranial nerve deficit or sensory deficit. GCS score is 15. GCS eye subscore is 4. GCS verbal subscore is 5. GCS motor subscore is 6.   Skin: Skin is warm and dry. Capillary refill takes less than 2 seconds. There is erythema.   Psychiatric: He has a normal mood and affect. Thought content normal.         ED Course   Procedures  Labs Reviewed   CBC W/ AUTO DIFFERENTIAL - Abnormal        Result Value    WBC 7.88      RBC 3.38 (*)     Hemoglobin 9.7 (*)     Hematocrit 30.3 (*)     MCV 90      MCH 28.7      MCHC 32.0      RDW 14.2      Platelets 270      MPV 9.6      Immature Granulocytes 0.5      Gran # (ANC) 5.4      Immature Grans (Abs) 0.04      Lymph # 1.3      Mono # 0.9      Eos # 0.3      Baso # 0.05      nRBC 0      Gran % 68.5      Lymph % 16.2 (*)     Mono % 10.8      Eosinophil % 3.4      Basophil % 0.6      Differential Method Automated     COMPREHENSIVE METABOLIC PANEL - Abnormal    Sodium 137      Potassium 4.0      Chloride 105      CO2 28      Glucose 123 (*)     BUN 28 (*)     Creatinine 2.0 (*)     Calcium 8.6 (*)     Total Protein 7.1      Albumin 3.6      Total Bilirubin 0.6      Alkaline Phosphatase 47 (*)     AST 18      ALT 12      eGFR 37.3 (*)     Anion Gap 4 (*)    CULTURE, BLOOD   CULTURE, BLOOD   URINALYSIS, REFLEX TO URINE CULTURE          Imaging Results              X-Ray Ankle Complete Left (Final result)  Result time 01/05/25 10:57:21      Final result by Shelton Lyman MD (01/05/25 10:57:21)                   Impression:      No acute osseous abnormality.    Osteoarthrosis of the midfoot.    Peripheral vascular disease.      Electronically signed by: Shelton Lyman  Date:    01/05/2025  Time:    10:57               Narrative:    EXAMINATION:  XR ANKLE COMPLETE 3 VIEW LEFT    CLINICAL HISTORY:  Edema, unspecified    COMPARISON:  06/21/2019    FINDINGS:  No acute fracture or malalignment of the left ankle.  Soft tissue swelling about the ankle.  Degenerative changes of the midfoot.  Calcaneal enthesopathy.  Peripheral vascular calcifications.  Distal lower extremity phleboliths.                                       Medications   dalbavancin (DALVANCE) 1,500 mg in D5W 325 mL infusion (1,500 mg Intravenous New Bag 1/5/25 1218)   lactated ringers bolus 1,000 mL (1,000 mLs Intravenous New Bag 1/5/25 1054)   HYDROmorphone injection 1 mg (1 mg Intravenous Given 1/5/25  1049)     Medical Decision Making  59-year-old male with extensive cellulitis behind the left ankle.  No joint involvement.  Patient started on long-acting broad-spectrum antibiotic.  Screening labs and workup otherwise unremarkable and patient does have renal insufficiency.  Patient's GFR is 37.  Patient received full dose of Dalvance.  Plan is to discharged with return precautions and instructions and follow-up with primary care provider.    Amount and/or Complexity of Data Reviewed  Labs: ordered. Decision-making details documented in ED Course.  Radiology: ordered. Decision-making details documented in ED Course.    Risk  Prescription drug management.                                      Clinical Impression:  Final diagnoses:  [R60.9] Swelling  [L03.116] Cellulitis of left ankle (Primary)          ED Disposition Condition    Discharge Stable          ED Prescriptions       Medication Sig Dispense Start Date End Date Auth. Provider    oxyCODONE-acetaminophen (PERCOCET) 5-325 mg per tablet Take 1 tablet by mouth every 6 (six) hours as needed for Pain. 12 tablet 1/5/2025 -- Rosalba Ortiz MD          Follow-up Information       Follow up With Specialties Details Why Contact Info    Rupinder Pagan MD Internal Medicine In 2 days  [address]  [address]  [facility] [address]  [phone]               Rosalba Ortiz MD  01/05/25 4669

## 2025-01-05 NOTE — DISCHARGE INSTRUCTIONS
You received a long-acting antibiotic.  Keep your feet elevated.  Return to emergency department for worsening symptoms or any problems.  Follow-up with your primary care provider

## 2025-01-05 NOTE — ED NOTES
Pt has Prn Bp meds at home. Informed he needs to take them when he got home. Pt verbalized understanding.

## 2025-01-10 LAB
BACTERIA BLD CULT: NORMAL
BACTERIA BLD CULT: NORMAL

## 2025-04-22 DIAGNOSIS — N40.1 BPH WITH OBSTRUCTION/LOWER URINARY TRACT SYMPTOMS: ICD-10-CM

## 2025-04-22 DIAGNOSIS — N13.8 BPH WITH OBSTRUCTION/LOWER URINARY TRACT SYMPTOMS: ICD-10-CM

## 2025-04-22 RX ORDER — TAMSULOSIN HYDROCHLORIDE 0.4 MG/1
0.4 CAPSULE ORAL NIGHTLY
Qty: 30 CAPSULE | Refills: 0 | Status: SHIPPED | OUTPATIENT
Start: 2025-04-22

## 2025-08-19 ENCOUNTER — OFFICE VISIT (OUTPATIENT)
Dept: PODIATRY | Facility: CLINIC | Age: 60
End: 2025-08-19
Payer: MEDICARE

## 2025-08-19 VITALS — OXYGEN SATURATION: 97 % | HEIGHT: 75 IN | BODY MASS INDEX: 39.17 KG/M2 | HEART RATE: 72 BPM | WEIGHT: 315 LBS

## 2025-08-19 DIAGNOSIS — L97.509 TYPE 2 DIABETES MELLITUS WITH FOOT ULCER, WITHOUT LONG-TERM CURRENT USE OF INSULIN: ICD-10-CM

## 2025-08-19 DIAGNOSIS — L85.1 ACQUIRED KERATODERMA: ICD-10-CM

## 2025-08-19 DIAGNOSIS — M20.5X1 HALLUX LIMITUS OF RIGHT FOOT: ICD-10-CM

## 2025-08-19 DIAGNOSIS — E11.9 ENCOUNTER FOR DIABETIC FOOT EXAM: ICD-10-CM

## 2025-08-19 DIAGNOSIS — E11.42 DIABETIC POLYNEUROPATHY ASSOCIATED WITH TYPE 2 DIABETES MELLITUS: Primary | ICD-10-CM

## 2025-08-19 DIAGNOSIS — L97.512 SKIN ULCER OF TOE OF RIGHT FOOT WITH FAT LAYER EXPOSED: ICD-10-CM

## 2025-08-19 DIAGNOSIS — E11.621 TYPE 2 DIABETES MELLITUS WITH FOOT ULCER, WITHOUT LONG-TERM CURRENT USE OF INSULIN: ICD-10-CM

## 2025-08-19 PROCEDURE — 3044F HG A1C LEVEL LT 7.0%: CPT | Mod: CPTII,S$GLB,, | Performed by: PODIATRIST

## 2025-08-19 PROCEDURE — 1160F RVW MEDS BY RX/DR IN RCRD: CPT | Mod: CPTII,S$GLB,, | Performed by: PODIATRIST

## 2025-08-19 PROCEDURE — 99214 OFFICE O/P EST MOD 30 MIN: CPT | Mod: 25,S$GLB,, | Performed by: PODIATRIST

## 2025-08-19 PROCEDURE — 1159F MED LIST DOCD IN RCRD: CPT | Mod: CPTII,S$GLB,, | Performed by: PODIATRIST

## 2025-08-19 PROCEDURE — 99999 PR PBB SHADOW E&M-EST. PATIENT-LVL IV: CPT | Mod: PBBFAC,,, | Performed by: PODIATRIST

## 2025-08-19 PROCEDURE — 3008F BODY MASS INDEX DOCD: CPT | Mod: CPTII,S$GLB,, | Performed by: PODIATRIST

## 2025-08-19 PROCEDURE — 11042 DBRDMT SUBQ TIS 1ST 20SQCM/<: CPT | Mod: S$GLB,,, | Performed by: PODIATRIST

## 2025-08-19 PROCEDURE — 4010F ACE/ARB THERAPY RXD/TAKEN: CPT | Mod: CPTII,S$GLB,, | Performed by: PODIATRIST

## 2025-08-22 ENCOUNTER — HOSPITAL ENCOUNTER (INPATIENT)
Facility: HOSPITAL | Age: 60
LOS: 2 days | Discharge: HOME | DRG: 305 | End: 2025-08-25
Attending: EMERGENCY MEDICINE | Admitting: INTERNAL MEDICINE
Payer: MEDICARE

## 2025-08-22 DIAGNOSIS — R07.9 CHEST PAIN: ICD-10-CM

## 2025-08-22 DIAGNOSIS — R07.9 CHEST PAIN, UNSPECIFIED TYPE: ICD-10-CM

## 2025-08-22 DIAGNOSIS — I16.1 HYPERTENSIVE EMERGENCY: Primary | ICD-10-CM

## 2025-08-22 DIAGNOSIS — I50.22 CHRONIC SYSTOLIC CONGESTIVE HEART FAILURE: ICD-10-CM

## 2025-08-22 PROBLEM — N17.9 ACUTE KIDNEY INJURY SUPERIMPOSED ON CHRONIC KIDNEY DISEASE: Status: ACTIVE | Noted: 2025-08-22

## 2025-08-22 PROBLEM — I50.9 CHF (CONGESTIVE HEART FAILURE): Status: ACTIVE | Noted: 2025-08-22

## 2025-08-22 PROBLEM — I16.0 HYPERTENSIVE URGENCY: Status: ACTIVE | Noted: 2025-08-22

## 2025-08-22 PROBLEM — N18.9 ACUTE KIDNEY INJURY SUPERIMPOSED ON CHRONIC KIDNEY DISEASE: Status: ACTIVE | Noted: 2025-08-22

## 2025-08-22 LAB
ABSOLUTE EOSINOPHIL (SMH): 0.21 K/UL
ABSOLUTE MONOCYTE (SMH): 0.53 K/UL (ref 0.3–1)
ABSOLUTE NEUTROPHIL COUNT (SMH): 2.6 K/UL (ref 1.8–7.7)
ALBUMIN SERPL-MCNC: 3.7 G/DL (ref 3.5–5.2)
ALP SERPL-CCNC: 34 UNIT/L (ref 55–135)
ALT SERPL-CCNC: 14 UNIT/L (ref 10–44)
AMPHET UR QL SCN: NEGATIVE
ANION GAP (SMH): 5 MMOL/L (ref 8–16)
AST SERPL-CCNC: 26 UNIT/L (ref 10–40)
BARBITURATE SCN PRESENT UR: NEGATIVE
BASOPHILS # BLD AUTO: 0.08 K/UL
BASOPHILS NFR BLD AUTO: 1.5 %
BENZODIAZ UR QL SCN: NEGATIVE
BILIRUB SERPL-MCNC: 0.7 MG/DL (ref 0.1–1)
BILIRUB UR QL STRIP.AUTO: NEGATIVE
BNP SERPL-MCNC: 69 PG/ML
BUN SERPL-MCNC: 42 MG/DL (ref 8–23)
CALCIUM SERPL-MCNC: 8.3 MG/DL (ref 8.7–10.5)
CANNABINOIDS UR QL SCN: ABNORMAL
CHLORIDE SERPL-SCNC: 107 MMOL/L (ref 95–110)
CK SERPL-CCNC: 783 U/L (ref 20–200)
CLARITY UR: CLEAR
CO2 SERPL-SCNC: 26 MMOL/L (ref 23–29)
COCAINE UR QL SCN: NEGATIVE
COLOR UR AUTO: YELLOW
CREAT SERPL-MCNC: 2.1 MG/DL (ref 0.5–1.4)
CREAT UR-MCNC: 88 MG/DL (ref 23–375)
ERYTHROCYTE [DISTWIDTH] IN BLOOD BY AUTOMATED COUNT: 15.9 % (ref 11.5–14.5)
FLUAV AG UPPER RESP QL IA.RAPID: NEGATIVE
FLUBV AG UPPER RESP QL IA.RAPID: NEGATIVE
GFR SERPLBLD CREATININE-BSD FMLA CKD-EPI: 35 ML/MIN/1.73/M2
GLUCOSE SERPL-MCNC: 112 MG/DL (ref 70–110)
GLUCOSE UR QL STRIP: ABNORMAL
HCT VFR BLD AUTO: 28.9 % (ref 40–54)
HCV AB SERPL QL IA: NORMAL
HGB BLD-MCNC: 9.2 GM/DL (ref 14–18)
HGB UR QL STRIP: ABNORMAL
HIV 1+2 AB+HIV1 P24 AG SERPL QL IA: NORMAL
IMM GRANULOCYTES # BLD AUTO: 0.02 K/UL (ref 0–0.04)
IMM GRANULOCYTES NFR BLD AUTO: 0.4 % (ref 0–0.5)
KETONES UR QL STRIP: NEGATIVE
LEUKOCYTE ESTERASE UR QL STRIP: NEGATIVE
LIPASE SERPL-CCNC: 27 U/L (ref 4–60)
LYMPHOCYTES # BLD AUTO: 1.9 K/UL (ref 1–4.8)
MAGNESIUM SERPL-MCNC: 2 MG/DL (ref 1.6–2.6)
MCH RBC QN AUTO: 28.5 PG (ref 27–31)
MCHC RBC AUTO-ENTMCNC: 31.8 G/DL (ref 32–36)
MCV RBC AUTO: 90 FL (ref 82–98)
MICROSCOPIC COMMENT: NORMAL
NITRITE UR QL STRIP: NEGATIVE
NUCLEATED RBC (/100WBC) (SMH): 0 /100 WBC
OPIATES UR QL SCN: NEGATIVE
PCP UR QL: NEGATIVE
PH UR STRIP: 6 [PH]
PLATELET # BLD AUTO: 225 K/UL (ref 150–450)
PMV BLD AUTO: 10.5 FL (ref 9.2–12.9)
POCT GLUCOSE: 148 MG/DL (ref 70–110)
POCT GLUCOSE: 172 MG/DL (ref 70–110)
POTASSIUM SERPL-SCNC: 4.6 MMOL/L (ref 3.5–5.1)
PROT SERPL-MCNC: 6.9 GM/DL (ref 6–8.4)
PROT UR QL STRIP: ABNORMAL
RBC # BLD AUTO: 3.23 M/UL (ref 4.6–6.2)
RBC #/AREA URNS AUTO: 1 /HPF
RELATIVE EOSINOPHIL (SMH): 3.9 % (ref 0–8)
RELATIVE LYMPHOCYTE (SMH): 35.5 % (ref 18–48)
RELATIVE MONOCYTE (SMH): 9.9 % (ref 4–15)
RELATIVE NEUTROPHIL (SMH): 48.8 % (ref 38–73)
SARS-COV-2 RDRP RESP QL NAA+PROBE: NEGATIVE
SODIUM SERPL-SCNC: 138 MMOL/L (ref 136–145)
SP GR UR STRIP: 1.01
TROPONIN HIGH SENSITIVE (SMH): 12.1 PG/ML
TROPONIN HIGH SENSITIVE (SMH): 13 PG/ML
TROPONIN HIGH SENSITIVE (SMH): 13.1 PG/ML
TROPONIN HIGH SENSITIVE (SMH): 13.5 PG/ML
TSH SERPL-ACNC: 1.83 UIU/ML (ref 0.34–5.6)
UROBILINOGEN UR STRIP-ACNC: NEGATIVE EU/DL
WBC # BLD AUTO: 5.35 K/UL (ref 3.9–12.7)
WBC #/AREA URNS AUTO: <1 /HPF

## 2025-08-22 PROCEDURE — 83735 ASSAY OF MAGNESIUM: CPT | Performed by: EMERGENCY MEDICINE

## 2025-08-22 PROCEDURE — 96375 TX/PRO/DX INJ NEW DRUG ADDON: CPT

## 2025-08-22 PROCEDURE — 25000242 PHARM REV CODE 250 ALT 637 W/ HCPCS: Performed by: NURSE PRACTITIONER

## 2025-08-22 PROCEDURE — G0378 HOSPITAL OBSERVATION PER HR: HCPCS

## 2025-08-22 PROCEDURE — 85025 COMPLETE CBC W/AUTO DIFF WBC: CPT | Performed by: EMERGENCY MEDICINE

## 2025-08-22 PROCEDURE — 86803 HEPATITIS C AB TEST: CPT | Performed by: EMERGENCY MEDICINE

## 2025-08-22 PROCEDURE — 93010 ELECTROCARDIOGRAM REPORT: CPT | Mod: ,,, | Performed by: GENERAL PRACTICE

## 2025-08-22 PROCEDURE — 94761 N-INVAS EAR/PLS OXIMETRY MLT: CPT

## 2025-08-22 PROCEDURE — A9540 TC99M MAA: HCPCS | Performed by: INTERNAL MEDICINE

## 2025-08-22 PROCEDURE — 25000003 PHARM REV CODE 250: Performed by: INTERNAL MEDICINE

## 2025-08-22 PROCEDURE — 84484 ASSAY OF TROPONIN QUANT: CPT | Mod: 91 | Performed by: INTERNAL MEDICINE

## 2025-08-22 PROCEDURE — 82040 ASSAY OF SERUM ALBUMIN: CPT | Performed by: EMERGENCY MEDICINE

## 2025-08-22 PROCEDURE — U0002 COVID-19 LAB TEST NON-CDC: HCPCS | Performed by: EMERGENCY MEDICINE

## 2025-08-22 PROCEDURE — 96376 TX/PRO/DX INJ SAME DRUG ADON: CPT

## 2025-08-22 PROCEDURE — 63600175 PHARM REV CODE 636 W HCPCS: Performed by: EMERGENCY MEDICINE

## 2025-08-22 PROCEDURE — 99900035 HC TECH TIME PER 15 MIN (STAT)

## 2025-08-22 PROCEDURE — 80307 DRUG TEST PRSMV CHEM ANLYZR: CPT | Performed by: EMERGENCY MEDICINE

## 2025-08-22 PROCEDURE — 63600175 PHARM REV CODE 636 W HCPCS: Performed by: INTERNAL MEDICINE

## 2025-08-22 PROCEDURE — 84443 ASSAY THYROID STIM HORMONE: CPT | Performed by: EMERGENCY MEDICINE

## 2025-08-22 PROCEDURE — 83690 ASSAY OF LIPASE: CPT | Performed by: EMERGENCY MEDICINE

## 2025-08-22 PROCEDURE — 93010 ELECTROCARDIOGRAM REPORT: CPT | Mod: ,,, | Performed by: INTERNAL MEDICINE

## 2025-08-22 PROCEDURE — 81003 URINALYSIS AUTO W/O SCOPE: CPT | Performed by: EMERGENCY MEDICINE

## 2025-08-22 PROCEDURE — 87502 INFLUENZA DNA AMP PROBE: CPT | Performed by: EMERGENCY MEDICINE

## 2025-08-22 PROCEDURE — 84484 ASSAY OF TROPONIN QUANT: CPT | Performed by: EMERGENCY MEDICINE

## 2025-08-22 PROCEDURE — 93005 ELECTROCARDIOGRAM TRACING: CPT | Performed by: GENERAL PRACTICE

## 2025-08-22 PROCEDURE — 25000003 PHARM REV CODE 250: Performed by: EMERGENCY MEDICINE

## 2025-08-22 PROCEDURE — 83880 ASSAY OF NATRIURETIC PEPTIDE: CPT | Performed by: EMERGENCY MEDICINE

## 2025-08-22 PROCEDURE — 99291 CRITICAL CARE FIRST HOUR: CPT

## 2025-08-22 PROCEDURE — 94799 UNLISTED PULMONARY SVC/PX: CPT

## 2025-08-22 PROCEDURE — 87389 HIV-1 AG W/HIV-1&-2 AB AG IA: CPT | Performed by: EMERGENCY MEDICINE

## 2025-08-22 PROCEDURE — 36415 COLL VENOUS BLD VENIPUNCTURE: CPT | Performed by: INTERNAL MEDICINE

## 2025-08-22 PROCEDURE — 96372 THER/PROPH/DIAG INJ SC/IM: CPT | Performed by: INTERNAL MEDICINE

## 2025-08-22 PROCEDURE — 82550 ASSAY OF CK (CPK): CPT | Performed by: EMERGENCY MEDICINE

## 2025-08-22 PROCEDURE — 93005 ELECTROCARDIOGRAM TRACING: CPT | Performed by: INTERNAL MEDICINE

## 2025-08-22 PROCEDURE — A9567 TECHNETIUM TC-99M AEROSOL: HCPCS | Performed by: INTERNAL MEDICINE

## 2025-08-22 RX ORDER — OXYCODONE AND ACETAMINOPHEN 10; 325 MG/1; MG/1
1 TABLET ORAL EVERY 6 HOURS PRN
Refills: 0 | Status: DISCONTINUED | OUTPATIENT
Start: 2025-08-22 | End: 2025-08-25 | Stop reason: HOSPADM

## 2025-08-22 RX ORDER — ASPIRIN 325 MG
325 TABLET ORAL
Status: COMPLETED | OUTPATIENT
Start: 2025-08-22 | End: 2025-08-22

## 2025-08-22 RX ORDER — LANOLIN ALCOHOL/MO/W.PET/CERES
800 CREAM (GRAM) TOPICAL
Status: DISCONTINUED | OUTPATIENT
Start: 2025-08-22 | End: 2025-08-25 | Stop reason: HOSPADM

## 2025-08-22 RX ORDER — ENOXAPARIN SODIUM 100 MG/ML
40 INJECTION SUBCUTANEOUS
Status: DISCONTINUED | OUTPATIENT
Start: 2025-08-22 | End: 2025-08-22

## 2025-08-22 RX ORDER — SODIUM,POTASSIUM PHOSPHATES 280-250MG
2 POWDER IN PACKET (EA) ORAL
Status: DISCONTINUED | OUTPATIENT
Start: 2025-08-22 | End: 2025-08-25 | Stop reason: HOSPADM

## 2025-08-22 RX ORDER — ACETAMINOPHEN 325 MG/1
650 TABLET ORAL EVERY 8 HOURS PRN
Status: DISCONTINUED | OUTPATIENT
Start: 2025-08-22 | End: 2025-08-25 | Stop reason: HOSPADM

## 2025-08-22 RX ORDER — HYDRALAZINE HYDROCHLORIDE 50 MG/1
50 TABLET, FILM COATED ORAL EVERY 12 HOURS
Status: ON HOLD | COMMUNITY
Start: 2025-05-08 | End: 2025-08-25 | Stop reason: HOSPADM

## 2025-08-22 RX ORDER — FUROSEMIDE 10 MG/ML
40 INJECTION INTRAMUSCULAR; INTRAVENOUS ONCE
Status: COMPLETED | OUTPATIENT
Start: 2025-08-22 | End: 2025-08-22

## 2025-08-22 RX ORDER — TALC
6 POWDER (GRAM) TOPICAL NIGHTLY PRN
Status: DISCONTINUED | OUTPATIENT
Start: 2025-08-22 | End: 2025-08-25 | Stop reason: HOSPADM

## 2025-08-22 RX ORDER — ACETAMINOPHEN 325 MG/1
650 TABLET ORAL EVERY 4 HOURS PRN
Status: DISCONTINUED | OUTPATIENT
Start: 2025-08-22 | End: 2025-08-25 | Stop reason: HOSPADM

## 2025-08-22 RX ORDER — FUROSEMIDE 40 MG/1
40 TABLET ORAL DAILY
Status: DISCONTINUED | OUTPATIENT
Start: 2025-08-23 | End: 2025-08-22

## 2025-08-22 RX ORDER — HYDRALAZINE HYDROCHLORIDE 25 MG/1
50 TABLET, FILM COATED ORAL EVERY 8 HOURS
Status: DISCONTINUED | OUTPATIENT
Start: 2025-08-22 | End: 2025-08-23

## 2025-08-22 RX ORDER — INSULIN ASPART 100 [IU]/ML
0-10 INJECTION, SOLUTION INTRAVENOUS; SUBCUTANEOUS
Status: DISCONTINUED | OUTPATIENT
Start: 2025-08-22 | End: 2025-08-25 | Stop reason: HOSPADM

## 2025-08-22 RX ORDER — CETIRIZINE HYDROCHLORIDE 10 MG/1
10 TABLET ORAL DAILY
COMMUNITY
Start: 2025-03-27

## 2025-08-22 RX ORDER — INSULIN GLARGINE 100 [IU]/ML
10 INJECTION, SOLUTION SUBCUTANEOUS DAILY
Status: DISCONTINUED | OUTPATIENT
Start: 2025-08-22 | End: 2025-08-25 | Stop reason: HOSPADM

## 2025-08-22 RX ORDER — SPIRONOLACTONE 50 MG/1
50 TABLET, FILM COATED ORAL DAILY
Status: DISCONTINUED | OUTPATIENT
Start: 2025-08-22 | End: 2025-08-22

## 2025-08-22 RX ORDER — ALUMINUM HYDROXIDE, MAGNESIUM HYDROXIDE, AND SIMETHICONE 1200; 120; 1200 MG/30ML; MG/30ML; MG/30ML
30 SUSPENSION ORAL 4 TIMES DAILY PRN
Status: DISCONTINUED | OUTPATIENT
Start: 2025-08-22 | End: 2025-08-25 | Stop reason: HOSPADM

## 2025-08-22 RX ORDER — NALOXONE HCL 0.4 MG/ML
0.02 VIAL (ML) INJECTION
Status: DISCONTINUED | OUTPATIENT
Start: 2025-08-22 | End: 2025-08-25 | Stop reason: HOSPADM

## 2025-08-22 RX ORDER — NITROGLYCERIN 20 MG/G
1 OINTMENT TOPICAL
Status: COMPLETED | OUTPATIENT
Start: 2025-08-22 | End: 2025-08-22

## 2025-08-22 RX ORDER — NITROGLYCERIN 0.4 MG/1
0.4 TABLET SUBLINGUAL EVERY 5 MIN PRN
Status: DISCONTINUED | OUTPATIENT
Start: 2025-08-22 | End: 2025-08-25 | Stop reason: HOSPADM

## 2025-08-22 RX ORDER — HYDRALAZINE HYDROCHLORIDE 20 MG/ML
10 INJECTION INTRAMUSCULAR; INTRAVENOUS EVERY 4 HOURS PRN
Status: DISCONTINUED | OUTPATIENT
Start: 2025-08-22 | End: 2025-08-25 | Stop reason: HOSPADM

## 2025-08-22 RX ORDER — PANTOPRAZOLE SODIUM 20 MG/1
20 TABLET, DELAYED RELEASE ORAL DAILY
Status: DISCONTINUED | OUTPATIENT
Start: 2025-08-22 | End: 2025-08-25 | Stop reason: HOSPADM

## 2025-08-22 RX ORDER — ENOXAPARIN SODIUM 100 MG/ML
30 INJECTION SUBCUTANEOUS
Status: DISCONTINUED | OUTPATIENT
Start: 2025-08-23 | End: 2025-08-25 | Stop reason: HOSPADM

## 2025-08-22 RX ORDER — HYDROMORPHONE HYDROCHLORIDE 1 MG/ML
0.5 INJECTION, SOLUTION INTRAMUSCULAR; INTRAVENOUS; SUBCUTANEOUS EVERY 6 HOURS PRN
Refills: 0 | Status: DISCONTINUED | OUTPATIENT
Start: 2025-08-22 | End: 2025-08-25 | Stop reason: HOSPADM

## 2025-08-22 RX ORDER — POTASSIUM CHLORIDE 750 MG/1
10 TABLET, EXTENDED RELEASE ORAL 2 TIMES DAILY
COMMUNITY

## 2025-08-22 RX ORDER — IBUPROFEN 800 MG/1
800 TABLET, FILM COATED ORAL DAILY PRN
Status: ON HOLD | COMMUNITY
Start: 2025-07-08 | End: 2025-08-25 | Stop reason: HOSPADM

## 2025-08-22 RX ORDER — ONDANSETRON HYDROCHLORIDE 2 MG/ML
4 INJECTION, SOLUTION INTRAVENOUS EVERY 6 HOURS PRN
Status: DISCONTINUED | OUTPATIENT
Start: 2025-08-22 | End: 2025-08-25 | Stop reason: HOSPADM

## 2025-08-22 RX ORDER — NITROGLYCERIN 20 MG/100ML
0-400 INJECTION INTRAVENOUS CONTINUOUS
Status: DISCONTINUED | OUTPATIENT
Start: 2025-08-22 | End: 2025-08-23

## 2025-08-22 RX ORDER — IBUPROFEN 200 MG
24 TABLET ORAL
Status: DISCONTINUED | OUTPATIENT
Start: 2025-08-22 | End: 2025-08-25 | Stop reason: HOSPADM

## 2025-08-22 RX ORDER — NORTRIPTYLINE HYDROCHLORIDE 50 MG/1
50 CAPSULE ORAL NIGHTLY
COMMUNITY

## 2025-08-22 RX ORDER — FUROSEMIDE 40 MG/1
40 TABLET ORAL DAILY
COMMUNITY

## 2025-08-22 RX ORDER — HYDRALAZINE HYDROCHLORIDE 20 MG/ML
10 INJECTION INTRAMUSCULAR; INTRAVENOUS
Status: COMPLETED | OUTPATIENT
Start: 2025-08-22 | End: 2025-08-22

## 2025-08-22 RX ORDER — PANTOPRAZOLE SODIUM 20 MG/1
20 TABLET, DELAYED RELEASE ORAL DAILY
COMMUNITY

## 2025-08-22 RX ORDER — IBUPROFEN 200 MG
16 TABLET ORAL
Status: DISCONTINUED | OUTPATIENT
Start: 2025-08-22 | End: 2025-08-25 | Stop reason: HOSPADM

## 2025-08-22 RX ORDER — OXYCODONE AND ACETAMINOPHEN 10; 325 MG/1; MG/1
1 TABLET ORAL 4 TIMES DAILY
COMMUNITY
Start: 2025-08-13

## 2025-08-22 RX ORDER — METOPROLOL SUCCINATE 25 MG/1
25 TABLET, EXTENDED RELEASE ORAL DAILY
Status: DISCONTINUED | OUTPATIENT
Start: 2025-08-22 | End: 2025-08-25 | Stop reason: HOSPADM

## 2025-08-22 RX ORDER — TAMSULOSIN HYDROCHLORIDE 0.4 MG/1
0.4 CAPSULE ORAL NIGHTLY
Status: DISCONTINUED | OUTPATIENT
Start: 2025-08-22 | End: 2025-08-25 | Stop reason: HOSPADM

## 2025-08-22 RX ORDER — VALSARTAN 80 MG/1
80 TABLET ORAL DAILY
Status: ON HOLD | COMMUNITY
End: 2025-08-25 | Stop reason: HOSPADM

## 2025-08-22 RX ORDER — NORTRIPTYLINE HYDROCHLORIDE 25 MG/1
50 CAPSULE ORAL NIGHTLY
Status: DISCONTINUED | OUTPATIENT
Start: 2025-08-22 | End: 2025-08-25 | Stop reason: HOSPADM

## 2025-08-22 RX ORDER — TIRZEPATIDE 2.5 MG/.5ML
2.5 INJECTION, SOLUTION SUBCUTANEOUS
COMMUNITY

## 2025-08-22 RX ORDER — ENOXAPARIN SODIUM 100 MG/ML
30 INJECTION SUBCUTANEOUS
Status: DISCONTINUED | OUTPATIENT
Start: 2025-08-22 | End: 2025-08-22

## 2025-08-22 RX ORDER — GLUCAGON 1 MG
1 KIT INJECTION
Status: DISCONTINUED | OUTPATIENT
Start: 2025-08-22 | End: 2025-08-25 | Stop reason: HOSPADM

## 2025-08-22 RX ORDER — AMLODIPINE BESYLATE 5 MG/1
10 TABLET ORAL DAILY
Status: DISCONTINUED | OUTPATIENT
Start: 2025-08-22 | End: 2025-08-25 | Stop reason: HOSPADM

## 2025-08-22 RX ORDER — ATORVASTATIN CALCIUM 40 MG/1
80 TABLET, FILM COATED ORAL DAILY
Status: DISCONTINUED | OUTPATIENT
Start: 2025-08-22 | End: 2025-08-25 | Stop reason: HOSPADM

## 2025-08-22 RX ADMIN — AMLODIPINE BESYLATE 10 MG: 5 TABLET ORAL at 01:08

## 2025-08-22 RX ADMIN — HYDRALAZINE HYDROCHLORIDE 10 MG: 20 INJECTION INTRAMUSCULAR; INTRAVENOUS at 06:08

## 2025-08-22 RX ADMIN — HYDRALAZINE HYDROCHLORIDE 10 MG: 20 INJECTION INTRAMUSCULAR; INTRAVENOUS at 02:08

## 2025-08-22 RX ADMIN — HYDRALAZINE HYDROCHLORIDE 50 MG: 25 TABLET ORAL at 01:08

## 2025-08-22 RX ADMIN — HYDRALAZINE HYDROCHLORIDE 50 MG: 25 TABLET ORAL at 09:08

## 2025-08-22 RX ADMIN — SPIRONOLACTONE 50 MG: 25 TABLET ORAL at 01:08

## 2025-08-22 RX ADMIN — HYDRALAZINE HYDROCHLORIDE 10 MG: 20 INJECTION INTRAMUSCULAR; INTRAVENOUS at 11:08

## 2025-08-22 RX ADMIN — ENOXAPARIN SODIUM 40 MG: 40 INJECTION SUBCUTANEOUS at 06:08

## 2025-08-22 RX ADMIN — NITROGLYCERIN 1 INCH: 20 OINTMENT TOPICAL at 08:08

## 2025-08-22 RX ADMIN — FUROSEMIDE 40 MG: 10 INJECTION, SOLUTION INTRAMUSCULAR; INTRAVENOUS at 01:08

## 2025-08-22 RX ADMIN — KIT FOR THE PREPARATION OF TECHNETIUM TC 99M PENTETATE 30 MILLICURIE: 20 INJECTION, POWDER, LYOPHILIZED, FOR SOLUTION INTRAVENOUS; RESPIRATORY (INHALATION) at 12:08

## 2025-08-22 RX ADMIN — PANTOPRAZOLE SODIUM 20 MG: 20 TABLET, DELAYED RELEASE ORAL at 01:08

## 2025-08-22 RX ADMIN — OXYCODONE HYDROCHLORIDE AND ACETAMINOPHEN 1 TABLET: 10; 325 TABLET ORAL at 01:08

## 2025-08-22 RX ADMIN — NITROGLYCERIN 0.4 MG: 0.4 TABLET SUBLINGUAL at 08:08

## 2025-08-22 RX ADMIN — ASPIRIN 325 MG: 325 TABLET ORAL at 08:08

## 2025-08-22 RX ADMIN — KIT FOR THE PREPARATION OF TECHNETIUM TC 99M ALBUMIN AGGREGATED 5 MILLICURIE: 2 INJECTION, POWDER, LYOPHILIZED, FOR SUSPENSION INTRAPERITONEAL; INTRAVENOUS at 12:08

## 2025-08-22 RX ADMIN — HYDROMORPHONE HYDROCHLORIDE 0.5 MG: 1 INJECTION, SOLUTION INTRAMUSCULAR; INTRAVENOUS; SUBCUTANEOUS at 08:08

## 2025-08-22 RX ADMIN — ATORVASTATIN CALCIUM 80 MG: 40 TABLET, FILM COATED ORAL at 01:08

## 2025-08-23 LAB
ABSOLUTE EOSINOPHIL (SMH): 0.21 K/UL
ABSOLUTE MONOCYTE (SMH): 0.5 K/UL (ref 0.3–1)
ABSOLUTE NEUTROPHIL COUNT (SMH): 2.9 K/UL (ref 1.8–7.7)
ALBUMIN SERPL-MCNC: 3.5 G/DL (ref 3.5–5.2)
ALP SERPL-CCNC: 31 UNIT/L (ref 55–135)
ALT SERPL-CCNC: 13 UNIT/L (ref 10–44)
ANION GAP (SMH): 5 MMOL/L (ref 8–16)
AST SERPL-CCNC: 18 UNIT/L (ref 10–40)
BASOPHILS # BLD AUTO: 0.06 K/UL
BASOPHILS NFR BLD AUTO: 1.1 %
BILIRUB SERPL-MCNC: 0.9 MG/DL (ref 0.1–1)
BUN SERPL-MCNC: 35 MG/DL (ref 8–23)
CALCIUM SERPL-MCNC: 8.3 MG/DL (ref 8.7–10.5)
CHLORIDE SERPL-SCNC: 107 MMOL/L (ref 95–110)
CK SERPL-CCNC: 473 U/L (ref 20–200)
CO2 SERPL-SCNC: 26 MMOL/L (ref 23–29)
CREAT SERPL-MCNC: 1.9 MG/DL (ref 0.5–1.4)
ERYTHROCYTE [DISTWIDTH] IN BLOOD BY AUTOMATED COUNT: 15.8 % (ref 11.5–14.5)
GFR SERPLBLD CREATININE-BSD FMLA CKD-EPI: 39 ML/MIN/1.73/M2
GLUCOSE SERPL-MCNC: 131 MG/DL (ref 70–110)
HCT VFR BLD AUTO: 28.9 % (ref 40–54)
HGB BLD-MCNC: 9.4 GM/DL (ref 14–18)
IMM GRANULOCYTES # BLD AUTO: 0.02 K/UL (ref 0–0.04)
IMM GRANULOCYTES NFR BLD AUTO: 0.4 % (ref 0–0.5)
LYMPHOCYTES # BLD AUTO: 1.7 K/UL (ref 1–4.8)
MAGNESIUM SERPL-MCNC: 1.9 MG/DL (ref 1.6–2.6)
MCH RBC QN AUTO: 28.5 PG (ref 27–31)
MCHC RBC AUTO-ENTMCNC: 32.5 G/DL (ref 32–36)
MCV RBC AUTO: 88 FL (ref 82–98)
NUCLEATED RBC (/100WBC) (SMH): 0 /100 WBC
PLATELET # BLD AUTO: 233 K/UL (ref 150–450)
PMV BLD AUTO: 10.7 FL (ref 9.2–12.9)
POCT GLUCOSE: 126 MG/DL (ref 70–110)
POCT GLUCOSE: 127 MG/DL (ref 70–110)
POCT GLUCOSE: 166 MG/DL (ref 70–110)
POCT GLUCOSE: 191 MG/DL (ref 70–110)
POTASSIUM SERPL-SCNC: 4.3 MMOL/L (ref 3.5–5.1)
PROT SERPL-MCNC: 6.5 GM/DL (ref 6–8.4)
RBC # BLD AUTO: 3.3 M/UL (ref 4.6–6.2)
RELATIVE EOSINOPHIL (SMH): 3.9 % (ref 0–8)
RELATIVE LYMPHOCYTE (SMH): 31.8 % (ref 18–48)
RELATIVE MONOCYTE (SMH): 9.3 % (ref 4–15)
RELATIVE NEUTROPHIL (SMH): 53.5 % (ref 38–73)
SODIUM SERPL-SCNC: 138 MMOL/L (ref 136–145)
WBC # BLD AUTO: 5.35 K/UL (ref 3.9–12.7)

## 2025-08-23 PROCEDURE — 99223 1ST HOSP IP/OBS HIGH 75: CPT | Mod: ,,, | Performed by: INTERNAL MEDICINE

## 2025-08-23 PROCEDURE — 96372 THER/PROPH/DIAG INJ SC/IM: CPT | Performed by: INTERNAL MEDICINE

## 2025-08-23 PROCEDURE — 63600175 PHARM REV CODE 636 W HCPCS: Performed by: INTERNAL MEDICINE

## 2025-08-23 PROCEDURE — 96365 THER/PROPH/DIAG IV INF INIT: CPT

## 2025-08-23 PROCEDURE — 83735 ASSAY OF MAGNESIUM: CPT | Performed by: INTERNAL MEDICINE

## 2025-08-23 PROCEDURE — 25000003 PHARM REV CODE 250

## 2025-08-23 PROCEDURE — 85025 COMPLETE CBC W/AUTO DIFF WBC: CPT | Performed by: INTERNAL MEDICINE

## 2025-08-23 PROCEDURE — 36415 COLL VENOUS BLD VENIPUNCTURE: CPT | Performed by: INTERNAL MEDICINE

## 2025-08-23 PROCEDURE — 96366 THER/PROPH/DIAG IV INF ADDON: CPT

## 2025-08-23 PROCEDURE — 25000003 PHARM REV CODE 250: Performed by: INTERNAL MEDICINE

## 2025-08-23 PROCEDURE — 93005 ELECTROCARDIOGRAM TRACING: CPT | Performed by: INTERNAL MEDICINE

## 2025-08-23 PROCEDURE — 20600001 HC STEP DOWN PRIVATE ROOM

## 2025-08-23 PROCEDURE — 80053 COMPREHEN METABOLIC PANEL: CPT | Performed by: INTERNAL MEDICINE

## 2025-08-23 PROCEDURE — 82550 ASSAY OF CK (CPK): CPT | Performed by: INTERNAL MEDICINE

## 2025-08-23 RX ORDER — ISOSORBIDE MONONITRATE 30 MG/1
30 TABLET, EXTENDED RELEASE ORAL DAILY
Status: DISCONTINUED | OUTPATIENT
Start: 2025-08-23 | End: 2025-08-24

## 2025-08-23 RX ADMIN — HYDRALAZINE HYDROCHLORIDE 10 MG: 20 INJECTION INTRAMUSCULAR; INTRAVENOUS at 09:08

## 2025-08-23 RX ADMIN — OXYCODONE HYDROCHLORIDE AND ACETAMINOPHEN 1 TABLET: 10; 325 TABLET ORAL at 03:08

## 2025-08-23 RX ADMIN — INSULIN ASPART 1 UNITS: 100 INJECTION, SOLUTION INTRAVENOUS; SUBCUTANEOUS at 09:08

## 2025-08-23 RX ADMIN — INSULIN ASPART 2 UNITS: 100 INJECTION, SOLUTION INTRAVENOUS; SUBCUTANEOUS at 05:08

## 2025-08-23 RX ADMIN — ENOXAPARIN SODIUM 30 MG: 30 INJECTION SUBCUTANEOUS at 05:08

## 2025-08-23 RX ADMIN — ISOSORBIDE MONONITRATE 30 MG: 30 TABLET, EXTENDED RELEASE ORAL at 10:08

## 2025-08-23 RX ADMIN — AMLODIPINE BESYLATE 10 MG: 5 TABLET ORAL at 08:08

## 2025-08-23 RX ADMIN — OXYCODONE HYDROCHLORIDE AND ACETAMINOPHEN 1 TABLET: 10; 325 TABLET ORAL at 02:08

## 2025-08-23 RX ADMIN — HYDRALAZINE HYDROCHLORIDE 50 MG: 25 TABLET ORAL at 06:08

## 2025-08-23 RX ADMIN — METOPROLOL SUCCINATE 25 MG: 25 TABLET, EXTENDED RELEASE ORAL at 08:08

## 2025-08-23 RX ADMIN — NITROGLYCERIN 5 MCG/MIN: 20 INJECTION INTRAVENOUS at 02:08

## 2025-08-23 RX ADMIN — ATORVASTATIN CALCIUM 80 MG: 40 TABLET, FILM COATED ORAL at 08:08

## 2025-08-23 RX ADMIN — Medication 6 MG: at 02:08

## 2025-08-23 RX ADMIN — INSULIN GLARGINE 10 UNITS: 100 INJECTION, SOLUTION SUBCUTANEOUS at 08:08

## 2025-08-23 RX ADMIN — PANTOPRAZOLE SODIUM 20 MG: 20 TABLET, DELAYED RELEASE ORAL at 08:08

## 2025-08-23 RX ADMIN — NORTRIPTYLINE HYDROCHLORIDE 50 MG: 25 CAPSULE ORAL at 09:08

## 2025-08-23 RX ADMIN — OXYCODONE HYDROCHLORIDE AND ACETAMINOPHEN 1 TABLET: 10; 325 TABLET ORAL at 11:08

## 2025-08-23 RX ADMIN — HYDRALAZINE HYDROCHLORIDE 10 MG: 20 INJECTION INTRAMUSCULAR; INTRAVENOUS at 02:08

## 2025-08-23 RX ADMIN — TAMSULOSIN HYDROCHLORIDE 0.4 MG: 0.4 CAPSULE ORAL at 09:08

## 2025-08-23 RX ADMIN — OXYCODONE HYDROCHLORIDE AND ACETAMINOPHEN 1 TABLET: 10; 325 TABLET ORAL at 09:08

## 2025-08-24 ENCOUNTER — CLINICAL SUPPORT (OUTPATIENT)
Dept: CARDIOLOGY | Facility: HOSPITAL | Age: 60
DRG: 305 | End: 2025-08-24
Attending: EMERGENCY MEDICINE
Payer: MEDICARE

## 2025-08-24 LAB
ABSOLUTE EOSINOPHIL (SMH): 0.22 K/UL
ABSOLUTE MONOCYTE (SMH): 0.5 K/UL (ref 0.3–1)
ABSOLUTE NEUTROPHIL COUNT (SMH): 2.3 K/UL (ref 1.8–7.7)
ALBUMIN SERPL-MCNC: 3.6 G/DL (ref 3.5–5.2)
ALP SERPL-CCNC: 32 UNIT/L (ref 55–135)
ALT SERPL-CCNC: 12 UNIT/L (ref 10–44)
ANION GAP (SMH): 2 MMOL/L (ref 8–16)
AST SERPL-CCNC: 16 UNIT/L (ref 10–40)
BASOPHILS # BLD AUTO: 0.07 K/UL
BASOPHILS NFR BLD AUTO: 1.3 %
BILIRUB SERPL-MCNC: 0.7 MG/DL (ref 0.1–1)
BUN SERPL-MCNC: 31 MG/DL (ref 8–23)
CALCIUM SERPL-MCNC: 8.5 MG/DL (ref 8.7–10.5)
CHLORIDE SERPL-SCNC: 105 MMOL/L (ref 95–110)
CO2 SERPL-SCNC: 29 MMOL/L (ref 23–29)
CREAT SERPL-MCNC: 1.7 MG/DL (ref 0.5–1.4)
CV PHARM DOSE: 0.4 MG
CV STRESS BASE HR: 70 BPM
DIASTOLIC BLOOD PRESSURE: 82 MMHG
ERYTHROCYTE [DISTWIDTH] IN BLOOD BY AUTOMATED COUNT: 15.9 % (ref 11.5–14.5)
GFR SERPLBLD CREATININE-BSD FMLA CKD-EPI: 45 ML/MIN/1.73/M2
GLUCOSE SERPL-MCNC: 135 MG/DL (ref 70–110)
HCT VFR BLD AUTO: 29.4 % (ref 40–54)
HGB BLD-MCNC: 9.3 GM/DL (ref 14–18)
IMM GRANULOCYTES # BLD AUTO: 0.01 K/UL (ref 0–0.04)
IMM GRANULOCYTES NFR BLD AUTO: 0.2 % (ref 0–0.5)
LYMPHOCYTES # BLD AUTO: 2.3 K/UL (ref 1–4.8)
MAGNESIUM SERPL-MCNC: 2 MG/DL (ref 1.6–2.6)
MCH RBC QN AUTO: 28.5 PG (ref 27–31)
MCHC RBC AUTO-ENTMCNC: 31.6 G/DL (ref 32–36)
MCV RBC AUTO: 90 FL (ref 82–98)
NUCLEATED RBC (/100WBC) (SMH): 0 /100 WBC
OHS CV CPX 1 MINUTE RECOVERY HEART RATE: 105 BPM
OHS CV CPX 85 PERCENT MAX PREDICTED HEART RATE MALE: 134
OHS CV CPX MAX PREDICTED HEART RATE: 158
OHS CV CPX PATIENT HEIGHT IN: 75
OHS CV CPX PATIENT IS FEMALE: 0
OHS CV CPX PATIENT IS MALE: 1
OHS CV CPX PEAK DIASTOLIC BLOOD PRESSURE: 83 MMHG
OHS CV CPX PEAK HEAR RATE: 110 BPM
OHS CV CPX PEAK RATE PRESSURE PRODUCT: NORMAL
OHS CV CPX PEAK SYSTOLIC BLOOD PRESSURE: 210 MMHG
OHS CV CPX PERCENT MAX PREDICTED HEART RATE ACHIEVED: 70
OHS CV CPX RATE PRESSURE PRODUCT PRESENTING: NORMAL
OHS CV PHARM TIME: 1 MIN
PLATELET # BLD AUTO: 213 K/UL (ref 150–450)
PMV BLD AUTO: 10.5 FL (ref 9.2–12.9)
POCT GLUCOSE: 140 MG/DL (ref 70–110)
POCT GLUCOSE: 158 MG/DL (ref 70–110)
POCT GLUCOSE: 173 MG/DL (ref 70–110)
POCT GLUCOSE: 174 MG/DL (ref 70–110)
POTASSIUM SERPL-SCNC: 4.3 MMOL/L (ref 3.5–5.1)
PROT SERPL-MCNC: 6.5 GM/DL (ref 6–8.4)
RBC # BLD AUTO: 3.26 M/UL (ref 4.6–6.2)
RELATIVE EOSINOPHIL (SMH): 4 % (ref 0–8)
RELATIVE LYMPHOCYTE (SMH): 42.3 % (ref 18–48)
RELATIVE MONOCYTE (SMH): 9.2 % (ref 4–15)
RELATIVE NEUTROPHIL (SMH): 43 % (ref 38–73)
SODIUM SERPL-SCNC: 136 MMOL/L (ref 136–145)
SYSTOLIC BLOOD PRESSURE: 176 MMHG
WBC # BLD AUTO: 5.44 K/UL (ref 3.9–12.7)

## 2025-08-24 PROCEDURE — 83735 ASSAY OF MAGNESIUM: CPT | Performed by: INTERNAL MEDICINE

## 2025-08-24 PROCEDURE — 99232 SBSQ HOSP IP/OBS MODERATE 35: CPT | Mod: ,,, | Performed by: INTERNAL MEDICINE

## 2025-08-24 PROCEDURE — 63600175 PHARM REV CODE 636 W HCPCS: Performed by: INTERNAL MEDICINE

## 2025-08-24 PROCEDURE — 36415 COLL VENOUS BLD VENIPUNCTURE: CPT | Performed by: INTERNAL MEDICINE

## 2025-08-24 PROCEDURE — 20600001 HC STEP DOWN PRIVATE ROOM

## 2025-08-24 PROCEDURE — 93017 CV STRESS TEST TRACING ONLY: CPT

## 2025-08-24 PROCEDURE — A9502 TC99M TETROFOSMIN: HCPCS

## 2025-08-24 PROCEDURE — 25000003 PHARM REV CODE 250: Performed by: INTERNAL MEDICINE

## 2025-08-24 PROCEDURE — 85025 COMPLETE CBC W/AUTO DIFF WBC: CPT | Performed by: INTERNAL MEDICINE

## 2025-08-24 PROCEDURE — 25000003 PHARM REV CODE 250

## 2025-08-24 PROCEDURE — 80053 COMPREHEN METABOLIC PANEL: CPT | Performed by: INTERNAL MEDICINE

## 2025-08-24 RX ORDER — VALSARTAN 80 MG/1
80 TABLET ORAL 2 TIMES DAILY
Status: DISCONTINUED | OUTPATIENT
Start: 2025-08-24 | End: 2025-08-25 | Stop reason: HOSPADM

## 2025-08-24 RX ORDER — ISOSORBIDE MONONITRATE 60 MG/1
60 TABLET, EXTENDED RELEASE ORAL DAILY
Status: DISCONTINUED | OUTPATIENT
Start: 2025-08-25 | End: 2025-08-25 | Stop reason: HOSPADM

## 2025-08-24 RX ORDER — REGADENOSON 0.08 MG/ML
0.4 INJECTION, SOLUTION INTRAVENOUS ONCE
Status: COMPLETED | OUTPATIENT
Start: 2025-08-24 | End: 2025-08-24

## 2025-08-24 RX ORDER — VALSARTAN 80 MG/1
80 TABLET ORAL DAILY
Status: DISCONTINUED | OUTPATIENT
Start: 2025-08-24 | End: 2025-08-24

## 2025-08-24 RX ADMIN — ISOSORBIDE MONONITRATE 30 MG: 30 TABLET, EXTENDED RELEASE ORAL at 10:08

## 2025-08-24 RX ADMIN — AMLODIPINE BESYLATE 10 MG: 5 TABLET ORAL at 10:08

## 2025-08-24 RX ADMIN — VALSARTAN 80 MG: 80 TABLET, FILM COATED ORAL at 08:08

## 2025-08-24 RX ADMIN — METOPROLOL SUCCINATE 25 MG: 25 TABLET, EXTENDED RELEASE ORAL at 10:08

## 2025-08-24 RX ADMIN — Medication 6 MG: at 08:08

## 2025-08-24 RX ADMIN — REGADENOSON 0.4 MG: 0.08 INJECTION, SOLUTION INTRAVENOUS at 08:08

## 2025-08-24 RX ADMIN — OXYCODONE HYDROCHLORIDE AND ACETAMINOPHEN 1 TABLET: 10; 325 TABLET ORAL at 05:08

## 2025-08-24 RX ADMIN — OXYCODONE HYDROCHLORIDE AND ACETAMINOPHEN 1 TABLET: 10; 325 TABLET ORAL at 10:08

## 2025-08-24 RX ADMIN — INSULIN ASPART 1 UNITS: 100 INJECTION, SOLUTION INTRAVENOUS; SUBCUTANEOUS at 09:08

## 2025-08-24 RX ADMIN — TAMSULOSIN HYDROCHLORIDE 0.4 MG: 0.4 CAPSULE ORAL at 08:08

## 2025-08-24 RX ADMIN — VALSARTAN 80 MG: 80 TABLET, FILM COATED ORAL at 01:08

## 2025-08-24 RX ADMIN — ATORVASTATIN CALCIUM 80 MG: 40 TABLET, FILM COATED ORAL at 10:08

## 2025-08-24 RX ADMIN — ENOXAPARIN SODIUM 30 MG: 30 INJECTION SUBCUTANEOUS at 05:08

## 2025-08-24 RX ADMIN — TETROFOSMIN 25.5 MILLICURIE: 0.23 INJECTION, POWDER, LYOPHILIZED, FOR SOLUTION INTRAVENOUS at 08:08

## 2025-08-24 RX ADMIN — HYDRALAZINE HYDROCHLORIDE 10 MG: 20 INJECTION INTRAMUSCULAR; INTRAVENOUS at 05:08

## 2025-08-24 RX ADMIN — PANTOPRAZOLE SODIUM 20 MG: 20 TABLET, DELAYED RELEASE ORAL at 10:08

## 2025-08-24 RX ADMIN — NORTRIPTYLINE HYDROCHLORIDE 50 MG: 25 CAPSULE ORAL at 08:08

## 2025-08-25 ENCOUNTER — TELEPHONE (OUTPATIENT)
Dept: CARDIOLOGY | Facility: CLINIC | Age: 60
End: 2025-08-25
Payer: MEDICARE

## 2025-08-25 VITALS
SYSTOLIC BLOOD PRESSURE: 161 MMHG | BODY MASS INDEX: 39.17 KG/M2 | RESPIRATION RATE: 11 BRPM | WEIGHT: 315 LBS | TEMPERATURE: 99 F | HEIGHT: 75 IN | HEART RATE: 68 BPM | OXYGEN SATURATION: 93 % | DIASTOLIC BLOOD PRESSURE: 76 MMHG

## 2025-08-25 DIAGNOSIS — R01.1 UNDIAGNOSED CARDIAC MURMURS: Primary | ICD-10-CM

## 2025-08-25 LAB
ABSOLUTE EOSINOPHIL (SMH): 0.19 K/UL
ABSOLUTE MONOCYTE (SMH): 0.45 K/UL (ref 0.3–1)
ABSOLUTE NEUTROPHIL COUNT (SMH): 2 K/UL (ref 1.8–7.7)
ALBUMIN SERPL-MCNC: 3.5 G/DL (ref 3.5–5.2)
ALP SERPL-CCNC: 30 UNIT/L (ref 55–135)
ALT SERPL-CCNC: 11 UNIT/L (ref 10–44)
ANION GAP (SMH): 5 MMOL/L (ref 8–16)
AST SERPL-CCNC: 15 UNIT/L (ref 10–40)
BASOPHILS # BLD AUTO: 0.05 K/UL
BASOPHILS NFR BLD AUTO: 1 %
BILIRUB SERPL-MCNC: 0.6 MG/DL (ref 0.1–1)
BUN SERPL-MCNC: 32 MG/DL (ref 8–23)
CALCIUM SERPL-MCNC: 8.5 MG/DL (ref 8.7–10.5)
CHLORIDE SERPL-SCNC: 104 MMOL/L (ref 95–110)
CO2 SERPL-SCNC: 26 MMOL/L (ref 23–29)
CREAT SERPL-MCNC: 2 MG/DL (ref 0.5–1.4)
ERYTHROCYTE [DISTWIDTH] IN BLOOD BY AUTOMATED COUNT: 15.6 % (ref 11.5–14.5)
GFR SERPLBLD CREATININE-BSD FMLA CKD-EPI: 37 ML/MIN/1.73/M2
GLUCOSE SERPL-MCNC: 128 MG/DL (ref 70–110)
HCT VFR BLD AUTO: 28.7 % (ref 40–54)
HGB BLD-MCNC: 9.1 GM/DL (ref 14–18)
HOLD SPECIMEN: NORMAL
IMM GRANULOCYTES # BLD AUTO: 0.01 K/UL (ref 0–0.04)
IMM GRANULOCYTES NFR BLD AUTO: 0.2 % (ref 0–0.5)
LYMPHOCYTES # BLD AUTO: 2.18 K/UL (ref 1–4.8)
MAGNESIUM SERPL-MCNC: 1.9 MG/DL (ref 1.6–2.6)
MCH RBC QN AUTO: 28.4 PG (ref 27–31)
MCHC RBC AUTO-ENTMCNC: 31.7 G/DL (ref 32–36)
MCV RBC AUTO: 90 FL (ref 82–98)
NUCLEATED RBC (/100WBC) (SMH): 0 /100 WBC
OHS QRS DURATION: 118 MS
OHS QRS DURATION: 120 MS
OHS QRS DURATION: 126 MS
OHS QTC CALCULATION: 440 MS
OHS QTC CALCULATION: 448 MS
OHS QTC CALCULATION: 460 MS
PLATELET # BLD AUTO: 209 K/UL (ref 150–450)
PMV BLD AUTO: 10.4 FL (ref 9.2–12.9)
POCT GLUCOSE: 145 MG/DL (ref 70–110)
POCT GLUCOSE: 150 MG/DL (ref 70–110)
POTASSIUM SERPL-SCNC: 4.3 MMOL/L (ref 3.5–5.1)
PROT SERPL-MCNC: 6.4 GM/DL (ref 6–8.4)
RBC # BLD AUTO: 3.2 M/UL (ref 4.6–6.2)
RELATIVE EOSINOPHIL (SMH): 3.9 % (ref 0–8)
RELATIVE LYMPHOCYTE (SMH): 44.7 % (ref 18–48)
RELATIVE MONOCYTE (SMH): 9.2 % (ref 4–15)
RELATIVE NEUTROPHIL (SMH): 41 % (ref 38–73)
SODIUM SERPL-SCNC: 135 MMOL/L (ref 136–145)
WBC # BLD AUTO: 4.88 K/UL (ref 3.9–12.7)

## 2025-08-25 PROCEDURE — 83735 ASSAY OF MAGNESIUM: CPT | Performed by: INTERNAL MEDICINE

## 2025-08-25 PROCEDURE — 36415 COLL VENOUS BLD VENIPUNCTURE: CPT | Performed by: INTERNAL MEDICINE

## 2025-08-25 PROCEDURE — 80053 COMPREHEN METABOLIC PANEL: CPT | Performed by: INTERNAL MEDICINE

## 2025-08-25 PROCEDURE — 63600175 PHARM REV CODE 636 W HCPCS: Performed by: INTERNAL MEDICINE

## 2025-08-25 PROCEDURE — 25000003 PHARM REV CODE 250: Performed by: INTERNAL MEDICINE

## 2025-08-25 PROCEDURE — 99233 SBSQ HOSP IP/OBS HIGH 50: CPT | Mod: ,,, | Performed by: INTERNAL MEDICINE

## 2025-08-25 PROCEDURE — A9502 TC99M TETROFOSMIN: HCPCS | Performed by: INTERNAL MEDICINE

## 2025-08-25 PROCEDURE — 85025 COMPLETE CBC W/AUTO DIFF WBC: CPT | Performed by: INTERNAL MEDICINE

## 2025-08-25 RX ORDER — AMLODIPINE BESYLATE 10 MG/1
10 TABLET ORAL DAILY
Qty: 30 TABLET | Refills: 0 | Status: SHIPPED | OUTPATIENT
Start: 2025-08-25 | End: 2026-08-25

## 2025-08-25 RX ORDER — VALSARTAN 80 MG/1
80 TABLET ORAL 2 TIMES DAILY
Qty: 60 TABLET | Refills: 0 | Status: SHIPPED | OUTPATIENT
Start: 2025-08-25 | End: 2026-08-25

## 2025-08-25 RX ORDER — ISOSORBIDE MONONITRATE 60 MG/1
60 TABLET, EXTENDED RELEASE ORAL DAILY
Qty: 30 TABLET | Refills: 0 | Status: SHIPPED | OUTPATIENT
Start: 2025-08-25 | End: 2026-08-25

## 2025-08-25 RX ADMIN — INSULIN GLARGINE 10 UNITS: 100 INJECTION, SOLUTION SUBCUTANEOUS at 10:08

## 2025-08-25 RX ADMIN — VALSARTAN 80 MG: 80 TABLET, FILM COATED ORAL at 10:08

## 2025-08-25 RX ADMIN — TETROFOSMIN 10.9 MILLICURIE: 0.23 INJECTION, POWDER, LYOPHILIZED, FOR SOLUTION INTRAVENOUS at 08:08

## 2025-08-25 RX ADMIN — OXYCODONE HYDROCHLORIDE AND ACETAMINOPHEN 1 TABLET: 10; 325 TABLET ORAL at 12:08

## 2025-08-25 RX ADMIN — METOPROLOL SUCCINATE 25 MG: 25 TABLET, EXTENDED RELEASE ORAL at 10:08

## 2025-08-25 RX ADMIN — ISOSORBIDE MONONITRATE 60 MG: 60 TABLET, EXTENDED RELEASE ORAL at 10:08

## 2025-08-25 RX ADMIN — ATORVASTATIN CALCIUM 80 MG: 40 TABLET, FILM COATED ORAL at 10:08

## 2025-08-25 RX ADMIN — HYDRALAZINE HYDROCHLORIDE 10 MG: 20 INJECTION INTRAMUSCULAR; INTRAVENOUS at 02:08

## 2025-08-25 RX ADMIN — AMLODIPINE BESYLATE 10 MG: 5 TABLET ORAL at 10:08

## 2025-08-25 RX ADMIN — OXYCODONE HYDROCHLORIDE AND ACETAMINOPHEN 1 TABLET: 10; 325 TABLET ORAL at 11:08

## 2025-08-25 RX ADMIN — PANTOPRAZOLE SODIUM 20 MG: 20 TABLET, DELAYED RELEASE ORAL at 10:08

## 2025-08-26 ENCOUNTER — PATIENT OUTREACH (OUTPATIENT)
Dept: FAMILY MEDICINE | Facility: CLINIC | Age: 60
End: 2025-08-26
Payer: MEDICARE

## 2025-08-26 ENCOUNTER — PATIENT MESSAGE (OUTPATIENT)
Facility: CLINIC | Age: 60
End: 2025-08-26
Payer: MEDICARE

## 2025-09-04 ENCOUNTER — TELEPHONE (OUTPATIENT)
Dept: CARDIOLOGY | Facility: CLINIC | Age: 60
End: 2025-09-04
Payer: MEDICARE

## (undated) DEVICE — TR BAND

## (undated) DEVICE — CATHETER DIAGNOSTIC DXTERITY 5FR JR4.0

## (undated) DEVICE — CATHETER DIAGNOSTIC DXTERITY 5FR JL3.5

## (undated) DEVICE — SHEATH INTRODUCER SLENDER 6FX10CM

## (undated) DEVICE — GUIDEWIRE J TIP EXCHANGE FIXED CORE 260